# Patient Record
Sex: MALE | Race: WHITE | NOT HISPANIC OR LATINO | Employment: OTHER | URBAN - METROPOLITAN AREA
[De-identification: names, ages, dates, MRNs, and addresses within clinical notes are randomized per-mention and may not be internally consistent; named-entity substitution may affect disease eponyms.]

---

## 2018-05-25 PROBLEM — H25.12 NUCLEAR SCLEROSIS OF LEFT EYE: Status: ACTIVE | Noted: 2018-05-25

## 2018-05-25 PROBLEM — H52.13 MYOPIA OF BOTH EYES WITH ASTIGMATISM AND PRESBYOPIA: Status: ACTIVE | Noted: 2018-05-25

## 2018-05-25 PROBLEM — H26.491 PCO (POSTERIOR CAPSULAR OPACIFICATION), RIGHT: Status: ACTIVE | Noted: 2018-05-25

## 2018-05-25 PROBLEM — Z96.1 PSEUDOPHAKIA OF RIGHT EYE: Status: ACTIVE | Noted: 2018-05-25

## 2018-05-25 PROBLEM — H52.4 MYOPIA OF BOTH EYES WITH ASTIGMATISM AND PRESBYOPIA: Status: ACTIVE | Noted: 2018-05-25

## 2018-05-25 PROBLEM — Z98.890 HX OF VITRECTOMY: Status: ACTIVE | Noted: 2018-05-25

## 2018-05-25 PROBLEM — H52.203 MYOPIA OF BOTH EYES WITH ASTIGMATISM AND PRESBYOPIA: Status: ACTIVE | Noted: 2018-05-25

## 2018-06-08 ASSESSMENT — ACTIVITIES OF DAILY LIVING (ADL)
ADL_SHORT_OF_BREATH: NO
ADL_SCORE: 12
SENSORY_SUPPORT_DEVICES: EYEGLASSES;HEARING AIDS
CHRONIC_PAIN_PRESENT: NO
ADL_BEFORE_ADMISSION: INDEPENDENT
HISTORY OF FALLING IN THE LAST YEAR (PRIOR TO ADMISSION): NO
NEEDS_ASSIST: NO
RECENT_DECLINE_ADL: NO

## 2018-06-13 RX ORDER — SODIUM CHLORIDE 9 MG/ML
INJECTION, SOLUTION INTRAVENOUS CONTINUOUS
Status: CANCELLED | OUTPATIENT
Start: 2018-06-13

## 2018-06-14 ENCOUNTER — HOSPITAL ENCOUNTER (OUTPATIENT)
Age: 73
Discharge: HOME | End: 2018-06-14
Attending: OPHTHALMOLOGY | Admitting: OPHTHALMOLOGY

## 2018-06-14 ENCOUNTER — ANESTHESIA EVENT (OUTPATIENT)
Dept: SURGERY | Age: 73
End: 2018-06-14

## 2018-06-14 ENCOUNTER — ANESTHESIA (OUTPATIENT)
Dept: SURGERY | Age: 73
End: 2018-06-14

## 2018-06-14 ENCOUNTER — SURGERY (OUTPATIENT)
Age: 73
End: 2018-06-14

## 2018-06-14 PROCEDURE — 10002801 HB RX 250 W/O HCPCS: Performed by: OPHTHALMOLOGY

## 2018-06-14 PROCEDURE — V2632 POST CHMBR INTRAOCULAR LENS: HCPCS | Performed by: OPHTHALMOLOGY

## 2018-06-14 PROCEDURE — 66982 XCAPSL CTRC RMVL CPLX WO ECP: CPT | Performed by: NURSE ANESTHETIST, CERTIFIED REGISTERED

## 2018-06-14 PROCEDURE — 10002362 HB ANESTH MAC IV 1ST 1/2 HR: Performed by: OPHTHALMOLOGY

## 2018-06-14 PROCEDURE — 10002800 HB RX 250 W HCPCS: Performed by: NURSE ANESTHETIST, CERTIFIED REGISTERED

## 2018-06-14 PROCEDURE — 66982 XCAPSL CTRC RMVL CPLX WO ECP: CPT | Performed by: OPHTHALMOLOGY

## 2018-06-14 PROCEDURE — 10003056 HB DISPOSABLE INSTRUMENT/SUPPLY 1: Performed by: OPHTHALMOLOGY

## 2018-06-14 PROCEDURE — 10002800 HB RX 250 W HCPCS: Performed by: OPHTHALMOLOGY

## 2018-06-14 PROCEDURE — 10002319 HB EYE SURGERY 2: Performed by: OPHTHALMOLOGY

## 2018-06-14 PROCEDURE — 10004348 HB COUNTER-EVAL PRE-OP

## 2018-06-14 PROCEDURE — 10002807 HB RX 258: Performed by: ANESTHESIOLOGY

## 2018-06-14 DEVICE — TECNIS 1-PC CLEAR MONO 6.0MM 18.0D
Type: IMPLANTABLE DEVICE | Site: EYE | Status: FUNCTIONAL
Brand: TECNIS IOL

## 2018-06-14 RX ORDER — HYDRALAZINE HYDROCHLORIDE 20 MG/ML
5 INJECTION INTRAMUSCULAR; INTRAVENOUS EVERY 10 MIN PRN
Status: DISCONTINUED | OUTPATIENT
Start: 2018-06-14 | End: 2018-06-14 | Stop reason: HOSPADM

## 2018-06-14 RX ORDER — 0.9 % SODIUM CHLORIDE 0.9 %
2 VIAL (ML) INJECTION EVERY 12 HOURS SCHEDULED
Status: DISCONTINUED | OUTPATIENT
Start: 2018-06-14 | End: 2018-06-14 | Stop reason: HOSPADM

## 2018-06-14 RX ORDER — 0.9 % SODIUM CHLORIDE 0.9 %
2 VIAL (ML) INJECTION PRN
Status: DISCONTINUED | OUTPATIENT
Start: 2018-06-14 | End: 2018-06-14 | Stop reason: HOSPADM

## 2018-06-14 RX ORDER — DIPHENHYDRAMINE HYDROCHLORIDE 50 MG/ML
6.25-25 INJECTION INTRAMUSCULAR; INTRAVENOUS
Status: DISCONTINUED | OUTPATIENT
Start: 2018-06-14 | End: 2018-06-14 | Stop reason: HOSPADM

## 2018-06-14 RX ORDER — HUMAN INSULIN 100 [IU]/ML
INJECTION, SOLUTION SUBCUTANEOUS
Status: DISCONTINUED | OUTPATIENT
Start: 2018-06-14 | End: 2018-06-14 | Stop reason: HOSPADM

## 2018-06-14 RX ORDER — ONDANSETRON 2 MG/ML
4 INJECTION INTRAMUSCULAR; INTRAVENOUS 2 TIMES DAILY PRN
Status: DISCONTINUED | OUTPATIENT
Start: 2018-06-14 | End: 2018-06-14 | Stop reason: HOSPADM

## 2018-06-14 RX ORDER — SODIUM CHLORIDE 9 MG/ML
INJECTION, SOLUTION INTRAVENOUS CONTINUOUS
Status: DISCONTINUED | OUTPATIENT
Start: 2018-06-14 | End: 2018-06-14 | Stop reason: HOSPADM

## 2018-06-14 RX ORDER — TETRACAINE HYDROCHLORIDE 5 MG/ML
SOLUTION OPHTHALMIC PRN
Status: DISCONTINUED | OUTPATIENT
Start: 2018-06-14 | End: 2018-06-14 | Stop reason: HOSPADM

## 2018-06-14 RX ORDER — PROCHLORPERAZINE MALEATE 5 MG/1
5-10 TABLET ORAL EVERY 4 HOURS PRN
Status: DISCONTINUED | OUTPATIENT
Start: 2018-06-14 | End: 2018-06-14 | Stop reason: HOSPADM

## 2018-06-14 RX ORDER — LIDOCAINE HYDROCHLORIDE 10 MG/ML
INJECTION, SOLUTION EPIDURAL; INFILTRATION; INTRACAUDAL; PERINEURAL PRN
Status: DISCONTINUED | OUTPATIENT
Start: 2018-06-14 | End: 2018-06-14 | Stop reason: HOSPADM

## 2018-06-14 RX ORDER — LIDOCAINE HYDROCHLORIDE 10 MG/ML
.5-1 INJECTION, SOLUTION INFILTRATION; PERINEURAL PRN
Status: DISCONTINUED | OUTPATIENT
Start: 2018-06-14 | End: 2018-06-14 | Stop reason: HOSPADM

## 2018-06-14 RX ORDER — MOXIFLOXACIN 5 MG/ML
1 SOLUTION/ DROPS OPHTHALMIC
Status: COMPLETED | OUTPATIENT
Start: 2018-06-14 | End: 2018-06-14

## 2018-06-14 RX ORDER — LIDOCAINE AND PRILOCAINE 25; 25 MG/G; MG/G
1 CREAM TOPICAL PRN
Status: DISCONTINUED | OUTPATIENT
Start: 2018-06-14 | End: 2018-06-14 | Stop reason: HOSPADM

## 2018-06-14 RX ORDER — TROPICAMIDE 10 MG/ML
1 SOLUTION/ DROPS OPHTHALMIC
Status: COMPLETED | OUTPATIENT
Start: 2018-06-14 | End: 2018-06-14

## 2018-06-14 RX ORDER — LABETALOL HYDROCHLORIDE 5 MG/ML
5 INJECTION, SOLUTION INTRAVENOUS EVERY 10 MIN PRN
Status: DISCONTINUED | OUTPATIENT
Start: 2018-06-14 | End: 2018-06-14 | Stop reason: HOSPADM

## 2018-06-14 RX ORDER — MIDAZOLAM HYDROCHLORIDE 1 MG/ML
INJECTION, SOLUTION INTRAMUSCULAR; INTRAVENOUS PRN
Status: DISCONTINUED | OUTPATIENT
Start: 2018-06-14 | End: 2018-06-14

## 2018-06-14 RX ORDER — ACETAMINOPHEN 10 MG/ML
1000 INJECTION, SOLUTION INTRAVENOUS
Status: DISCONTINUED | OUTPATIENT
Start: 2018-06-14 | End: 2018-06-14 | Stop reason: HOSPADM

## 2018-06-14 RX ORDER — PHENYLEPHRINE HYDROCHLORIDE 25 MG/ML
1 SOLUTION/ DROPS OPHTHALMIC
Status: COMPLETED | OUTPATIENT
Start: 2018-06-14 | End: 2018-06-14

## 2018-06-14 RX ORDER — SODIUM CHLORIDE, SODIUM LACTATE, POTASSIUM CHLORIDE, CALCIUM CHLORIDE 600; 310; 30; 20 MG/100ML; MG/100ML; MG/100ML; MG/100ML
INJECTION, SOLUTION INTRAVENOUS CONTINUOUS
Status: DISCONTINUED | OUTPATIENT
Start: 2018-06-14 | End: 2018-06-14 | Stop reason: HOSPADM

## 2018-06-14 RX ORDER — BALANCED SALT SOLUTION 6.4; .75; .48; .3; 3.9; 1.7 MG/ML; MG/ML; MG/ML; MG/ML; MG/ML; MG/ML
SOLUTION OPHTHALMIC PRN
Status: DISCONTINUED | OUTPATIENT
Start: 2018-06-14 | End: 2018-06-14 | Stop reason: HOSPADM

## 2018-06-14 RX ORDER — NALOXONE HCL 0.4 MG/ML
0.2 VIAL (ML) INJECTION EVERY 5 MIN PRN
Status: DISCONTINUED | OUTPATIENT
Start: 2018-06-14 | End: 2018-06-14 | Stop reason: HOSPADM

## 2018-06-14 RX ADMIN — EPINEPHRINE: 1 INJECTION INTRAMUSCULAR; INTRAVENOUS; SUBCUTANEOUS at 11:57

## 2018-06-14 RX ADMIN — TROPICAMIDE 1 DROP: 10 SOLUTION/ DROPS OPHTHALMIC at 11:15

## 2018-06-14 RX ADMIN — MOXIFLOXACIN 1 DROP: 5 SOLUTION/ DROPS OPHTHALMIC at 11:20

## 2018-06-14 RX ADMIN — PHENYLEPHRINE HYDROCHLORIDE 1 DROP: 25 SOLUTION/ DROPS OPHTHALMIC at 11:25

## 2018-06-14 RX ADMIN — PHENYLEPHRINE HYDROCHLORIDE 1 DROP: 25 SOLUTION/ DROPS OPHTHALMIC at 11:20

## 2018-06-14 RX ADMIN — MIDAZOLAM HYDROCHLORIDE 1 MG: 1 INJECTION, SOLUTION INTRAMUSCULAR; INTRAVENOUS at 11:55

## 2018-06-14 RX ADMIN — BALANCED SALT SOLUTION 15 ML: 6.4; .75; .48; .3; 3.9; 1.7 SOLUTION OPHTHALMIC at 11:57

## 2018-06-14 RX ADMIN — TETRACAINE HYDROCHLORIDE 2 DROP: 5 SOLUTION OPHTHALMIC at 11:40

## 2018-06-14 RX ADMIN — LIDOCAINE HYDROCHLORIDE 0.5 ML: 10 INJECTION, SOLUTION EPIDURAL; INFILTRATION; INTRACAUDAL; PERINEURAL at 11:57

## 2018-06-14 RX ADMIN — SODIUM CHLORIDE, POTASSIUM CHLORIDE, SODIUM LACTATE AND CALCIUM CHLORIDE: 600; 310; 30; 20 INJECTION, SOLUTION INTRAVENOUS at 11:25

## 2018-06-14 RX ADMIN — Medication 12 MG: at 11:57

## 2018-06-14 RX ADMIN — MOXIFLOXACIN 1 DROP: 5 SOLUTION/ DROPS OPHTHALMIC at 11:15

## 2018-06-14 RX ADMIN — TETRACAINE HYDROCHLORIDE 2 DROP: 5 SOLUTION OPHTHALMIC at 10:44

## 2018-06-14 RX ADMIN — FENTANYL CITRATE 50 MCG: 50 INJECTION INTRAMUSCULAR; INTRAVENOUS at 11:50

## 2018-06-14 RX ADMIN — TETRACAINE HYDROCHLORIDE 2 DROP: 5 SOLUTION OPHTHALMIC at 11:44

## 2018-06-14 RX ADMIN — TROPICAMIDE 1 DROP: 10 SOLUTION/ DROPS OPHTHALMIC at 11:25

## 2018-06-14 RX ADMIN — TETRACAINE HYDROCHLORIDE 2 DROP: 5 SOLUTION OPHTHALMIC at 11:42

## 2018-06-14 RX ADMIN — MOXIFLOXACIN 1 DROP: 5 SOLUTION/ DROPS OPHTHALMIC at 11:25

## 2018-06-14 RX ADMIN — MIDAZOLAM HYDROCHLORIDE 1 MG: 1 INJECTION, SOLUTION INTRAMUSCULAR; INTRAVENOUS at 11:50

## 2018-06-14 RX ADMIN — TROPICAMIDE 1 DROP: 10 SOLUTION/ DROPS OPHTHALMIC at 11:20

## 2018-06-14 RX ADMIN — PHENYLEPHRINE HYDROCHLORIDE 1 DROP: 25 SOLUTION/ DROPS OPHTHALMIC at 11:15

## 2018-06-14 ASSESSMENT — ACTIVITIES OF DAILY LIVING (ADL)
ADL_BEFORE_ADMISSION: INDEPENDENT
NEEDS_ASSIST: NO
ADL_SCORE: 12
SENSORY_SUPPORT_DEVICES: EYEGLASSES;HEARING AIDS
HISTORY OF FALLING IN THE LAST YEAR (PRIOR TO ADMISSION): NO
ADL_SHORT_OF_BREATH: NO
RECENT_DECLINE_ADL: NO
CHRONIC_PAIN_PRESENT: NO

## 2018-06-14 ASSESSMENT — COGNITIVE AND FUNCTIONAL STATUS - GENERAL
ARE YOU DEAF OR DO YOU HAVE SERIOUS DIFFICULTY  HEARING: NO
ARE YOU BLIND OR DO YOU HAVE SERIOUS DIFFICULTY SEEING, EVEN WHEN WEARING GLASSES: NO

## 2018-06-14 ASSESSMENT — PAIN SCALES - GENERAL
PAIN_LEVEL_AT_REST: 0

## 2018-06-14 ASSESSMENT — ENCOUNTER SYMPTOMS
SEIZURES: 0
EXERCISE TOLERANCE: GOOD

## 2018-06-15 PROBLEM — Z96.1 PSEUDOPHAKIA OF BOTH EYES: Status: ACTIVE | Noted: 2018-06-15

## 2018-06-15 PROBLEM — Z96.1 PSEUDOPHAKIA OF RIGHT EYE: Status: RESOLVED | Noted: 2018-05-25 | Resolved: 2018-06-15

## 2018-10-30 ENCOUNTER — NURSE ONLY (OUTPATIENT)
Dept: URGENT CARE | Age: 73
End: 2018-10-30

## 2018-10-30 VITALS — TEMPERATURE: 98.9 F

## 2018-10-30 DIAGNOSIS — Z23 NEED FOR IMMUNIZATION AGAINST INFLUENZA: Primary | ICD-10-CM

## 2018-10-30 PROCEDURE — 90662 IIV NO PRSV INCREASED AG IM: CPT | Performed by: PHYSICIAN ASSISTANT

## 2019-11-25 ENCOUNTER — NURSE ONLY (OUTPATIENT)
Dept: URGENT CARE | Age: 74
End: 2019-11-25

## 2019-11-25 VITALS — TEMPERATURE: 98.4 F

## 2019-11-25 DIAGNOSIS — Z23 NEED FOR IMMUNIZATION AGAINST INFLUENZA: Primary | ICD-10-CM

## 2019-11-25 PROCEDURE — 90662 IIV NO PRSV INCREASED AG IM: CPT | Performed by: PHYSICIAN ASSISTANT

## 2021-01-01 ENCOUNTER — PATIENT OUTREACH (OUTPATIENT)
Dept: GASTROENTEROLOGY | Facility: CLINIC | Age: 76
End: 2021-01-01
Payer: COMMERCIAL

## 2021-01-01 ENCOUNTER — TELEPHONE (OUTPATIENT)
Dept: GASTROENTEROLOGY | Facility: CLINIC | Age: 76
End: 2021-01-01

## 2021-01-01 ENCOUNTER — PATIENT OUTREACH (OUTPATIENT)
Dept: GASTROENTEROLOGY | Facility: CLINIC | Age: 76
End: 2021-01-01

## 2021-01-01 ENCOUNTER — HOSPITAL ENCOUNTER (OUTPATIENT)
Facility: CLINIC | Age: 76
Discharge: HOME | End: 2021-10-14
Attending: INTERNAL MEDICINE | Admitting: INTERNAL MEDICINE
Payer: COMMERCIAL

## 2021-01-01 ENCOUNTER — TELEPHONE (OUTPATIENT)
Dept: GASTROENTEROLOGY | Facility: CLINIC | Age: 76
End: 2021-01-01
Payer: COMMERCIAL

## 2021-01-01 ENCOUNTER — ANESTHESIA EVENT (OUTPATIENT)
Dept: SURGERY | Facility: CLINIC | Age: 76
End: 2021-01-01
Payer: COMMERCIAL

## 2021-01-01 ENCOUNTER — HEALTH MAINTENANCE LETTER (OUTPATIENT)
Age: 76
End: 2021-01-01

## 2021-01-01 ENCOUNTER — ANESTHESIA (OUTPATIENT)
Dept: GASTROENTEROLOGY | Facility: CLINIC | Age: 76
End: 2021-01-01
Payer: COMMERCIAL

## 2021-01-01 ENCOUNTER — LAB (OUTPATIENT)
Dept: LAB | Facility: CLINIC | Age: 76
End: 2021-01-01
Payer: COMMERCIAL

## 2021-01-01 ENCOUNTER — OFFICE VISIT (OUTPATIENT)
Dept: SURGERY | Facility: CLINIC | Age: 76
End: 2021-01-01
Attending: SURGERY
Payer: COMMERCIAL

## 2021-01-01 ENCOUNTER — HOSPITAL ENCOUNTER (OUTPATIENT)
Facility: CLINIC | Age: 76
Discharge: HOME | End: 2021-09-28
Attending: INTERNAL MEDICINE | Admitting: INTERNAL MEDICINE
Payer: COMMERCIAL

## 2021-01-01 ENCOUNTER — ANESTHESIA (OUTPATIENT)
Dept: SURGERY | Facility: CLINIC | Age: 76
End: 2021-01-01
Payer: COMMERCIAL

## 2021-01-01 ENCOUNTER — TELEPHONE (OUTPATIENT)
Dept: SURGERY | Facility: CLINIC | Age: 76
End: 2021-01-01

## 2021-01-01 ENCOUNTER — OFFICE VISIT (OUTPATIENT)
Dept: FAMILY MEDICINE | Facility: CLINIC | Age: 76
End: 2021-01-01
Payer: COMMERCIAL

## 2021-01-01 ENCOUNTER — APPOINTMENT (OUTPATIENT)
Dept: GENERAL RADIOLOGY | Facility: CLINIC | Age: 76
End: 2021-01-01
Attending: INTERNAL MEDICINE
Payer: COMMERCIAL

## 2021-01-01 ENCOUNTER — PREP FOR PROCEDURE (OUTPATIENT)
Dept: GASTROENTEROLOGY | Facility: CLINIC | Age: 76
End: 2021-01-01

## 2021-01-01 ENCOUNTER — PATIENT OUTREACH (OUTPATIENT)
Dept: SURGERY | Facility: CLINIC | Age: 76
End: 2021-01-01

## 2021-01-01 ENCOUNTER — LAB (OUTPATIENT)
Dept: URGENT CARE | Facility: URGENT CARE | Age: 76
End: 2021-01-01
Payer: COMMERCIAL

## 2021-01-01 ENCOUNTER — ANESTHESIA EVENT (OUTPATIENT)
Dept: GASTROENTEROLOGY | Facility: CLINIC | Age: 76
End: 2021-01-01
Payer: COMMERCIAL

## 2021-01-01 ENCOUNTER — PREP FOR PROCEDURE (OUTPATIENT)
Dept: GASTROENTEROLOGY | Facility: CLINIC | Age: 76
End: 2021-01-01
Payer: COMMERCIAL

## 2021-01-01 ENCOUNTER — LAB (OUTPATIENT)
Dept: LAB | Facility: CLINIC | Age: 76
End: 2021-01-01
Attending: INTERNAL MEDICINE
Payer: COMMERCIAL

## 2021-01-01 VITALS
WEIGHT: 179.45 LBS | BODY MASS INDEX: 25.12 KG/M2 | TEMPERATURE: 97.8 F | SYSTOLIC BLOOD PRESSURE: 142 MMHG | OXYGEN SATURATION: 98 % | HEART RATE: 74 BPM | RESPIRATION RATE: 16 BRPM | HEIGHT: 71 IN | DIASTOLIC BLOOD PRESSURE: 82 MMHG

## 2021-01-01 VITALS
HEART RATE: 85 BPM | RESPIRATION RATE: 16 BRPM | TEMPERATURE: 98.4 F | SYSTOLIC BLOOD PRESSURE: 140 MMHG | DIASTOLIC BLOOD PRESSURE: 79 MMHG | WEIGHT: 173.94 LBS | BODY MASS INDEX: 24.9 KG/M2 | HEIGHT: 70 IN | OXYGEN SATURATION: 99 %

## 2021-01-01 VITALS
HEART RATE: 72 BPM | BODY MASS INDEX: 26.25 KG/M2 | SYSTOLIC BLOOD PRESSURE: 130 MMHG | WEIGHT: 187.5 LBS | DIASTOLIC BLOOD PRESSURE: 70 MMHG | RESPIRATION RATE: 16 BRPM | HEIGHT: 71 IN

## 2021-01-01 VITALS
OXYGEN SATURATION: 100 % | WEIGHT: 179.2 LBS | DIASTOLIC BLOOD PRESSURE: 73 MMHG | HEART RATE: 72 BPM | SYSTOLIC BLOOD PRESSURE: 112 MMHG | TEMPERATURE: 97.7 F | BODY MASS INDEX: 25.71 KG/M2

## 2021-01-01 DIAGNOSIS — F33.42 MAJOR DEPRESSIVE DISORDER, RECURRENT EPISODE, IN FULL REMISSION (H): ICD-10-CM

## 2021-01-01 DIAGNOSIS — K86.89 PANCREATIC MASS: Primary | ICD-10-CM

## 2021-01-01 DIAGNOSIS — Z11.59 ENCOUNTER FOR SCREENING FOR OTHER VIRAL DISEASES: ICD-10-CM

## 2021-01-01 DIAGNOSIS — C61 PROSTATE CANCER (H): ICD-10-CM

## 2021-01-01 DIAGNOSIS — R69 DIAGNOSIS UNKNOWN: ICD-10-CM

## 2021-01-01 DIAGNOSIS — E78.5 DYSLIPIDEMIA (HIGH LDL; LOW HDL): ICD-10-CM

## 2021-01-01 DIAGNOSIS — K83.1 OBSTRUCTIVE JAUNDICE (H): ICD-10-CM

## 2021-01-01 DIAGNOSIS — K86.89 PANCREATIC MASS: ICD-10-CM

## 2021-01-01 DIAGNOSIS — I10 ESSENTIAL HYPERTENSION, BENIGN: Primary | ICD-10-CM

## 2021-01-01 DIAGNOSIS — I10 ESSENTIAL HYPERTENSION, BENIGN: ICD-10-CM

## 2021-01-01 DIAGNOSIS — K83.1 BILIARY STRICTURE (H): Primary | ICD-10-CM

## 2021-01-01 DIAGNOSIS — F51.01 PRIMARY INSOMNIA: ICD-10-CM

## 2021-01-01 DIAGNOSIS — Z11.59 ENCOUNTER FOR SCREENING FOR OTHER VIRAL DISEASES: Primary | ICD-10-CM

## 2021-01-01 LAB
ALBUMIN SERPL-MCNC: 3.4 G/DL (ref 3.4–5)
ALP SERPL-CCNC: 95 U/L (ref 40–150)
ALT SERPL W P-5'-P-CCNC: 36 U/L (ref 0–70)
AMYLASE SERPL-CCNC: 36 U/L (ref 30–110)
AMYLASE SERPL-CCNC: 47 U/L (ref 30–110)
ANION GAP SERPL CALCULATED.3IONS-SCNC: 5 MMOL/L (ref 3–14)
AST SERPL W P-5'-P-CCNC: 28 U/L (ref 0–45)
BILIRUB SERPL-MCNC: 0.6 MG/DL (ref 0.2–1.3)
BUN SERPL-MCNC: 18 MG/DL (ref 7–30)
CALCIUM SERPL-MCNC: 8.8 MG/DL (ref 8.5–10.1)
CHLORIDE BLD-SCNC: 104 MMOL/L (ref 94–109)
CHOLEST SERPL-MCNC: 163 MG/DL
CO2 SERPL-SCNC: 27 MMOL/L (ref 20–32)
CREAT SERPL-MCNC: 0.92 MG/DL (ref 0.66–1.25)
ERCP: NORMAL
ERYTHROCYTE [DISTWIDTH] IN BLOOD BY AUTOMATED COUNT: 12.8 % (ref 10–15)
FASTING STATUS PATIENT QL REPORTED: YES
GFR SERPL CREATININE-BSD FRML MDRD: 81 ML/MIN/1.73M2
GLUCOSE BLD-MCNC: 114 MG/DL (ref 70–99)
GLUCOSE BLDC GLUCOMTR-MCNC: 119 MG/DL (ref 70–99)
HCT VFR BLD AUTO: 31.6 % (ref 40–53)
HDLC SERPL-MCNC: 54 MG/DL
HGB BLD-MCNC: 10.8 G/DL (ref 13.3–17.7)
INR PPP: 1.08 (ref 0.85–1.15)
LDLC SERPL CALC-MCNC: 86 MG/DL
LIPASE SERPL-CCNC: 280 U/L (ref 73–393)
LIPASE SERPL-CCNC: 574 U/L (ref 73–393)
MCH RBC QN AUTO: 35.5 PG (ref 26.5–33)
MCHC RBC AUTO-ENTMCNC: 34.2 G/DL (ref 31.5–36.5)
MCV RBC AUTO: 104 FL (ref 78–100)
NONHDLC SERPL-MCNC: 109 MG/DL
PATH REPORT.COMMENTS IMP SPEC: ABNORMAL
PATH REPORT.COMMENTS IMP SPEC: NORMAL
PATH REPORT.COMMENTS IMP SPEC: YES
PATH REPORT.FINAL DX SPEC: ABNORMAL
PATH REPORT.FINAL DX SPEC: NORMAL
PATH REPORT.FINAL DX SPEC: NORMAL
PATH REPORT.GROSS SPEC: ABNORMAL
PATH REPORT.GROSS SPEC: NORMAL
PATH REPORT.GROSS SPEC: NORMAL
PATH REPORT.MICROSCOPIC SPEC OTHER STN: NORMAL
PATH REPORT.RELEVANT HX SPEC: ABNORMAL
PATH REPORT.RELEVANT HX SPEC: NORMAL
PATH REPORT.RELEVANT HX SPEC: NORMAL
PHOTO IMAGE: NORMAL
PLATELET # BLD AUTO: 175 10E3/UL (ref 150–450)
POTASSIUM BLD-SCNC: 4 MMOL/L (ref 3.4–5.3)
PROT SERPL-MCNC: 6.9 G/DL (ref 6.8–8.8)
RBC # BLD AUTO: 3.04 10E6/UL (ref 4.4–5.9)
SARS-COV-2 RNA RESP QL NAA+PROBE: NEGATIVE
SARS-COV-2 RNA RESP QL NAA+PROBE: NEGATIVE
SODIUM SERPL-SCNC: 136 MMOL/L (ref 133–144)
TRIGL SERPL-MCNC: 115 MG/DL
UPPER EUS: NORMAL
UPPER EUS: NORMAL
WBC # BLD AUTO: 6.9 10E3/UL (ref 4–11)

## 2021-01-01 PROCEDURE — U0003 INFECTIOUS AGENT DETECTION BY NUCLEIC ACID (DNA OR RNA); SEVERE ACUTE RESPIRATORY SYNDROME CORONAVIRUS 2 (SARS-COV-2) (CORONAVIRUS DISEASE [COVID-19]), AMPLIFIED PROBE TECHNIQUE, MAKING USE OF HIGH THROUGHPUT TECHNOLOGIES AS DESCRIBED BY CMS-2020-01-R: HCPCS

## 2021-01-01 PROCEDURE — 88173 CYTOPATH EVAL FNA REPORT: CPT | Mod: 26 | Performed by: PATHOLOGY

## 2021-01-01 PROCEDURE — 999N000141 HC STATISTIC PRE-PROCEDURE NURSING ASSESSMENT: Performed by: INTERNAL MEDICINE

## 2021-01-01 PROCEDURE — 80053 COMPREHEN METABOLIC PANEL: CPT | Performed by: INTERNAL MEDICINE

## 2021-01-01 PROCEDURE — 82962 GLUCOSE BLOOD TEST: CPT

## 2021-01-01 PROCEDURE — 255N000002 HC RX 255 OP 636: Performed by: INTERNAL MEDICINE

## 2021-01-01 PROCEDURE — 360N000084 HC SURGERY LEVEL 4 W/ FLUORO, PER MIN: Performed by: INTERNAL MEDICINE

## 2021-01-01 PROCEDURE — 88305 TISSUE EXAM BY PATHOLOGIST: CPT | Mod: 26 | Performed by: PATHOLOGY

## 2021-01-01 PROCEDURE — C1726 CATH, BAL DIL, NON-VASCULAR: HCPCS | Performed by: INTERNAL MEDICINE

## 2021-01-01 PROCEDURE — 36415 COLL VENOUS BLD VENIPUNCTURE: CPT

## 2021-01-01 PROCEDURE — 272N000001 HC OR GENERAL SUPPLY STERILE: Performed by: INTERNAL MEDICINE

## 2021-01-01 PROCEDURE — 85027 COMPLETE CBC AUTOMATED: CPT | Performed by: INTERNAL MEDICINE

## 2021-01-01 PROCEDURE — C1877 STENT, NON-COAT/COV W/O DEL: HCPCS | Performed by: INTERNAL MEDICINE

## 2021-01-01 PROCEDURE — 250N000009 HC RX 250: Performed by: INTERNAL MEDICINE

## 2021-01-01 PROCEDURE — 250N000009 HC RX 250: Performed by: ANESTHESIOLOGY

## 2021-01-01 PROCEDURE — 82150 ASSAY OF AMYLASE: CPT | Performed by: INTERNAL MEDICINE

## 2021-01-01 PROCEDURE — 80061 LIPID PANEL: CPT

## 2021-01-01 PROCEDURE — U0005 INFEC AGEN DETEC AMPLI PROBE: HCPCS

## 2021-01-01 PROCEDURE — 710N000012 HC RECOVERY PHASE 2, PER MINUTE: Performed by: INTERNAL MEDICINE

## 2021-01-01 PROCEDURE — 250N000025 HC SEVOFLURANE, PER MIN: Performed by: INTERNAL MEDICINE

## 2021-01-01 PROCEDURE — 999N000181 XR SURGERY CARM FLUORO GREATER THAN 5 MIN W STILLS: Mod: TC

## 2021-01-01 PROCEDURE — 43242 EGD US FINE NEEDLE BX/ASPIR: CPT | Performed by: INTERNAL MEDICINE

## 2021-01-01 PROCEDURE — G0463 HOSPITAL OUTPT CLINIC VISIT: HCPCS

## 2021-01-01 PROCEDURE — 258N000003 HC RX IP 258 OP 636: Performed by: INTERNAL MEDICINE

## 2021-01-01 PROCEDURE — 258N000003 HC RX IP 258 OP 636

## 2021-01-01 PROCEDURE — 88305 TISSUE EXAM BY PATHOLOGIST: CPT | Mod: TC | Performed by: INTERNAL MEDICINE

## 2021-01-01 PROCEDURE — 250N000009 HC RX 250

## 2021-01-01 PROCEDURE — 250N000011 HC RX IP 250 OP 636

## 2021-01-01 PROCEDURE — 99207 PR NO CHARGE LOS: CPT

## 2021-01-01 PROCEDURE — 258N000003 HC RX IP 258 OP 636: Performed by: STUDENT IN AN ORGANIZED HEALTH CARE EDUCATION/TRAINING PROGRAM

## 2021-01-01 PROCEDURE — 88172 CYTP DX EVAL FNA 1ST EA SITE: CPT | Mod: 26 | Performed by: PATHOLOGY

## 2021-01-01 PROCEDURE — 710N000010 HC RECOVERY PHASE 1, LEVEL 2, PER MIN: Performed by: INTERNAL MEDICINE

## 2021-01-01 PROCEDURE — 250N000011 HC RX IP 250 OP 636: Performed by: STUDENT IN AN ORGANIZED HEALTH CARE EDUCATION/TRAINING PROGRAM

## 2021-01-01 PROCEDURE — 83690 ASSAY OF LIPASE: CPT | Performed by: INTERNAL MEDICINE

## 2021-01-01 PROCEDURE — C1769 GUIDE WIRE: HCPCS | Performed by: INTERNAL MEDICINE

## 2021-01-01 PROCEDURE — 99204 OFFICE O/P NEW MOD 45 MIN: CPT | Performed by: INTERNAL MEDICINE

## 2021-01-01 PROCEDURE — 36415 COLL VENOUS BLD VENIPUNCTURE: CPT | Performed by: INTERNAL MEDICINE

## 2021-01-01 PROCEDURE — 99204 OFFICE O/P NEW MOD 45 MIN: CPT | Performed by: SURGERY

## 2021-01-01 PROCEDURE — 43237 ENDOSCOPIC US EXAM ESOPH: CPT | Performed by: INTERNAL MEDICINE

## 2021-01-01 PROCEDURE — 85610 PROTHROMBIN TIME: CPT | Performed by: INTERNAL MEDICINE

## 2021-01-01 PROCEDURE — 370N000017 HC ANESTHESIA TECHNICAL FEE, PER MIN: Performed by: INTERNAL MEDICINE

## 2021-01-01 DEVICE — IMPLANTABLE DEVICE
Type: IMPLANTABLE DEVICE | Site: BILE DUCT | Status: NON-FUNCTIONAL
Removed: 2022-02-24

## 2021-01-01 RX ORDER — PROPOFOL 10 MG/ML
INJECTION, EMULSION INTRAVENOUS PRN
Status: DISCONTINUED | OUTPATIENT
Start: 2021-01-01 | End: 2021-01-01

## 2021-01-01 RX ORDER — SODIUM CHLORIDE, SODIUM LACTATE, POTASSIUM CHLORIDE, CALCIUM CHLORIDE 600; 310; 30; 20 MG/100ML; MG/100ML; MG/100ML; MG/100ML
INJECTION, SOLUTION INTRAVENOUS CONTINUOUS PRN
Status: DISCONTINUED | OUTPATIENT
Start: 2021-01-01 | End: 2021-01-01

## 2021-01-01 RX ORDER — IOPAMIDOL 408 MG/ML
INJECTION, SOLUTION INTRATHECAL PRN
Status: DISCONTINUED | OUTPATIENT
Start: 2021-01-01 | End: 2021-01-01 | Stop reason: HOSPADM

## 2021-01-01 RX ORDER — ONDANSETRON 4 MG/1
4 TABLET, ORALLY DISINTEGRATING ORAL EVERY 30 MIN PRN
Status: DISCONTINUED | OUTPATIENT
Start: 2021-01-01 | End: 2021-01-01 | Stop reason: HOSPADM

## 2021-01-01 RX ORDER — ONDANSETRON 2 MG/ML
4 INJECTION INTRAMUSCULAR; INTRAVENOUS EVERY 30 MIN PRN
Status: DISCONTINUED | OUTPATIENT
Start: 2021-01-01 | End: 2021-01-01 | Stop reason: HOSPADM

## 2021-01-01 RX ORDER — EPHEDRINE SULFATE 50 MG/ML
INJECTION, SOLUTION INTRAMUSCULAR; INTRAVENOUS; SUBCUTANEOUS PRN
Status: DISCONTINUED | OUTPATIENT
Start: 2021-01-01 | End: 2021-01-01

## 2021-01-01 RX ORDER — IOPAMIDOL 510 MG/ML
INJECTION, SOLUTION INTRAVASCULAR PRN
Status: DISCONTINUED | OUTPATIENT
Start: 2021-01-01 | End: 2021-01-01 | Stop reason: HOSPADM

## 2021-01-01 RX ORDER — NALOXONE HYDROCHLORIDE 0.4 MG/ML
0.4 INJECTION, SOLUTION INTRAMUSCULAR; INTRAVENOUS; SUBCUTANEOUS
Status: DISCONTINUED | OUTPATIENT
Start: 2021-01-01 | End: 2021-01-01 | Stop reason: HOSPADM

## 2021-01-01 RX ORDER — LOSARTAN POTASSIUM 100 MG/1
100 TABLET ORAL DAILY
Qty: 90 TABLET | Refills: 1 | Status: SHIPPED | OUTPATIENT
Start: 2021-01-01 | End: 2022-01-01

## 2021-01-01 RX ORDER — NALOXONE HYDROCHLORIDE 0.4 MG/ML
0.2 INJECTION, SOLUTION INTRAMUSCULAR; INTRAVENOUS; SUBCUTANEOUS
Status: DISCONTINUED | OUTPATIENT
Start: 2021-01-01 | End: 2021-01-01 | Stop reason: HOSPADM

## 2021-01-01 RX ORDER — LABETALOL HYDROCHLORIDE 5 MG/ML
10 INJECTION, SOLUTION INTRAVENOUS
Status: DISCONTINUED | OUTPATIENT
Start: 2021-01-01 | End: 2021-01-01 | Stop reason: HOSPADM

## 2021-01-01 RX ORDER — HYDRALAZINE HYDROCHLORIDE 20 MG/ML
2.5-5 INJECTION INTRAMUSCULAR; INTRAVENOUS EVERY 10 MIN PRN
Status: DISCONTINUED | OUTPATIENT
Start: 2021-01-01 | End: 2021-01-01 | Stop reason: HOSPADM

## 2021-01-01 RX ORDER — ACETAMINOPHEN 325 MG/1
975 TABLET ORAL ONCE
Status: DISCONTINUED | OUTPATIENT
Start: 2021-01-01 | End: 2021-01-01 | Stop reason: HOSPADM

## 2021-01-01 RX ORDER — HYDROMORPHONE HCL IN WATER/PF 6 MG/30 ML
0.2 PATIENT CONTROLLED ANALGESIA SYRINGE INTRAVENOUS EVERY 5 MIN PRN
Status: DISCONTINUED | OUTPATIENT
Start: 2021-01-01 | End: 2021-01-01 | Stop reason: HOSPADM

## 2021-01-01 RX ORDER — ZOLPIDEM TARTRATE 10 MG/1
10 TABLET ORAL
COMMUNITY
Start: 2021-08-06 | End: 2022-01-01

## 2021-01-01 RX ORDER — DICYCLOMINE HCL 20 MG
20 TABLET ORAL 3 TIMES DAILY
COMMUNITY
Start: 2021-09-10

## 2021-01-01 RX ORDER — LIDOCAINE 40 MG/G
CREAM TOPICAL
Status: DISCONTINUED | OUTPATIENT
Start: 2021-01-01 | End: 2021-01-01 | Stop reason: HOSPADM

## 2021-01-01 RX ORDER — FLUMAZENIL 0.1 MG/ML
0.2 INJECTION, SOLUTION INTRAVENOUS
Status: DISCONTINUED | OUTPATIENT
Start: 2021-01-01 | End: 2021-01-01 | Stop reason: HOSPADM

## 2021-01-01 RX ORDER — INDOMETHACIN 50 MG/1
SUPPOSITORY RECTAL PRN
Status: DISCONTINUED | OUTPATIENT
Start: 2021-01-01 | End: 2021-01-01 | Stop reason: HOSPADM

## 2021-01-01 RX ORDER — ESCITALOPRAM OXALATE 20 MG/1
1 TABLET ORAL EVERY MORNING
COMMUNITY
Start: 2021-09-02

## 2021-01-01 RX ORDER — OXYCODONE HYDROCHLORIDE 5 MG/1
5 TABLET ORAL EVERY 4 HOURS PRN
Status: DISCONTINUED | OUTPATIENT
Start: 2021-01-01 | End: 2021-01-01 | Stop reason: HOSPADM

## 2021-01-01 RX ORDER — DIMENHYDRINATE 50 MG/ML
25 INJECTION, SOLUTION INTRAMUSCULAR; INTRAVENOUS
Status: DISCONTINUED | OUTPATIENT
Start: 2021-01-01 | End: 2021-01-01 | Stop reason: HOSPADM

## 2021-01-01 RX ORDER — SODIUM CHLORIDE, SODIUM LACTATE, POTASSIUM CHLORIDE, CALCIUM CHLORIDE 600; 310; 30; 20 MG/100ML; MG/100ML; MG/100ML; MG/100ML
INJECTION, SOLUTION INTRAVENOUS CONTINUOUS
Status: DISCONTINUED | OUTPATIENT
Start: 2021-01-01 | End: 2021-01-01 | Stop reason: HOSPADM

## 2021-01-01 RX ORDER — LIDOCAINE HYDROCHLORIDE 20 MG/ML
INJECTION, SOLUTION INFILTRATION; PERINEURAL PRN
Status: DISCONTINUED | OUTPATIENT
Start: 2021-01-01 | End: 2021-01-01

## 2021-01-01 RX ORDER — BUPROPION HYDROCHLORIDE 150 MG/1
1 TABLET, EXTENDED RELEASE ORAL DAILY
COMMUNITY
Start: 2020-03-03 | End: 2021-01-01

## 2021-01-01 RX ORDER — LOSARTAN POTASSIUM 100 MG/1
100 TABLET ORAL DAILY
COMMUNITY
Start: 2020-09-04 | End: 2021-01-01

## 2021-01-01 RX ORDER — ONDANSETRON 2 MG/ML
INJECTION INTRAMUSCULAR; INTRAVENOUS PRN
Status: DISCONTINUED | OUTPATIENT
Start: 2021-01-01 | End: 2021-01-01

## 2021-01-01 RX ORDER — GLYCOPYRROLATE 0.2 MG/ML
INJECTION, SOLUTION INTRAMUSCULAR; INTRAVENOUS PRN
Status: DISCONTINUED | OUTPATIENT
Start: 2021-01-01 | End: 2021-01-01

## 2021-01-01 RX ORDER — LOVASTATIN 40 MG
40 TABLET ORAL EVERY MORNING
COMMUNITY
Start: 2020-09-04 | End: 2022-01-01

## 2021-01-01 RX ORDER — HYDROMORPHONE HCL IN WATER/PF 6 MG/30 ML
0.4 PATIENT CONTROLLED ANALGESIA SYRINGE INTRAVENOUS EVERY 5 MIN PRN
Status: DISCONTINUED | OUTPATIENT
Start: 2021-01-01 | End: 2021-01-01 | Stop reason: HOSPADM

## 2021-01-01 RX ORDER — FENTANYL CITRATE 50 UG/ML
50 INJECTION, SOLUTION INTRAMUSCULAR; INTRAVENOUS EVERY 5 MIN PRN
Status: DISCONTINUED | OUTPATIENT
Start: 2021-01-01 | End: 2021-01-01 | Stop reason: HOSPADM

## 2021-01-01 RX ORDER — PROPOFOL 10 MG/ML
INJECTION, EMULSION INTRAVENOUS CONTINUOUS PRN
Status: DISCONTINUED | OUTPATIENT
Start: 2021-01-01 | End: 2021-01-01

## 2021-01-01 RX ORDER — FENTANYL CITRATE 50 UG/ML
INJECTION, SOLUTION INTRAMUSCULAR; INTRAVENOUS PRN
Status: DISCONTINUED | OUTPATIENT
Start: 2021-01-01 | End: 2021-01-01

## 2021-01-01 RX ORDER — ONDANSETRON 4 MG/1
4 TABLET, ORALLY DISINTEGRATING ORAL EVERY 6 HOURS PRN
Status: DISCONTINUED | OUTPATIENT
Start: 2021-01-01 | End: 2021-01-01 | Stop reason: HOSPADM

## 2021-01-01 RX ORDER — DEXAMETHASONE SODIUM PHOSPHATE 4 MG/ML
INJECTION, SOLUTION INTRA-ARTICULAR; INTRALESIONAL; INTRAMUSCULAR; INTRAVENOUS; SOFT TISSUE PRN
Status: DISCONTINUED | OUTPATIENT
Start: 2021-01-01 | End: 2021-01-01

## 2021-01-01 RX ORDER — LOPERAMIDE HCL 2 MG
1 CAPSULE ORAL PRN
COMMUNITY
End: 2022-01-01

## 2021-01-01 RX ORDER — FENTANYL CITRATE 50 UG/ML
25 INJECTION, SOLUTION INTRAMUSCULAR; INTRAVENOUS
Status: DISCONTINUED | OUTPATIENT
Start: 2021-01-01 | End: 2021-01-01 | Stop reason: HOSPADM

## 2021-01-01 RX ORDER — FENTANYL CITRATE 50 UG/ML
25 INJECTION, SOLUTION INTRAMUSCULAR; INTRAVENOUS EVERY 5 MIN PRN
Status: DISCONTINUED | OUTPATIENT
Start: 2021-01-01 | End: 2021-01-01 | Stop reason: HOSPADM

## 2021-01-01 RX ORDER — MEPERIDINE HYDROCHLORIDE 25 MG/ML
12.5 INJECTION INTRAMUSCULAR; INTRAVENOUS; SUBCUTANEOUS
Status: DISCONTINUED | OUTPATIENT
Start: 2021-01-01 | End: 2021-01-01 | Stop reason: HOSPADM

## 2021-01-01 RX ORDER — ONDANSETRON 2 MG/ML
4 INJECTION INTRAMUSCULAR; INTRAVENOUS EVERY 6 HOURS PRN
Status: DISCONTINUED | OUTPATIENT
Start: 2021-01-01 | End: 2021-01-01 | Stop reason: HOSPADM

## 2021-01-01 RX ORDER — LEVOFLOXACIN 5 MG/ML
INJECTION, SOLUTION INTRAVENOUS PRN
Status: DISCONTINUED | OUTPATIENT
Start: 2021-01-01 | End: 2021-01-01

## 2021-01-01 RX ADMIN — ONDANSETRON 4 MG: 2 INJECTION INTRAMUSCULAR; INTRAVENOUS at 09:15

## 2021-01-01 RX ADMIN — ROCURONIUM BROMIDE 50 MG: 10 INJECTION INTRAVENOUS at 07:51

## 2021-01-01 RX ADMIN — PHENYLEPHRINE HYDROCHLORIDE 100 MCG: 10 INJECTION INTRAVENOUS at 07:51

## 2021-01-01 RX ADMIN — PHENYLEPHRINE HYDROCHLORIDE 200 MCG: 10 INJECTION INTRAVENOUS at 09:45

## 2021-01-01 RX ADMIN — PHENYLEPHRINE HYDROCHLORIDE 200 MCG: 10 INJECTION INTRAVENOUS at 10:19

## 2021-01-01 RX ADMIN — Medication 5 MG: at 08:10

## 2021-01-01 RX ADMIN — SODIUM CHLORIDE, POTASSIUM CHLORIDE, SODIUM LACTATE AND CALCIUM CHLORIDE: 600; 310; 30; 20 INJECTION, SOLUTION INTRAVENOUS at 14:51

## 2021-01-01 RX ADMIN — PHENYLEPHRINE HYDROCHLORIDE 200 MCG: 10 INJECTION INTRAVENOUS at 10:10

## 2021-01-01 RX ADMIN — PHENYLEPHRINE HYDROCHLORIDE 100 MCG: 10 INJECTION INTRAVENOUS at 09:37

## 2021-01-01 RX ADMIN — LIDOCAINE HYDROCHLORIDE 80 MG: 20 INJECTION, SOLUTION INFILTRATION; PERINEURAL at 07:51

## 2021-01-01 RX ADMIN — PHENYLEPHRINE HYDROCHLORIDE 100 MCG: 10 INJECTION INTRAVENOUS at 10:02

## 2021-01-01 RX ADMIN — FENTANYL CITRATE 100 MCG: 50 INJECTION, SOLUTION INTRAMUSCULAR; INTRAVENOUS at 07:51

## 2021-01-01 RX ADMIN — PHENYLEPHRINE HYDROCHLORIDE 50 MCG: 10 INJECTION INTRAVENOUS at 08:31

## 2021-01-01 RX ADMIN — PROPOFOL 150 MCG/KG/MIN: 10 INJECTION, EMULSION INTRAVENOUS at 15:00

## 2021-01-01 RX ADMIN — SODIUM CHLORIDE, POTASSIUM CHLORIDE, SODIUM LACTATE AND CALCIUM CHLORIDE: 600; 310; 30; 20 INJECTION, SOLUTION INTRAVENOUS at 07:45

## 2021-01-01 RX ADMIN — PHENYLEPHRINE HYDROCHLORIDE 100 MCG: 10 INJECTION INTRAVENOUS at 08:10

## 2021-01-01 RX ADMIN — SODIUM CHLORIDE, POTASSIUM CHLORIDE, SODIUM LACTATE AND CALCIUM CHLORIDE 1000 ML: 600; 310; 30; 20 INJECTION, SOLUTION INTRAVENOUS at 11:00

## 2021-01-01 RX ADMIN — SUGAMMADEX 200 MG: 100 INJECTION, SOLUTION INTRAVENOUS at 10:30

## 2021-01-01 RX ADMIN — PHENYLEPHRINE HYDROCHLORIDE 200 MCG: 10 INJECTION INTRAVENOUS at 10:06

## 2021-01-01 RX ADMIN — PHENYLEPHRINE HYDROCHLORIDE 200 MCG: 10 INJECTION INTRAVENOUS at 09:55

## 2021-01-01 RX ADMIN — PROPOFOL 120 MG: 10 INJECTION, EMULSION INTRAVENOUS at 07:51

## 2021-01-01 RX ADMIN — PHENYLEPHRINE HYDROCHLORIDE 100 MCG: 10 INJECTION INTRAVENOUS at 09:49

## 2021-01-01 RX ADMIN — GLYCOPYRROLATE 0.2 MG: 0.2 INJECTION, SOLUTION INTRAMUSCULAR; INTRAVENOUS at 14:52

## 2021-01-01 RX ADMIN — TOPICAL ANESTHETIC 1 SPRAY: 200 SPRAY DENTAL; PERIODONTAL at 14:52

## 2021-01-01 RX ADMIN — DEXAMETHASONE SODIUM PHOSPHATE 4 MG: 4 INJECTION, SOLUTION INTRA-ARTICULAR; INTRALESIONAL; INTRAMUSCULAR; INTRAVENOUS; SOFT TISSUE at 08:10

## 2021-01-01 RX ADMIN — LIDOCAINE HYDROCHLORIDE 100 MG: 20 INJECTION, SOLUTION INFILTRATION; PERINEURAL at 15:00

## 2021-01-01 RX ADMIN — PHENYLEPHRINE HYDROCHLORIDE 50 MCG: 10 INJECTION INTRAVENOUS at 09:22

## 2021-01-01 RX ADMIN — PHENYLEPHRINE HYDROCHLORIDE 100 MCG: 10 INJECTION INTRAVENOUS at 08:04

## 2021-01-01 RX ADMIN — LEVOFLOXACIN 500 MG: 5 INJECTION, SOLUTION INTRAVENOUS at 09:25

## 2021-01-01 RX ADMIN — FENTANYL CITRATE 50 MCG: 50 INJECTION, SOLUTION INTRAMUSCULAR; INTRAVENOUS at 09:35

## 2021-01-01 RX ADMIN — FENTANYL CITRATE 50 MCG: 50 INJECTION, SOLUTION INTRAMUSCULAR; INTRAVENOUS at 09:05

## 2021-01-01 ASSESSMENT — LIFESTYLE VARIABLES
TOBACCO_USE: 1
TOBACCO_USE: 1

## 2021-01-01 ASSESSMENT — ANXIETY QUESTIONNAIRES
GAD7 TOTAL SCORE: 0
3. WORRYING TOO MUCH ABOUT DIFFERENT THINGS: NOT AT ALL
GAD7 TOTAL SCORE: 0
2. NOT BEING ABLE TO STOP OR CONTROL WORRYING: NOT AT ALL
IF YOU CHECKED OFF ANY PROBLEMS ON THIS QUESTIONNAIRE, HOW DIFFICULT HAVE THESE PROBLEMS MADE IT FOR YOU TO DO YOUR WORK, TAKE CARE OF THINGS AT HOME, OR GET ALONG WITH OTHER PEOPLE: NOT DIFFICULT AT ALL
1. FEELING NERVOUS, ANXIOUS, OR ON EDGE: NOT AT ALL
6. BECOMING EASILY ANNOYED OR IRRITABLE: NOT AT ALL
5. BEING SO RESTLESS THAT IT IS HARD TO SIT STILL: NOT AT ALL
7. FEELING AFRAID AS IF SOMETHING AWFUL MIGHT HAPPEN: NOT AT ALL

## 2021-01-01 ASSESSMENT — PATIENT HEALTH QUESTIONNAIRE - PHQ9
SUM OF ALL RESPONSES TO PHQ QUESTIONS 1-9: 2
5. POOR APPETITE OR OVEREATING: NOT AT ALL

## 2021-01-01 ASSESSMENT — MIFFLIN-ST. JEOR
SCORE: 1566.13
SCORE: 1594.68
SCORE: 1525.25

## 2021-01-01 ASSESSMENT — PAIN SCALES - GENERAL: PAINLEVEL: NO PAIN (0)

## 2021-04-06 VITALS
RESPIRATION RATE: 16 BRPM | OXYGEN SATURATION: 97 % | SYSTOLIC BLOOD PRESSURE: 149 MMHG | HEART RATE: 81 BPM | WEIGHT: 193.34 LBS | TEMPERATURE: 97 F | DIASTOLIC BLOOD PRESSURE: 81 MMHG | BODY MASS INDEX: 27.07 KG/M2 | HEIGHT: 71 IN

## 2021-08-25 ENCOUNTER — TRANSFERRED RECORDS (OUTPATIENT)
Dept: HEALTH INFORMATION MANAGEMENT | Facility: CLINIC | Age: 76
End: 2021-08-25

## 2021-08-27 ENCOUNTER — APPOINTMENT (OUTPATIENT)
Dept: GENERAL RADIOLOGY | Age: 76
End: 2021-08-27
Attending: INTERNAL MEDICINE

## 2021-08-27 ENCOUNTER — HOSPITAL ENCOUNTER (OUTPATIENT)
Age: 76
Discharge: ANOTHER HOSPITAL | End: 2021-08-27
Attending: INTERNAL MEDICINE | Admitting: INTERNAL MEDICINE

## 2021-08-27 ENCOUNTER — ANESTHESIA (OUTPATIENT)
Dept: SURGERY | Age: 76
End: 2021-08-27

## 2021-08-27 ENCOUNTER — ANESTHESIA EVENT (OUTPATIENT)
Dept: SURGERY | Age: 76
End: 2021-08-27

## 2021-08-27 VITALS
BODY MASS INDEX: 24.64 KG/M2 | HEART RATE: 72 BPM | RESPIRATION RATE: 14 BRPM | OXYGEN SATURATION: 99 % | WEIGHT: 176 LBS | TEMPERATURE: 98.4 F | DIASTOLIC BLOOD PRESSURE: 74 MMHG | HEIGHT: 71 IN | SYSTOLIC BLOOD PRESSURE: 151 MMHG

## 2021-08-27 DIAGNOSIS — K86.89 PANCREATIC MASS: ICD-10-CM

## 2021-08-27 PROCEDURE — 86301 IMMUNOASSAY TUMOR CA 19-9: CPT | Performed by: INTERNAL MEDICINE

## 2021-08-27 PROCEDURE — 93005 ELECTROCARDIOGRAM TRACING: CPT | Performed by: INTERNAL MEDICINE

## 2021-08-27 PROCEDURE — 10002805 HB CONTRAST AGENT: Performed by: INTERNAL MEDICINE

## 2021-08-27 PROCEDURE — 74330 X-RAY BILE/PANC ENDOSCOPY: CPT

## 2021-08-27 PROCEDURE — A43274 ANES ERCP W PLACE ENDOSCOPIC STENT IN BIL PANCREATIC DUC: Performed by: ANESTHESIOLOGY

## 2021-08-27 PROCEDURE — 10006023 HB SUPPLY 272: Performed by: INTERNAL MEDICINE

## 2021-08-27 PROCEDURE — 10003433 HB ERCP: Performed by: INTERNAL MEDICINE

## 2021-08-27 PROCEDURE — 88305 TISSUE EXAM BY PATHOLOGIST: CPT | Performed by: INTERNAL MEDICINE

## 2021-08-27 PROCEDURE — 10003429 HB ENDOSCOPY W/ENDOSCOPIC ULTRASOUND: Performed by: INTERNAL MEDICINE

## 2021-08-27 PROCEDURE — 10002801 HB RX 250 W/O HCPCS: Performed by: ANESTHESIOLOGY

## 2021-08-27 PROCEDURE — 10002800 HB RX 250 W HCPCS: Performed by: ANESTHESIOLOGY

## 2021-08-27 PROCEDURE — 43274 ERCP DUCT STENT PLACEMENT: CPT | Performed by: INTERNAL MEDICINE

## 2021-08-27 PROCEDURE — 43238 EGD US FINE NEEDLE BX/ASPIR: CPT | Performed by: INTERNAL MEDICINE

## 2021-08-27 PROCEDURE — C1769 GUIDE WIRE: HCPCS | Performed by: INTERNAL MEDICINE

## 2021-08-27 PROCEDURE — 10002803 HB RX 637: Performed by: INTERNAL MEDICINE

## 2021-08-27 PROCEDURE — C2617 STENT, NON-COR, TEM W/O DEL: HCPCS | Performed by: INTERNAL MEDICINE

## 2021-08-27 PROCEDURE — 10002359 HB ANESTH GENERAL ADD'L 1/2 HR: Performed by: INTERNAL MEDICINE

## 2021-08-27 PROCEDURE — 10003436 HB UPPER GI ENDOSCOPY: Performed by: INTERNAL MEDICINE

## 2021-08-27 PROCEDURE — 10004451 HB PACU RECOVERY 1ST 30 MINUTES: Performed by: INTERNAL MEDICINE

## 2021-08-27 PROCEDURE — 10002807 HB RX 258: Performed by: ANESTHESIOLOGY

## 2021-08-27 PROCEDURE — 82378 CARCINOEMBRYONIC ANTIGEN: CPT | Performed by: INTERNAL MEDICINE

## 2021-08-27 PROCEDURE — 10002358 HB ANESTH GENERAL 1ST 1/2 HR: Performed by: INTERNAL MEDICINE

## 2021-08-27 PROCEDURE — 10006027 HB SUPPLY 278: Performed by: INTERNAL MEDICINE

## 2021-08-27 PROCEDURE — 10004651 HB RX, NO CHARGE ITEM: Performed by: INTERNAL MEDICINE

## 2021-08-27 PROCEDURE — C1874 STENT, COATED/COV W/DEL SYS: HCPCS | Performed by: INTERNAL MEDICINE

## 2021-08-27 PROCEDURE — 93010 ELECTROCARDIOGRAM REPORT: CPT | Performed by: INTERNAL MEDICINE

## 2021-08-27 PROCEDURE — 10002800 HB RX 250 W HCPCS: Performed by: INTERNAL MEDICINE

## 2021-08-27 PROCEDURE — 36415 COLL VENOUS BLD VENIPUNCTURE: CPT | Performed by: INTERNAL MEDICINE

## 2021-08-27 PROCEDURE — 10004451 HB PACU RECOVERY 1ST 30 MINUTES

## 2021-08-27 DEVICE — MMIS - STENT FRMN 5FR 5CM FLXB .035IN SM PGTL CRV: Type: IMPLANTABLE DEVICE | Site: PANCREATIC DUCT | Status: FUNCTIONAL

## 2021-08-27 DEVICE — STENT SYSTEM RMV
Type: IMPLANTABLE DEVICE | Site: PANCREATIC DUCT | Status: FUNCTIONAL
Brand: WALLFLEX BILIARY

## 2021-08-27 RX ORDER — SODIUM CHLORIDE, SODIUM LACTATE, POTASSIUM CHLORIDE, CALCIUM CHLORIDE 600; 310; 30; 20 MG/100ML; MG/100ML; MG/100ML; MG/100ML
INJECTION, SOLUTION INTRAVENOUS CONTINUOUS
Status: DISCONTINUED | OUTPATIENT
Start: 2021-08-27 | End: 2021-08-27 | Stop reason: HOSPADM

## 2021-08-27 RX ORDER — SODIUM CHLORIDE 9 MG/ML
INJECTION, SOLUTION INTRAVENOUS CONTINUOUS
Status: DISCONTINUED | OUTPATIENT
Start: 2021-08-27 | End: 2021-08-27 | Stop reason: HOSPADM

## 2021-08-27 RX ORDER — NALOXONE HCL 0.4 MG/ML
0.2 VIAL (ML) INJECTION EVERY 5 MIN PRN
Status: DISCONTINUED | OUTPATIENT
Start: 2021-08-27 | End: 2021-08-27 | Stop reason: HOSPADM

## 2021-08-27 RX ORDER — CEFAZOLIN SODIUM/WATER 2 G/20 ML
SYRINGE (ML) INTRAVENOUS PRN
Status: DISCONTINUED | OUTPATIENT
Start: 2021-08-27 | End: 2021-08-27 | Stop reason: HOSPADM

## 2021-08-27 RX ORDER — 0.9 % SODIUM CHLORIDE 0.9 %
2 VIAL (ML) INJECTION EVERY 12 HOURS SCHEDULED
Status: DISCONTINUED | OUTPATIENT
Start: 2021-08-27 | End: 2021-08-27 | Stop reason: HOSPADM

## 2021-08-27 RX ORDER — LIDOCAINE HYDROCHLORIDE 20 MG/ML
INJECTION, SOLUTION INFILTRATION; PERINEURAL PRN
Status: DISCONTINUED | OUTPATIENT
Start: 2021-08-27 | End: 2021-08-27

## 2021-08-27 RX ORDER — METOCLOPRAMIDE HYDROCHLORIDE 5 MG/ML
5-10 INJECTION INTRAMUSCULAR; INTRAVENOUS EVERY 6 HOURS PRN
Status: DISCONTINUED | OUTPATIENT
Start: 2021-08-27 | End: 2021-08-27 | Stop reason: HOSPADM

## 2021-08-27 RX ORDER — PROCHLORPERAZINE EDISYLATE 5 MG/ML
5 INJECTION INTRAMUSCULAR; INTRAVENOUS
Status: DISCONTINUED | OUTPATIENT
Start: 2021-08-27 | End: 2021-08-27 | Stop reason: HOSPADM

## 2021-08-27 RX ORDER — DEXAMETHASONE SODIUM PHOSPHATE 4 MG/ML
4 INJECTION, SOLUTION INTRA-ARTICULAR; INTRALESIONAL; INTRAMUSCULAR; INTRAVENOUS; SOFT TISSUE
Status: DISCONTINUED | OUTPATIENT
Start: 2021-08-27 | End: 2021-08-27 | Stop reason: HOSPADM

## 2021-08-27 RX ORDER — ONDANSETRON 2 MG/ML
4 INJECTION INTRAMUSCULAR; INTRAVENOUS 2 TIMES DAILY PRN
Status: DISCONTINUED | OUTPATIENT
Start: 2021-08-27 | End: 2021-08-27 | Stop reason: HOSPADM

## 2021-08-27 RX ORDER — ONDANSETRON 2 MG/ML
INJECTION INTRAMUSCULAR; INTRAVENOUS PRN
Status: DISCONTINUED | OUTPATIENT
Start: 2021-08-27 | End: 2021-08-27

## 2021-08-27 RX ORDER — SODIUM CHLORIDE, SODIUM LACTATE, POTASSIUM CHLORIDE, CALCIUM CHLORIDE 600; 310; 30; 20 MG/100ML; MG/100ML; MG/100ML; MG/100ML
INJECTION, SOLUTION INTRAVENOUS CONTINUOUS PRN
Status: DISCONTINUED | OUTPATIENT
Start: 2021-08-27 | End: 2021-08-27

## 2021-08-27 RX ORDER — DIPHENHYDRAMINE HYDROCHLORIDE 50 MG/ML
6.25-25 INJECTION INTRAMUSCULAR; INTRAVENOUS
Status: DISCONTINUED | OUTPATIENT
Start: 2021-08-27 | End: 2021-08-27 | Stop reason: HOSPADM

## 2021-08-27 RX ORDER — INDOMETHACIN 50 MG/1
SUPPOSITORY RECTAL PRN
Status: DISCONTINUED | OUTPATIENT
Start: 2021-08-27 | End: 2021-08-27 | Stop reason: HOSPADM

## 2021-08-27 RX ORDER — ACETAMINOPHEN 325 MG/1
650 TABLET ORAL EVERY 4 HOURS PRN
Status: DISCONTINUED | OUTPATIENT
Start: 2021-08-27 | End: 2021-08-27 | Stop reason: HOSPADM

## 2021-08-27 RX ORDER — HYDRALAZINE HYDROCHLORIDE 20 MG/ML
10 INJECTION INTRAMUSCULAR; INTRAVENOUS EVERY 10 MIN PRN
Status: DISCONTINUED | OUTPATIENT
Start: 2021-08-27 | End: 2021-08-27 | Stop reason: HOSPADM

## 2021-08-27 RX ORDER — PROPOFOL 10 MG/ML
INJECTION, EMULSION INTRAVENOUS PRN
Status: DISCONTINUED | OUTPATIENT
Start: 2021-08-27 | End: 2021-08-27

## 2021-08-27 RX ORDER — ACETAMINOPHEN 650 MG/1
650 SUPPOSITORY RECTAL EVERY 4 HOURS PRN
Status: DISCONTINUED | OUTPATIENT
Start: 2021-08-27 | End: 2021-08-27 | Stop reason: HOSPADM

## 2021-08-27 RX ORDER — DEXAMETHASONE SODIUM PHOSPHATE 4 MG/ML
INJECTION, SOLUTION INTRA-ARTICULAR; INTRALESIONAL; INTRAMUSCULAR; INTRAVENOUS; SOFT TISSUE PRN
Status: DISCONTINUED | OUTPATIENT
Start: 2021-08-27 | End: 2021-08-27

## 2021-08-27 RX ORDER — CEFAZOLIN SODIUM/WATER 2 G/20 ML
2000 SYRINGE (ML) INTRAVENOUS ONCE
Status: DISCONTINUED | OUTPATIENT
Start: 2021-08-27 | End: 2021-08-27 | Stop reason: HOSPADM

## 2021-08-27 RX ORDER — 0.9 % SODIUM CHLORIDE 0.9 %
VIAL (ML) INJECTION PRN
Status: DISCONTINUED | OUTPATIENT
Start: 2021-08-27 | End: 2021-08-27 | Stop reason: HOSPADM

## 2021-08-27 RX ORDER — ROCURONIUM BROMIDE 10 MG/ML
INJECTION, SOLUTION INTRAVENOUS PRN
Status: DISCONTINUED | OUTPATIENT
Start: 2021-08-27 | End: 2021-08-27

## 2021-08-27 RX ADMIN — PROPOFOL 200 MG: 10 INJECTION, EMULSION INTRAVENOUS at 14:58

## 2021-08-27 RX ADMIN — GLUCAGON HYDROCHLORIDE 0.5 MG: KIT at 15:47

## 2021-08-27 RX ADMIN — ROCURONIUM BROMIDE 20 MG: 50 INJECTION, SOLUTION INTRAVENOUS at 16:00

## 2021-08-27 RX ADMIN — ONDANSETRON 4 MG: 2 INJECTION INTRAMUSCULAR; INTRAVENOUS at 15:07

## 2021-08-27 RX ADMIN — LIDOCAINE HYDROCHLORIDE 60 ML: 20 INJECTION, SOLUTION INFILTRATION; PERINEURAL at 14:57

## 2021-08-27 RX ADMIN — ROCURONIUM BROMIDE 50 MG: 50 INJECTION, SOLUTION INTRAVENOUS at 14:58

## 2021-08-27 RX ADMIN — DEXAMETHASONE SODIUM PHOSPHATE 4 MG: 4 INJECTION, SOLUTION INTRAMUSCULAR; INTRAVENOUS at 15:07

## 2021-08-27 RX ADMIN — GLUCAGON HYDROCHLORIDE 0.5 MG: KIT at 15:56

## 2021-08-27 RX ADMIN — SODIUM CHLORIDE, POTASSIUM CHLORIDE, SODIUM LACTATE AND CALCIUM CHLORIDE: 600; 310; 30; 20 INJECTION, SOLUTION INTRAVENOUS at 14:53

## 2021-08-27 RX ADMIN — SUGAMMADEX 200 MG: 100 INJECTION, SOLUTION INTRAVENOUS at 16:17

## 2021-08-27 RX ADMIN — GLUCAGON HYDROCHLORIDE 0.5 MG: KIT at 15:35

## 2021-08-27 SDOH — SOCIAL STABILITY: SOCIAL INSECURITY: RISK FACTORS: AGE

## 2021-08-27 SDOH — SOCIAL STABILITY: SOCIAL INSECURITY: RISK FACTORS: KIDNEY DISEASE

## 2021-08-27 ASSESSMENT — ACTIVITIES OF DAILY LIVING (ADL)
ADL_SCORE: 12
ADL_BEFORE_ADMISSION: INDEPENDENT
HISTORY OF FALLING IN THE LAST YEAR (PRIOR TO ADMISSION): NO
RECENT_DECLINE_ADL: NO
ADL_SHORT_OF_BREATH: NO
NEEDS_ASSIST: NO
CHRONIC_PAIN_PRESENT: NO

## 2021-08-27 ASSESSMENT — PAIN SCALES - GENERAL
PAINLEVEL_OUTOF10: 0

## 2021-08-27 ASSESSMENT — LIFESTYLE VARIABLES: SMOKING_STATUS: FORMER

## 2021-08-28 LAB
CANCER AG19-9 SERPL-ACNC: 6 UNITS/ML (ref 0–35)
CEA SERPL-MCNC: 2.6 NG/ML (ref 0–5)

## 2021-08-31 ENCOUNTER — TELEPHONE (OUTPATIENT)
Dept: GASTROENTEROLOGY | Age: 76
End: 2021-08-31

## 2021-08-31 LAB
ASR DISCLAIMER: NORMAL
ASR DISCLAIMER: NORMAL
CASE RPRT: NORMAL
CASE RPRT: NORMAL
CLINICAL INFO: NORMAL
CLINICAL INFO: NORMAL
PATH REPORT.FINAL DX SPEC: NORMAL
PATH REPORT.FINAL DX SPEC: NORMAL
PATH REPORT.GROSS SPEC: NORMAL
PATH REPORT.GROSS SPEC: NORMAL
PATH REPORT.MICROSCOPIC SPEC OTHER STN: NORMAL
PATH REPORT.MICROSCOPIC SPEC OTHER STN: NORMAL

## 2021-09-08 LAB
ATRIAL RATE (BPM): 81
P AXIS (DEGREES): 65
PR-INTERVAL (MSEC): 152
QRS-INTERVAL (MSEC): 92
QT-INTERVAL (MSEC): 368
QTC: 427
R AXIS (DEGREES): 11
REPORT TEXT: NORMAL
T AXIS (DEGREES): 27
VENTRICULAR RATE EKG/MIN (BPM): 81

## 2021-09-10 ENCOUNTER — PATIENT OUTREACH (OUTPATIENT)
Dept: ONCOLOGY | Facility: CLINIC | Age: 76
End: 2021-09-10

## 2021-09-10 ENCOUNTER — TRANSCRIBE ORDERS (OUTPATIENT)
Dept: OTHER | Age: 76
End: 2021-09-10

## 2021-09-10 ENCOUNTER — PRE VISIT (OUTPATIENT)
Dept: OTHER | Age: 76
End: 2021-09-10

## 2021-09-10 DIAGNOSIS — R69 DIAGNOSIS UNKNOWN: Primary | ICD-10-CM

## 2021-09-10 NOTE — PROGRESS NOTES
Writer attempted to load in records through [facility], no matching patient info, message to New Patient Records sent to obtain patient records.

## 2021-09-13 ENCOUNTER — DOCUMENTATION ONLY (OUTPATIENT)
Dept: ONCOLOGY | Facility: CLINIC | Age: 76
End: 2021-09-13

## 2021-09-13 NOTE — PROGRESS NOTES
Action    Action Taken 9/13/2021 3:30pm TALIB   I called the [facility] Phone: [phone]- they will fax over records and reports soon.     They transferred me to radiology- I sent over a request for imaging to be mailed.   Ph: [phone]  Fax: [phone]     Mailing Address:   [address] [place] [address]    [employer] Tracking Number:  [id number]    9/16/2021 8:14AM TALIB   I sent a bunch of records from [facility] to HIM yesterday but don't see them in Epic...     I sent the records to HIM a second time this morning.     9/17/2021 7:16AM TALIB Edwards sent a Teams message asking me to call Kody's wife Asia Reynaga at [phone]. I'll gave them a call. I spoke to Asia and also pulled it pt Kody's records into CE from [facility]    Update: I checked the image disc tracking number and it arrived at the [facility] yesterday afternoon   Sep, 16 2021 2:01 PM Delivered [place], [place]    I sent an ib-message to Fara letting her know we should be good to schedule.

## 2021-09-21 NOTE — TELEPHONE ENCOUNTER
New Patient Oncology Nurse Navigator Note     Referring provider: Dr. Cardoza    Referring Clinic/Organization: Other - [facility]      Referred to: Surgical Oncology      Requested provider (if applicable): First available provider    Referral Received: 09/21/21       Evaluation for : Pancreas Mass      Clinical History (per Nurse review of records provided):      8/27/21 - EUS/ERCP     Endoscopic ultrasound findings:  The linear echoendoscope was advanced to 2nd portion of the duodenum. Mediastinal structures are unremarkable. Liver parenchyma was notable for marked intrahepatic ductal dilation. The gallbladder is dilated bile duct is dilated to the level of the head of the pancreas with there is an abrupt cutoff of the common bile duct. Pancreatic duct is also dilated in the body and tail of the pancreas with a hypoechoic heterogeneous mass measuring 3 x 2.5 cm in the region of the superior aspect of the head of the pancreas extending into the distal body of the pancreas fine-needle biopsy was performed using a 22 gauge needle total of 3 passes were performed with specimen sent in [facility] for pathologic evaluation. There was a small pocket of ascites seen. Several peripancreatic lymph nodes were identified as well. Vascular structures were intact. Without evidence of vascular invasion specifically the portal vein was free from tumor invasion.. The echoendoscope was withdrawn.    Endoscopic retrograde cholangiopancreatography findings:  The therapeutic duodenum scope was advanced to the 2nd portion of the duodenum. Initial cannulation of the pancreatic duct was successful bile duct cannulation was not successful therefore pancreatic stent was placed needle knife papillotomy was performed with subsequent deep cannulation of the bile duct being able to be achieved. 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 There is good hemostasis at the close of the procedure gallbladder did partially filled. The intrahepatic ducts were markedly dilated.    Pathology:   Cytologic Interpretation     Pancreatic mass, fine-needle aspirate:   -Debris and inflammation, negative for malignancy.         LABS:   8/27/21 -  : 6        No past medical history on file.    No past surgical history on file.    No current outpatient medications on file.         Not on File       Clinical Assessment / Barriers to Care (Per Nurse):    None at this time.     Records Location:     [facility] Everywhere     Records Needed:     ABDOMINAL IMAGING (CT SCANS, MRI, US)  DATING BACK TO 2020      Additional testing needed prior to consult:     CT CAP   EUS    Referral updates and Plan:       Consult with Surgical Oncology   CT CAP - pancreas protocol    09/21/2021 3:11 PM - Called and left a message with my direct number for call back. Patient and wife returned call. Discussed transfer of care and plan for repeat biopsy. Confirmed patient is not set up as of yet but they are wanting to proceed with EUS at the  as they are now living in [place]. Informed them I would send message to our GI group to review and that they would be in touch to schedule EUS. Informed them that once that is scheduled we will arrange for surgical consult about 1 week after procedure to ensure any repeat biopsy are back. They agreed with plan and have no questions at this time.     Fara Hanna RN, BSN   Surgical Oncology New Patient Nurse Navigator  [facility]  [phone]

## 2021-09-22 NOTE — TELEPHONE ENCOUNTER
Screening Questions  1. Are you active on mychart? no    2. What insurance is in the chart? MEDICARE    2.  Ordering/Referring Provider: Dr. Garvin    3. BMI 24.4    4. Are you on daily home oxygen? no    5. Do you have a history of difficult airway? no    6. Have you had a heart, lung, or liver transplant? no    7. Are you currently on dialysis or have chronic kidney disease? no    8. Have you had a stroke or Transient ischemic atttack (TIA) within 6 months? no    9. In the past 6 months, have you had any heart related issues including cardiomyopathy or heart attack?         If yes, did it require cardiac stenting or other implantable device?no    10. Do you have any implantable devices in your body (pacemaker, defib, LVAD)? no    11. Do you take nitroglycerin? If yes, how often? no    12. Are you currently taking any blood thinners?no    13. Are you a diabetic? no    14. (Females) Are you currently pregnant?   If yes, how many weeks?    15. Have you had a procedure in the past that was difficult to tolerate with conscious sedation? Any allergies to Fentanyl or Versed no    16. Are you taking any scheduled prescription narcotics more than once daily? no    17. Do you have any chemical dependencies such as alcohol, street drugs, or methadone? no    18. Do you have any history of post-traumatic stress syndrome or mental health issues? no    19. Do you transfer independently? yes    20.  Do you have any issues with constipation? OCCASIONALLY     21. Preferred Pharmacy for Pre Prescription Unsure right now. Recently moved.     Scheduling Details    Which Colonoscopy Prep was Sent?:   Procedure Scheduled: EUS  Provider/Surgeon: Dr. Garvin   Date of Procedure: 09/28/2021  Location: [facility]  Caller (Please ask for phone number if not scheduled by patient): YASH ROACHBING [phone]      Sedation Type: MAC  Conscious Sedation- Needs  for 6 hours after the procedure  MAC/General-Needs  for 24 hours after  procedure    Pre-op Required at [facility] [place] [place] and OR for MAC sedation:   (if yes advise patient they will need a pre-op prior to procedure)      Is patient on blood thinners? -NO (If yes- inform patient to follow up with PCP or provider for follow up instructions)     Informed patient they will need an adult  YES  Cannot take any type of public or medical transportation alone    [facility]: YES    Confirmed Nurse will call to complete assessment YES    Additional comments: NO

## 2021-09-22 NOTE — PROGRESS NOTES
Called patient to discuss procedure with Dr Garvin:    OK for EUS.     Pt now has metal biliary stent which may get in the way but cannot be removed. Does not need ERCP at same time.   Not on anticoagulation.     Had significant pancreatitis at time of prior procedure but preceded the ERCP. Evident on the CT.    Left VM, gastro procedure ordered, message sent to endo , waiting for call back to schedule though    1030    Pt wife calledExplained they can expect a call from  for date and time of procedure, will need a , someone to stay with them for 24 hours and should stay in town for 24 hours (within 45 min of Hospital) post procedure    Patient needs to get pre-op physical completed. If outside  health system will need physical faxed to number [phone]   If you do not get a preop physical, your procedure could be cancelled, patient voiced understanding*    Preop Plan: office visit on 9/3/21 with full physical exam, EKG on 9/8 in [facility]    Med Review    Blood thinner -  none  ASA - none  Diabetic - none    COVID test discussed: yes, pt understands that needs to be within 4 days of procedure    Patient Education r/t procedure: mychart    Does patient have any history of gastric bypass/gastric surgery/altered panc/bili anatomy? none    A pre-op nurse will call 1-2 days prior to the procedure.      Verbalized understanding of all instructions. All questions answered.        Dede Bond, RN, BSN,   [facility]  Care coordinator

## 2021-09-23 NOTE — TELEPHONE ENCOUNTER
"Writer reviewed pre-assessment questions with patient prior to upcoming EUS on 9.28.2021.      Covid test scheduled: 9.24.2021    Pre-op: Per Dede Bond RN \"Preop Plan: office visit on 9/3/21 with full physical exam[facility]    Arrival time: 1320    Facility location: [facility]    Sedation type: MAC    Electronic Implantable devices? No    Blood thinners/Antiplatelet medication? No    Reviewed EUS prep instructions with patient.  Instructions sent via letter yesterday.  Pt has [facility] number to call with any questions/concerns.    Designated  policy reviewed with patient.     Patient verbalized understanding.  No further questions or concerns.    Erendira Meyer RN    "

## 2021-09-28 NOTE — ANESTHESIA PREPROCEDURE EVALUATION
"Anesthesia Pre-Procedure Evaluation    Patient: Kody Reynaga   MRN: [id number] : 1945        Preoperative Diagnosis: Pancreatic mass [K86.89]   Procedure : Procedure(s):  ENDOSCOPIC ULTRASOUND, ESOPHAGOSCOPY / UPPER GASTROINTESTINAL TRACT (GI)     No past medical history on file.   No past surgical history on file.   Not on File   Social History     Tobacco Use     Smoking status: Not on file   Substance Use Topics     Alcohol use: Not on file      Wt Readings from Last 1 Encounters:   No data found for Wt        Anesthesia Evaluation   Pt has had prior anesthetic.     No history of anesthetic complications       ROS/MED HX  ENT/Pulmonary:     (+) tobacco use, Past use,     Neurologic:  - neg neurologic ROS     Cardiovascular:  - neg cardiovascular ROS   (+) -----No previous cardiac testing     METS/Exercise Tolerance:     Hematologic:  - neg hematologic  ROS     Musculoskeletal:  - neg musculoskeletal ROS     GI/Hepatic: Comment:   * Obstructive Jaundice and weight loss.  * Mass head of the pancreas and significant intrahepatic and extra hepatic ductal dilatation consistent with apparent malignant appearing mass at the head of the  pancreas noted on endoscopic ultrasound.         Renal/Genitourinary:  - neg Renal ROS     Endo:  - neg endo ROS     Psychiatric/Substance Use: Comment: On Citalopram, Bupropion.   Heavier alcohol intake >15 drinks per week, with reportedly average 4 drinks on \"drinking days\". Denies EtOH abuse. Denies withdrawals.     (+) psychiatric history depression and anxiety alcohol abuse     Infectious Disease:  - neg infectious disease ROS     Malignancy:  - neg malignancy ROS     Other:  - neg other ROS          Physical Exam    Airway  airway exam normal      Mallampati: II   TM distance: > 3 FB   Neck ROM: full   Mouth opening: > 3 cm    Respiratory Devices and Support         Dental  no notable dental history         Cardiovascular   cardiovascular exam normal          Pulmonary   " pulmonary exam normal                OUTSIDE LABS:  CBC: No results found for: WBC, HGB, HCT, PLT  BMP: No results found for: NA, POTASSIUM, CHLORIDE, CO2, BUN, CR, GLC  COAGS: No results found for: PTT, INR, FIBR  POC: No results found for: BGM, HCG, HCGS  HEPATIC: No results found for: ALBUMIN, PROTTOTAL, ALT, AST, GGT, ALKPHOS, BILITOTAL, BILIDIRECT, AWAIS  OTHER: No results found for: PH, LACT, A1C, AMIRAH, PHOS, MAG, LIPASE, AMYLASE, TSH, T4, T3, CRP, SED    Anesthesia Plan    ASA Status:  3      Anesthesia Type: MAC.     - Reason for MAC: immobility needed, straight local not clinically adequate   Induction: Propofol.   Maintenance: TIVA.        Consents    Anesthesia Plan(s) and associated risks, benefits, and realistic alternatives discussed. Questions answered and patient/representative(s) expressed understanding.     - Discussed with:  Patient      - Extended Intubation/Ventilatory Support Discussed: No.      - Patient is DNR/DNI Status: No    Use of blood products discussed: No .     Postoperative Care    Pain management: IV analgesics, Oral pain medications.   PONV prophylaxis: Ondansetron (or other 5HT-3), Dexamethasone or Solumedrol     Comments:                Radha Art MD

## 2021-09-28 NOTE — ANESTHESIA POSTPROCEDURE EVALUATION
Patient: Kody Reynaga    Procedure(s):  ENDOSCOPIC ULTRASOUND, ESOPHAGOSCOPY / UPPER GASTROINTESTINAL TRACT (GI)    Diagnosis:Pancreatic mass [K86.89]  Diagnosis Additional Information: No value filed.    Anesthesia Type:  MAC    Note:  Disposition: Outpatient   Postop Pain Control: Uneventful            Sign Out: Well controlled pain   PONV: No   Neuro/Psych: Uneventful            Sign Out: Acceptable/Baseline neuro status   Airway/Respiratory: Uneventful            Sign Out: Acceptable/Baseline resp. status   CV/Hemodynamics: Uneventful            Sign Out: Acceptable CV status; No obvious hypovolemia; No obvious fluid overload   Other NRE: NONE   DID A NON-ROUTINE EVENT OCCUR? No           Last vitals:  Vitals Value Taken Time   /79 09/28/21 1645   Temp     Pulse     Resp 16 09/28/21 1645   SpO2 99 % 09/28/21 1645       Electronically Signed By: AUBREE GARSIA MD  September 28, 2021  4:59 PM

## 2021-09-28 NOTE — OR NURSING
Dr. Mendoza call 1650: Patient stable, discharging now. [facility] will place sign out.

## 2021-09-28 NOTE — ANESTHESIA CARE TRANSFER NOTE
Patient: Kody Reynaga    Procedure(s):  ENDOSCOPIC ULTRASOUND, ESOPHAGOSCOPY / UPPER GASTROINTESTINAL TRACT (GI)    Diagnosis: Pancreatic mass [K86.89]  Diagnosis Additional Information: No value filed.    Anesthesia Type:   MAC     Note:    Oropharynx: spontaneously breathing  Level of Consciousness: awake  Oxygen Supplementation: room air    Independent Airway: airway patency satisfactory and stable  Dentition: dentition unchanged  Vital Signs Stable: post-procedure vital signs reviewed and stable  Report to RN Given: handoff report given  Patient transferred to: Phase II    Handoff Report: Identifed the Patient, Identified the Reponsible Provider, Reviewed the pertinent medical history, Discussed the surgical course, Reviewed Intra-OP anesthesia mangement and issues during anesthesia, Set expectations for post-procedure period and Allowed opportunity for questions and acknowledgement of understanding      Vitals:  Vitals Value Taken Time   BP     Temp     Pulse     Resp     SpO2         Electronically Signed By: KATHRINE Britton CRNA  September 28, 2021  3:59 PM

## 2021-09-28 NOTE — DISCHARGE INSTRUCTIONS
[facility] Upper Endoscopy (EGD) with Monitored Anesthesia Care  For 24 hours after your procedure  Sedation:  1. Get plenty of rest. A responsible adult must stay with you for at least 24 hours after you leave the hospital.   2. Do not drive or use heavy equipment. If you have weakness or tingling, don't drive or use heavy equipment until this feeling goes away.  3. Do not drink alcohol.  4. Avoid strenuous or risky activities. Ask for help when climbing stairs.   5. You may feel lightheaded. IF so, sit for a few minutes before standing. Have someone help you get up.   6. If you have nausea (feel sick to your stomach): Drink only clear liquids such as apple juice, ginger ale, broth or 7-Up. Rest may also help. Be sure to drink enough fluids. Move to a regular diet as you feel able.  7. You may have a slight fever. Call the doctor if your fever is over 100 F (37.7 C) (taken under the tongue) or lasts longer than 24 hours.  8. You may have a dry mouth, a sore throat, muscle aches or trouble sleeping. These should go away after 24 hours.  9. Do not make important or legal decisions.   Procedural:  1. Wait one hour before eating or drinking. Start with sips of water. When your gag reflex has returned, you may return to your normal diet, medicines, and light exercise.  2. Some bloating is normal. You may have large burps or pass air.  3. You may have a sore throat for 2 to 3 days. If so, it may help to:    Avoid hot liquids for 24 hours.    Use sore throat lozenges.    Gargle as need with salt water up to 4 times a day. Mix 1 cup of warm water with 1 teaspoon of salt. Do not swallow.  4. You may take Tylenol (acetaminophen) for pain unless your doctor has told you not to.   Do not take aspirin or ibuprofen (Advil, Motrin, or other anti-inflammatory  drugs) for __3__ days.  Call right away if you have:  1. Unusual pain in belly or chest pain not relieved with passing air.  2. Severe throat pain or trouble swallowing.  3. Black  stools (tar-like looking bowel movement).  4. Signs of infection (fever, growing tenderness at the surgery site, a large amount of drainage or bleeding, severe pain, foul-smelling drainage, redness, swelling).  5. It has been over 8 to 10 hours since surgery and you are still not able to urinate (pass water).  6. Headache for over 24 hours.  7. Numbness, tingling or weakness the day after surgery (if you had spinal anesthesia).  Follow-up:  _x___ We took small tissue or fluid samples. Your doctor will call you with the results within two weeks.  ____ Your esophagus was dilated (opened) or banded.    Avoid hot liquids for 24 hours    Drink only cool liquids for the rest of the day.    Eat a soft diet for the next few days.    You may have a sore throat or chest for 2-3 days.  If you have severe pain, bleeding, vomit blood, or shortness of breath, go to an emergency room.  If you have questions, call:  Endoscopy: Monday to Friday, 8 a.m. to 4:30 p.m. [phone], option 2  After hours: Hospital  [phone] (Ask for the GI fellow on call)

## 2021-10-08 NOTE — H&P (VIEW-ONLY)
Surgical Oncology - New Patient  10/8/2021    76 M w/ diagnosis of biliary obstruction of unclear etiology.  He originally presented with painless jaundice and was found to have biliary dilation and gradual tapering towards the head of the pancreas on imaging.  There was no clear mass identified, but imaging showed other findings consistent with pancreatitis.  He was stented via ERCP and underwent EUS x 2 (including one at the [facility]) with biopsy showing no evidence of malignancy.  In discussion at multidisciplinary conference and review with radiology, there continues to be significant concern for underlying malignancy.  Given this, the patient presents to discuss the possible role of surgical management.    Of Note: The patient is feeling much better with the biliary stent in place.  He denies any prior history of pancreatitis.  He does mention a history of heavy alcohol use that he stopped several years ago.  The patient is generally healthy and has good physical status.  He is independent and does not use assistive devices for ambulation.  He has no significant cardiac or pulmonary disease.  No prior abdominal operations.  No blood thinning medications.    /73   Pulse 72   Temp 97.7  F (36.5  C) (Oral)   Wt 81.3 kg (179 lb 3.2 oz)   SpO2 100%   BMI 25.71 kg/m      Abdominal US (8/26/2021): IMPRESSION:  1.  Positive ultrasonographic Hinton sign.  The gallbladder is moderately distended and filled with sludge.  The gallbladder wall is normal in thickness and there is no pericholecystic fluid.  Findings are equivocal for acute cholecystitis.  Recommend surgical consultation.  Consider further evaluation with a HIDA scan.   2.  Dilated common bile duct up to 1.6 cm.  Sludge is seen within the distal common bile duct.  Partially visualized intrahepatic biliary duct dilatation.   Please refer to the subsequent MRCP of the abdomen.     CTAP (8/26/2021): IMPRESSION:  1. CT findings compatible with acute  pancreatitis. Small focal area of blunted enhancement along the inferior margin of the proximal pancreatic tail is noted. This could reflect a small area of glandular necrosis. Recommend attention on follow-up imaging.   2. Marked intra and extrahepatic biliary ductal dilatation focally transitioning at the level the proximal common bile duct. There is bile duct wall enhancement at this site. Findings could indicate underlying ductal stricture. Neoplastic process is not excluded. Additional consideration would include nonradiopaque stones. Although this is felt to be less likely. Recommend correlation with MRCP or ERCP.    MRI/MRCP (8/26/2021): IMPRESSION:  1. Marked intrahepatic biliary ductal dilatation extending through the common hepatic duct. There is abrupt transition of the duct at the level of the proximal CBD extending through the pancreatic head. This could indicate underlying stricture. While benign inflammatory causes could be considered, a neoplastic process is also a consideration. Recommend correlation with ERCP.   2. Sequela of pancreatitis. Several tiny cystic foci within the pancreas measure up to 7 mm. Recommend attention on follow-up imaging. Follow-up MRI or CT recommended in one year.   3. Gallbladder sludge.   4. Suspect underlying red marrow reconversion. Areas of increased T2 signal are seen throughout the thoracolumbar spine on nonfat sat images. Additional heterogeneous appearance of the marrow in the pelvis. While this may be a benign etiology, consider follow-up MRI confirm.     EUS (9/27/2021): IMPRESSION:  - Difficult exam of the pancreatic head due to indwelling metal biliary stent.   - Diffusely heterogenous pancreas with scattered hypoechoci regions, irregular duct contour, dilated sidebranches and simple-appearing cysts. Could represent sequelae of recent acute pancreatitis, underlying chronic pancreatitis or changes from pancreatic duct obstruction.   - More focal rounded region  in the pancreatic head measuring 20 x 17 mm in diameter. This is in contact with the bile duct and also associated with apparent obstruction of the pancreatic duct. This has loss of interface with the portal vein but no narrowing or occlusion. No contact with major arteries although imaging difficult due to stent artifact. Needle aspiration and needle biopsy performed. Results pending.   - Metal biliary and plastic pancreatic duct stents left in place.   - No ascites, liver lesions or adenopathy. The pancreatic head abnormality is technically non-specific and could represent acute or chronic pancreatitis, however this is concerning for malignancy in light of the recent biliary obstruction. Biopsies are pending. Will need consultation with surgical oncology in my opinion and should be considered malignant unless definitively proven otherwise.    Pathology (9/27/2021): No evidence of malignancy.    Assessment/Plan:  76 M w/ extra-hepatic biliary obstruction at the level of the pancreatic head and appearance concerning for malignancy, but without a biopsy that proves malignancy.  Both prior EUS exams were done in the presence of the biliary stent, which made visualization difficult.  The patient is otherwise relatively healthy and has a good functional status.  He appears that he would tolerate major abdominal surgery.  We discussed his situation and that often the only way to definitively prove the presence or absence of malignancy is to resect the area surgically, which would require a Whipple in his case.  Although I think he would likely tolerate this, there is still a larger chance of poor outcomes in a patient of his age.  Thus, I would prefer to exhaust all options for diagnosis prior to committing him to surgery.  We discussed him in pancreas conference and one thing that could be done is repeat ERCP and EUS with stent removal to allow for SPYGLASS biopsies and also a better EUS picture that may aid more  directed FNA.  The stent would be replaced after.  I think we should attempt this first to try and get a diagnosis as a cancer diagnosis (depending on etiology) may effect management decisions and order of treatments.  The patient was in agreement.  However, in the event that we still end up with no diagnosis after this attempt, I would definitely lean towards a recommendation for surgical resection in this patient given the concern over malignancy and the fact that I think he is a reasonable surgical candidate.  Thus, we will communicate with the GI team regarding the recommendation to re-attempt EUS/ERCP and biopsy.  Questions were answered and the patient was in agreement with and understanding of the plan.    A total of 50 minutes were spent on this encounter including more than 50% in face to face time and the remainder in imaging review, chart review, documentation, and coordination of care.

## 2021-10-08 NOTE — NURSING NOTE
"Oncology Rooming Note    October 8, 2021 10:00 AM   Kody Reynaga is a 76 year old male who presents for:    Chief Complaint   Patient presents with     Oncology Clinic Visit     pancreas mass     Initial Vitals: /73   Pulse 72   Temp 97.7  F (36.5  C) (Oral)   Wt 81.3 kg (179 lb 3.2 oz)   SpO2 100%   BMI 25.71 kg/m   Estimated body mass index is 25.71 kg/m  as calculated from the following:    Height as of 9/28/21: 1.778 m (5' 10\").    Weight as of this encounter: 81.3 kg (179 lb 3.2 oz). Body surface area is 2 meters squared.  No Pain (0) Comment: Data Unavailable   No LMP for male patient.  Allergies reviewed: Yes  Medications reviewed: Yes    Medications: Medication refills not needed today.  Pharmacy name entered into [facility]: [id number] - [place], [place] - [address] AT [facility] [address]    Clinical concerns: none       Kari Sandoval CMA            "

## 2021-10-08 NOTE — PROGRESS NOTES
Surgical Oncology - New Patient  10/8/2021    76 M w/ diagnosis of biliary obstruction of unclear etiology.  He originally presented with painless jaundice and was found to have biliary dilation and gradual tapering towards the head of the pancreas on imaging.  There was no clear mass identified, but imaging showed other findings consistent with pancreatitis.  He was stented via ERCP and underwent EUS x 2 (including one at the [facility]) with biopsy showing no evidence of malignancy.  In discussion at multidisciplinary conference and review with radiology, there continues to be significant concern for underlying malignancy.  Given this, the patient presents to discuss the possible role of surgical management.    Of Note: The patient is feeling much better with the biliary stent in place.  He denies any prior history of pancreatitis.  He does mention a history of heavy alcohol use that he stopped several years ago.  The patient is generally healthy and has good physical status.  He is independent and does not use assistive devices for ambulation.  He has no significant cardiac or pulmonary disease.  No prior abdominal operations.  No blood thinning medications.    /73   Pulse 72   Temp 97.7  F (36.5  C) (Oral)   Wt 81.3 kg (179 lb 3.2 oz)   SpO2 100%   BMI 25.71 kg/m      Abdominal US (8/26/2021): IMPRESSION:  1.  Positive ultrasonographic Hinton sign.  The gallbladder is moderately distended and filled with sludge.  The gallbladder wall is normal in thickness and there is no pericholecystic fluid.  Findings are equivocal for acute cholecystitis.  Recommend surgical consultation.  Consider further evaluation with a HIDA scan.   2.  Dilated common bile duct up to 1.6 cm.  Sludge is seen within the distal common bile duct.  Partially visualized intrahepatic biliary duct dilatation.   Please refer to the subsequent MRCP of the abdomen.     CTAP (8/26/2021): IMPRESSION:  1. CT findings compatible with acute  pancreatitis. Small focal area of blunted enhancement along the inferior margin of the proximal pancreatic tail is noted. This could reflect a small area of glandular necrosis. Recommend attention on follow-up imaging.   2. Marked intra and extrahepatic biliary ductal dilatation focally transitioning at the level the proximal common bile duct. There is bile duct wall enhancement at this site. Findings could indicate underlying ductal stricture. Neoplastic process is not excluded. Additional consideration would include nonradiopaque stones. Although this is felt to be less likely. Recommend correlation with MRCP or ERCP.    MRI/MRCP (8/26/2021): IMPRESSION:  1. Marked intrahepatic biliary ductal dilatation extending through the common hepatic duct. There is abrupt transition of the duct at the level of the proximal CBD extending through the pancreatic head. This could indicate underlying stricture. While benign inflammatory causes could be considered, a neoplastic process is also a consideration. Recommend correlation with ERCP.   2. Sequela of pancreatitis. Several tiny cystic foci within the pancreas measure up to 7 mm. Recommend attention on follow-up imaging. Follow-up MRI or CT recommended in one year.   3. Gallbladder sludge.   4. Suspect underlying red marrow reconversion. Areas of increased T2 signal are seen throughout the thoracolumbar spine on nonfat sat images. Additional heterogeneous appearance of the marrow in the pelvis. While this may be a benign etiology, consider follow-up MRI confirm.     EUS (9/27/2021): IMPRESSION:  - Difficult exam of the pancreatic head due to indwelling metal biliary stent.   - Diffusely heterogenous pancreas with scattered hypoechoci regions, irregular duct contour, dilated sidebranches and simple-appearing cysts. Could represent sequelae of recent acute pancreatitis, underlying chronic pancreatitis or changes from pancreatic duct obstruction.   - More focal rounded region  in the pancreatic head measuring 20 x 17 mm in diameter. This is in contact with the bile duct and also associated with apparent obstruction of the pancreatic duct. This has loss of interface with the portal vein but no narrowing or occlusion. No contact with major arteries although imaging difficult due to stent artifact. Needle aspiration and needle biopsy performed. Results pending.   - Metal biliary and plastic pancreatic duct stents left in place.   - No ascites, liver lesions or adenopathy. The pancreatic head abnormality is technically non-specific and could represent acute or chronic pancreatitis, however this is concerning for malignancy in light of the recent biliary obstruction. Biopsies are pending. Will need consultation with surgical oncology in my opinion and should be considered malignant unless definitively proven otherwise.    Pathology (9/27/2021): No evidence of malignancy.    Assessment/Plan:  76 M w/ extra-hepatic biliary obstruction at the level of the pancreatic head and appearance concerning for malignancy, but without a biopsy that proves malignancy.  Both prior EUS exams were done in the presence of the biliary stent, which made visualization difficult.  The patient is otherwise relatively healthy and has a good functional status.  He appears that he would tolerate major abdominal surgery.  We discussed his situation and that often the only way to definitively prove the presence or absence of malignancy is to resect the area surgically, which would require a Whipple in his case.  Although I think he would likely tolerate this, there is still a larger chance of poor outcomes in a patient of his age.  Thus, I would prefer to exhaust all options for diagnosis prior to committing him to surgery.  We discussed him in pancreas conference and one thing that could be done is repeat ERCP and EUS with stent removal to allow for SPYGLASS biopsies and also a better EUS picture that may aid more  directed FNA.  The stent would be replaced after.  I think we should attempt this first to try and get a diagnosis as a cancer diagnosis (depending on etiology) may effect management decisions and order of treatments.  The patient was in agreement.  However, in the event that we still end up with no diagnosis after this attempt, I would definitely lean towards a recommendation for surgical resection in this patient given the concern over malignancy and the fact that I think he is a reasonable surgical candidate.  Thus, we will communicate with the GI team regarding the recommendation to re-attempt EUS/ERCP and biopsy.  Questions were answered and the patient was in agreement with and understanding of the plan.    A total of 50 minutes were spent on this encounter including more than 50% in face to face time and the remainder in imaging review, chart review, documentation, and coordination of care.

## 2021-10-08 NOTE — LETTER
10/8/2021         RE: Kody Reynaga  [address]  [facility] [address]        Dear Colleague,    Thank you for referring your patient, Kody Reynaga, to the [facility]. Please see a copy of my visit note below.    Surgical Oncology - New Patient  10/8/2021    76 M w/ diagnosis of biliary obstruction of unclear etiology.  He originally presented with painless jaundice and was found to have biliary dilation and gradual tapering towards the head of the pancreas on imaging.  There was no clear mass identified, but imaging showed other findings consistent with pancreatitis.  He was stented via ERCP and underwent EUS x 2 (including one at the [facility]) with biopsy showing no evidence of malignancy.  In discussion at multidisciplinary conference and review with radiology, there continues to be significant concern for underlying malignancy.  Given this, the patient presents to discuss the possible role of surgical management.    Of Note: The patient is feeling much better with the biliary stent in place.  He denies any prior history of pancreatitis.  He does mention a history of heavy alcohol use that he stopped several years ago.  The patient is generally healthy and has good physical status.  He is independent and does not use assistive devices for ambulation.  He has no significant cardiac or pulmonary disease.  No prior abdominal operations.  No blood thinning medications.    /73   Pulse 72   Temp 97.7  F (36.5  C) (Oral)   Wt 81.3 kg (179 lb 3.2 oz)   SpO2 100%   BMI 25.71 kg/m      Abdominal US (8/26/2021): IMPRESSION:  1.  Positive ultrasonographic Hinton sign.  The gallbladder is moderately distended and filled with sludge.  The gallbladder wall is normal in thickness and there is no pericholecystic fluid.  Findings are equivocal for acute cholecystitis.  Recommend surgical consultation.  Consider further evaluation with a HIDA scan.   2.  Dilated common bile duct up  to 1.6 cm.  Sludge is seen within the distal common bile duct.  Partially visualized intrahepatic biliary duct dilatation.   Please refer to the subsequent MRCP of the abdomen.     CTAP (8/26/2021): IMPRESSION:  1. CT findings compatible with acute pancreatitis. Small focal area of blunted enhancement along the inferior margin of the proximal pancreatic tail is noted. This could reflect a small area of glandular necrosis. Recommend attention on follow-up imaging.   2. Marked intra and extrahepatic biliary ductal dilatation focally transitioning at the level the proximal common bile duct. There is bile duct wall enhancement at this site. Findings could indicate underlying ductal stricture. Neoplastic process is not excluded. Additional consideration would include nonradiopaque stones. Although this is felt to be less likely. Recommend correlation with MRCP or ERCP.    MRI/MRCP (8/26/2021): IMPRESSION:  1. Marked intrahepatic biliary ductal dilatation extending through the common hepatic duct. There is abrupt transition of the duct at the level of the proximal CBD extending through the pancreatic head. This could indicate underlying stricture. While benign inflammatory causes could be considered, a neoplastic process is also a consideration. Recommend correlation with ERCP.   2. Sequela of pancreatitis. Several tiny cystic foci within the pancreas measure up to 7 mm. Recommend attention on follow-up imaging. Follow-up MRI or CT recommended in one year.   3. Gallbladder sludge.   4. Suspect underlying red marrow reconversion. Areas of increased T2 signal are seen throughout the thoracolumbar spine on nonfat sat images. Additional heterogeneous appearance of the marrow in the pelvis. While this may be a benign etiology, consider follow-up MRI confirm.     EUS (9/27/2021): IMPRESSION:  - Difficult exam of the pancreatic head due to indwelling metal biliary stent.   - Diffusely heterogenous pancreas with scattered  hypoechoci regions, irregular duct contour, dilated sidebranches and simple-appearing cysts. Could represent sequelae of recent acute pancreatitis, underlying chronic pancreatitis or changes from pancreatic duct obstruction.   - More focal rounded region in the pancreatic head measuring 20 x 17 mm in diameter. This is in contact with the bile duct and also associated with apparent obstruction of the pancreatic duct. This has loss of interface with the portal vein but no narrowing or occlusion. No contact with major arteries although imaging difficult due to stent artifact. Needle aspiration and needle biopsy performed. Results pending.   - Metal biliary and plastic pancreatic duct stents left in place.   - No ascites, liver lesions or adenopathy. The pancreatic head abnormality is technically non-specific and could represent acute or chronic pancreatitis, however this is concerning for malignancy in light of the recent biliary obstruction. Biopsies are pending. Will need consultation with surgical oncology in my opinion and should be considered malignant unless definitively proven otherwise.    Pathology (9/27/2021): No evidence of malignancy.    Assessment/Plan:  76 M w/ extra-hepatic biliary obstruction at the level of the pancreatic head and appearance concerning for malignancy, but without a biopsy that proves malignancy.  Both prior EUS exams were done in the presence of the biliary stent, which made visualization difficult.  The patient is otherwise relatively healthy and has a good functional status.  He appears that he would tolerate major abdominal surgery.  We discussed his situation and that often the only way to definitively prove the presence or absence of malignancy is to resect the area surgically, which would require a Whipple in his case.  Although I think he would likely tolerate this, there is still a larger chance of poor outcomes in a patient of his age.  Thus, I would prefer to exhaust all  options for diagnosis prior to committing him to surgery.  We discussed him in pancreas conference and one thing that could be done is repeat ERCP and EUS with stent removal to allow for SPYGLASS biopsies and also a better EUS picture that may aid more directed FNA.  The stent would be replaced after.  I think we should attempt this first to try and get a diagnosis as a cancer diagnosis (depending on etiology) may effect management decisions and order of treatments.  The patient was in agreement.  However, in the event that we still end up with no diagnosis after this attempt, I would definitely lean towards a recommendation for surgical resection in this patient given the concern over malignancy and the fact that I think he is a reasonable surgical candidate.  Thus, we will communicate with the GI team regarding the recommendation to re-attempt EUS/ERCP and biopsy.  Questions were answered and the patient was in agreement with and understanding of the plan.    A total of 50 minutes were spent on this encounter including more than 50% in face to face time and the remainder in imaging review, chart review, documentation, and coordination of care.          Again, thank you for allowing me to participate in the care of your patient.        Sincerely,        Anil Benz MD

## 2021-10-11 NOTE — PROGRESS NOTES
Called to discuss with patient. Left VM    Called wife and did speak about scheduling procedure EUS/ERCP with Dr Garvin and Dr Abarca on 10/14  Wife is agreeable to this plan.      Explained they can expect a call from  for date and time of procedure, will need a , someone to stay with them for 24 hours and should stay in town for 24 hours (within 45 min of Hospital) post procedure    Preop Plan: Dr Garvin to update office visit from 9/3/21    Med Review    Blood thinner -  none  ASA - none  Diabetic - none    COVID test discussed: yes, provided covid scheduling phone number, wife will call to schedule    Patient Education r/t procedure: mychart    A pre-op nurse will call 1-2 days prior to the procedure.     NPO/Prep: 8 hours prior to procedure    Verbalized understanding of all instructions. All questions answered.      Case request in, message sent to OR      Dede Bond, RN, BSN,   [facility]  Care coordinator

## 2021-10-12 NOTE — BRIEF OP NOTE
[facility] Brief Operative Note    Pre-operative diagnosis: Pancreatic mass [K86.89]   Post-operative diagnosis As prior    Procedure: Procedure(s):  ENDOSCOPIC ULTRASOUND, ESOPHAGOSCOPY / UPPER GASTROINTESTINAL TRACT (GI)  ENDOSCOPIC RETROGRADE CHOLANGIOPANCREATOGRAPHY, WITH DIRECT DUCT VISUALIZATION, USING PANCREATICOBILIARY FIBEROPTIC PROBE   Surgeon(s): Surgeon(s) and Role:  Panel 1:     * Babak Garvin MD - Primary  Panel 2:     * Anthony Abarca MD - Primary   Estimated blood loss: 0 mL    Specimens: * No specimens in log *   Findings:        76 M w/ extra-hepatic biliary obstruction at the level of the pancreatic head and appearance concerning for malignancy, but without a biopsy that proves malignancy.  Both prior EUS exams were done in the presence of the biliary stent, which made visualization difficult.  Given high concern for malignancy and surgical candidacy if proven, patient present today for re-attempt EUSand biopsy and ERCP for new biliary stent placement +/- SpyGlass biopsies.      EUS:    - Extraction of previously placed metal biliary stent. Plastic pancreatic duct stent not manipulated.   - An indiscrete hypoechoic mass-like lesion in the region in the pancreatic head/distal bile duct measuring 19 x 18 mm in diameter. No contact with major arteries or veins was appreciated and no adjacent lymphadenopathy. The abnormality is non-specific and could represent sequale of acute or chronic pancreatitis, however given concern for malignancy in light of the recent biliary obstruction and prior negative sampling, repeat needle biopsy performed. Results pending.   - Diffusely heterogenous pancreas with scattered hypoechoci regions, irregular duct contour, dilated sidebranches and simple-appearing cysts. Could represent sequelae of recent acute pancreatitis, underlying chronic pancreatitis or changes from pancreatic duct obstruction.  - No ascites, liver lesions or adenopathy.      ERCP    **Overall impression: 76 year-old male with distal obstruction to the common bile duct and resultant stricture who had prior ERCP with FCMS placement, prior EUS exams and biopsies of presumed pancreatic head mass were negative for malignancy. Had a third EUS with biopsies earlier today, prelim not suggestive of malignancy. ERCP was notable for:  - Widely patient biliary sphincterotomy and a transpapillary Crowley pancreatic stent distally migrated. Stent self ejected and passed distally during the exam.  - SpyGladd cholangioscopy demonstrated a segmental biliary stricture involving the middle and lower third of the main bile duct. Biopsies were obtained with a SpyBite miniature forceps for histology.   - Uncomplicated placement of three 10 Fr x 9 cm SF biliary stents into the common bile duct.     -- Suspect visualized biliary stricture from both CP in the head area and mucosal changes from previosuly placed FCMS, rather than malignant appearing biliary obstruction.     Plan  - Observe patient in PACU for possible discharge same day.   - Will communicate both EUS guided and SpyBite biopsies to the patient when available.  - Repeat ERCP with Dr. Abarca in 3 months to exchange stents and further evaluatio nstricture site.   - The findings and recommendations were discussed with the patient and their family.

## 2021-10-13 NOTE — ANESTHESIA PREPROCEDURE EVALUATION
Anesthesia Pre-Procedure Evaluation    Patient: Kody Reynaga   MRN: [id number] : 1945        Preoperative Diagnosis: Pancreatic mass [K86.89]    Procedure : Procedure(s):  ENDOSCOPIC ULTRASOUND, ESOPHAGOSCOPY / UPPER GASTROINTESTINAL TRACT (GI)  ENDOSCOPIC RETROGRADE CHOLANGIOPANCREATOGRAPHY, WITH DIRECT DUCT VISUALIZATION, USING PANCREATICOBILIARY FIBEROPTIC PROBE          Past Medical History:   Diagnosis Date     Anxiety      Depression      Gastroesophageal reflux disease      Gout      Hypertension      IBS (irritable bowel syndrome)      Pancreatic disease      Prostate cancer (H)       Past Surgical History:   Procedure Laterality Date     CATARACT EXTRACTION W/ INTRAOCULAR LENS IMPLANT Right      COLONOSCOPY       cryoablation of prostate       ENDOSCOPIC RETROGRADE CHOLANGIOPANCREATOGRAM       ENDOSCOPIC ULTRASOUND UPPER GASTROINTESTINAL TRACT (GI) N/A 2021    Procedure: ENDOSCOPIC ULTRASOUND, ESOPHAGOSCOPY / UPPER GASTROINTESTINAL TRACT (GI);  Surgeon: Babak Garvin MD;  Location:  GI     MOUTH SURGERY       TONSILLECTOMY        No Known Allergies   Social History     Tobacco Use     Smoking status: Former Smoker     Smokeless tobacco: Never Used   Substance Use Topics     Alcohol use: Not Currently      Wt Readings from Last 1 Encounters:   10/14/21 81.4 kg (179 lb 7.3 oz)        Anesthesia Evaluation   Pt has had prior anesthetic.     No history of anesthetic complications       ROS/MED HX  ENT/Pulmonary:     (+) tobacco use, Past use,     Neurologic:  - neg neurologic ROS     Cardiovascular:     (+) hypertension-----No previous cardiac testing     METS/Exercise Tolerance:     Hematologic:  - neg hematologic  ROS     Musculoskeletal:  - neg musculoskeletal ROS     GI/Hepatic: Comment:   * Obstructive Jaundice and weight loss.  * Mass head of the pancreas and significant intrahepatic and extra hepatic ductal dilatation consistent with apparent malignant appearing mass at the head  "of the  pancreas noted on endoscopic ultrasound.       (+) GERD,     Renal/Genitourinary:  - neg Renal ROS     Endo:  - neg endo ROS     Psychiatric/Substance Use: Comment: On Citalopram, Bupropion.   Heavier alcohol intake >15 drinks per week, with reportedly average 4 drinks on \"drinking days\". Denies EtOH abuse. Denies withdrawals.     (+) psychiatric history depression and anxiety alcohol abuse     Infectious Disease:  - neg infectious disease ROS     Malignancy: Comment: Prostate cancer s/p Lupron therapy in 2019 and cryoablation, followed by urology without signs of disease progression      Other:  - neg other ROS          Physical Exam    Airway        Mallampati: II   TM distance: > 3 FB   Neck ROM: full   Mouth opening: > 3 cm    Respiratory Devices and Support         Dental  no notable dental history         Cardiovascular   cardiovascular exam normal          Pulmonary   pulmonary exam normal                OUTSIDE LABS:  CBC: No results found for: WBC, HGB, HCT, PLT  BMP:   Lab Results   Component Value Date     (H) 10/14/2021     COAGS: No results found for: PTT, INR, FIBR  POC: No results found for: BGM, HCG, HCGS  HEPATIC: No results found for: ALBUMIN, PROTTOTAL, ALT, AST, GGT, ALKPHOS, BILITOTAL, BILIDIRECT, AWAIS  OTHER: No results found for: PH, LACT, A1C, AMIRAH, PHOS, MAG, LIPASE, AMYLASE, TSH, T4, T3, CRP, SED    Anesthesia Plan    ASA Status:  3      Anesthesia Type: General.     - Airway: ETT   Induction: Intravenous.   Maintenance: Balanced.   Techniques and Equipment:     - Lines/Monitors: BIS     Consents    Anesthesia Plan(s) and associated risks, benefits, and realistic alternatives discussed. Questions answered and patient/representative(s) expressed understanding.     - Discussed with:  Patient      - Extended Intubation/Ventilatory Support Discussed: Yes.      - Patient is DNR/DNI Status: No    Use of blood products discussed: Yes.     - Discussed with: Patient.     Postoperative " Care    Pain management: IV analgesics, Oral pain medications, Multi-modal analgesia.   PONV prophylaxis: Ondansetron (or other 5HT-3), Dexamethasone or Solumedrol     Comments:                Matteo Reid

## 2021-10-14 NOTE — DISCHARGE INSTRUCTIONS
[facility], [place]  Same-Day Surgery ERCP Procedure   Adult Discharge Orders & Instructions     You had a procedure known as an Endoscopic Retrograde Cholangiopancreatography (ERCP). Your healthcare provider performed the ERCP to look at your bile or pancreatic ducts, and to locate and/or treat blockages (dilation, stenting, removal, etc.) in these ducts. Often biopsies, small samples of tissue, are taken to help diagnose and/or classify stages of disease growth. This procedure is used to diagnose diseases of the pancreas, bile ducts, pancreatic duct, liver, and gallbladder.     After your procedure   1. Make sure to clarify with your healthcare provider any diet restrictions (For example, clear liquid, low fat, no caffeine, etc.)   2. Do NOT take aspirin containing medications or any other blood-thinning medicines (anticoagulants) until your healthcare provider says it's OK.   3. You MAY be prescribed antibiotics, depending on what was done and/or found during your EUS, make sure to take antibiotics as prescribed by your healthcare provider    For 24 hours after surgery  1. Get plenty of rest.  A responsible adult must stay with you for at least 24 hours after you leave the hospital.   2. Do not drive or use heavy equipment.  If you have weakness or tingling, don't drive or use heavy equipment until this feeling goes away.  3. Do not drink alcohol.  4. Avoid strenuous or risky activities (gym, yoga, cycling, etc.).  Ask for help when climbing stairs.   5. You may feel lightheaded.  IF so, sit for a few minutes before standing.  Have someone help you get up.   6. If you have nausea (feel sick to your stomach): Drink only clear liquids such as apple juice, ginger ale, broth or 7-Up.  Rest may also help.  Be sure to drink enough fluids.  Move to a regular diet as you feel able.  7. If you feel bloated or have too much gas, use a heating pad on your belly to help reduce the discomfort.  This should help you feel better.   8. You may have a slight fever. This is normal for the first 24 hours.   9. You may have a dry mouth, a sore throat, muscle aches or trouble sleeping.  These are normal and will go away after 24 hours. A sore throat is most common. Use lozenges or gargle with salt water to ease the discomfort.   10. Do not make important or legal decisions.      Call your doctor for any of the followin. Chest pain, and/or shortness of breath  2. Abdominal  pain, bloating or cramping that has not improved or does not respond to pain reliving medications (Tylenol or narcotics if prescribed)   3. Difficulty swallowing or feeling as though food or liquids are stuck in your throat   4. Sore throat lasting more than 2 days or pain that has worsened over time   5. Black or tarry stools   6. Nausea and/or vomiting that is not resolving or has not responded to anti-nausea medications prescribed to you   7. It has been over 8 to 10 hours since surgery and you are still not able to urinate (pass water)   8. Headache for over 24 hours   9. Fever over 100.5 F (38 C) lasting more than 24 hours after the procedure   10. Signs of jaundice or blockage (fever, chills, abdominal pain, yellowing of the whites of your eyes, yellowing of your skin, and/or passing darker than normal urine)     To contact a doctor, call:   [ ]Dr Garvin at the  GI clinic at [phone] (Monday thru Friday 8:00am to 4:30pm)   [ ] [phone] and ask for the GI resident on call (answered 24 hours a day)   [ ] Emergency Department: [facility]: [phone]     Take it easy when you get home.  Remember, same day surgery DOES NOT MEAN SAME DAY RECOVERY!  Healing is a gradual process.  You will need some time to recover - you may be more tired than you realize at first.  Rest and relax for at least the first 24 hours at home.  You'll feel better and heal faster if you take good care of yourself.

## 2021-10-14 NOTE — ANESTHESIA POSTPROCEDURE EVALUATION
Patient: Kody Reynaga    Procedure: Procedure(s):  ESOPHAGOGASTRODUODENOSCOPY, WITH FINE NEEDLE ASPIRATION BIOPSY, WITH ENDOSCOPIC ULTRASOUND GUIDANCE, biliary stent removal  ENDOSCOPIC RETROGRADE CHOLANGIOPANCREATOGRAPHY, WITH DIRECT DUCT VISUALIZATION, USING PANCREATICOBILIARY FIBEROPTIC PROBE-spyglass, biliary sludge and clot removal, biliary biopsies, biliary stent exchange       Diagnosis:Pancreatic mass [K86.89]  Diagnosis Additional Information: No value filed.    Anesthesia Type:  General    Note:  Disposition: Outpatient   Postop Pain Control: Uneventful            Sign Out: Well controlled pain   PONV: No   Neuro/Psych: Uneventful            Sign Out: Acceptable/Baseline neuro status   Airway/Respiratory: Uneventful            Sign Out: Acceptable/Baseline resp. status   CV/Hemodynamics: Uneventful            Sign Out: Acceptable CV status; No obvious hypovolemia; No obvious fluid overload   Other NRE: NONE   DID A NON-ROUTINE EVENT OCCUR? No           Last vitals:  Vitals Value Taken Time   /70 10/14/21 1100   Temp 36.9  C (98.42  F) 10/14/21 1110   Pulse 75 10/14/21 1110   Resp 18 10/14/21 1100   SpO2 97 % 10/14/21 1110   Vitals shown include unvalidated device data.    Electronically Signed By: Akash Chow MD  October 14, 2021  11:11 AM

## 2021-10-14 NOTE — ANESTHESIA CARE TRANSFER NOTE
Patient: Kody Reynaga    Procedure: Procedure(s):  ESOPHAGOGASTRODUODENOSCOPY, WITH FINE NEEDLE ASPIRATION BIOPSY, WITH ENDOSCOPIC ULTRASOUND GUIDANCE, biliary stent removal  ENDOSCOPIC RETROGRADE CHOLANGIOPANCREATOGRAPHY, WITH DIRECT DUCT VISUALIZATION, USING PANCREATICOBILIARY FIBEROPTIC PROBE-spyglass, biliary sludge and clot removal, biliary biopsies, biliary stent exchange       Diagnosis: Pancreatic mass [K86.89]  Diagnosis Additional Information: No value filed.    Anesthesia Type:   General     Note:    Oropharynx: oropharynx clear of all foreign objects and spontaneously breathing  Level of Consciousness: awake and drowsy  Oxygen Supplementation: face mask  Level of Supplemental Oxygen (L/min / FiO2): 4  Independent Airway: airway patency satisfactory and stable  Dentition: dentition unchanged  Vital Signs Stable: post-procedure vital signs reviewed and stable  Report to RN Given: handoff report given  Patient transferred to: PACU    Handoff Report: Identifed the Patient, Identified the Reponsible Provider, Reviewed the pertinent medical history, Discussed the surgical course, Reviewed Intra-OP anesthesia mangement and issues during anesthesia, Set expectations for post-procedure period and Allowed opportunity for questions and acknowledgement of understanding      Vitals:  Vitals Value Taken Time   /77 10/14/21 1050   Temp     Pulse 84 10/14/21 1052   Resp     SpO2 100 % 10/14/21 1052   Vitals shown include unvalidated device data.    Electronically Signed By: Matteo Reid  October 14, 2021  10:53 AM

## 2021-10-14 NOTE — ANESTHESIA PROCEDURE NOTES
Airway       Patient location during procedure: OR       Procedure Start/Stop Times: 10/14/2021 7:55 AM  Staff -        Anesthesiologist:  Akash Chow MD       Resident/Fellow: Matteo Reid       Performed By: resident  Consent for Airway        Urgency: elective  Indications and Patient Condition       Indications for airway management: robin-procedural       Induction type:intravenous       Mask difficulty assessment: 2 - vent by mask + OA or adjuvant +/- NMBA    Final Airway Details       Final airway type: endotracheal airway       Successful airway: ETT - single  Endotracheal Airway Details        ETT size (mm): 7.5       Cuffed: yes       Successful intubation technique: direct laryngoscopy       DL Blade Type: MAC 4       Grade View of Cords: 2 (2b)       Adjucts: bougie       Position: Right       Measured from: gums/teeth       Secured at (cm): 22       Bite Block used: egd bite block.    Post intubation assessment        Placement verified by: capnometry, equal breath sounds and chest rise        Number of attempts at approach: 2       Number of other approaches attempted: 1 (ramping)       Secured with: silk tape       Ease of procedure: easy       Dentition: Intact and Unchanged    Additional Comments       Initial attempt with MAC4 blade with grade 3 view, decision was made to mask while grabbing a boogie. Patient was ramped to optimize positioning, subsequent attempt with grade 2b view and successful placement of tube with boogie.

## 2021-10-15 NOTE — TELEPHONE ENCOUNTER
Cytology from EUS-FNA returned with atypia. No definitive evidence of malignancy. Called and updated pt. Clarified that the bile duct biopsies at ERCP are still pending and that atypia does not imply malignancy and could indicate changes related to inflammation or stent-related irritation.    Message sent to Dr. Benz with update.    JESSICA Garvin MD  Professor of Medicine  Division of Gastroenterology, Hepatology and Nutrition  [facility]

## 2021-10-20 NOTE — TELEPHONE ENCOUNTER
Per Dr Garvin  just reviewed the pathology results with Dr. Abarca from this pt.   The bile duct biopsies were suspicious but not diagnostic of malignancy.     He had recommended repeat ERCP and stent change in 3 months, but now would like to move this up to 2 months.     Please assist in scheduling:     Procedure/Imaging/Clinic: ERCP  Physician: Dr. Abarca  Timin months  Procedure length:90 min  Anesthesia:gen  Dx: biliary stricture  Tier:2  Location: [facility]     Orders placed, left message.    ML     infant of 35 completed weeks of gestation

## 2021-10-20 NOTE — TELEPHONE ENCOUNTER
"Final biopsy results from ERCP forceps biliary biopsies returned read as \"suspicious for malignancy\".     Discussed with pathology. Not felt to be sufficiently abnormal to state with certainty that this is malignant. As such, not sufficient to proceed with chemo.    Called and discussed with pt.    Discussed with Dr. Abarca and Dr. Benz.    Dr. Benz will regroup with pt to make a final decision re possible surgery, however tentative plan will be for earlier than planned repeat ERCP for stent change and rebiopsy in 2 months, rather than 3 months. Re.    JESSICA Garvin MD  Professor of Medicine  Division of Gastroenterology, Hepatology and Nutrition  [facility]    "

## 2021-10-27 NOTE — TELEPHONE ENCOUNTER
I called and had a short discussion with the patient.  In short, all of his biopsies are inconclusive.  Often times we recommend surgery in these scenarios given there is suspicion of malignancy and the potentially curative treatment window can be missed if not treated.  However, there is also a chance that this is not malignancy and that a resection would not be required.  Thus, there is risk of both over-treatment and under-treatment.  Given his age, his concerns about quality of life, and the risk of a Whipple procedure, I plan to discuss him at the next multidisciplinary pancreas conference to clarify a group plan moving forward.  Questions were answered and the patient was in agreement with and understanding of the plan.

## 2021-11-01 NOTE — TELEPHONE ENCOUNTER
I called and left a voicemail for the patient as I wanted to discuss final recommendations after discussion in multidisciplinary conference today.  I gave an office callback number and will also try to contact him again at another time.

## 2021-11-02 NOTE — TELEPHONE ENCOUNTER
The patient called me back today and I was able to have a conversation with him.  Just to summarize, the patient is well aware that a surgical approach could be the right approach or one that could lead to over-treatment and/or complications.  On the other hand, he is aware that ongoing surveillance could lead to delay in treatment of a cancer if one is present.  Thus, he would prefer not to move forward with surgery at this time.  This actually echoed what we discussed in conference where it was recommended that we repeat ERCP/EUS in 2 months to see what things look like at that time.  This was in light of the risks of surgery, the patient's age, the patient's preference, and the indeterminate findings from biopsy.  GI will move forward with this surveillance and I will likely speak to the patient again in the future.  Questions were answered and the patient was in agreement with and understanding of the plan.

## 2021-11-13 NOTE — PROGRESS NOTES
Assessment & Plan     Pancreatic mass  The pancreatic mass was found when he was in [facility]  He had biopsies done at least once there  He had a couple of more biopsies done at [facility]  Apparently the said that this was not malignant  Initially he had some pancreatic stents but now he has biliary stents  Following up with GI  Not clear what the etiology of this mass is  He does have a history of significant alcohol intake in the past  They are wondering if this is from chronic pancreatitis    Essential hypertension, benign  He is on losartan and blood pressure is well controlled  He recently had a CMP and his electrolytes are normal    Primary insomnia  He is on melatonin and zolpidem  Apparently he is on zolpidem for the last 16 years  This was prescribed by him by his PCP in [place]  I discussed with him about side effects of zolpidem  Encouraged him to see if he can wean himself off  For now I asked him to see if he can take it on alternate days    Major depressive disorder, recurrent episode, in full remission (H)  On Lexapro 20 mg  PHQ-9 score today is very good    Obstructive jaundice  As stated above he has severe obstructive jaundice from the pancreatic mass  This is being worked up  He had pancreatic stents placed at one-point but currently has biliary stents  These are supposed to be replaced every 3 months    Dyslipidemia (high LDL; low HDL)  Paradoxically his HDL was undetectable when the lipid panel was done a couple of months ago in [place]  Not sure that is because of his deranged LFTs then  Will go to repeat the lipid panel in a couple of weeks  Continue lovastatin  - Lipid panel reflex to direct LDL Fasting; Future    Prostate cancer (H)  History of prostate cancer status post laser therapy  Last PSA was very good at 0.08  He wishes to follow-up with the urologist here  - Adult Urology Referral; Future      30 minutes spent on the date of the encounter doing chart review, history and  "exam, documentation and further activities per the note       BMI:   Estimated body mass index is 26.52 kg/m  as calculated from the following:    Height as of this encounter: 1.791 m (5' 10.5\").    Weight as of this encounter: 85 kg (187 lb 8 oz).           Return in about 3 months (around 2/17/2022).    Jerel Curran MD  [facility] Gates is a 76 year old who presents for the following health issues     History of Present Illness       He eats 2-3 servings of fruits and vegetables daily.He consumes 1 sweetened beverage(s) daily.He exercises with enough effort to increase his heart rate 30 to 60 minutes per day.  He exercises with enough effort to increase his heart rate 6 days per week.   He is taking medications regularly.  76-year-old retired  who has recently moved from [place] to be closer to family here in [place] comes today to establish care  He has no active complaints whatsoever however he is needing some refills and some refills soon          Review of Systems   Constitutional, HEENT, cardiovascular, pulmonary, gi and gu systems are negative, except as otherwise noted.      Objective    /70 (BP Location: Right arm, Patient Position: Sitting, Cuff Size: Adult Large)   Pulse 72   Resp 16   Ht 1.791 m (5' 10.5\")   Wt 85 kg (187 lb 8 oz)   BMI 26.52 kg/m    Body mass index is 26.52 kg/m .  Physical Exam   GENERAL: healthy, alert and no distress  NECK: no adenopathy, no asymmetry, masses, or scars and thyroid normal to palpation  RESP: lungs clear to auscultation - no rales, rhonchi or wheezes  CV: regular rate and rhythm, normal S1 S2, no S3 or S4, no murmur, click or rub, no peripheral edema and peripheral pulses strong  ABDOMEN: soft, nontender, no hepatosplenomegaly, no masses and bowel sounds normal  MS: no gross musculoskeletal defects noted, no edema  SKIN: no suspicious lesions or rashes  NEURO: Normal strength and tone, " mentation intact and speech normal  PSYCH: mentation appears normal, affect normal/bright

## 2021-11-17 PROBLEM — C61 PROSTATE CANCER (H): Status: ACTIVE | Noted: 2021-01-01

## 2021-11-17 PROBLEM — K86.89 PANCREATIC MASS: Status: ACTIVE | Noted: 2021-01-01

## 2021-11-17 PROBLEM — E78.5 DYSLIPIDEMIA (HIGH LDL; LOW HDL): Status: ACTIVE | Noted: 2021-01-01

## 2021-11-17 PROBLEM — K83.1 OBSTRUCTIVE JAUNDICE (H): Status: ACTIVE | Noted: 2021-08-26

## 2021-11-17 PROBLEM — G47.00 INSOMNIA: Status: ACTIVE | Noted: 2021-01-01

## 2021-12-02 NOTE — TELEPHONE ENCOUNTER
Routing refill request to provider for review/approval because:  Medication is reported/historical    Barb Coburn BSN, RN

## 2021-12-02 NOTE — TELEPHONE ENCOUNTER
Patient came into clinic for a lab and realized that he also needs prescription refills for Omeprazole and Losartan. Please help patient receive his refills. Please call patient with any further questions or concerns. Thank you.

## 2021-12-14 NOTE — TELEPHONE ENCOUNTER
LVM for patient in regards to scheduling procedure with Dr. Garvin/Dr. Abarca since January schedule has been released. Hold placed 1/13/2022.Left direct line for patient to call to go over scheduling details.

## 2021-12-17 NOTE — TELEPHONE ENCOUNTER
LVM for patient in regards to scheduling procedure with Dr. Abarca and Dr. Garvin. Left direct line for patient to call to go over scheduling details.

## 2021-12-30 NOTE — TELEPHONE ENCOUNTER
Called patient one last time to discuss procedure on 1/13/22.  Pt says time will work. Message sent via [employer] with other preop details.       ML

## 2022-01-01 ENCOUNTER — HOSPITAL ENCOUNTER (OUTPATIENT)
Facility: CLINIC | Age: 77
Discharge: HOME | End: 2022-01-24
Attending: INTERNAL MEDICINE | Admitting: INTERNAL MEDICINE
Payer: COMMERCIAL

## 2022-01-01 ENCOUNTER — PREP FOR PROCEDURE (OUTPATIENT)
Dept: GASTROENTEROLOGY | Facility: CLINIC | Age: 77
End: 2022-01-01
Payer: COMMERCIAL

## 2022-01-01 ENCOUNTER — ANESTHESIA EVENT (OUTPATIENT)
Dept: SURGERY | Facility: CLINIC | Age: 77
End: 2022-01-01
Payer: COMMERCIAL

## 2022-01-01 ENCOUNTER — PATIENT OUTREACH (OUTPATIENT)
Dept: CARE COORDINATION | Facility: CLINIC | Age: 77
End: 2022-01-01

## 2022-01-01 ENCOUNTER — TELEPHONE (OUTPATIENT)
Dept: FAMILY MEDICINE | Facility: CLINIC | Age: 77
End: 2022-01-01

## 2022-01-01 ENCOUNTER — APPOINTMENT (OUTPATIENT)
Dept: OCCUPATIONAL THERAPY | Facility: CLINIC | Age: 77
DRG: 682 | End: 2022-01-01
Payer: COMMERCIAL

## 2022-01-01 ENCOUNTER — ANTICOAGULATION THERAPY VISIT (OUTPATIENT)
Dept: ANTICOAGULATION | Facility: CLINIC | Age: 77
End: 2022-01-01

## 2022-01-01 ENCOUNTER — TELEPHONE (OUTPATIENT)
Dept: UROLOGY | Facility: CLINIC | Age: 77
End: 2022-01-01

## 2022-01-01 ENCOUNTER — OFFICE VISIT (OUTPATIENT)
Dept: SURGERY | Facility: CLINIC | Age: 77
End: 2022-01-01
Attending: SURGERY
Payer: COMMERCIAL

## 2022-01-01 ENCOUNTER — PREP FOR PROCEDURE (OUTPATIENT)
Dept: GASTROENTEROLOGY | Facility: CLINIC | Age: 77
End: 2022-01-01

## 2022-01-01 ENCOUNTER — APPOINTMENT (OUTPATIENT)
Dept: GENERAL RADIOLOGY | Facility: CLINIC | Age: 77
DRG: 682 | End: 2022-01-01
Payer: COMMERCIAL

## 2022-01-01 ENCOUNTER — ANESTHESIA (OUTPATIENT)
Dept: SURGERY | Facility: CLINIC | Age: 77
End: 2022-01-01
Payer: COMMERCIAL

## 2022-01-01 ENCOUNTER — TELEPHONE (OUTPATIENT)
Dept: WOUND CARE | Facility: CLINIC | Age: 77
End: 2022-01-01

## 2022-01-01 ENCOUNTER — APPOINTMENT (OUTPATIENT)
Dept: ULTRASOUND IMAGING | Facility: CLINIC | Age: 77
DRG: 682 | End: 2022-01-01
Attending: INTERNAL MEDICINE
Payer: COMMERCIAL

## 2022-01-01 ENCOUNTER — DOCUMENTATION ONLY (OUTPATIENT)
Dept: GASTROENTEROLOGY | Facility: CLINIC | Age: 77
End: 2022-01-01
Payer: COMMERCIAL

## 2022-01-01 ENCOUNTER — VIRTUAL VISIT (OUTPATIENT)
Dept: FAMILY MEDICINE | Facility: CLINIC | Age: 77
End: 2022-01-01
Payer: COMMERCIAL

## 2022-01-01 ENCOUNTER — TELEPHONE (OUTPATIENT)
Dept: HEMATOLOGY | Facility: CLINIC | Age: 77
End: 2022-01-01
Payer: COMMERCIAL

## 2022-01-01 ENCOUNTER — APPOINTMENT (OUTPATIENT)
Dept: ULTRASOUND IMAGING | Facility: CLINIC | Age: 77
DRG: 682 | End: 2022-01-01
Attending: STUDENT IN AN ORGANIZED HEALTH CARE EDUCATION/TRAINING PROGRAM
Payer: COMMERCIAL

## 2022-01-01 ENCOUNTER — LAB (OUTPATIENT)
Dept: LAB | Facility: CLINIC | Age: 77
End: 2022-01-01

## 2022-01-01 ENCOUNTER — TELEPHONE (OUTPATIENT)
Dept: UROLOGY | Facility: CLINIC | Age: 77
End: 2022-01-01
Payer: COMMERCIAL

## 2022-01-01 ENCOUNTER — APPOINTMENT (OUTPATIENT)
Dept: GENERAL RADIOLOGY | Facility: CLINIC | Age: 77
DRG: 682 | End: 2022-01-01
Attending: STUDENT IN AN ORGANIZED HEALTH CARE EDUCATION/TRAINING PROGRAM
Payer: COMMERCIAL

## 2022-01-01 ENCOUNTER — VIRTUAL VISIT (OUTPATIENT)
Dept: GASTROENTEROLOGY | Facility: CLINIC | Age: 77
End: 2022-01-01
Attending: INTERNAL MEDICINE
Payer: COMMERCIAL

## 2022-01-01 ENCOUNTER — TELEPHONE (OUTPATIENT)
Dept: GASTROENTEROLOGY | Facility: CLINIC | Age: 77
End: 2022-01-01
Payer: COMMERCIAL

## 2022-01-01 ENCOUNTER — ANESTHESIA EVENT (OUTPATIENT)
Dept: SURGERY | Facility: CLINIC | Age: 77
End: 2022-01-01

## 2022-01-01 ENCOUNTER — OFFICE VISIT (OUTPATIENT)
Dept: INFUSION THERAPY | Facility: CLINIC | Age: 77
End: 2022-01-01
Attending: INTERNAL MEDICINE
Payer: COMMERCIAL

## 2022-01-01 ENCOUNTER — PATIENT OUTREACH (OUTPATIENT)
Dept: GASTROENTEROLOGY | Facility: CLINIC | Age: 77
End: 2022-01-01
Payer: COMMERCIAL

## 2022-01-01 ENCOUNTER — OFFICE VISIT (OUTPATIENT)
Dept: FAMILY MEDICINE | Facility: CLINIC | Age: 77
End: 2022-01-01
Payer: COMMERCIAL

## 2022-01-01 ENCOUNTER — APPOINTMENT (OUTPATIENT)
Dept: GENERAL RADIOLOGY | Facility: CLINIC | Age: 77
DRG: 682 | End: 2022-01-01
Attending: INTERNAL MEDICINE
Payer: COMMERCIAL

## 2022-01-01 ENCOUNTER — DOCUMENTATION ONLY (OUTPATIENT)
Dept: OTHER | Facility: CLINIC | Age: 77
End: 2022-01-01

## 2022-01-01 ENCOUNTER — MYC MEDICAL ADVICE (OUTPATIENT)
Dept: FAMILY MEDICINE | Facility: CLINIC | Age: 77
End: 2022-01-01

## 2022-01-01 ENCOUNTER — APPOINTMENT (OUTPATIENT)
Dept: ULTRASOUND IMAGING | Facility: CLINIC | Age: 77
DRG: 682 | End: 2022-01-01
Payer: COMMERCIAL

## 2022-01-01 ENCOUNTER — DOCUMENTATION ONLY (OUTPATIENT)
Dept: LAB | Facility: CLINIC | Age: 77
End: 2022-01-01

## 2022-01-01 ENCOUNTER — APPOINTMENT (OUTPATIENT)
Dept: CT IMAGING | Facility: CLINIC | Age: 77
DRG: 682 | End: 2022-01-01
Attending: STUDENT IN AN ORGANIZED HEALTH CARE EDUCATION/TRAINING PROGRAM
Payer: COMMERCIAL

## 2022-01-01 ENCOUNTER — OFFICE VISIT (OUTPATIENT)
Dept: HEMATOLOGY | Facility: CLINIC | Age: 77
End: 2022-01-01
Attending: PHYSICIAN ASSISTANT
Payer: COMMERCIAL

## 2022-01-01 ENCOUNTER — DOCUMENTATION ONLY (OUTPATIENT)
Dept: GASTROENTEROLOGY | Facility: CLINIC | Age: 77
End: 2022-01-01

## 2022-01-01 ENCOUNTER — APPOINTMENT (OUTPATIENT)
Dept: PHYSICAL THERAPY | Facility: CLINIC | Age: 77
DRG: 682 | End: 2022-01-01
Payer: COMMERCIAL

## 2022-01-01 ENCOUNTER — PATIENT OUTREACH (OUTPATIENT)
Dept: GASTROENTEROLOGY | Facility: CLINIC | Age: 77
End: 2022-01-01

## 2022-01-01 ENCOUNTER — OFFICE VISIT (OUTPATIENT)
Dept: HEMATOLOGY | Facility: CLINIC | Age: 77
End: 2022-01-01
Attending: INTERNAL MEDICINE
Payer: COMMERCIAL

## 2022-01-01 ENCOUNTER — APPOINTMENT (OUTPATIENT)
Dept: PHYSICAL THERAPY | Facility: CLINIC | Age: 77
DRG: 682 | End: 2022-01-01
Attending: INTERNAL MEDICINE
Payer: COMMERCIAL

## 2022-01-01 ENCOUNTER — OFFICE VISIT (OUTPATIENT)
Dept: SURGERY | Facility: CLINIC | Age: 77
End: 2022-01-01
Payer: COMMERCIAL

## 2022-01-01 ENCOUNTER — APPOINTMENT (OUTPATIENT)
Dept: GENERAL RADIOLOGY | Facility: CLINIC | Age: 77
DRG: 682 | End: 2022-01-01
Attending: NURSE PRACTITIONER
Payer: COMMERCIAL

## 2022-01-01 ENCOUNTER — TELEPHONE (OUTPATIENT)
Dept: GASTROENTEROLOGY | Facility: CLINIC | Age: 77
End: 2022-01-01

## 2022-01-01 ENCOUNTER — ANCILLARY PROCEDURE (OUTPATIENT)
Dept: ULTRASOUND IMAGING | Facility: CLINIC | Age: 77
End: 2022-01-01
Attending: INTERNAL MEDICINE
Payer: COMMERCIAL

## 2022-01-01 ENCOUNTER — APPOINTMENT (OUTPATIENT)
Dept: OCCUPATIONAL THERAPY | Facility: CLINIC | Age: 77
DRG: 682 | End: 2022-01-01
Attending: INTERNAL MEDICINE
Payer: COMMERCIAL

## 2022-01-01 ENCOUNTER — HOSPITAL ENCOUNTER (INPATIENT)
Facility: CLINIC | Age: 77
LOS: 10 days | DRG: 682 | End: 2022-09-16
Attending: STUDENT IN AN ORGANIZED HEALTH CARE EDUCATION/TRAINING PROGRAM | Admitting: INTERNAL MEDICINE
Payer: COMMERCIAL

## 2022-01-01 ENCOUNTER — TRANSFERRED RECORDS (OUTPATIENT)
Dept: HEALTH INFORMATION MANAGEMENT | Facility: CLINIC | Age: 77
End: 2022-01-01

## 2022-01-01 ENCOUNTER — ANCILLARY PROCEDURE (OUTPATIENT)
Dept: CT IMAGING | Facility: CLINIC | Age: 77
End: 2022-01-01
Attending: INTERNAL MEDICINE
Payer: COMMERCIAL

## 2022-01-01 ENCOUNTER — MYC MEDICAL ADVICE (OUTPATIENT)
Dept: GASTROENTEROLOGY | Facility: CLINIC | Age: 77
End: 2022-01-01

## 2022-01-01 ENCOUNTER — APPOINTMENT (OUTPATIENT)
Dept: GENERAL RADIOLOGY | Facility: CLINIC | Age: 77
End: 2022-01-01
Attending: INTERNAL MEDICINE
Payer: COMMERCIAL

## 2022-01-01 ENCOUNTER — HOSPITAL ENCOUNTER (OUTPATIENT)
Facility: CLINIC | Age: 77
Discharge: HOME | End: 2022-05-10
Attending: INTERNAL MEDICINE | Admitting: INTERNAL MEDICINE
Payer: COMMERCIAL

## 2022-01-01 ENCOUNTER — PRE VISIT (OUTPATIENT)
Dept: SURGERY | Facility: CLINIC | Age: 77
End: 2022-01-01

## 2022-01-01 ENCOUNTER — HOSPITAL ENCOUNTER (EMERGENCY)
Facility: CLINIC | Age: 77
Discharge: HOME | End: 2022-08-15
Attending: EMERGENCY MEDICINE | Admitting: EMERGENCY MEDICINE
Payer: COMMERCIAL

## 2022-01-01 ENCOUNTER — LAB (OUTPATIENT)
Dept: LAB | Facility: CLINIC | Age: 77
End: 2022-01-01
Attending: INTERNAL MEDICINE

## 2022-01-01 ENCOUNTER — HOSPITAL ENCOUNTER (OUTPATIENT)
Facility: CLINIC | Age: 77
Discharge: HOME | End: 2022-02-24
Attending: INTERNAL MEDICINE | Admitting: INTERNAL MEDICINE
Payer: COMMERCIAL

## 2022-01-01 ENCOUNTER — LAB (OUTPATIENT)
Dept: LAB | Facility: CLINIC | Age: 77
End: 2022-01-01
Attending: INTERNAL MEDICINE
Payer: COMMERCIAL

## 2022-01-01 ENCOUNTER — ANCILLARY PROCEDURE (OUTPATIENT)
Dept: CT IMAGING | Facility: CLINIC | Age: 77
End: 2022-01-01
Attending: PHYSICIAN ASSISTANT
Payer: COMMERCIAL

## 2022-01-01 ENCOUNTER — VIRTUAL VISIT (OUTPATIENT)
Dept: SURGERY | Facility: CLINIC | Age: 77
End: 2022-01-01
Payer: COMMERCIAL

## 2022-01-01 ENCOUNTER — LAB (OUTPATIENT)
Dept: LAB | Facility: CLINIC | Age: 77
End: 2022-01-01
Payer: COMMERCIAL

## 2022-01-01 ENCOUNTER — HOSPITAL ENCOUNTER (OUTPATIENT)
Dept: CT IMAGING | Facility: CLINIC | Age: 77
Discharge: HOME | End: 2022-05-10
Attending: INTERNAL MEDICINE
Payer: COMMERCIAL

## 2022-01-01 ENCOUNTER — VIRTUAL VISIT (OUTPATIENT)
Dept: GASTROENTEROLOGY | Facility: CLINIC | Age: 77
End: 2022-01-01
Payer: COMMERCIAL

## 2022-01-01 ENCOUNTER — TRANSFERRED RECORDS (OUTPATIENT)
Dept: HEALTH INFORMATION MANAGEMENT | Facility: CLINIC | Age: 77
End: 2022-01-01
Payer: COMMERCIAL

## 2022-01-01 ENCOUNTER — CARE COORDINATION (OUTPATIENT)
Dept: GASTROENTEROLOGY | Facility: CLINIC | Age: 77
End: 2022-01-01
Payer: COMMERCIAL

## 2022-01-01 ENCOUNTER — OFFICE VISIT (OUTPATIENT)
Dept: GASTROENTEROLOGY | Facility: CLINIC | Age: 77
DRG: 682 | End: 2022-01-01
Attending: INTERNAL MEDICINE
Payer: COMMERCIAL

## 2022-01-01 ENCOUNTER — APPOINTMENT (OUTPATIENT)
Dept: CARDIOLOGY | Facility: CLINIC | Age: 77
DRG: 682 | End: 2022-01-01
Payer: COMMERCIAL

## 2022-01-01 ENCOUNTER — OFFICE VISIT (OUTPATIENT)
Dept: UROLOGY | Facility: CLINIC | Age: 77
End: 2022-01-01
Attending: INTERNAL MEDICINE
Payer: COMMERCIAL

## 2022-01-01 ENCOUNTER — HOSPITAL ENCOUNTER (INPATIENT)
Facility: CLINIC | Age: 77
LOS: 5 days | Discharge: HOME | DRG: 682 | End: 2022-09-03
Attending: EMERGENCY MEDICINE | Admitting: INTERNAL MEDICINE
Payer: COMMERCIAL

## 2022-01-01 VITALS
HEART RATE: 93 BPM | DIASTOLIC BLOOD PRESSURE: 70 MMHG | WEIGHT: 198.2 LBS | SYSTOLIC BLOOD PRESSURE: 130 MMHG | HEIGHT: 71 IN | TEMPERATURE: 99.2 F | RESPIRATION RATE: 21 BRPM | BODY MASS INDEX: 27.75 KG/M2 | OXYGEN SATURATION: 95 %

## 2022-01-01 VITALS
OXYGEN SATURATION: 99 % | HEART RATE: 69 BPM | HEIGHT: 71 IN | TEMPERATURE: 98 F | DIASTOLIC BLOOD PRESSURE: 91 MMHG | WEIGHT: 192.02 LBS | BODY MASS INDEX: 26.88 KG/M2 | SYSTOLIC BLOOD PRESSURE: 188 MMHG | RESPIRATION RATE: 16 BRPM

## 2022-01-01 VITALS
DIASTOLIC BLOOD PRESSURE: 82 MMHG | HEART RATE: 74 BPM | HEIGHT: 71 IN | OXYGEN SATURATION: 98 % | WEIGHT: 192.1 LBS | BODY MASS INDEX: 26.9 KG/M2 | RESPIRATION RATE: 16 BRPM | TEMPERATURE: 98.6 F | SYSTOLIC BLOOD PRESSURE: 137 MMHG

## 2022-01-01 VITALS
DIASTOLIC BLOOD PRESSURE: 64 MMHG | SYSTOLIC BLOOD PRESSURE: 109 MMHG | BODY MASS INDEX: 26.12 KG/M2 | HEART RATE: 90 BPM | OXYGEN SATURATION: 95 % | TEMPERATURE: 98 F | WEIGHT: 187.3 LBS

## 2022-01-01 VITALS
RESPIRATION RATE: 18 BRPM | HEART RATE: 99 BPM | DIASTOLIC BLOOD PRESSURE: 61 MMHG | TEMPERATURE: 98 F | OXYGEN SATURATION: 98 % | HEIGHT: 71 IN | BODY MASS INDEX: 28.28 KG/M2 | WEIGHT: 202 LBS | SYSTOLIC BLOOD PRESSURE: 97 MMHG

## 2022-01-01 VITALS
TEMPERATURE: 97.8 F | HEART RATE: 76 BPM | WEIGHT: 194.3 LBS | DIASTOLIC BLOOD PRESSURE: 76 MMHG | HEIGHT: 71 IN | BODY MASS INDEX: 27.2 KG/M2 | OXYGEN SATURATION: 99 % | SYSTOLIC BLOOD PRESSURE: 132 MMHG

## 2022-01-01 VITALS
WEIGHT: 158.73 LBS | DIASTOLIC BLOOD PRESSURE: 59 MMHG | HEIGHT: 71 IN | RESPIRATION RATE: 8 BRPM | BODY MASS INDEX: 22.22 KG/M2 | SYSTOLIC BLOOD PRESSURE: 115 MMHG | OXYGEN SATURATION: 92 % | TEMPERATURE: 98.5 F

## 2022-01-01 VITALS
DIASTOLIC BLOOD PRESSURE: 60 MMHG | OXYGEN SATURATION: 96 % | RESPIRATION RATE: 16 BRPM | SYSTOLIC BLOOD PRESSURE: 126 MMHG | WEIGHT: 183.42 LBS | BODY MASS INDEX: 25.68 KG/M2 | HEART RATE: 91 BPM | TEMPERATURE: 97.5 F | HEIGHT: 71 IN

## 2022-01-01 VITALS
HEART RATE: 77 BPM | DIASTOLIC BLOOD PRESSURE: 56 MMHG | OXYGEN SATURATION: 100 % | SYSTOLIC BLOOD PRESSURE: 111 MMHG | TEMPERATURE: 98 F | RESPIRATION RATE: 22 BRPM

## 2022-01-01 VITALS
OXYGEN SATURATION: 97 % | HEART RATE: 69 BPM | BODY MASS INDEX: 26.94 KG/M2 | WEIGHT: 192.46 LBS | RESPIRATION RATE: 16 BRPM | SYSTOLIC BLOOD PRESSURE: 154 MMHG | DIASTOLIC BLOOD PRESSURE: 85 MMHG | TEMPERATURE: 97.6 F | HEIGHT: 71 IN

## 2022-01-01 VITALS
WEIGHT: 203 LBS | BODY MASS INDEX: 28.33 KG/M2 | DIASTOLIC BLOOD PRESSURE: 78 MMHG | SYSTOLIC BLOOD PRESSURE: 133 MMHG | TEMPERATURE: 97.7 F | HEART RATE: 88 BPM

## 2022-01-01 VITALS
SYSTOLIC BLOOD PRESSURE: 132 MMHG | WEIGHT: 196.6 LBS | HEART RATE: 77 BPM | TEMPERATURE: 97.8 F | DIASTOLIC BLOOD PRESSURE: 70 MMHG | HEIGHT: 71 IN | RESPIRATION RATE: 16 BRPM | OXYGEN SATURATION: 98 % | BODY MASS INDEX: 27.52 KG/M2

## 2022-01-01 VITALS
TEMPERATURE: 97.9 F | OXYGEN SATURATION: 99 % | BODY MASS INDEX: 26.85 KG/M2 | SYSTOLIC BLOOD PRESSURE: 150 MMHG | WEIGHT: 192.5 LBS | HEART RATE: 97 BPM | DIASTOLIC BLOOD PRESSURE: 76 MMHG

## 2022-01-01 VITALS
RESPIRATION RATE: 15 BRPM | BODY MASS INDEX: 27.96 KG/M2 | SYSTOLIC BLOOD PRESSURE: 137 MMHG | TEMPERATURE: 97.8 F | DIASTOLIC BLOOD PRESSURE: 75 MMHG | HEIGHT: 71 IN | HEART RATE: 74 BPM | WEIGHT: 199.74 LBS | OXYGEN SATURATION: 98 %

## 2022-01-01 DIAGNOSIS — N17.9 ACUTE RENAL FAILURE SUPERIMPOSED ON CHRONIC KIDNEY DISEASE, UNSPECIFIED CKD STAGE, UNSPECIFIED ACUTE RENAL FAILURE TYPE: ICD-10-CM

## 2022-01-01 DIAGNOSIS — K83.8 DILATED INTRAHEPATIC BILE DUCT: ICD-10-CM

## 2022-01-01 DIAGNOSIS — F51.01 PRIMARY INSOMNIA: ICD-10-CM

## 2022-01-01 DIAGNOSIS — R18.8 OTHER ASCITES: Primary | ICD-10-CM

## 2022-01-01 DIAGNOSIS — I81 PORTAL VEIN THROMBOSIS: Primary | ICD-10-CM

## 2022-01-01 DIAGNOSIS — R54 FRAILTY SYNDROME IN GERIATRIC PATIENT: ICD-10-CM

## 2022-01-01 DIAGNOSIS — I10 ESSENTIAL HYPERTENSION, BENIGN: ICD-10-CM

## 2022-01-01 DIAGNOSIS — E87.5 HYPERKALEMIA: ICD-10-CM

## 2022-01-01 DIAGNOSIS — Z01.818 PREOP EXAMINATION: Primary | ICD-10-CM

## 2022-01-01 DIAGNOSIS — I82.90 ACUTE DEEP VEIN THROMBOSIS (DVT) OF NON-EXTREMITY VEIN: ICD-10-CM

## 2022-01-01 DIAGNOSIS — R79.89 ELEVATED SERUM CREATININE: ICD-10-CM

## 2022-01-01 DIAGNOSIS — Z11.59 ENCOUNTER FOR SCREENING FOR OTHER VIRAL DISEASES: ICD-10-CM

## 2022-01-01 DIAGNOSIS — Z11.59 ENCOUNTER FOR SCREENING FOR OTHER VIRAL DISEASES: Primary | ICD-10-CM

## 2022-01-01 DIAGNOSIS — R14.0 ABDOMINAL DISTENTION: ICD-10-CM

## 2022-01-01 DIAGNOSIS — K86.89 MASS OF PANCREAS: Primary | ICD-10-CM

## 2022-01-01 DIAGNOSIS — K76.6 PORTAL HYPERTENSION (H): Primary | ICD-10-CM

## 2022-01-01 DIAGNOSIS — Z29.89 ALTITUDE SICKNESS PREVENTATIVE MEASURES: Primary | ICD-10-CM

## 2022-01-01 DIAGNOSIS — R18.8 OTHER ASCITES: ICD-10-CM

## 2022-01-01 DIAGNOSIS — Z98.890 S/P CRANIOTOMY: ICD-10-CM

## 2022-01-01 DIAGNOSIS — Z01.818 PREOP GENERAL PHYSICAL EXAM: Primary | ICD-10-CM

## 2022-01-01 DIAGNOSIS — C61 PROSTATE CANCER (H): Primary | ICD-10-CM

## 2022-01-01 DIAGNOSIS — N18.31 CHRONIC KIDNEY DISEASE, STAGE 3A (H): ICD-10-CM

## 2022-01-01 DIAGNOSIS — K86.89 PANCREATIC MASS: Primary | ICD-10-CM

## 2022-01-01 DIAGNOSIS — Z71.89 OTHER SPECIFIED COUNSELING: ICD-10-CM

## 2022-01-01 DIAGNOSIS — C61 PROSTATE CANCER (H): ICD-10-CM

## 2022-01-01 DIAGNOSIS — R47.01 EXPRESSIVE APHASIA: ICD-10-CM

## 2022-01-01 DIAGNOSIS — K83.1 BILIARY STRICTURE (H): Primary | ICD-10-CM

## 2022-01-01 DIAGNOSIS — E78.5 DYSLIPIDEMIA (HIGH LDL; LOW HDL): ICD-10-CM

## 2022-01-01 DIAGNOSIS — K86.89 PANCREATIC MASS: ICD-10-CM

## 2022-01-01 DIAGNOSIS — I81 PORTAL VEIN THROMBOSIS: ICD-10-CM

## 2022-01-01 DIAGNOSIS — N17.9 ACUTE RENAL FAILURE, UNSPECIFIED ACUTE RENAL FAILURE TYPE (H): Primary | ICD-10-CM

## 2022-01-01 DIAGNOSIS — S06.5XAA SDH (SUBDURAL HEMATOMA) (H): ICD-10-CM

## 2022-01-01 DIAGNOSIS — Z79.01 CHRONIC ANTICOAGULATION: Primary | ICD-10-CM

## 2022-01-01 DIAGNOSIS — K83.1 BILIARY OBSTRUCTION (H): Primary | ICD-10-CM

## 2022-01-01 DIAGNOSIS — Z98.890 STATUS POST CRYOABLATION: ICD-10-CM

## 2022-01-01 DIAGNOSIS — K83.1 COMMON BILE DUCT STRICTURE (H): Primary | ICD-10-CM

## 2022-01-01 DIAGNOSIS — R93.3 ABNORMAL FINDING ON GI TRACT IMAGING: Primary | ICD-10-CM

## 2022-01-01 DIAGNOSIS — N18.31 CHRONIC KIDNEY DISEASE, STAGE 3A (H): Primary | ICD-10-CM

## 2022-01-01 DIAGNOSIS — Z11.59 NEED FOR HEPATITIS C SCREENING TEST: Primary | ICD-10-CM

## 2022-01-01 DIAGNOSIS — E72.20 HYPERAMMONEMIA (H): ICD-10-CM

## 2022-01-01 DIAGNOSIS — K83.1 BILIARY OBSTRUCTION (H): ICD-10-CM

## 2022-01-01 DIAGNOSIS — Z11.59 NEED FOR HEPATITIS C SCREENING TEST: ICD-10-CM

## 2022-01-01 DIAGNOSIS — K83.1 BILIARY STRICTURE (H): ICD-10-CM

## 2022-01-01 DIAGNOSIS — N19 UREMIA: ICD-10-CM

## 2022-01-01 DIAGNOSIS — Z20.822 LAB TEST NEGATIVE FOR COVID-19 VIRUS: ICD-10-CM

## 2022-01-01 DIAGNOSIS — N17.9 ACUTE RENAL FAILURE, UNSPECIFIED ACUTE RENAL FAILURE TYPE (H): ICD-10-CM

## 2022-01-01 DIAGNOSIS — Z20.822 CONTACT WITH AND (SUSPECTED) EXPOSURE TO COVID-19: ICD-10-CM

## 2022-01-01 DIAGNOSIS — Z46.89 ENCOUNTER FOR REMOVAL OF PANCREATIC STENT: Primary | ICD-10-CM

## 2022-01-01 DIAGNOSIS — S01.00XD OPEN WOUND OF SCALP, UNSPECIFIED OPEN WOUND TYPE, SUBSEQUENT ENCOUNTER: ICD-10-CM

## 2022-01-01 DIAGNOSIS — R93.3 ABNORMAL FINDING ON GI TRACT IMAGING: ICD-10-CM

## 2022-01-01 DIAGNOSIS — F51.01 PRIMARY INSOMNIA: Primary | ICD-10-CM

## 2022-01-01 DIAGNOSIS — D77 OTHER DISORDERS OF BLOOD AND BLOOD-FORMING ORGANS IN DISEASES CLASSIFIED ELSEWHERE: ICD-10-CM

## 2022-01-01 DIAGNOSIS — N18.9 ACUTE RENAL FAILURE SUPERIMPOSED ON CHRONIC KIDNEY DISEASE, UNSPECIFIED CKD STAGE, UNSPECIFIED ACUTE RENAL FAILURE TYPE: ICD-10-CM

## 2022-01-01 LAB
ALBUMIN BODY FLUID SOURCE: NORMAL
ALBUMIN FLD-MCNC: 1.3 G/DL
ALBUMIN FLD-MCNC: 1.7 G/DL
ALBUMIN FLD-MCNC: 2.4 G/DL
ALBUMIN MFR UR ELPH: 62.1 MG/DL
ALBUMIN SERPL BCG-MCNC: 3.3 G/DL (ref 3.5–5.2)
ALBUMIN SERPL BCG-MCNC: 3.4 G/DL (ref 3.5–5.2)
ALBUMIN SERPL BCG-MCNC: 3.5 G/DL (ref 3.5–5.2)
ALBUMIN SERPL BCG-MCNC: 3.8 G/DL (ref 3.5–5.2)
ALBUMIN SERPL BCG-MCNC: 3.8 G/DL (ref 3.5–5.2)
ALBUMIN SERPL BCG-MCNC: 4 G/DL (ref 3.5–5.2)
ALBUMIN SERPL BCG-MCNC: 4.2 G/DL (ref 3.5–5.2)
ALBUMIN SERPL BCG-MCNC: 4.3 G/DL (ref 3.5–5.2)
ALBUMIN SERPL BCG-MCNC: 4.4 G/DL (ref 3.5–5.2)
ALBUMIN SERPL BCG-MCNC: 4.4 G/DL (ref 3.5–5.2)
ALBUMIN SERPL BCG-MCNC: 4.6 G/DL (ref 3.5–5.2)
ALBUMIN SERPL-MCNC: 3 G/DL (ref 3.4–5)
ALBUMIN SERPL-MCNC: 3 G/DL (ref 3.4–5)
ALBUMIN SERPL-MCNC: 3.6 G/DL (ref 3.4–5)
ALBUMIN SERPL-MCNC: 3.7 G/DL (ref 3.4–5)
ALBUMIN SERPL-MCNC: 3.8 G/DL (ref 3.4–5)
ALBUMIN UR-MCNC: 100 MG/DL
ALBUMIN UR-MCNC: 20 MG/DL
ALBUMIN UR-MCNC: 20 MG/DL
ALP SERPL-CCNC: 103 U/L (ref 40–129)
ALP SERPL-CCNC: 125 U/L (ref 40–129)
ALP SERPL-CCNC: 140 U/L (ref 40–129)
ALP SERPL-CCNC: 150 U/L (ref 40–150)
ALP SERPL-CCNC: 155 U/L (ref 40–129)
ALP SERPL-CCNC: 157 U/L (ref 40–129)
ALP SERPL-CCNC: 158 U/L (ref 40–129)
ALP SERPL-CCNC: 159 U/L (ref 40–129)
ALP SERPL-CCNC: 160 U/L (ref 40–129)
ALP SERPL-CCNC: 164 U/L (ref 40–129)
ALP SERPL-CCNC: 167 U/L (ref 40–129)
ALP SERPL-CCNC: 235 U/L (ref 40–129)
ALP SERPL-CCNC: 407 U/L (ref 40–129)
ALP SERPL-CCNC: 69 U/L (ref 40–129)
ALP SERPL-CCNC: 73 U/L (ref 40–129)
ALP SERPL-CCNC: 74 U/L (ref 40–150)
ALP SERPL-CCNC: 75 U/L (ref 40–129)
ALP SERPL-CCNC: 77 U/L (ref 40–129)
ALP SERPL-CCNC: 78 U/L (ref 40–150)
ALP SERPL-CCNC: 81 U/L (ref 40–129)
ALP SERPL-CCNC: 83 U/L (ref 40–150)
ALP SERPL-CCNC: 85 U/L (ref 40–150)
ALP SERPL-CCNC: 93 U/L (ref 40–150)
ALP SERPL-CCNC: 94 U/L (ref 40–150)
ALP SERPL-CCNC: 95 U/L (ref 40–129)
ALT SERPL W P-5'-P-CCNC: 10 U/L (ref 10–50)
ALT SERPL W P-5'-P-CCNC: 10 U/L (ref 10–50)
ALT SERPL W P-5'-P-CCNC: 11 U/L (ref 10–50)
ALT SERPL W P-5'-P-CCNC: 12 U/L (ref 10–50)
ALT SERPL W P-5'-P-CCNC: 13 U/L (ref 10–50)
ALT SERPL W P-5'-P-CCNC: 14 U/L (ref 10–50)
ALT SERPL W P-5'-P-CCNC: 15 U/L (ref 0–70)
ALT SERPL W P-5'-P-CCNC: 17 U/L (ref 0–70)
ALT SERPL W P-5'-P-CCNC: 20 U/L (ref 10–50)
ALT SERPL W P-5'-P-CCNC: 27 U/L (ref 0–70)
ALT SERPL W P-5'-P-CCNC: 30 U/L (ref 0–70)
ALT SERPL W P-5'-P-CCNC: 37 U/L (ref 0–70)
ALT SERPL W P-5'-P-CCNC: 43 U/L (ref 10–50)
ALT SERPL W P-5'-P-CCNC: 46 U/L (ref 0–70)
ALT SERPL W P-5'-P-CCNC: 6 U/L (ref 10–50)
ALT SERPL W P-5'-P-CCNC: 7 U/L (ref 10–50)
ALT SERPL W P-5'-P-CCNC: 77 U/L (ref 0–70)
ALT SERPL W P-5'-P-CCNC: <5 U/L (ref 10–50)
AMMONIA PLAS-SCNC: 43 UMOL/L (ref 16–60)
AMMONIA PLAS-SCNC: 67 UMOL/L (ref 16–60)
AMYLASE SERPL-CCNC: 38 U/L (ref 30–110)
AMYLASE SERPL-CCNC: 45 U/L (ref 30–110)
ANA SER QL IF: NEGATIVE
ANCA AB PATTERN SER IF-IMP: NORMAL
ANION GAP SERPL CALCULATED.3IONS-SCNC: 15 MMOL/L (ref 3–14)
ANION GAP SERPL CALCULATED.3IONS-SCNC: 15 MMOL/L (ref 7–15)
ANION GAP SERPL CALCULATED.3IONS-SCNC: 16 MMOL/L (ref 7–15)
ANION GAP SERPL CALCULATED.3IONS-SCNC: 17 MMOL/L (ref 7–15)
ANION GAP SERPL CALCULATED.3IONS-SCNC: 18 MMOL/L (ref 7–15)
ANION GAP SERPL CALCULATED.3IONS-SCNC: 20 MMOL/L (ref 7–15)
ANION GAP SERPL CALCULATED.3IONS-SCNC: 22 MMOL/L (ref 7–15)
ANION GAP SERPL CALCULATED.3IONS-SCNC: 23 MMOL/L (ref 7–15)
ANION GAP SERPL CALCULATED.3IONS-SCNC: 25 MMOL/L (ref 7–15)
ANION GAP SERPL CALCULATED.3IONS-SCNC: 25 MMOL/L (ref 7–15)
ANION GAP SERPL CALCULATED.3IONS-SCNC: 6 MMOL/L (ref 3–14)
ANION GAP SERPL CALCULATED.3IONS-SCNC: 7 MMOL/L (ref 3–14)
ANION GAP SERPL CALCULATED.3IONS-SCNC: 8 MMOL/L (ref 3–14)
APPEARANCE FLD: ABNORMAL
APPEARANCE UR: ABNORMAL
APPEARANCE UR: CLEAR
APPEARANCE UR: CLEAR
APTT PPP: 37 SECONDS (ref 22–38)
AST SERPL W P-5'-P-CCNC: 18 U/L (ref 0–45)
AST SERPL W P-5'-P-CCNC: 19 U/L (ref 10–50)
AST SERPL W P-5'-P-CCNC: 20 U/L (ref 0–45)
AST SERPL W P-5'-P-CCNC: 22 U/L (ref 10–50)
AST SERPL W P-5'-P-CCNC: 22 U/L (ref 10–50)
AST SERPL W P-5'-P-CCNC: 23 U/L (ref 10–50)
AST SERPL W P-5'-P-CCNC: 24 U/L (ref 0–45)
AST SERPL W P-5'-P-CCNC: 25 U/L (ref 0–45)
AST SERPL W P-5'-P-CCNC: 25 U/L (ref 10–50)
AST SERPL W P-5'-P-CCNC: 26 U/L (ref 10–50)
AST SERPL W P-5'-P-CCNC: 26 U/L (ref 10–50)
AST SERPL W P-5'-P-CCNC: 27 U/L (ref 10–50)
AST SERPL W P-5'-P-CCNC: 30 U/L (ref 10–50)
AST SERPL W P-5'-P-CCNC: 36 U/L (ref 10–50)
AST SERPL W P-5'-P-CCNC: 37 U/L (ref 10–50)
AST SERPL W P-5'-P-CCNC: 49 U/L (ref 10–50)
AST SERPL W P-5'-P-CCNC: 63 U/L (ref 0–45)
AST SERPL W P-5'-P-CCNC: 98 U/L (ref 10–50)
AST SERPL W P-5'-P-CCNC: ABNORMAL U/L
ATRIAL RATE - MUSE: 65 BPM
ATRIAL RATE - MUSE: 86 BPM
ATRIAL RATE - MUSE: 90 BPM
ATRIAL RATE - MUSE: 93 BPM
B2 GLYCOPROT1 IGG SERPL IA-ACNC: 0.9 U/ML
B2 GLYCOPROT1 IGG SERPL IA-ACNC: <0.8 U/ML
B2 GLYCOPROT1 IGM SERPL IA-ACNC: <2.4 U/ML
B2 GLYCOPROT1 IGM SERPL IA-ACNC: <2.4 U/ML
BACTERIA #/AREA URNS HPF: ABNORMAL /HPF
BACTERIA BLD CULT: NO GROWTH
BACTERIA BLD CULT: NO GROWTH
BACTERIA BRO BRUSH AEROBE CULT: ABNORMAL
BACTERIA FLD CULT: NO GROWTH
BACTERIA FLD CULT: NO GROWTH
BACTERIA UR CULT: NO GROWTH
BASE EXCESS BLDA CALC-SCNC: -1.4 MMOL/L (ref -9–1.8)
BASE EXCESS BLDA CALC-SCNC: -1.6 MMOL/L (ref -9–1.8)
BASE EXCESS BLDA CALC-SCNC: 0.1 MMOL/L (ref -9–1.8)
BASE EXCESS BLDA CALC-SCNC: 0.8 MMOL/L (ref -9–1.8)
BASE EXCESS BLDA CALC-SCNC: 1.9 MMOL/L (ref -9–1.8)
BASE EXCESS BLDA CALC-SCNC: 2 MMOL/L (ref -9–1.8)
BASOPHILS # BLD AUTO: 0 10E3/UL (ref 0–0.2)
BASOPHILS # BLD AUTO: 0.1 10E3/UL (ref 0–0.2)
BASOPHILS # BLD MANUAL: 0 10E3/UL (ref 0–0.2)
BASOPHILS # BLD MANUAL: 0 10E3/UL (ref 0–0.2)
BASOPHILS NFR BLD AUTO: 0 %
BASOPHILS NFR BLD AUTO: 1 %
BASOPHILS NFR BLD MANUAL: 0 %
BASOPHILS NFR BLD MANUAL: 0 %
BILIRUB DIRECT SERPL-MCNC: <0.2 MG/DL (ref 0–0.3)
BILIRUB SERPL-MCNC: 0.4 MG/DL
BILIRUB SERPL-MCNC: 0.4 MG/DL (ref 0.2–1.3)
BILIRUB SERPL-MCNC: 0.5 MG/DL
BILIRUB SERPL-MCNC: 0.5 MG/DL
BILIRUB SERPL-MCNC: 0.5 MG/DL (ref 0.2–1.3)
BILIRUB SERPL-MCNC: 0.5 MG/DL (ref 0.2–1.3)
BILIRUB SERPL-MCNC: 0.6 MG/DL
BILIRUB SERPL-MCNC: 0.6 MG/DL (ref 0.2–1.3)
BILIRUB SERPL-MCNC: 0.7 MG/DL
BILIRUB SERPL-MCNC: 0.7 MG/DL
BILIRUB SERPL-MCNC: 0.8 MG/DL
BILIRUB SERPL-MCNC: 0.8 MG/DL
BILIRUB SERPL-MCNC: 0.8 MG/DL (ref 0.2–1.3)
BILIRUB SERPL-MCNC: 0.9 MG/DL
BILIRUB SERPL-MCNC: 1 MG/DL
BILIRUB SERPL-MCNC: 1.1 MG/DL
BILIRUB SERPL-MCNC: 1.2 MG/DL
BILIRUB SERPL-MCNC: 1.2 MG/DL (ref 0.2–1.3)
BILIRUB SERPL-MCNC: 1.3 MG/DL
BILIRUB SERPL-MCNC: 1.3 MG/DL (ref 0.2–1.3)
BILIRUB UR QL STRIP: NEGATIVE
BUN SERPL-MCNC: 102 MG/DL (ref 8–23)
BUN SERPL-MCNC: 120 MG/DL (ref 8–23)
BUN SERPL-MCNC: 126 MG/DL (ref 8–23)
BUN SERPL-MCNC: 127 MG/DL (ref 8–23)
BUN SERPL-MCNC: 129 MG/DL (ref 8–23)
BUN SERPL-MCNC: 131 MG/DL (ref 8–23)
BUN SERPL-MCNC: 138 MG/DL (ref 8–23)
BUN SERPL-MCNC: 147 MG/DL (ref 8–23)
BUN SERPL-MCNC: 148 MG/DL (ref 8–23)
BUN SERPL-MCNC: 168 MG/DL (ref 8–23)
BUN SERPL-MCNC: 19 MG/DL (ref 7–30)
BUN SERPL-MCNC: 22 MG/DL (ref 7–30)
BUN SERPL-MCNC: 22 MG/DL (ref 7–30)
BUN SERPL-MCNC: 25 MG/DL (ref 7–30)
BUN SERPL-MCNC: 27 MG/DL (ref 7–30)
BUN SERPL-MCNC: 42 MG/DL (ref 7–30)
BUN SERPL-MCNC: 61 MG/DL (ref 7–30)
BUN SERPL-MCNC: 70.9 MG/DL (ref 8–23)
BUN SERPL-MCNC: 74.5 MG/DL (ref 8–23)
BUN SERPL-MCNC: 75.3 MG/DL (ref 8–23)
BUN SERPL-MCNC: 76 MG/DL (ref 8–23)
BUN SERPL-MCNC: 77.2 MG/DL (ref 8–23)
BUN SERPL-MCNC: 78.9 MG/DL (ref 8–23)
BUN SERPL-MCNC: 81.9 MG/DL (ref 8–23)
BUN SERPL-MCNC: 83.4 MG/DL (ref 8–23)
BUN SERPL-MCNC: 84 MG/DL (ref 7–30)
BUN SERPL-MCNC: 88.2 MG/DL (ref 8–23)
BURR CELLS BLD QL SMEAR: SLIGHT
BURR CELLS BLD QL SMEAR: SLIGHT
C DIFF TOX B STL QL: POSITIVE
C-ANCA TITR SER IF: NORMAL {TITER}
C3 SERPL-MCNC: 73 MG/DL (ref 81–157)
C4 SERPL-MCNC: 19 MG/DL (ref 13–39)
CALCIUM SERPL-MCNC: 10 MG/DL (ref 8.8–10.2)
CALCIUM SERPL-MCNC: 8.8 MG/DL (ref 8.8–10.2)
CALCIUM SERPL-MCNC: 8.8 MG/DL (ref 8.8–10.2)
CALCIUM SERPL-MCNC: 8.9 MG/DL (ref 8.8–10.2)
CALCIUM SERPL-MCNC: 8.9 MG/DL (ref 8.8–10.2)
CALCIUM SERPL-MCNC: 9.1 MG/DL (ref 8.5–10.1)
CALCIUM SERPL-MCNC: 9.1 MG/DL (ref 8.8–10.2)
CALCIUM SERPL-MCNC: 9.2 MG/DL (ref 8.5–10.1)
CALCIUM SERPL-MCNC: 9.2 MG/DL (ref 8.8–10.2)
CALCIUM SERPL-MCNC: 9.3 MG/DL (ref 8.5–10.1)
CALCIUM SERPL-MCNC: 9.3 MG/DL (ref 8.8–10.2)
CALCIUM SERPL-MCNC: 9.3 MG/DL (ref 8.8–10.2)
CALCIUM SERPL-MCNC: 9.4 MG/DL (ref 8.5–10.1)
CALCIUM SERPL-MCNC: 9.4 MG/DL (ref 8.5–10.1)
CALCIUM SERPL-MCNC: 9.5 MG/DL (ref 8.8–10.2)
CALCIUM SERPL-MCNC: 9.6 MG/DL (ref 8.8–10.2)
CALCIUM SERPL-MCNC: 9.7 MG/DL (ref 8.8–10.2)
CALCIUM SERPL-MCNC: 9.8 MG/DL (ref 8.8–10.2)
CALCIUM SERPL-MCNC: 9.8 MG/DL (ref 8.8–10.2)
CANCER AG19-9 SERPL IA-ACNC: 23 U/ML
CARDIOLIPIN IGG SER IA-ACNC: <2 GPL-U/ML
CARDIOLIPIN IGG SER IA-ACNC: <2 GPL-U/ML
CARDIOLIPIN IGG SER IA-ACNC: NEGATIVE
CARDIOLIPIN IGG SER IA-ACNC: NEGATIVE
CARDIOLIPIN IGM SER IA-ACNC: <2 MPL-U/ML
CARDIOLIPIN IGM SER IA-ACNC: <2 MPL-U/ML
CARDIOLIPIN IGM SER IA-ACNC: NEGATIVE
CARDIOLIPIN IGM SER IA-ACNC: NEGATIVE
CELL COUNT BODY FLUID SOURCE: ABNORMAL
CHLORIDE BLD-SCNC: 102 MMOL/L (ref 94–109)
CHLORIDE BLD-SCNC: 103 MMOL/L (ref 94–109)
CHLORIDE BLD-SCNC: 103 MMOL/L (ref 94–109)
CHLORIDE BLD-SCNC: 104 MMOL/L (ref 94–109)
CHLORIDE BLD-SCNC: 105 MMOL/L (ref 94–109)
CHLORIDE BLD-SCNC: 106 MMOL/L (ref 94–109)
CHLORIDE BLD-SCNC: 93 MMOL/L (ref 94–109)
CHLORIDE BLD-SCNC: 96 MMOL/L (ref 94–109)
CHLORIDE SERPL-SCNC: 86 MMOL/L (ref 98–107)
CHLORIDE SERPL-SCNC: 87 MMOL/L (ref 98–107)
CHLORIDE SERPL-SCNC: 89 MMOL/L (ref 98–107)
CHLORIDE SERPL-SCNC: 91 MMOL/L (ref 98–107)
CHLORIDE SERPL-SCNC: 92 MMOL/L (ref 98–107)
CHLORIDE SERPL-SCNC: 93 MMOL/L (ref 98–107)
CHLORIDE SERPL-SCNC: 94 MMOL/L (ref 98–107)
CHLORIDE SERPL-SCNC: 97 MMOL/L (ref 98–107)
CHLORIDE SERPL-SCNC: 99 MMOL/L (ref 98–107)
CO2 SERPL-SCNC: 20 MMOL/L (ref 20–32)
CO2 SERPL-SCNC: 24 MMOL/L (ref 20–32)
CO2 SERPL-SCNC: 25 MMOL/L (ref 20–32)
CO2 SERPL-SCNC: 25 MMOL/L (ref 20–32)
CO2 SERPL-SCNC: 26 MMOL/L (ref 20–32)
CO2 SERPL-SCNC: 28 MMOL/L (ref 20–32)
COLOR FLD: ABNORMAL
COLOR FLD: YELLOW
COLOR FLD: YELLOW
COLOR UR AUTO: ABNORMAL
COLOR UR AUTO: ABNORMAL
COLOR UR AUTO: YELLOW
CREAT BLD-MCNC: 1.7 MG/DL (ref 0.7–1.3)
CREAT SERPL-MCNC: 0.91 MG/DL (ref 0.66–1.25)
CREAT SERPL-MCNC: 1.08 MG/DL (ref 0.66–1.25)
CREAT SERPL-MCNC: 1.38 MG/DL (ref 0.66–1.25)
CREAT SERPL-MCNC: 1.45 MG/DL (ref 0.66–1.25)
CREAT SERPL-MCNC: 1.56 MG/DL (ref 0.66–1.25)
CREAT SERPL-MCNC: 2.04 MG/DL (ref 0.66–1.25)
CREAT SERPL-MCNC: 2.59 MG/DL (ref 0.66–1.25)
CREAT SERPL-MCNC: 2.9 MG/DL (ref 0.67–1.17)
CREAT SERPL-MCNC: 2.96 MG/DL (ref 0.67–1.17)
CREAT SERPL-MCNC: 2.99 MG/DL (ref 0.67–1.17)
CREAT SERPL-MCNC: 3.06 MG/DL (ref 0.67–1.17)
CREAT SERPL-MCNC: 3.08 MG/DL (ref 0.67–1.17)
CREAT SERPL-MCNC: 3.19 MG/DL (ref 0.67–1.17)
CREAT SERPL-MCNC: 3.3 MG/DL (ref 0.67–1.17)
CREAT SERPL-MCNC: 3.76 MG/DL (ref 0.67–1.17)
CREAT SERPL-MCNC: 3.8 MG/DL (ref 0.67–1.17)
CREAT SERPL-MCNC: 3.84 MG/DL (ref 0.67–1.17)
CREAT SERPL-MCNC: 4.05 MG/DL (ref 0.67–1.17)
CREAT SERPL-MCNC: 4.07 MG/DL (ref 0.67–1.17)
CREAT SERPL-MCNC: 4.18 MG/DL (ref 0.67–1.17)
CREAT SERPL-MCNC: 4.25 MG/DL (ref 0.66–1.25)
CREAT SERPL-MCNC: 4.26 MG/DL (ref 0.67–1.17)
CREAT SERPL-MCNC: 4.28 MG/DL (ref 0.67–1.17)
CREAT SERPL-MCNC: 5.16 MG/DL (ref 0.67–1.17)
CREAT SERPL-MCNC: 5.28 MG/DL (ref 0.67–1.17)
CREAT SERPL-MCNC: 5.92 MG/DL (ref 0.67–1.17)
CREAT SERPL-MCNC: 6.65 MG/DL (ref 0.67–1.17)
CREAT UR-MCNC: 66.2 MG/DL
CREAT UR-MCNC: 89 MG/DL
DEPRECATED HCO3 PLAS-SCNC: 15 MMOL/L (ref 22–29)
DEPRECATED HCO3 PLAS-SCNC: 18 MMOL/L (ref 22–29)
DEPRECATED HCO3 PLAS-SCNC: 21 MMOL/L (ref 22–29)
DEPRECATED HCO3 PLAS-SCNC: 22 MMOL/L (ref 22–29)
DEPRECATED HCO3 PLAS-SCNC: 23 MMOL/L (ref 22–29)
DEPRECATED HCO3 PLAS-SCNC: 24 MMOL/L (ref 22–29)
DEPRECATED HCO3 PLAS-SCNC: 25 MMOL/L (ref 22–29)
DEPRECATED HCO3 PLAS-SCNC: 25 MMOL/L (ref 22–29)
DIASTOLIC BLOOD PRESSURE - MUSE: NORMAL MMHG
DRVVT CONFIRM NORMALIZED RATIO: 1.2
DRVVT CONFIRM NORMALIZED RATIO: 1.23
DRVVT SCREEN MIX RATIO: 1.07
DRVVT SCREEN RATIO: 1.31
DRVVT SCREEN RATIO: 1.33
ELLIPTOCYTES BLD QL SMEAR: ABNORMAL
EOSINOPHIL # BLD AUTO: 0 10E3/UL (ref 0–0.7)
EOSINOPHIL # BLD AUTO: 0.1 10E3/UL (ref 0–0.7)
EOSINOPHIL # BLD AUTO: 0.2 10E3/UL (ref 0–0.7)
EOSINOPHIL # BLD AUTO: 0.3 10E3/UL (ref 0–0.7)
EOSINOPHIL # BLD MANUAL: 0.2 10E3/UL (ref 0–0.7)
EOSINOPHIL # BLD MANUAL: 0.4 10E3/UL (ref 0–0.7)
EOSINOPHIL NFR BLD AUTO: 0 %
EOSINOPHIL NFR BLD AUTO: 1 %
EOSINOPHIL NFR BLD AUTO: 2 %
EOSINOPHIL NFR BLD MANUAL: 1 %
EOSINOPHIL NFR BLD MANUAL: 1 %
EOSINOPHIL NFR FLD MANUAL: 1 %
ERCP: NORMAL
ERYTHROCYTE [DISTWIDTH] IN BLOOD BY AUTOMATED COUNT: 11.7 % (ref 10–15)
ERYTHROCYTE [DISTWIDTH] IN BLOOD BY AUTOMATED COUNT: 11.7 % (ref 10–15)
ERYTHROCYTE [DISTWIDTH] IN BLOOD BY AUTOMATED COUNT: 11.9 % (ref 10–15)
ERYTHROCYTE [DISTWIDTH] IN BLOOD BY AUTOMATED COUNT: 11.9 % (ref 10–15)
ERYTHROCYTE [DISTWIDTH] IN BLOOD BY AUTOMATED COUNT: 12.3 % (ref 10–15)
ERYTHROCYTE [DISTWIDTH] IN BLOOD BY AUTOMATED COUNT: 14.3 % (ref 10–15)
ERYTHROCYTE [DISTWIDTH] IN BLOOD BY AUTOMATED COUNT: 14.9 % (ref 10–15)
ERYTHROCYTE [DISTWIDTH] IN BLOOD BY AUTOMATED COUNT: 15.2 % (ref 10–15)
ERYTHROCYTE [DISTWIDTH] IN BLOOD BY AUTOMATED COUNT: 15.2 % (ref 10–15)
ERYTHROCYTE [DISTWIDTH] IN BLOOD BY AUTOMATED COUNT: 15.3 % (ref 10–15)
ERYTHROCYTE [DISTWIDTH] IN BLOOD BY AUTOMATED COUNT: 15.3 % (ref 10–15)
ERYTHROCYTE [DISTWIDTH] IN BLOOD BY AUTOMATED COUNT: 15.4 % (ref 10–15)
ERYTHROCYTE [DISTWIDTH] IN BLOOD BY AUTOMATED COUNT: 15.4 % (ref 10–15)
ERYTHROCYTE [DISTWIDTH] IN BLOOD BY AUTOMATED COUNT: 16.1 % (ref 10–15)
ERYTHROCYTE [DISTWIDTH] IN BLOOD BY AUTOMATED COUNT: 16.2 % (ref 10–15)
ERYTHROCYTE [DISTWIDTH] IN BLOOD BY AUTOMATED COUNT: 16.3 % (ref 10–15)
ERYTHROCYTE [DISTWIDTH] IN BLOOD BY AUTOMATED COUNT: 16.5 % (ref 10–15)
ERYTHROCYTE [DISTWIDTH] IN BLOOD BY AUTOMATED COUNT: 16.6 % (ref 10–15)
ERYTHROCYTE [DISTWIDTH] IN BLOOD BY AUTOMATED COUNT: 16.6 % (ref 10–15)
ERYTHROCYTE [DISTWIDTH] IN BLOOD BY AUTOMATED COUNT: 16.7 % (ref 10–15)
ERYTHROCYTE [DISTWIDTH] IN BLOOD BY AUTOMATED COUNT: 17.2 % (ref 10–15)
ERYTHROCYTE [DISTWIDTH] IN BLOOD BY AUTOMATED COUNT: 17.2 % (ref 10–15)
ERYTHROCYTE [DISTWIDTH] IN BLOOD BY AUTOMATED COUNT: 17.4 % (ref 10–15)
ERYTHROCYTE [DISTWIDTH] IN BLOOD BY AUTOMATED COUNT: 17.6 % (ref 10–15)
GBM IGG SER IA-ACNC: <2.4 U/ML
GBM IGG SER IA-ACNC: NEGATIVE
GFR SERPL CREATININE-BSD FRML MDRD: 11 ML/MIN/1.73M2
GFR SERPL CREATININE-BSD FRML MDRD: 11 ML/MIN/1.73M2
GFR SERPL CREATININE-BSD FRML MDRD: 14 ML/MIN/1.73M2
GFR SERPL CREATININE-BSD FRML MDRD: 15 ML/MIN/1.73M2
GFR SERPL CREATININE-BSD FRML MDRD: 16 ML/MIN/1.73M2
GFR SERPL CREATININE-BSD FRML MDRD: 16 ML/MIN/1.73M2
GFR SERPL CREATININE-BSD FRML MDRD: 19 ML/MIN/1.73M2
GFR SERPL CREATININE-BSD FRML MDRD: 19 ML/MIN/1.73M2
GFR SERPL CREATININE-BSD FRML MDRD: 20 ML/MIN/1.73M2
GFR SERPL CREATININE-BSD FRML MDRD: 20 ML/MIN/1.73M2
GFR SERPL CREATININE-BSD FRML MDRD: 21 ML/MIN/1.73M2
GFR SERPL CREATININE-BSD FRML MDRD: 21 ML/MIN/1.73M2
GFR SERPL CREATININE-BSD FRML MDRD: 22 ML/MIN/1.73M2
GFR SERPL CREATININE-BSD FRML MDRD: 25 ML/MIN/1.73M2
GFR SERPL CREATININE-BSD FRML MDRD: 33 ML/MIN/1.73M2
GFR SERPL CREATININE-BSD FRML MDRD: 41 ML/MIN/1.73M2
GFR SERPL CREATININE-BSD FRML MDRD: 46 ML/MIN/1.73M2
GFR SERPL CREATININE-BSD FRML MDRD: 50 ML/MIN/1.73M2
GFR SERPL CREATININE-BSD FRML MDRD: 53 ML/MIN/1.73M2
GFR SERPL CREATININE-BSD FRML MDRD: 71 ML/MIN/1.73M2
GFR SERPL CREATININE-BSD FRML MDRD: 8 ML/MIN/1.73M2
GFR SERPL CREATININE-BSD FRML MDRD: 87 ML/MIN/1.73M2
GFR SERPL CREATININE-BSD FRML MDRD: 9 ML/MIN/1.73M2
GLUCOSE BLD-MCNC: 108 MG/DL (ref 70–99)
GLUCOSE BLD-MCNC: 114 MG/DL (ref 70–99)
GLUCOSE BLD-MCNC: 143 MG/DL (ref 70–99)
GLUCOSE BLD-MCNC: 182 MG/DL (ref 70–99)
GLUCOSE BLD-MCNC: 197 MG/DL (ref 70–99)
GLUCOSE BLD-MCNC: 207 MG/DL (ref 70–99)
GLUCOSE BLD-MCNC: 57 MG/DL (ref 70–99)
GLUCOSE BLD-MCNC: 99 MG/DL (ref 70–99)
GLUCOSE BLDC GLUCOMTR-MCNC: 103 MG/DL (ref 70–99)
GLUCOSE BLDC GLUCOMTR-MCNC: 106 MG/DL (ref 70–99)
GLUCOSE BLDC GLUCOMTR-MCNC: 110 MG/DL (ref 70–99)
GLUCOSE BLDC GLUCOMTR-MCNC: 111 MG/DL (ref 70–99)
GLUCOSE BLDC GLUCOMTR-MCNC: 114 MG/DL (ref 70–99)
GLUCOSE BLDC GLUCOMTR-MCNC: 118 MG/DL (ref 70–99)
GLUCOSE BLDC GLUCOMTR-MCNC: 123 MG/DL (ref 70–99)
GLUCOSE BLDC GLUCOMTR-MCNC: 128 MG/DL (ref 70–99)
GLUCOSE BLDC GLUCOMTR-MCNC: 129 MG/DL (ref 70–99)
GLUCOSE BLDC GLUCOMTR-MCNC: 129 MG/DL (ref 70–99)
GLUCOSE BLDC GLUCOMTR-MCNC: 132 MG/DL (ref 70–99)
GLUCOSE BLDC GLUCOMTR-MCNC: 134 MG/DL (ref 70–99)
GLUCOSE BLDC GLUCOMTR-MCNC: 135 MG/DL (ref 70–99)
GLUCOSE BLDC GLUCOMTR-MCNC: 139 MG/DL (ref 70–99)
GLUCOSE BLDC GLUCOMTR-MCNC: 139 MG/DL (ref 70–99)
GLUCOSE BLDC GLUCOMTR-MCNC: 142 MG/DL (ref 70–99)
GLUCOSE BLDC GLUCOMTR-MCNC: 144 MG/DL (ref 70–99)
GLUCOSE BLDC GLUCOMTR-MCNC: 146 MG/DL (ref 70–99)
GLUCOSE BLDC GLUCOMTR-MCNC: 149 MG/DL (ref 70–99)
GLUCOSE BLDC GLUCOMTR-MCNC: 150 MG/DL (ref 70–99)
GLUCOSE BLDC GLUCOMTR-MCNC: 158 MG/DL (ref 70–99)
GLUCOSE BLDC GLUCOMTR-MCNC: 158 MG/DL (ref 70–99)
GLUCOSE BLDC GLUCOMTR-MCNC: 162 MG/DL (ref 70–99)
GLUCOSE BLDC GLUCOMTR-MCNC: 163 MG/DL (ref 70–99)
GLUCOSE BLDC GLUCOMTR-MCNC: 163 MG/DL (ref 70–99)
GLUCOSE BLDC GLUCOMTR-MCNC: 164 MG/DL (ref 70–99)
GLUCOSE BLDC GLUCOMTR-MCNC: 166 MG/DL (ref 70–99)
GLUCOSE BLDC GLUCOMTR-MCNC: 167 MG/DL (ref 70–99)
GLUCOSE BLDC GLUCOMTR-MCNC: 169 MG/DL (ref 70–99)
GLUCOSE BLDC GLUCOMTR-MCNC: 169 MG/DL (ref 70–99)
GLUCOSE BLDC GLUCOMTR-MCNC: 170 MG/DL (ref 70–99)
GLUCOSE BLDC GLUCOMTR-MCNC: 171 MG/DL (ref 70–99)
GLUCOSE BLDC GLUCOMTR-MCNC: 172 MG/DL (ref 70–99)
GLUCOSE BLDC GLUCOMTR-MCNC: 172 MG/DL (ref 70–99)
GLUCOSE BLDC GLUCOMTR-MCNC: 174 MG/DL (ref 70–99)
GLUCOSE BLDC GLUCOMTR-MCNC: 176 MG/DL (ref 70–99)
GLUCOSE BLDC GLUCOMTR-MCNC: 176 MG/DL (ref 70–99)
GLUCOSE BLDC GLUCOMTR-MCNC: 177 MG/DL (ref 70–99)
GLUCOSE BLDC GLUCOMTR-MCNC: 182 MG/DL (ref 70–99)
GLUCOSE BLDC GLUCOMTR-MCNC: 182 MG/DL (ref 70–99)
GLUCOSE BLDC GLUCOMTR-MCNC: 188 MG/DL (ref 70–99)
GLUCOSE BLDC GLUCOMTR-MCNC: 189 MG/DL (ref 70–99)
GLUCOSE BLDC GLUCOMTR-MCNC: 192 MG/DL (ref 70–99)
GLUCOSE BLDC GLUCOMTR-MCNC: 196 MG/DL (ref 70–99)
GLUCOSE BLDC GLUCOMTR-MCNC: 196 MG/DL (ref 70–99)
GLUCOSE BLDC GLUCOMTR-MCNC: 197 MG/DL (ref 70–99)
GLUCOSE BLDC GLUCOMTR-MCNC: 198 MG/DL (ref 70–99)
GLUCOSE BLDC GLUCOMTR-MCNC: 201 MG/DL (ref 70–99)
GLUCOSE BLDC GLUCOMTR-MCNC: 202 MG/DL (ref 70–99)
GLUCOSE BLDC GLUCOMTR-MCNC: 205 MG/DL (ref 70–99)
GLUCOSE BLDC GLUCOMTR-MCNC: 211 MG/DL (ref 70–99)
GLUCOSE BLDC GLUCOMTR-MCNC: 213 MG/DL (ref 70–99)
GLUCOSE BLDC GLUCOMTR-MCNC: 215 MG/DL (ref 70–99)
GLUCOSE BLDC GLUCOMTR-MCNC: 215 MG/DL (ref 70–99)
GLUCOSE BLDC GLUCOMTR-MCNC: 216 MG/DL (ref 70–99)
GLUCOSE BLDC GLUCOMTR-MCNC: 216 MG/DL (ref 70–99)
GLUCOSE BLDC GLUCOMTR-MCNC: 218 MG/DL (ref 70–99)
GLUCOSE BLDC GLUCOMTR-MCNC: 220 MG/DL (ref 70–99)
GLUCOSE BLDC GLUCOMTR-MCNC: 224 MG/DL (ref 70–99)
GLUCOSE BLDC GLUCOMTR-MCNC: 225 MG/DL (ref 70–99)
GLUCOSE BLDC GLUCOMTR-MCNC: 226 MG/DL (ref 70–99)
GLUCOSE BLDC GLUCOMTR-MCNC: 228 MG/DL (ref 70–99)
GLUCOSE BLDC GLUCOMTR-MCNC: 239 MG/DL (ref 70–99)
GLUCOSE BLDC GLUCOMTR-MCNC: 239 MG/DL (ref 70–99)
GLUCOSE BLDC GLUCOMTR-MCNC: 245 MG/DL (ref 70–99)
GLUCOSE BLDC GLUCOMTR-MCNC: 86 MG/DL (ref 70–99)
GLUCOSE BODY FLUID SOURCE: NORMAL
GLUCOSE FLD-MCNC: 137 MG/DL
GLUCOSE SERPL-MCNC: 116 MG/DL (ref 70–99)
GLUCOSE SERPL-MCNC: 116 MG/DL (ref 70–99)
GLUCOSE SERPL-MCNC: 119 MG/DL (ref 70–99)
GLUCOSE SERPL-MCNC: 121 MG/DL (ref 70–99)
GLUCOSE SERPL-MCNC: 129 MG/DL (ref 70–99)
GLUCOSE SERPL-MCNC: 132 MG/DL (ref 70–99)
GLUCOSE SERPL-MCNC: 135 MG/DL (ref 70–99)
GLUCOSE SERPL-MCNC: 137 MG/DL (ref 70–99)
GLUCOSE SERPL-MCNC: 140 MG/DL (ref 70–99)
GLUCOSE SERPL-MCNC: 144 MG/DL (ref 70–99)
GLUCOSE SERPL-MCNC: 144 MG/DL (ref 70–99)
GLUCOSE SERPL-MCNC: 146 MG/DL (ref 70–99)
GLUCOSE SERPL-MCNC: 152 MG/DL (ref 70–99)
GLUCOSE SERPL-MCNC: 154 MG/DL (ref 70–99)
GLUCOSE SERPL-MCNC: 165 MG/DL (ref 70–99)
GLUCOSE SERPL-MCNC: 168 MG/DL (ref 70–99)
GLUCOSE SERPL-MCNC: 198 MG/DL (ref 70–99)
GLUCOSE SERPL-MCNC: 210 MG/DL (ref 70–99)
GLUCOSE SERPL-MCNC: 98 MG/DL (ref 70–99)
GLUCOSE UR STRIP-MCNC: NEGATIVE MG/DL
GRAM STAIN RESULT: NORMAL
HBV SURFACE AB SERPL IA-ACNC: 56.64 M[IU]/ML
HBV SURFACE AB SERPL IA-ACNC: REACTIVE M[IU]/ML
HBV SURFACE AG SERPL QL IA: NONREACTIVE
HCO3 BLD-SCNC: 22 MMOL/L (ref 21–28)
HCO3 BLD-SCNC: 22 MMOL/L (ref 21–28)
HCO3 BLD-SCNC: 24 MMOL/L (ref 21–28)
HCO3 BLD-SCNC: 25 MMOL/L (ref 21–28)
HCO3 BLD-SCNC: 25 MMOL/L (ref 21–28)
HCO3 BLD-SCNC: 26 MMOL/L (ref 21–28)
HCT VFR BLD AUTO: 25.2 % (ref 40–53)
HCT VFR BLD AUTO: 25.5 % (ref 40–53)
HCT VFR BLD AUTO: 26 % (ref 40–53)
HCT VFR BLD AUTO: 26 % (ref 40–53)
HCT VFR BLD AUTO: 27.4 % (ref 40–53)
HCT VFR BLD AUTO: 27.8 % (ref 40–53)
HCT VFR BLD AUTO: 28.1 % (ref 40–53)
HCT VFR BLD AUTO: 28.6 % (ref 40–53)
HCT VFR BLD AUTO: 28.8 % (ref 40–53)
HCT VFR BLD AUTO: 29.3 % (ref 40–53)
HCT VFR BLD AUTO: 30.8 % (ref 40–53)
HCT VFR BLD AUTO: 31.2 % (ref 40–53)
HCT VFR BLD AUTO: 31.4 % (ref 40–53)
HCT VFR BLD AUTO: 34.9 % (ref 40–53)
HCT VFR BLD AUTO: 35.8 % (ref 40–53)
HCT VFR BLD AUTO: 36.2 % (ref 40–53)
HCT VFR BLD AUTO: 36.4 % (ref 40–53)
HCT VFR BLD AUTO: 36.6 % (ref 40–53)
HCT VFR BLD AUTO: 37.1 % (ref 40–53)
HCT VFR BLD AUTO: 39.4 % (ref 40–53)
HCV AB SERPL QL IA: NONREACTIVE
HGB BLD-MCNC: 10 G/DL (ref 13.3–17.7)
HGB BLD-MCNC: 10 G/DL (ref 13.3–17.7)
HGB BLD-MCNC: 11.5 G/DL (ref 13.3–17.7)
HGB BLD-MCNC: 11.8 G/DL (ref 13.3–17.7)
HGB BLD-MCNC: 11.9 G/DL (ref 13.3–17.7)
HGB BLD-MCNC: 11.9 G/DL (ref 13.3–17.7)
HGB BLD-MCNC: 12.3 G/DL (ref 13.3–17.7)
HGB BLD-MCNC: 13.1 G/DL (ref 13.3–17.7)
HGB BLD-MCNC: 13.3 G/DL (ref 13.3–17.7)
HGB BLD-MCNC: 8 G/DL (ref 13.3–17.7)
HGB BLD-MCNC: 8.2 G/DL (ref 13.3–17.7)
HGB BLD-MCNC: 8.2 G/DL (ref 13.3–17.7)
HGB BLD-MCNC: 8.3 G/DL (ref 13.3–17.7)
HGB BLD-MCNC: 8.3 G/DL (ref 13.3–17.7)
HGB BLD-MCNC: 8.4 G/DL (ref 13.3–17.7)
HGB BLD-MCNC: 8.7 G/DL (ref 13.3–17.7)
HGB BLD-MCNC: 8.8 G/DL (ref 13.3–17.7)
HGB BLD-MCNC: 8.8 G/DL (ref 13.3–17.7)
HGB BLD-MCNC: 9 G/DL (ref 13.3–17.7)
HGB BLD-MCNC: 9.3 G/DL (ref 13.3–17.7)
HGB BLD-MCNC: 9.4 G/DL (ref 13.3–17.7)
HGB BLD-MCNC: 9.4 G/DL (ref 13.3–17.7)
HGB BLD-MCNC: 9.5 G/DL (ref 13.3–17.7)
HGB BLD-MCNC: 9.9 G/DL (ref 13.3–17.7)
HGB UR QL STRIP: ABNORMAL
HOLD SPECIMEN: NORMAL
HYALINE CASTS: 4 /LPF
HYALINE CASTS: 5 /LPF
IMM GRANULOCYTES # BLD: 0 10E3/UL
IMM GRANULOCYTES # BLD: 0.1 10E3/UL
IMM GRANULOCYTES # BLD: 0.2 10E3/UL
IMM GRANULOCYTES # BLD: 0.4 10E3/UL
IMM GRANULOCYTES # BLD: 0.4 10E3/UL
IMM GRANULOCYTES NFR BLD: 0 %
IMM GRANULOCYTES NFR BLD: 1 %
INR PPP: 1.06 (ref 0.85–1.15)
INR PPP: 1.12 (ref 0.85–1.15)
INR PPP: 1.22 (ref 0.85–1.15)
INR PPP: 1.28 (ref 0.85–1.15)
INR PPP: 1.31 (ref 0.85–1.15)
INR PPP: 1.42 (ref 0.85–1.15)
INR PPP: 1.81 (ref 0.85–1.15)
INR PPP: 1.92 (ref 0.85–1.15)
INR PPP: 1.98 (ref 0.85–1.15)
INR PPP: 2.29 (ref 0.85–1.15)
INR PPP: 2.3 (ref 0.85–1.15)
INR PPP: 2.33 (ref 0.85–1.15)
INR PPP: 2.5 (ref 0.85–1.15)
INTERPRETATION ECG - MUSE: NORMAL
INTERPRETATION: NORMAL
INTERPRETATION: NORMAL
KAPPA LC FREE SER-MCNC: 15 MG/DL (ref 0.33–1.94)
KAPPA LC FREE/LAMBDA FREE SER NEPH: 1.6 {RATIO} (ref 0.26–1.65)
KETONES UR STRIP-MCNC: NEGATIVE MG/DL
LA PPP-IMP: NEGATIVE
LA PPP-IMP: NORMAL
LA PPP-IMP: POSITIVE
LACTATE SERPL-SCNC: 1.9 MMOL/L (ref 0.7–2)
LACTATE SERPL-SCNC: 1.9 MMOL/L (ref 0.7–2)
LACTATE SERPL-SCNC: 2.6 MMOL/L (ref 0.7–2)
LACTATE SERPL-SCNC: 2.9 MMOL/L (ref 0.7–2)
LACTATE SERPL-SCNC: 3.1 MMOL/L (ref 0.7–2)
LACTATE SERPL-SCNC: 3.3 MMOL/L (ref 0.7–2)
LACTATE SERPL-SCNC: 3.7 MMOL/L (ref 0.7–2)
LACTATE SERPL-SCNC: 3.9 MMOL/L (ref 0.7–2)
LACTATE SERPL-SCNC: 5.9 MMOL/L (ref 0.7–2)
LAMBDA LC FREE SERPL-MCNC: 9.4 MG/DL (ref 0.57–2.63)
LD BODY BODY FLUID SOURCE: NORMAL
LDH FLD L TO P-CCNC: 427 U/L
LEUKOCYTE ESTERASE UR QL STRIP: ABNORMAL
LIPASE SERPL-CCNC: 165 U/L (ref 73–393)
LIPASE SERPL-CCNC: 260 U/L (ref 73–393)
LIPASE SERPL-CCNC: 323 U/L (ref 73–393)
LIPASE SERPL-CCNC: 548 U/L (ref 13–60)
LUPUS INTERPRETATION: ABNORMAL
LUPUS INTERPRETATION: ABNORMAL
LVEF ECHO: NORMAL
LYMPHOCYTES # BLD AUTO: 0.5 10E3/UL (ref 0.8–5.3)
LYMPHOCYTES # BLD AUTO: 0.5 10E3/UL (ref 0.8–5.3)
LYMPHOCYTES # BLD AUTO: 0.6 10E3/UL (ref 0.8–5.3)
LYMPHOCYTES # BLD AUTO: 0.7 10E3/UL (ref 0.8–5.3)
LYMPHOCYTES # BLD AUTO: 0.8 10E3/UL (ref 0.8–5.3)
LYMPHOCYTES # BLD AUTO: 0.9 10E3/UL (ref 0.8–5.3)
LYMPHOCYTES # BLD AUTO: 1.1 10E3/UL (ref 0.8–5.3)
LYMPHOCYTES # BLD MANUAL: 0.2 10E3/UL (ref 0.8–5.3)
LYMPHOCYTES # BLD MANUAL: 1.1 10E3/UL (ref 0.8–5.3)
LYMPHOCYTES NFR BLD AUTO: 16 %
LYMPHOCYTES NFR BLD AUTO: 2 %
LYMPHOCYTES NFR BLD AUTO: 3 %
LYMPHOCYTES NFR BLD AUTO: 4 %
LYMPHOCYTES NFR BLD AUTO: 4 %
LYMPHOCYTES NFR BLD AUTO: 5 %
LYMPHOCYTES NFR BLD AUTO: 5 %
LYMPHOCYTES NFR BLD AUTO: 6 %
LYMPHOCYTES NFR BLD AUTO: 7 %
LYMPHOCYTES NFR BLD MANUAL: 1 %
LYMPHOCYTES NFR BLD MANUAL: 3 %
LYMPHOCYTES NFR FLD MANUAL: 23 %
LYMPHOCYTES NFR FLD MANUAL: 37 %
LYMPHOCYTES NFR FLD MANUAL: 41 %
MAGNESIUM SERPL-MCNC: 2.4 MG/DL (ref 1.7–2.3)
MAGNESIUM SERPL-MCNC: 2.5 MG/DL (ref 1.7–2.3)
MAGNESIUM SERPL-MCNC: 2.5 MG/DL (ref 1.7–2.3)
MAGNESIUM SERPL-MCNC: 2.8 MG/DL (ref 1.7–2.3)
MAGNESIUM SERPL-MCNC: 3 MG/DL (ref 1.7–2.3)
MAGNESIUM SERPL-MCNC: 3 MG/DL (ref 1.7–2.3)
MAGNESIUM SERPL-MCNC: 3.1 MG/DL (ref 1.7–2.3)
MCH RBC QN AUTO: 27.4 PG (ref 26.5–33)
MCH RBC QN AUTO: 27.5 PG (ref 26.5–33)
MCH RBC QN AUTO: 27.8 PG (ref 26.5–33)
MCH RBC QN AUTO: 28 PG (ref 26.5–33)
MCH RBC QN AUTO: 28 PG (ref 26.5–33)
MCH RBC QN AUTO: 28.1 PG (ref 26.5–33)
MCH RBC QN AUTO: 28.2 PG (ref 26.5–33)
MCH RBC QN AUTO: 28.2 PG (ref 26.5–33)
MCH RBC QN AUTO: 28.3 PG (ref 26.5–33)
MCH RBC QN AUTO: 28.3 PG (ref 26.5–33)
MCH RBC QN AUTO: 28.4 PG (ref 26.5–33)
MCH RBC QN AUTO: 28.5 PG (ref 26.5–33)
MCH RBC QN AUTO: 28.5 PG (ref 26.5–33)
MCH RBC QN AUTO: 28.6 PG (ref 26.5–33)
MCH RBC QN AUTO: 28.7 PG (ref 26.5–33)
MCH RBC QN AUTO: 28.8 PG (ref 26.5–33)
MCH RBC QN AUTO: 31.5 PG (ref 26.5–33)
MCH RBC QN AUTO: 31.6 PG (ref 26.5–33)
MCH RBC QN AUTO: 31.8 PG (ref 26.5–33)
MCH RBC QN AUTO: 32.2 PG (ref 26.5–33)
MCH RBC QN AUTO: 32.5 PG (ref 26.5–33)
MCHC RBC AUTO-ENTMCNC: 29.2 G/DL (ref 31.5–36.5)
MCHC RBC AUTO-ENTMCNC: 30.3 G/DL (ref 31.5–36.5)
MCHC RBC AUTO-ENTMCNC: 30.4 G/DL (ref 31.5–36.5)
MCHC RBC AUTO-ENTMCNC: 30.6 G/DL (ref 31.5–36.5)
MCHC RBC AUTO-ENTMCNC: 31.3 G/DL (ref 31.5–36.5)
MCHC RBC AUTO-ENTMCNC: 31.7 G/DL (ref 31.5–36.5)
MCHC RBC AUTO-ENTMCNC: 31.7 G/DL (ref 31.5–36.5)
MCHC RBC AUTO-ENTMCNC: 31.8 G/DL (ref 31.5–36.5)
MCHC RBC AUTO-ENTMCNC: 31.9 G/DL (ref 31.5–36.5)
MCHC RBC AUTO-ENTMCNC: 32.1 G/DL (ref 31.5–36.5)
MCHC RBC AUTO-ENTMCNC: 32.2 G/DL (ref 31.5–36.5)
MCHC RBC AUTO-ENTMCNC: 32.3 G/DL (ref 31.5–36.5)
MCHC RBC AUTO-ENTMCNC: 32.3 G/DL (ref 31.5–36.5)
MCHC RBC AUTO-ENTMCNC: 32.4 G/DL (ref 31.5–36.5)
MCHC RBC AUTO-ENTMCNC: 32.4 G/DL (ref 31.5–36.5)
MCHC RBC AUTO-ENTMCNC: 32.6 G/DL (ref 31.5–36.5)
MCHC RBC AUTO-ENTMCNC: 32.6 G/DL (ref 31.5–36.5)
MCHC RBC AUTO-ENTMCNC: 32.9 G/DL (ref 31.5–36.5)
MCHC RBC AUTO-ENTMCNC: 33.6 G/DL (ref 31.5–36.5)
MCHC RBC AUTO-ENTMCNC: 33.8 G/DL (ref 31.5–36.5)
MCHC RBC AUTO-ENTMCNC: 34.1 G/DL (ref 31.5–36.5)
MCHC RBC AUTO-ENTMCNC: 35.3 G/DL (ref 31.5–36.5)
MCV RBC AUTO: 86 FL (ref 78–100)
MCV RBC AUTO: 86 FL (ref 78–100)
MCV RBC AUTO: 87 FL (ref 78–100)
MCV RBC AUTO: 88 FL (ref 78–100)
MCV RBC AUTO: 89 FL (ref 78–100)
MCV RBC AUTO: 90 FL (ref 78–100)
MCV RBC AUTO: 91 FL (ref 78–100)
MCV RBC AUTO: 92 FL (ref 78–100)
MCV RBC AUTO: 93 FL (ref 78–100)
MCV RBC AUTO: 95 FL (ref 78–100)
MCV RBC AUTO: 96 FL (ref 78–100)
MCV RBC AUTO: 97 FL (ref 78–100)
MONOCYTES # BLD AUTO: 0.6 10E3/UL (ref 0–1.3)
MONOCYTES # BLD AUTO: 0.7 10E3/UL (ref 0–1.3)
MONOCYTES # BLD AUTO: 0.8 10E3/UL (ref 0–1.3)
MONOCYTES # BLD AUTO: 0.9 10E3/UL (ref 0–1.3)
MONOCYTES # BLD AUTO: 1 10E3/UL (ref 0–1.3)
MONOCYTES # BLD AUTO: 1.1 10E3/UL (ref 0–1.3)
MONOCYTES # BLD AUTO: 1.1 10E3/UL (ref 0–1.3)
MONOCYTES # BLD AUTO: 1.2 10E3/UL (ref 0–1.3)
MONOCYTES # BLD AUTO: 1.3 10E3/UL (ref 0–1.3)
MONOCYTES # BLD AUTO: 1.3 10E3/UL (ref 0–1.3)
MONOCYTES # BLD AUTO: 1.4 10E3/UL (ref 0–1.3)
MONOCYTES # BLD AUTO: 1.5 10E3/UL (ref 0–1.3)
MONOCYTES # BLD MANUAL: 0.2 10E3/UL (ref 0–1.3)
MONOCYTES # BLD MANUAL: 1.1 10E3/UL (ref 0–1.3)
MONOCYTES NFR BLD AUTO: 10 %
MONOCYTES NFR BLD AUTO: 2 %
MONOCYTES NFR BLD AUTO: 2 %
MONOCYTES NFR BLD AUTO: 4 %
MONOCYTES NFR BLD AUTO: 5 %
MONOCYTES NFR BLD AUTO: 6 %
MONOCYTES NFR BLD AUTO: 7 %
MONOCYTES NFR BLD AUTO: 7 %
MONOCYTES NFR BLD AUTO: 8 %
MONOCYTES NFR BLD AUTO: 9 %
MONOCYTES NFR BLD MANUAL: 1 %
MONOCYTES NFR BLD MANUAL: 3 %
MONOS+MACROS NFR FLD MANUAL: 43 %
MONOS+MACROS NFR FLD MANUAL: NORMAL %
MONOS+MACROS NFR FLD MANUAL: NORMAL %
MRSA DNA SPEC QL NAA+PROBE: NEGATIVE
MUCOUS THREADS #/AREA URNS LPF: PRESENT /LPF
MUCOUS THREADS #/AREA URNS LPF: PRESENT /LPF
NEUTROPHILS # BLD AUTO: 10.3 10E3/UL (ref 1.6–8.3)
NEUTROPHILS # BLD AUTO: 10.8 10E3/UL (ref 1.6–8.3)
NEUTROPHILS # BLD AUTO: 11.6 10E3/UL (ref 1.6–8.3)
NEUTROPHILS # BLD AUTO: 11.8 10E3/UL (ref 1.6–8.3)
NEUTROPHILS # BLD AUTO: 13.1 10E3/UL (ref 1.6–8.3)
NEUTROPHILS # BLD AUTO: 14.1 10E3/UL (ref 1.6–8.3)
NEUTROPHILS # BLD AUTO: 14.4 10E3/UL (ref 1.6–8.3)
NEUTROPHILS # BLD AUTO: 15.5 10E3/UL (ref 1.6–8.3)
NEUTROPHILS # BLD AUTO: 16.9 10E3/UL (ref 1.6–8.3)
NEUTROPHILS # BLD AUTO: 17.4 10E3/UL (ref 1.6–8.3)
NEUTROPHILS # BLD AUTO: 18.3 10E3/UL (ref 1.6–8.3)
NEUTROPHILS # BLD AUTO: 21.7 10E3/UL (ref 1.6–8.3)
NEUTROPHILS # BLD AUTO: 25.2 10E3/UL (ref 1.6–8.3)
NEUTROPHILS # BLD AUTO: 26.3 10E3/UL (ref 1.6–8.3)
NEUTROPHILS # BLD AUTO: 32.5 10E3/UL (ref 1.6–8.3)
NEUTROPHILS # BLD AUTO: 5.1 10E3/UL (ref 1.6–8.3)
NEUTROPHILS # BLD MANUAL: 22.7 10E3/UL (ref 1.6–8.3)
NEUTROPHILS # BLD MANUAL: 33.9 10E3/UL (ref 1.6–8.3)
NEUTROPHILS NFR BLD AUTO: 71 %
NEUTROPHILS NFR BLD AUTO: 81 %
NEUTROPHILS NFR BLD AUTO: 82 %
NEUTROPHILS NFR BLD AUTO: 82 %
NEUTROPHILS NFR BLD AUTO: 83 %
NEUTROPHILS NFR BLD AUTO: 83 %
NEUTROPHILS NFR BLD AUTO: 85 %
NEUTROPHILS NFR BLD AUTO: 88 %
NEUTROPHILS NFR BLD AUTO: 88 %
NEUTROPHILS NFR BLD AUTO: 89 %
NEUTROPHILS NFR BLD AUTO: 90 %
NEUTROPHILS NFR BLD AUTO: 91 %
NEUTROPHILS NFR BLD AUTO: 91 %
NEUTROPHILS NFR BLD AUTO: 92 %
NEUTROPHILS NFR BLD AUTO: 93 %
NEUTROPHILS NFR BLD AUTO: 94 %
NEUTROPHILS NFR BLD MANUAL: 92 %
NEUTROPHILS NFR BLD MANUAL: 97 %
NEUTS BAND NFR FLD MANUAL: 34 %
NEUTS BAND NFR FLD MANUAL: 41 %
NEUTS BAND NFR FLD MANUAL: 6 %
NITRATE UR QL: NEGATIVE
NRBC # BLD AUTO: 0 10E3/UL
NRBC # BLD AUTO: 0.1 10E3/UL
NRBC # BLD AUTO: 0.1 10E3/UL
NRBC # BLD AUTO: 0.2 10E3/UL
NRBC # BLD AUTO: 1.1 10E3/UL
NRBC BLD AUTO-RTO: 0 /100
NRBC BLD MANUAL-RTO: 1 %
NRBC BLD MANUAL-RTO: 3 %
O2/TOTAL GAS SETTING VFR VENT: 100 %
O2/TOTAL GAS SETTING VFR VENT: 100 %
O2/TOTAL GAS SETTING VFR VENT: 3 %
O2/TOTAL GAS SETTING VFR VENT: 50 %
O2/TOTAL GAS SETTING VFR VENT: 60 %
O2/TOTAL GAS SETTING VFR VENT: 70 %
OTHER CELLS FLD MANUAL: 18 %
OTHER CELLS FLD MANUAL: 57 %
OXYHGB MFR BLD: 84 % (ref 92–100)
OXYHGB MFR BLD: 91 % (ref 92–100)
P AXIS - MUSE: 59 DEGREES
P AXIS - MUSE: 69 DEGREES
P AXIS - MUSE: 69 DEGREES
P AXIS - MUSE: 83 DEGREES
PATH REPORT.COMMENTS IMP SPEC: ABNORMAL
PATH REPORT.COMMENTS IMP SPEC: ABNORMAL
PATH REPORT.COMMENTS IMP SPEC: NORMAL
PATH REPORT.COMMENTS IMP SPEC: YES
PATH REPORT.FINAL DX SPEC: ABNORMAL
PATH REPORT.FINAL DX SPEC: NORMAL
PATH REPORT.GROSS SPEC: ABNORMAL
PATH REPORT.GROSS SPEC: NORMAL
PATH REPORT.MICROSCOPIC SPEC OTHER STN: ABNORMAL
PATH REPORT.MICROSCOPIC SPEC OTHER STN: NORMAL
PATH REPORT.RELEVANT HX SPEC: ABNORMAL
PATH REPORT.RELEVANT HX SPEC: NORMAL
PCO2 BLD: 33 MM HG (ref 35–45)
PCO2 BLD: 34 MM HG (ref 35–45)
PCO2 BLD: 34 MM HG (ref 35–45)
PCO2 BLD: 35 MM HG (ref 35–45)
PCO2 BLD: 36 MM HG (ref 35–45)
PCO2 BLD: 37 MM HG (ref 35–45)
PH BLD: 7.43 [PH] (ref 7.35–7.45)
PH BLD: 7.44 [PH] (ref 7.35–7.45)
PH BLD: 7.45 [PH] (ref 7.35–7.45)
PH BLD: 7.48 [PH] (ref 7.35–7.45)
PH UR STRIP: 5 [PH] (ref 5–7)
PH UR STRIP: 5 [PH] (ref 5–7)
PH UR STRIP: 5.5 [PH] (ref 5–7)
PHOSPHATE SERPL-MCNC: 5.3 MG/DL (ref 2.5–4.5)
PHOSPHATE SERPL-MCNC: 6.3 MG/DL (ref 2.5–4.5)
PHOSPHATE SERPL-MCNC: 7.6 MG/DL (ref 2.5–4.5)
PHOTO IMAGE: NORMAL
PHOTO IMAGE: NORMAL
PLAT MORPH BLD: ABNORMAL
PLATELET # BLD AUTO: 103 10E3/UL (ref 150–450)
PLATELET # BLD AUTO: 104 10E3/UL (ref 150–450)
PLATELET # BLD AUTO: 109 10E3/UL (ref 150–450)
PLATELET # BLD AUTO: 121 10E3/UL (ref 150–450)
PLATELET # BLD AUTO: 127 10E3/UL (ref 150–450)
PLATELET # BLD AUTO: 131 10E3/UL (ref 150–450)
PLATELET # BLD AUTO: 136 10E3/UL (ref 150–450)
PLATELET # BLD AUTO: 139 10E3/UL (ref 150–450)
PLATELET # BLD AUTO: 139 10E3/UL (ref 150–450)
PLATELET # BLD AUTO: 152 10E3/UL (ref 150–450)
PLATELET # BLD AUTO: 156 10E3/UL (ref 150–450)
PLATELET # BLD AUTO: 157 10E3/UL (ref 150–450)
PLATELET # BLD AUTO: 161 10E3/UL (ref 150–450)
PLATELET # BLD AUTO: 170 10E3/UL (ref 150–450)
PLATELET # BLD AUTO: 175 10E3/UL (ref 150–450)
PLATELET # BLD AUTO: 192 10E3/UL (ref 150–450)
PLATELET # BLD AUTO: 194 10E3/UL (ref 150–450)
PLATELET # BLD AUTO: 203 10E3/UL (ref 150–450)
PLATELET # BLD AUTO: 205 10E3/UL (ref 150–450)
PLATELET # BLD AUTO: 212 10E3/UL (ref 150–450)
PLATELET # BLD AUTO: 218 10E3/UL (ref 150–450)
PLATELET # BLD AUTO: 228 10E3/UL (ref 150–450)
PLATELET # BLD AUTO: 323 10E3/UL (ref 150–450)
PLATELET # BLD AUTO: 99 10E3/UL (ref 150–450)
PLATELET NEUTRALIZATION: -2 SECONDS
PLATELET NEUTRALIZATION: -5 SECONDS
PO2 BLD: 52 MM HG (ref 80–105)
PO2 BLD: 54 MM HG (ref 80–105)
PO2 BLD: 58 MM HG (ref 80–105)
PO2 BLD: 65 MM HG (ref 80–105)
PO2 BLD: 72 MM HG (ref 80–105)
PO2 BLD: 75 MM HG (ref 80–105)
POLYCHROMASIA BLD QL SMEAR: ABNORMAL
POLYCHROMASIA BLD QL SMEAR: SLIGHT
POTASSIUM BLD-SCNC: 4.1 MMOL/L (ref 3.4–5.3)
POTASSIUM BLD-SCNC: 4.2 MMOL/L (ref 3.4–5.3)
POTASSIUM BLD-SCNC: 4.4 MMOL/L (ref 3.4–5.3)
POTASSIUM BLD-SCNC: 4.6 MMOL/L (ref 3.4–5.3)
POTASSIUM BLD-SCNC: 4.7 MMOL/L (ref 3.4–5.3)
POTASSIUM BLD-SCNC: 5.2 MMOL/L (ref 3.4–5.3)
POTASSIUM BLD-SCNC: 5.2 MMOL/L (ref 3.4–5.3)
POTASSIUM BLD-SCNC: 6.6 MMOL/L (ref 3.4–5.3)
POTASSIUM SERPL-SCNC: 2.8 MMOL/L (ref 3.4–5.3)
POTASSIUM SERPL-SCNC: 3.1 MMOL/L (ref 3.4–5.3)
POTASSIUM SERPL-SCNC: 3.2 MMOL/L (ref 3.4–5.3)
POTASSIUM SERPL-SCNC: 3.4 MMOL/L (ref 3.4–5.3)
POTASSIUM SERPL-SCNC: 3.5 MMOL/L (ref 3.4–5.3)
POTASSIUM SERPL-SCNC: 3.6 MMOL/L (ref 3.4–5.3)
POTASSIUM SERPL-SCNC: 4 MMOL/L (ref 3.4–5.3)
POTASSIUM SERPL-SCNC: 4.1 MMOL/L (ref 3.4–5.3)
POTASSIUM SERPL-SCNC: 4.2 MMOL/L (ref 3.4–5.3)
POTASSIUM SERPL-SCNC: 4.2 MMOL/L (ref 3.4–5.3)
POTASSIUM SERPL-SCNC: 4.4 MMOL/L (ref 3.4–5.3)
POTASSIUM SERPL-SCNC: 4.4 MMOL/L (ref 3.4–5.3)
POTASSIUM SERPL-SCNC: 4.6 MMOL/L (ref 3.4–5.3)
POTASSIUM SERPL-SCNC: 4.6 MMOL/L (ref 3.4–5.3)
POTASSIUM SERPL-SCNC: 4.9 MMOL/L (ref 3.4–5.3)
POTASSIUM SERPL-SCNC: 5.2 MMOL/L (ref 3.4–5.3)
POTASSIUM SERPL-SCNC: 5.3 MMOL/L (ref 3.4–5.3)
POTASSIUM SERPL-SCNC: 5.4 MMOL/L (ref 3.4–5.3)
POTASSIUM SERPL-SCNC: 5.7 MMOL/L (ref 3.4–5.3)
POTASSIUM SERPL-SCNC: 5.8 MMOL/L (ref 3.4–5.3)
POTASSIUM SERPL-SCNC: 6 MMOL/L (ref 3.4–5.3)
POTASSIUM SERPL-SCNC: 6.2 MMOL/L (ref 3.4–5.3)
POTASSIUM SERPL-SCNC: 6.7 MMOL/L (ref 3.4–5.3)
POTASSIUM SERPL-SCNC: 6.9 MMOL/L (ref 3.4–5.3)
PR INTERVAL - MUSE: 146 MS
PR INTERVAL - MUSE: 148 MS
PR INTERVAL - MUSE: 154 MS
PR INTERVAL - MUSE: 160 MS
PROCALCITONIN SERPL IA-MCNC: 11.5 NG/ML
PROCALCITONIN SERPL IA-MCNC: 2.45 NG/ML
PROCALCITONIN SERPL IA-MCNC: 9.61 NG/ML
PROMYELOCYTES # BLD MANUAL: 0.4 10E3/UL
PROMYELOCYTES NFR BLD MANUAL: 1 %
PROT FLD-MCNC: 2.3 G/DL
PROT FLD-MCNC: 2.7 G/DL
PROT FLD-MCNC: 3.1 G/DL
PROT SERPL-MCNC: 5.8 G/DL (ref 6.4–8.3)
PROT SERPL-MCNC: 5.9 G/DL (ref 6.4–8.3)
PROT SERPL-MCNC: 6.1 G/DL (ref 6.4–8.3)
PROT SERPL-MCNC: 6.2 G/DL (ref 6.4–8.3)
PROT SERPL-MCNC: 6.3 G/DL (ref 6.4–8.3)
PROT SERPL-MCNC: 6.4 G/DL (ref 6.4–8.3)
PROT SERPL-MCNC: 6.4 G/DL (ref 6.4–8.3)
PROT SERPL-MCNC: 6.5 G/DL (ref 6.4–8.3)
PROT SERPL-MCNC: 6.6 G/DL (ref 6.4–8.3)
PROT SERPL-MCNC: 6.6 G/DL (ref 6.4–8.3)
PROT SERPL-MCNC: 6.8 G/DL (ref 6.8–8.8)
PROT SERPL-MCNC: 6.9 G/DL (ref 6.8–8.8)
PROT SERPL-MCNC: 7 G/DL (ref 6.8–8.8)
PROT SERPL-MCNC: 7 G/DL (ref 6.8–8.8)
PROT SERPL-MCNC: 7.1 G/DL (ref 6.4–8.3)
PROT SERPL-MCNC: 7.2 G/DL (ref 6.8–8.8)
PROT SERPL-MCNC: 7.3 G/DL (ref 6.8–8.8)
PROT SERPL-MCNC: 7.4 G/DL (ref 6.8–8.8)
PROT/CREAT 24H UR: 0.94 MG/MG CR (ref 0–0.2)
PROTEIN BODY FLUID SOURCE: NORMAL
PSA SERPL-MCNC: 0.1 NG/ML (ref 0–6.5)
PSA SERPL-MCNC: 0.17 UG/L (ref 0–4)
PSA SERPL-MCNC: 0.19 UG/L (ref 0–4)
PTT 1:2 MIX: 43 SECONDS (ref 31–45)
PTT RATIO: 1.54
PTT RATIO: 1.65
QRS DURATION - MUSE: 76 MS
QRS DURATION - MUSE: 82 MS
QRS DURATION - MUSE: 88 MS
QRS DURATION - MUSE: 94 MS
QT - MUSE: 360 MS
QT - MUSE: 364 MS
QT - MUSE: 382 MS
QT - MUSE: 424 MS
QTC - MUSE: 435 MS
QTC - MUSE: 440 MS
QTC - MUSE: 440 MS
QTC - MUSE: 474 MS
R AXIS - MUSE: -13 DEGREES
R AXIS - MUSE: -16 DEGREES
R AXIS - MUSE: -19 DEGREES
R AXIS - MUSE: -3 DEGREES
RADIOLOGIST FLAGS: ABNORMAL
RADIOLOGIST FLAGS: NORMAL
RBC # BLD AUTO: 2.85 10E6/UL (ref 4.4–5.9)
RBC # BLD AUTO: 2.91 10E6/UL (ref 4.4–5.9)
RBC # BLD AUTO: 2.92 10E6/UL (ref 4.4–5.9)
RBC # BLD AUTO: 2.95 10E6/UL (ref 4.4–5.9)
RBC # BLD AUTO: 2.97 10E6/UL (ref 4.4–5.9)
RBC # BLD AUTO: 3.03 10E6/UL (ref 4.4–5.9)
RBC # BLD AUTO: 3.08 10E6/UL (ref 4.4–5.9)
RBC # BLD AUTO: 3.13 10E6/UL (ref 4.4–5.9)
RBC # BLD AUTO: 3.2 10E6/UL (ref 4.4–5.9)
RBC # BLD AUTO: 3.22 10E6/UL (ref 4.4–5.9)
RBC # BLD AUTO: 3.31 10E6/UL (ref 4.4–5.9)
RBC # BLD AUTO: 3.33 10E6/UL (ref 4.4–5.9)
RBC # BLD AUTO: 3.34 10E6/UL (ref 4.4–5.9)
RBC # BLD AUTO: 3.36 10E6/UL (ref 4.4–5.9)
RBC # BLD AUTO: 3.47 10E6/UL (ref 4.4–5.9)
RBC # BLD AUTO: 3.48 10E6/UL (ref 4.4–5.9)
RBC # BLD AUTO: 3.5 10E6/UL (ref 4.4–5.9)
RBC # BLD AUTO: 3.74 10E6/UL (ref 4.4–5.9)
RBC # BLD AUTO: 3.76 10E6/UL (ref 4.4–5.9)
RBC # BLD AUTO: 3.82 10E6/UL (ref 4.4–5.9)
RBC # BLD AUTO: 4.02 10E6/UL (ref 4.4–5.9)
RBC # BLD AUTO: 4.03 10E6/UL (ref 4.4–5.9)
RBC # BLD AUTO: 4.14 10E6/UL (ref 4.4–5.9)
RBC # BLD AUTO: 4.22 10E6/UL (ref 4.4–5.9)
RBC MORPH BLD: ABNORMAL
RBC URINE: 29 /HPF
RBC URINE: 3 /HPF
RBC URINE: 85 /HPF
SA TARGET DNA: NEGATIVE
SARS-COV-2 RNA RESP QL NAA+PROBE: NEGATIVE
SIGNIFICANT RESULTS: NORMAL
SIGNIFICANT RESULTS: NORMAL
SODIUM SERPL-SCNC: 127 MMOL/L (ref 136–145)
SODIUM SERPL-SCNC: 127 MMOL/L (ref 136–145)
SODIUM SERPL-SCNC: 128 MMOL/L (ref 133–144)
SODIUM SERPL-SCNC: 128 MMOL/L (ref 136–145)
SODIUM SERPL-SCNC: 129 MMOL/L (ref 136–145)
SODIUM SERPL-SCNC: 130 MMOL/L (ref 133–144)
SODIUM SERPL-SCNC: 130 MMOL/L (ref 136–145)
SODIUM SERPL-SCNC: 131 MMOL/L (ref 136–145)
SODIUM SERPL-SCNC: 132 MMOL/L (ref 136–145)
SODIUM SERPL-SCNC: 132 MMOL/L (ref 136–145)
SODIUM SERPL-SCNC: 133 MMOL/L (ref 136–145)
SODIUM SERPL-SCNC: 133 MMOL/L (ref 136–145)
SODIUM SERPL-SCNC: 134 MMOL/L (ref 133–144)
SODIUM SERPL-SCNC: 134 MMOL/L (ref 133–144)
SODIUM SERPL-SCNC: 134 MMOL/L (ref 136–145)
SODIUM SERPL-SCNC: 135 MMOL/L (ref 136–145)
SODIUM SERPL-SCNC: 136 MMOL/L (ref 133–144)
SODIUM SERPL-SCNC: 136 MMOL/L (ref 133–144)
SODIUM SERPL-SCNC: 137 MMOL/L (ref 133–144)
SODIUM SERPL-SCNC: 138 MMOL/L (ref 136–145)
SODIUM SERPL-SCNC: 138 MMOL/L (ref 136–145)
SODIUM SERPL-SCNC: 139 MMOL/L (ref 133–144)
SODIUM SERPL-SCNC: 139 MMOL/L (ref 136–145)
SODIUM UR-SCNC: <20 MMOL/L
SP GR UR STRIP: 1.01 (ref 1–1.03)
SP GR UR STRIP: 1.02 (ref 1–1.03)
SP GR UR STRIP: 1.02 (ref 1–1.03)
SPECIMEN DESCRIPTION: NORMAL
SPECIMEN DESCRIPTION: NORMAL
SQUAMOUS EPITHELIAL: 1 /HPF
SQUAMOUS EPITHELIAL: <1 /HPF
SQUAMOUS EPITHELIAL: <1 /HPF
SYSTOLIC BLOOD PRESSURE - MUSE: NORMAL MMHG
T AXIS - MUSE: 31 DEGREES
T AXIS - MUSE: 36 DEGREES
T AXIS - MUSE: 36 DEGREES
T AXIS - MUSE: 72 DEGREES
TARGETS BLD QL SMEAR: SLIGHT
TARGETS BLD QL SMEAR: SLIGHT
TEST DETAILS, MDL: NORMAL
TEST DETAILS, MDL: NORMAL
THROMBIN TIME: 14.6 SECONDS (ref 13–19)
THROMBIN TIME: 15.7 SECONDS (ref 13–19)
TOXIC GRANULES BLD QL SMEAR: PRESENT
TRANSITIONAL EPI: <1 /HPF
UPPER EUS: NORMAL
UPPER EUS: NORMAL
UROBILINOGEN UR STRIP-MCNC: NORMAL MG/DL
UUN UR-MCNC: 339 MG/DL (ref 801–1666)
VENTRICULAR RATE- MUSE: 65 BPM
VENTRICULAR RATE- MUSE: 86 BPM
VENTRICULAR RATE- MUSE: 90 BPM
VENTRICULAR RATE- MUSE: 93 BPM
WBC # BLD AUTO: 10.4 10E3/UL (ref 4–11)
WBC # BLD AUTO: 12.6 10E3/UL (ref 4–11)
WBC # BLD AUTO: 13 10E3/UL (ref 4–11)
WBC # BLD AUTO: 13.1 10E3/UL (ref 4–11)
WBC # BLD AUTO: 14.1 10E3/UL (ref 4–11)
WBC # BLD AUTO: 14.1 10E3/UL (ref 4–11)
WBC # BLD AUTO: 15.5 10E3/UL (ref 4–11)
WBC # BLD AUTO: 16 10E3/UL (ref 4–11)
WBC # BLD AUTO: 17 10E3/UL (ref 4–11)
WBC # BLD AUTO: 17.4 10E3/UL (ref 4–11)
WBC # BLD AUTO: 18.9 10E3/UL (ref 4–11)
WBC # BLD AUTO: 19.2 10E3/UL (ref 4–11)
WBC # BLD AUTO: 20.3 10E3/UL (ref 4–11)
WBC # BLD AUTO: 23.4 10E3/UL (ref 4–11)
WBC # BLD AUTO: 23.6 10E3/UL (ref 4–11)
WBC # BLD AUTO: 27.4 10E3/UL (ref 4–11)
WBC # BLD AUTO: 28 10E3/UL (ref 4–11)
WBC # BLD AUTO: 35 10E3/UL (ref 4–11)
WBC # BLD AUTO: 36.9 10E3/UL (ref 4–11)
WBC # BLD AUTO: 6.1 10E3/UL (ref 4–11)
WBC # BLD AUTO: 6.5 10E3/UL (ref 4–11)
WBC # BLD AUTO: 7.1 10E3/UL (ref 4–11)
WBC # BLD AUTO: 7.3 10E3/UL (ref 4–11)
WBC # BLD AUTO: 8.4 10E3/UL (ref 4–11)
WBC # FLD AUTO: 398 /UL
WBC # FLD AUTO: 485 /UL
WBC # FLD AUTO: 577 /UL
WBC URINE: 18 /HPF
WBC URINE: 3 /HPF
WBC URINE: 8 /HPF

## 2022-01-01 PROCEDURE — 250N000013 HC RX MED GY IP 250 OP 250 PS 637

## 2022-01-01 PROCEDURE — 120N000002 HC R&B MED SURG/OB UMMC

## 2022-01-01 PROCEDURE — 99233 SBSQ HOSP IP/OBS HIGH 50: CPT | Performed by: CLINICAL NURSE SPECIALIST

## 2022-01-01 PROCEDURE — 80053 COMPREHEN METABOLIC PANEL: CPT | Performed by: PATHOLOGY

## 2022-01-01 PROCEDURE — 999N000157 HC STATISTIC RCP TIME EA 10 MIN

## 2022-01-01 PROCEDURE — 36415 COLL VENOUS BLD VENIPUNCTURE: CPT | Performed by: INTERNAL MEDICINE

## 2022-01-01 PROCEDURE — 94660 CPAP INITIATION&MGMT: CPT

## 2022-01-01 PROCEDURE — 83605 ASSAY OF LACTIC ACID: CPT

## 2022-01-01 PROCEDURE — 83735 ASSAY OF MAGNESIUM: CPT | Performed by: STUDENT IN AN ORGANIZED HEALTH CARE EDUCATION/TRAINING PROGRAM

## 2022-01-01 PROCEDURE — 85610 PROTHROMBIN TIME: CPT | Performed by: INTERNAL MEDICINE

## 2022-01-01 PROCEDURE — 99291 CRITICAL CARE FIRST HOUR: CPT | Mod: 25 | Performed by: EMERGENCY MEDICINE

## 2022-01-01 PROCEDURE — 85730 THROMBOPLASTIN TIME PARTIAL: CPT | Performed by: PHYSICIAN ASSISTANT

## 2022-01-01 PROCEDURE — 88305 TISSUE EXAM BY PATHOLOGIST: CPT | Mod: TC | Performed by: INTERNAL MEDICINE

## 2022-01-01 PROCEDURE — 85610 PROTHROMBIN TIME: CPT

## 2022-01-01 PROCEDURE — 250N000011 HC RX IP 250 OP 636: Performed by: INTERNAL MEDICINE

## 2022-01-01 PROCEDURE — 250N000013 HC RX MED GY IP 250 OP 250 PS 637: Performed by: INTERNAL MEDICINE

## 2022-01-01 PROCEDURE — 84100 ASSAY OF PHOSPHORUS: CPT

## 2022-01-01 PROCEDURE — 82040 ASSAY OF SERUM ALBUMIN: CPT | Performed by: INTERNAL MEDICINE

## 2022-01-01 PROCEDURE — 97530 THERAPEUTIC ACTIVITIES: CPT | Mod: GP

## 2022-01-01 PROCEDURE — 87493 C DIFF AMPLIFIED PROBE: CPT | Performed by: INTERNAL MEDICINE

## 2022-01-01 PROCEDURE — 85025 COMPLETE CBC W/AUTO DIFF WBC: CPT | Performed by: STUDENT IN AN ORGANIZED HEALTH CARE EDUCATION/TRAINING PROGRAM

## 2022-01-01 PROCEDURE — 85014 HEMATOCRIT: CPT | Performed by: INTERNAL MEDICINE

## 2022-01-01 PROCEDURE — 36415 COLL VENOUS BLD VENIPUNCTURE: CPT | Performed by: NURSE PRACTITIONER

## 2022-01-01 PROCEDURE — 36415 COLL VENOUS BLD VENIPUNCTURE: CPT | Performed by: STUDENT IN AN ORGANIZED HEALTH CARE EDUCATION/TRAINING PROGRAM

## 2022-01-01 PROCEDURE — 74018 RADEX ABDOMEN 1 VIEW: CPT | Mod: 26 | Performed by: RADIOLOGY

## 2022-01-01 PROCEDURE — 250N000009 HC RX 250: Performed by: STUDENT IN AN ORGANIZED HEALTH CARE EDUCATION/TRAINING PROGRAM

## 2022-01-01 PROCEDURE — 97165 OT EVAL LOW COMPLEX 30 MIN: CPT | Mod: GO

## 2022-01-01 PROCEDURE — 88185 FLOWCYTOMETRY/TC ADD-ON: CPT | Performed by: PHYSICIAN ASSISTANT

## 2022-01-01 PROCEDURE — 81003 URINALYSIS AUTO W/O SCOPE: CPT | Performed by: STUDENT IN AN ORGANIZED HEALTH CARE EDUCATION/TRAINING PROGRAM

## 2022-01-01 PROCEDURE — 86038 ANTINUCLEAR ANTIBODIES: CPT | Performed by: CLINICAL NURSE SPECIALIST

## 2022-01-01 PROCEDURE — 84132 ASSAY OF SERUM POTASSIUM: CPT

## 2022-01-01 PROCEDURE — 250N000011 HC RX IP 250 OP 636

## 2022-01-01 PROCEDURE — 99233 SBSQ HOSP IP/OBS HIGH 50: CPT | Performed by: STUDENT IN AN ORGANIZED HEALTH CARE EDUCATION/TRAINING PROGRAM

## 2022-01-01 PROCEDURE — 74177 CT ABD & PELVIS W/CONTRAST: CPT

## 2022-01-01 PROCEDURE — 93975 VASCULAR STUDY: CPT | Mod: 26 | Performed by: RADIOLOGY

## 2022-01-01 PROCEDURE — 710N000012 HC RECOVERY PHASE 2, PER MINUTE: Performed by: INTERNAL MEDICINE

## 2022-01-01 PROCEDURE — 74177 CT ABD & PELVIS W/CONTRAST: CPT | Mod: 26 | Performed by: RADIOLOGY

## 2022-01-01 PROCEDURE — 74018 RADEX ABDOMEN 1 VIEW: CPT

## 2022-01-01 PROCEDURE — 49083 ABD PARACENTESIS W/IMAGING: CPT | Performed by: STUDENT IN AN ORGANIZED HEALTH CARE EDUCATION/TRAINING PROGRAM

## 2022-01-01 PROCEDURE — 99205 OFFICE O/P NEW HI 60 MIN: CPT | Performed by: PHYSICIAN ASSISTANT

## 2022-01-01 PROCEDURE — 85014 HEMATOCRIT: CPT | Performed by: STUDENT IN AN ORGANIZED HEALTH CARE EDUCATION/TRAINING PROGRAM

## 2022-01-01 PROCEDURE — P9047 ALBUMIN (HUMAN), 25%, 50ML: HCPCS | Performed by: CLINICAL NURSE SPECIALIST

## 2022-01-01 PROCEDURE — 85014 HEMATOCRIT: CPT | Performed by: PHYSICIAN ASSISTANT

## 2022-01-01 PROCEDURE — 250N000012 HC RX MED GY IP 250 OP 636 PS 637

## 2022-01-01 PROCEDURE — 82042 OTHER SOURCE ALBUMIN QUAN EA: CPT | Performed by: INTERNAL MEDICINE

## 2022-01-01 PROCEDURE — 83615 LACTATE (LD) (LDH) ENZYME: CPT | Performed by: STUDENT IN AN ORGANIZED HEALTH CARE EDUCATION/TRAINING PROGRAM

## 2022-01-01 PROCEDURE — 99222 1ST HOSP IP/OBS MODERATE 55: CPT | Mod: GC | Performed by: INTERNAL MEDICINE

## 2022-01-01 PROCEDURE — 999N000054 HC STATISTIC EKG NON-CHARGEABLE

## 2022-01-01 PROCEDURE — 36415 COLL VENOUS BLD VENIPUNCTURE: CPT | Performed by: PHYSICIAN ASSISTANT

## 2022-01-01 PROCEDURE — 87070 CULTURE OTHR SPECIMN AEROBIC: CPT | Performed by: STUDENT IN AN ORGANIZED HEALTH CARE EDUCATION/TRAINING PROGRAM

## 2022-01-01 PROCEDURE — 99207 PR CDG-CUT & PASTE-POTENTIAL IMPACT ON LEVEL: CPT | Performed by: STUDENT IN AN ORGANIZED HEALTH CARE EDUCATION/TRAINING PROGRAM

## 2022-01-01 PROCEDURE — 250N000009 HC RX 250: Performed by: INTERNAL MEDICINE

## 2022-01-01 PROCEDURE — 80053 COMPREHEN METABOLIC PANEL: CPT | Performed by: INTERNAL MEDICINE

## 2022-01-01 PROCEDURE — 71045 X-RAY EXAM CHEST 1 VIEW: CPT

## 2022-01-01 PROCEDURE — 84153 ASSAY OF PSA TOTAL: CPT | Performed by: PATHOLOGY

## 2022-01-01 PROCEDURE — 258N000003 HC RX IP 258 OP 636: Performed by: NURSE ANESTHETIST, CERTIFIED REGISTERED

## 2022-01-01 PROCEDURE — 99221 1ST HOSP IP/OBS SF/LOW 40: CPT | Performed by: NURSE PRACTITIONER

## 2022-01-01 PROCEDURE — 86160 COMPLEMENT ANTIGEN: CPT | Performed by: CLINICAL NURSE SPECIALIST

## 2022-01-01 PROCEDURE — 82945 GLUCOSE OTHER FLUID: CPT | Performed by: STUDENT IN AN ORGANIZED HEALTH CARE EDUCATION/TRAINING PROGRAM

## 2022-01-01 PROCEDURE — 99213 OFFICE O/P EST LOW 20 MIN: CPT | Mod: 95 | Performed by: INTERNAL MEDICINE

## 2022-01-01 PROCEDURE — 82962 GLUCOSE BLOOD TEST: CPT

## 2022-01-01 PROCEDURE — 86146 BETA-2 GLYCOPROTEIN ANTIBODY: CPT | Performed by: PHYSICIAN ASSISTANT

## 2022-01-01 PROCEDURE — P9045 ALBUMIN (HUMAN), 5%, 250 ML: HCPCS | Performed by: STUDENT IN AN ORGANIZED HEALTH CARE EDUCATION/TRAINING PROGRAM

## 2022-01-01 PROCEDURE — 87340 HEPATITIS B SURFACE AG IA: CPT | Performed by: CLINICAL NURSE SPECIALIST

## 2022-01-01 PROCEDURE — 99233 SBSQ HOSP IP/OBS HIGH 50: CPT | Mod: FS | Performed by: CLINICAL NURSE SPECIALIST

## 2022-01-01 PROCEDURE — 250N000013 HC RX MED GY IP 250 OP 250 PS 637: Performed by: STUDENT IN AN ORGANIZED HEALTH CARE EDUCATION/TRAINING PROGRAM

## 2022-01-01 PROCEDURE — 85027 COMPLETE CBC AUTOMATED: CPT | Performed by: STUDENT IN AN ORGANIZED HEALTH CARE EDUCATION/TRAINING PROGRAM

## 2022-01-01 PROCEDURE — G0463 HOSPITAL OUTPT CLINIC VISIT: HCPCS

## 2022-01-01 PROCEDURE — 83735 ASSAY OF MAGNESIUM: CPT

## 2022-01-01 PROCEDURE — 96376 TX/PRO/DX INJ SAME DRUG ADON: CPT | Performed by: EMERGENCY MEDICINE

## 2022-01-01 PROCEDURE — 88112 CYTOPATH CELL ENHANCE TECH: CPT | Mod: TC

## 2022-01-01 PROCEDURE — 80053 COMPREHEN METABOLIC PANEL: CPT | Performed by: STUDENT IN AN ORGANIZED HEALTH CARE EDUCATION/TRAINING PROGRAM

## 2022-01-01 PROCEDURE — 88305 TISSUE EXAM BY PATHOLOGIST: CPT | Mod: 26 | Performed by: PATHOLOGY

## 2022-01-01 PROCEDURE — 49083 ABD PARACENTESIS W/IMAGING: CPT | Performed by: INTERNAL MEDICINE

## 2022-01-01 PROCEDURE — P9045 ALBUMIN (HUMAN), 5%, 250 ML: HCPCS | Performed by: CLINICAL NURSE SPECIALIST

## 2022-01-01 PROCEDURE — 97530 THERAPEUTIC ACTIVITIES: CPT | Mod: GO | Performed by: OCCUPATIONAL THERAPIST

## 2022-01-01 PROCEDURE — 255N000002 HC RX 255 OP 636: Performed by: INTERNAL MEDICINE

## 2022-01-01 PROCEDURE — 84157 ASSAY OF PROTEIN OTHER: CPT | Performed by: STUDENT IN AN ORGANIZED HEALTH CARE EDUCATION/TRAINING PROGRAM

## 2022-01-01 PROCEDURE — 258N000003 HC RX IP 258 OP 636: Performed by: CLINICAL NURSE SPECIALIST

## 2022-01-01 PROCEDURE — 87015 SPECIMEN INFECT AGNT CONCNTJ: CPT | Performed by: INTERNAL MEDICINE

## 2022-01-01 PROCEDURE — 99223 1ST HOSP IP/OBS HIGH 75: CPT | Mod: FS | Performed by: CLINICAL NURSE SPECIALIST

## 2022-01-01 PROCEDURE — 85610 PROTHROMBIN TIME: CPT | Performed by: PHYSICIAN ASSISTANT

## 2022-01-01 PROCEDURE — 99233 SBSQ HOSP IP/OBS HIGH 50: CPT | Mod: GC | Performed by: INTERNAL MEDICINE

## 2022-01-01 PROCEDURE — 82150 ASSAY OF AMYLASE: CPT | Performed by: INTERNAL MEDICINE

## 2022-01-01 PROCEDURE — 710N000009 HC RECOVERY PHASE 1, LEVEL 1, PER MIN: Performed by: INTERNAL MEDICINE

## 2022-01-01 PROCEDURE — 76770 US EXAM ABDO BACK WALL COMP: CPT

## 2022-01-01 PROCEDURE — 250N000009 HC RX 250

## 2022-01-01 PROCEDURE — 83605 ASSAY OF LACTIC ACID: CPT | Performed by: INTERNAL MEDICINE

## 2022-01-01 PROCEDURE — 86803 HEPATITIS C AB TEST: CPT | Performed by: PHYSICIAN ASSISTANT

## 2022-01-01 PROCEDURE — 85027 COMPLETE CBC AUTOMATED: CPT | Performed by: INTERNAL MEDICINE

## 2022-01-01 PROCEDURE — 99204 OFFICE O/P NEW MOD 45 MIN: CPT | Performed by: CLINICAL NURSE SPECIALIST

## 2022-01-01 PROCEDURE — 90937 HEMODIALYSIS REPEATED EVAL: CPT

## 2022-01-01 PROCEDURE — U0003 INFECTIOUS AGENT DETECTION BY NUCLEIC ACID (DNA OR RNA); SEVERE ACUTE RESPIRATORY SYNDROME CORONAVIRUS 2 (SARS-COV-2) (CORONAVIRUS DISEASE [COVID-19]), AMPLIFIED PROBE TECHNIQUE, MAKING USE OF HIGH THROUGHPUT TECHNOLOGIES AS DESCRIBED BY CMS-2020-01-R: HCPCS

## 2022-01-01 PROCEDURE — 82374 ASSAY BLOOD CARBON DIOXIDE: CPT | Performed by: INTERNAL MEDICINE

## 2022-01-01 PROCEDURE — 99233 SBSQ HOSP IP/OBS HIGH 50: CPT | Performed by: INTERNAL MEDICINE

## 2022-01-01 PROCEDURE — 84540 ASSAY OF URINE/UREA-N: CPT | Performed by: INTERNAL MEDICINE

## 2022-01-01 PROCEDURE — 82570 ASSAY OF URINE CREATININE: CPT | Performed by: STUDENT IN AN ORGANIZED HEALTH CARE EDUCATION/TRAINING PROGRAM

## 2022-01-01 PROCEDURE — 250N000009 HC RX 250: Performed by: NURSE ANESTHETIST, CERTIFIED REGISTERED

## 2022-01-01 PROCEDURE — 83690 ASSAY OF LIPASE: CPT | Performed by: INTERNAL MEDICINE

## 2022-01-01 PROCEDURE — 999N000179 XR SURGERY CARM FLUORO LESS THAN 5 MIN W STILLS: Mod: TC

## 2022-01-01 PROCEDURE — 88172 CYTP DX EVAL FNA 1ST EA SITE: CPT | Mod: TC | Performed by: INTERNAL MEDICINE

## 2022-01-01 PROCEDURE — 83690 ASSAY OF LIPASE: CPT | Performed by: FAMILY MEDICINE

## 2022-01-01 PROCEDURE — 250N000025 HC SEVOFLURANE, PER MIN: Performed by: INTERNAL MEDICINE

## 2022-01-01 PROCEDURE — 51702 INSERT TEMP BLADDER CATH: CPT | Performed by: EMERGENCY MEDICINE

## 2022-01-01 PROCEDURE — 99233 SBSQ HOSP IP/OBS HIGH 50: CPT | Performed by: NURSE PRACTITIONER

## 2022-01-01 PROCEDURE — 0W9G3ZZ DRAINAGE OF PERITONEAL CAVITY, PERCUTANEOUS APPROACH: ICD-10-PCS | Performed by: INTERNAL MEDICINE

## 2022-01-01 PROCEDURE — 99207 PR NO BILLABLE SERVICE THIS VISIT: CPT | Performed by: INTERNAL MEDICINE

## 2022-01-01 PROCEDURE — 85025 COMPLETE CBC W/AUTO DIFF WBC: CPT | Performed by: EMERGENCY MEDICINE

## 2022-01-01 PROCEDURE — 82248 BILIRUBIN DIRECT: CPT | Performed by: INTERNAL MEDICINE

## 2022-01-01 PROCEDURE — C1726 CATH, BAL DIL, NON-VASCULAR: HCPCS | Performed by: INTERNAL MEDICINE

## 2022-01-01 PROCEDURE — 85390 FIBRINOLYSINS SCREEN I&R: CPT | Mod: 26 | Performed by: PATHOLOGY

## 2022-01-01 PROCEDURE — 36600 WITHDRAWAL OF ARTERIAL BLOOD: CPT

## 2022-01-01 PROCEDURE — C1874 STENT, COATED/COV W/DEL SYS: HCPCS | Performed by: INTERNAL MEDICINE

## 2022-01-01 PROCEDURE — 99214 OFFICE O/P EST MOD 30 MIN: CPT | Mod: 95 | Performed by: INTERNAL MEDICINE

## 2022-01-01 PROCEDURE — 370N000017 HC ANESTHESIA TECHNICAL FEE, PER MIN: Performed by: INTERNAL MEDICINE

## 2022-01-01 PROCEDURE — 999N000065 XR CHEST PORT 1 VIEW

## 2022-01-01 PROCEDURE — 999N000128 HC STATISTIC PERIPHERAL IV START W/O US GUIDANCE

## 2022-01-01 PROCEDURE — 83605 ASSAY OF LACTIC ACID: CPT | Performed by: NURSE PRACTITIONER

## 2022-01-01 PROCEDURE — 97535 SELF CARE MNGMENT TRAINING: CPT | Mod: GO

## 2022-01-01 PROCEDURE — 99214 OFFICE O/P EST MOD 30 MIN: CPT | Performed by: FAMILY MEDICINE

## 2022-01-01 PROCEDURE — 97535 SELF CARE MNGMENT TRAINING: CPT | Mod: GO | Performed by: OCCUPATIONAL THERAPIST

## 2022-01-01 PROCEDURE — 250N000009 HC RX 250: Performed by: EMERGENCY MEDICINE

## 2022-01-01 PROCEDURE — 250N000011 HC RX IP 250 OP 636: Performed by: NURSE PRACTITIONER

## 2022-01-01 PROCEDURE — 85613 RUSSELL VIPER VENOM DILUTED: CPT | Performed by: PHYSICIAN ASSISTANT

## 2022-01-01 PROCEDURE — 360N000083 HC SURGERY LEVEL 3 W/ FLUORO, PER MIN: Performed by: INTERNAL MEDICINE

## 2022-01-01 PROCEDURE — 93005 ELECTROCARDIOGRAM TRACING: CPT | Mod: 76 | Performed by: EMERGENCY MEDICINE

## 2022-01-01 PROCEDURE — 250N000011 HC RX IP 250 OP 636: Performed by: CLINICAL NURSE SPECIALIST

## 2022-01-01 PROCEDURE — 272N000054 HC CANNULA HIGH FLOW, ADULT

## 2022-01-01 PROCEDURE — 87070 CULTURE OTHR SPECIMN AEROBIC: CPT | Performed by: INTERNAL MEDICINE

## 2022-01-01 PROCEDURE — 97116 GAIT TRAINING THERAPY: CPT | Mod: GP

## 2022-01-01 PROCEDURE — U0005 INFEC AGEN DETEC AMPLI PROBE: HCPCS | Performed by: EMERGENCY MEDICINE

## 2022-01-01 PROCEDURE — 85004 AUTOMATED DIFF WBC COUNT: CPT | Performed by: INTERNAL MEDICINE

## 2022-01-01 PROCEDURE — 88112 CYTOPATH CELL ENHANCE TECH: CPT | Mod: 26 | Performed by: PATHOLOGY

## 2022-01-01 PROCEDURE — 85025 COMPLETE CBC W/AUTO DIFF WBC: CPT | Performed by: INTERNAL MEDICINE

## 2022-01-01 PROCEDURE — P9047 ALBUMIN (HUMAN), 25%, 50ML: HCPCS | Performed by: INTERNAL MEDICINE

## 2022-01-01 PROCEDURE — 85610 PROTHROMBIN TIME: CPT | Performed by: STUDENT IN AN ORGANIZED HEALTH CARE EDUCATION/TRAINING PROGRAM

## 2022-01-01 PROCEDURE — 82947 ASSAY GLUCOSE BLOOD QUANT: CPT | Performed by: STUDENT IN AN ORGANIZED HEALTH CARE EDUCATION/TRAINING PROGRAM

## 2022-01-01 PROCEDURE — P9047 ALBUMIN (HUMAN), 25%, 50ML: HCPCS

## 2022-01-01 PROCEDURE — 81403 MOPATH PROCEDURE LEVEL 4: CPT | Performed by: PHYSICIAN ASSISTANT

## 2022-01-01 PROCEDURE — 81001 URINALYSIS AUTO W/SCOPE: CPT | Performed by: STUDENT IN AN ORGANIZED HEALTH CARE EDUCATION/TRAINING PROGRAM

## 2022-01-01 PROCEDURE — 82140 ASSAY OF AMMONIA: CPT | Performed by: INTERNAL MEDICINE

## 2022-01-01 PROCEDURE — 120N000003 HC R&B IMCU UMMC

## 2022-01-01 PROCEDURE — 36415 COLL VENOUS BLD VENIPUNCTURE: CPT

## 2022-01-01 PROCEDURE — 85004 AUTOMATED DIFF WBC COUNT: CPT | Performed by: STUDENT IN AN ORGANIZED HEALTH CARE EDUCATION/TRAINING PROGRAM

## 2022-01-01 PROCEDURE — 272N000001 HC OR GENERAL SUPPLY STERILE: Performed by: INTERNAL MEDICINE

## 2022-01-01 PROCEDURE — 84132 ASSAY OF SERUM POTASSIUM: CPT | Performed by: PHYSICIAN ASSISTANT

## 2022-01-01 PROCEDURE — 80053 COMPREHEN METABOLIC PANEL: CPT | Performed by: PHYSICIAN ASSISTANT

## 2022-01-01 PROCEDURE — 88341 IMHCHEM/IMCYTCHM EA ADD ANTB: CPT | Mod: 26 | Performed by: PATHOLOGY

## 2022-01-01 PROCEDURE — 99215 OFFICE O/P EST HI 40 MIN: CPT | Mod: GT | Performed by: INTERNAL MEDICINE

## 2022-01-01 PROCEDURE — 97130 THER IVNTJ EA ADDL 15 MIN: CPT | Mod: GO

## 2022-01-01 PROCEDURE — 87077 CULTURE AEROBIC IDENTIFY: CPT | Mod: 59 | Performed by: INTERNAL MEDICINE

## 2022-01-01 PROCEDURE — 84153 ASSAY OF PSA TOTAL: CPT | Performed by: PHYSICIAN ASSISTANT

## 2022-01-01 PROCEDURE — 85027 COMPLETE CBC AUTOMATED: CPT | Performed by: PHYSICIAN ASSISTANT

## 2022-01-01 PROCEDURE — 89051 BODY FLUID CELL COUNT: CPT | Performed by: STUDENT IN AN ORGANIZED HEALTH CARE EDUCATION/TRAINING PROGRAM

## 2022-01-01 PROCEDURE — 83605 ASSAY OF LACTIC ACID: CPT | Performed by: STUDENT IN AN ORGANIZED HEALTH CARE EDUCATION/TRAINING PROGRAM

## 2022-01-01 PROCEDURE — 85025 COMPLETE CBC W/AUTO DIFF WBC: CPT

## 2022-01-01 PROCEDURE — 200N000002 HC R&B ICU UMMC

## 2022-01-01 PROCEDURE — 250N000013 HC RX MED GY IP 250 OP 250 PS 637: Performed by: CLINICAL NURSE SPECIALIST

## 2022-01-01 PROCEDURE — 99291 CRITICAL CARE FIRST HOUR: CPT | Mod: GC | Performed by: INTERNAL MEDICINE

## 2022-01-01 PROCEDURE — 49083 ABD PARACENTESIS W/IMAGING: CPT | Mod: GC | Performed by: RADIOLOGY

## 2022-01-01 PROCEDURE — 36415 COLL VENOUS BLD VENIPUNCTURE: CPT | Performed by: FAMILY MEDICINE

## 2022-01-01 PROCEDURE — 85610 PROTHROMBIN TIME: CPT | Performed by: EMERGENCY MEDICINE

## 2022-01-01 PROCEDURE — 99238 HOSP IP/OBS DSCHRG MGMT 30/<: CPT | Mod: GC | Performed by: INTERNAL MEDICINE

## 2022-01-01 PROCEDURE — 71045 X-RAY EXAM CHEST 1 VIEW: CPT | Mod: 26 | Performed by: RADIOLOGY

## 2022-01-01 PROCEDURE — 93010 ELECTROCARDIOGRAM REPORT: CPT | Performed by: STUDENT IN AN ORGANIZED HEALTH CARE EDUCATION/TRAINING PROGRAM

## 2022-01-01 PROCEDURE — 84132 ASSAY OF SERUM POTASSIUM: CPT | Performed by: EMERGENCY MEDICINE

## 2022-01-01 PROCEDURE — 87040 BLOOD CULTURE FOR BACTERIA: CPT | Performed by: INTERNAL MEDICINE

## 2022-01-01 PROCEDURE — 96375 TX/PRO/DX INJ NEW DRUG ADDON: CPT | Performed by: EMERGENCY MEDICINE

## 2022-01-01 PROCEDURE — 84157 ASSAY OF PROTEIN OTHER: CPT

## 2022-01-01 PROCEDURE — 258N000003 HC RX IP 258 OP 636: Performed by: INTERNAL MEDICINE

## 2022-01-01 PROCEDURE — 49083 ABD PARACENTESIS W/IMAGING: CPT | Performed by: EMERGENCY MEDICINE

## 2022-01-01 PROCEDURE — 83735 ASSAY OF MAGNESIUM: CPT | Performed by: INTERNAL MEDICINE

## 2022-01-01 PROCEDURE — 96365 THER/PROPH/DIAG IV INF INIT: CPT | Performed by: EMERGENCY MEDICINE

## 2022-01-01 PROCEDURE — 999N000141 HC STATISTIC PRE-PROCEDURE NURSING ASSESSMENT: Performed by: INTERNAL MEDICINE

## 2022-01-01 PROCEDURE — 250N000011 HC RX IP 250 OP 636: Performed by: NURSE ANESTHETIST, CERTIFIED REGISTERED

## 2022-01-01 PROCEDURE — 97166 OT EVAL MOD COMPLEX 45 MIN: CPT | Mod: GO | Performed by: OCCUPATIONAL THERAPIST

## 2022-01-01 PROCEDURE — 84145 PROCALCITONIN (PCT): CPT | Performed by: NURSE PRACTITIONER

## 2022-01-01 PROCEDURE — U0005 INFEC AGEN DETEC AMPLI PROBE: HCPCS

## 2022-01-01 PROCEDURE — 258N000003 HC RX IP 258 OP 636

## 2022-01-01 PROCEDURE — 99232 SBSQ HOSP IP/OBS MODERATE 35: CPT | Mod: 25 | Performed by: INTERNAL MEDICINE

## 2022-01-01 PROCEDURE — 99215 OFFICE O/P EST HI 40 MIN: CPT | Performed by: INTERNAL MEDICINE

## 2022-01-01 PROCEDURE — 36415 COLL VENOUS BLD VENIPUNCTURE: CPT | Performed by: EMERGENCY MEDICINE

## 2022-01-01 PROCEDURE — 250N000011 HC RX IP 250 OP 636: Performed by: STUDENT IN AN ORGANIZED HEALTH CARE EDUCATION/TRAINING PROGRAM

## 2022-01-01 PROCEDURE — 82042 OTHER SOURCE ALBUMIN QUAN EA: CPT

## 2022-01-01 PROCEDURE — 49083 ABD PARACENTESIS W/IMAGING: CPT | Mod: GC | Performed by: INTERNAL MEDICINE

## 2022-01-01 PROCEDURE — 93010 ELECTROCARDIOGRAM REPORT: CPT | Performed by: EMERGENCY MEDICINE

## 2022-01-01 PROCEDURE — 99214 OFFICE O/P EST MOD 30 MIN: CPT | Mod: GT | Performed by: PHYSICIAN ASSISTANT

## 2022-01-01 PROCEDURE — 86146 BETA-2 GLYCOPROTEIN ANTIBODY: CPT | Performed by: CLINICAL NURSE SPECIALIST

## 2022-01-01 PROCEDURE — 80053 COMPREHEN METABOLIC PANEL: CPT | Performed by: FAMILY MEDICINE

## 2022-01-01 PROCEDURE — 83690 ASSAY OF LIPASE: CPT | Performed by: STUDENT IN AN ORGANIZED HEALTH CARE EDUCATION/TRAINING PROGRAM

## 2022-01-01 PROCEDURE — 0W9G3ZZ DRAINAGE OF PERITONEAL CAVITY, PERCUTANEOUS APPROACH: ICD-10-PCS | Performed by: STUDENT IN AN ORGANIZED HEALTH CARE EDUCATION/TRAINING PROGRAM

## 2022-01-01 PROCEDURE — 86706 HEP B SURFACE ANTIBODY: CPT | Performed by: CLINICAL NURSE SPECIALIST

## 2022-01-01 PROCEDURE — 97802 MEDICAL NUTRITION INDIV IN: CPT | Mod: GT | Performed by: DIETITIAN, REGISTERED

## 2022-01-01 PROCEDURE — 76705 ECHO EXAM OF ABDOMEN: CPT | Mod: 26 | Performed by: RADIOLOGY

## 2022-01-01 PROCEDURE — 87106 FUNGI IDENTIFICATION YEAST: CPT | Performed by: INTERNAL MEDICINE

## 2022-01-01 PROCEDURE — 71045 X-RAY EXAM CHEST 1 VIEW: CPT | Mod: 26 | Performed by: STUDENT IN AN ORGANIZED HEALTH CARE EDUCATION/TRAINING PROGRAM

## 2022-01-01 PROCEDURE — G0452 MOLECULAR PATHOLOGY INTERPR: HCPCS | Mod: 26 | Performed by: STUDENT IN AN ORGANIZED HEALTH CARE EDUCATION/TRAINING PROGRAM

## 2022-01-01 PROCEDURE — 99285 EMERGENCY DEPT VISIT HI MDM: CPT | Performed by: EMERGENCY MEDICINE

## 2022-01-01 PROCEDURE — 89051 BODY FLUID CELL COUNT: CPT

## 2022-01-01 PROCEDURE — 96365 THER/PROPH/DIAG IV INF INIT: CPT | Performed by: STUDENT IN AN ORGANIZED HEALTH CARE EDUCATION/TRAINING PROGRAM

## 2022-01-01 PROCEDURE — 99285 EMERGENCY DEPT VISIT HI MDM: CPT | Mod: 25 | Performed by: EMERGENCY MEDICINE

## 2022-01-01 PROCEDURE — C9803 HOPD COVID-19 SPEC COLLECT: HCPCS | Performed by: EMERGENCY MEDICINE

## 2022-01-01 PROCEDURE — 85730 THROMBOPLASTIN TIME PARTIAL: CPT | Performed by: CLINICAL NURSE SPECIALIST

## 2022-01-01 PROCEDURE — 36415 COLL VENOUS BLD VENIPUNCTURE: CPT | Performed by: UROLOGY

## 2022-01-01 PROCEDURE — 250N000012 HC RX MED GY IP 250 OP 636 PS 637: Performed by: STUDENT IN AN ORGANIZED HEALTH CARE EDUCATION/TRAINING PROGRAM

## 2022-01-01 PROCEDURE — 76770 US EXAM ABDO BACK WALL COMP: CPT | Mod: 26 | Performed by: RADIOLOGY

## 2022-01-01 PROCEDURE — 88184 FLOWCYTOMETRY/ TC 1 MARKER: CPT | Performed by: PATHOLOGY

## 2022-01-01 PROCEDURE — 82803 BLOOD GASES ANY COMBINATION: CPT | Performed by: INTERNAL MEDICINE

## 2022-01-01 PROCEDURE — 86301 IMMUNOASSAY TUMOR CA 19-9: CPT | Performed by: INTERNAL MEDICINE

## 2022-01-01 PROCEDURE — 99215 OFFICE O/P EST HI 40 MIN: CPT | Performed by: SURGERY

## 2022-01-01 PROCEDURE — 97129 THER IVNTJ 1ST 15 MIN: CPT | Mod: GO

## 2022-01-01 PROCEDURE — 84155 ASSAY OF PROTEIN SERUM: CPT | Performed by: INTERNAL MEDICINE

## 2022-01-01 PROCEDURE — 74176 CT ABD & PELVIS W/O CONTRAST: CPT | Mod: 26 | Performed by: RADIOLOGY

## 2022-01-01 PROCEDURE — 80053 COMPREHEN METABOLIC PANEL: CPT

## 2022-01-01 PROCEDURE — 74176 CT ABD & PELVIS W/O CONTRAST: CPT

## 2022-01-01 PROCEDURE — C1877 STENT, NON-COAT/COV W/O DEL: HCPCS | Performed by: INTERNAL MEDICINE

## 2022-01-01 PROCEDURE — 83521 IG LIGHT CHAINS FREE EACH: CPT | Performed by: CLINICAL NURSE SPECIALIST

## 2022-01-01 PROCEDURE — 99221 1ST HOSP IP/OBS SF/LOW 40: CPT | Mod: GC | Performed by: UROLOGY

## 2022-01-01 PROCEDURE — 99223 1ST HOSP IP/OBS HIGH 75: CPT | Mod: AI | Performed by: INTERNAL MEDICINE

## 2022-01-01 PROCEDURE — P9047 ALBUMIN (HUMAN), 25%, 50ML: HCPCS | Performed by: STUDENT IN AN ORGANIZED HEALTH CARE EDUCATION/TRAINING PROGRAM

## 2022-01-01 PROCEDURE — 999N000181 XR SURGERY CARM FLUORO GREATER THAN 5 MIN W STILLS: Mod: TC

## 2022-01-01 PROCEDURE — 70450 CT HEAD/BRAIN W/O DYE: CPT | Mod: 26 | Performed by: RADIOLOGY

## 2022-01-01 PROCEDURE — 76705 ECHO EXAM OF ABDOMEN: CPT

## 2022-01-01 PROCEDURE — 93970 EXTREMITY STUDY: CPT

## 2022-01-01 PROCEDURE — 710N000010 HC RECOVERY PHASE 1, LEVEL 2, PER MIN: Performed by: INTERNAL MEDICINE

## 2022-01-01 PROCEDURE — 84156 ASSAY OF PROTEIN URINE: CPT | Performed by: INTERNAL MEDICINE

## 2022-01-01 PROCEDURE — 87086 URINE CULTURE/COLONY COUNT: CPT | Performed by: STUDENT IN AN ORGANIZED HEALTH CARE EDUCATION/TRAINING PROGRAM

## 2022-01-01 PROCEDURE — 84300 ASSAY OF URINE SODIUM: CPT | Performed by: CLINICAL NURSE SPECIALIST

## 2022-01-01 PROCEDURE — 82803 BLOOD GASES ANY COMBINATION: CPT

## 2022-01-01 PROCEDURE — 93010 ELECTROCARDIOGRAM REPORT: CPT | Mod: 59 | Performed by: INTERNAL MEDICINE

## 2022-01-01 PROCEDURE — 258N000003 HC RX IP 258 OP 636: Performed by: STUDENT IN AN ORGANIZED HEALTH CARE EDUCATION/TRAINING PROGRAM

## 2022-01-01 PROCEDURE — 93306 TTE W/DOPPLER COMPLETE: CPT | Mod: 26 | Performed by: STUDENT IN AN ORGANIZED HEALTH CARE EDUCATION/TRAINING PROGRAM

## 2022-01-01 PROCEDURE — C9803 HOPD COVID-19 SPEC COLLECT: HCPCS | Performed by: STUDENT IN AN ORGANIZED HEALTH CARE EDUCATION/TRAINING PROGRAM

## 2022-01-01 PROCEDURE — 96366 THER/PROPH/DIAG IV INF ADDON: CPT | Performed by: EMERGENCY MEDICINE

## 2022-01-01 PROCEDURE — C1769 GUIDE WIRE: HCPCS | Performed by: INTERNAL MEDICINE

## 2022-01-01 PROCEDURE — 97161 PT EVAL LOW COMPLEX 20 MIN: CPT | Mod: GP

## 2022-01-01 PROCEDURE — U0003 INFECTIOUS AGENT DETECTION BY NUCLEIC ACID (DNA OR RNA); SEVERE ACUTE RESPIRATORY SYNDROME CORONAVIRUS 2 (SARS-COV-2) (CORONAVIRUS DISEASE [COVID-19]), AMPLIFIED PROBE TECHNIQUE, MAKING USE OF HIGH THROUGHPUT TECHNOLOGIES AS DESCRIBED BY CMS-2020-01-R: HCPCS | Performed by: STUDENT IN AN ORGANIZED HEALTH CARE EDUCATION/TRAINING PROGRAM

## 2022-01-01 PROCEDURE — 86147 CARDIOLIPIN ANTIBODY EA IG: CPT | Performed by: CLINICAL NURSE SPECIALIST

## 2022-01-01 PROCEDURE — 258N000001 HC RX 258: Performed by: EMERGENCY MEDICINE

## 2022-01-01 PROCEDURE — 88112 CYTOPATH CELL ENHANCE TECH: CPT | Mod: TC | Performed by: INTERNAL MEDICINE

## 2022-01-01 PROCEDURE — 84157 ASSAY OF PROTEIN OTHER: CPT | Performed by: INTERNAL MEDICINE

## 2022-01-01 PROCEDURE — 82947 ASSAY GLUCOSE BLOOD QUANT: CPT | Performed by: INTERNAL MEDICINE

## 2022-01-01 PROCEDURE — 99233 SBSQ HOSP IP/OBS HIGH 50: CPT | Mod: 25 | Performed by: STUDENT IN AN ORGANIZED HEALTH CARE EDUCATION/TRAINING PROGRAM

## 2022-01-01 PROCEDURE — 99233 SBSQ HOSP IP/OBS HIGH 50: CPT | Mod: FS | Performed by: INTERNAL MEDICINE

## 2022-01-01 PROCEDURE — 74177 CT ABD & PELVIS W/CONTRAST: CPT | Performed by: PATHOLOGY

## 2022-01-01 PROCEDURE — 84145 PROCALCITONIN (PCT): CPT | Performed by: INTERNAL MEDICINE

## 2022-01-01 PROCEDURE — 88342 IMHCHEM/IMCYTCHM 1ST ANTB: CPT | Mod: 26 | Performed by: PATHOLOGY

## 2022-01-01 PROCEDURE — 96374 THER/PROPH/DIAG INJ IV PUSH: CPT | Performed by: STUDENT IN AN ORGANIZED HEALTH CARE EDUCATION/TRAINING PROGRAM

## 2022-01-01 PROCEDURE — 250N000013 HC RX MED GY IP 250 OP 250 PS 637: Performed by: EMERGENCY MEDICINE

## 2022-01-01 PROCEDURE — 82805 BLOOD GASES W/O2 SATURATION: CPT | Performed by: INTERNAL MEDICINE

## 2022-01-01 PROCEDURE — 84155 ASSAY OF PROTEIN SERUM: CPT | Performed by: EMERGENCY MEDICINE

## 2022-01-01 PROCEDURE — 999N000127 HC STATISTIC PERIPHERAL IV START W US GUIDANCE

## 2022-01-01 PROCEDURE — 87070 CULTURE OTHR SPECIMN AEROBIC: CPT

## 2022-01-01 PROCEDURE — 99285 EMERGENCY DEPT VISIT HI MDM: CPT | Mod: 25 | Performed by: STUDENT IN AN ORGANIZED HEALTH CARE EDUCATION/TRAINING PROGRAM

## 2022-01-01 PROCEDURE — 999N000215 HC STATISTIC HFNC ADULT NON-CPAP

## 2022-01-01 PROCEDURE — 84145 PROCALCITONIN (PCT): CPT

## 2022-01-01 PROCEDURE — 87641 MR-STAPH DNA AMP PROBE: CPT

## 2022-01-01 PROCEDURE — 85007 BL SMEAR W/DIFF WBC COUNT: CPT | Performed by: STUDENT IN AN ORGANIZED HEALTH CARE EDUCATION/TRAINING PROGRAM

## 2022-01-01 PROCEDURE — 97530 THERAPEUTIC ACTIVITIES: CPT | Mod: GO

## 2022-01-01 PROCEDURE — 99239 HOSP IP/OBS DSCHRG MGMT >30: CPT | Performed by: INTERNAL MEDICINE

## 2022-01-01 PROCEDURE — 82803 BLOOD GASES ANY COMBINATION: CPT | Performed by: STUDENT IN AN ORGANIZED HEALTH CARE EDUCATION/TRAINING PROGRAM

## 2022-01-01 PROCEDURE — 70450 CT HEAD/BRAIN W/O DYE: CPT

## 2022-01-01 PROCEDURE — 99232 SBSQ HOSP IP/OBS MODERATE 35: CPT | Performed by: FAMILY MEDICINE

## 2022-01-01 PROCEDURE — 86036 ANCA SCREEN EACH ANTIBODY: CPT | Performed by: CLINICAL NURSE SPECIALIST

## 2022-01-01 PROCEDURE — 88187 FLOWCYTOMETRY/READ 2-8: CPT | Performed by: PATHOLOGY

## 2022-01-01 PROCEDURE — 85027 COMPLETE CBC AUTOMATED: CPT

## 2022-01-01 PROCEDURE — 87040 BLOOD CULTURE FOR BACTERIA: CPT | Performed by: NURSE PRACTITIONER

## 2022-01-01 PROCEDURE — 93005 ELECTROCARDIOGRAM TRACING: CPT | Performed by: STUDENT IN AN ORGANIZED HEALTH CARE EDUCATION/TRAINING PROGRAM

## 2022-01-01 PROCEDURE — 74177 CT ABD & PELVIS W/CONTRAST: CPT | Performed by: RADIOLOGY

## 2022-01-01 PROCEDURE — 83735 ASSAY OF MAGNESIUM: CPT | Performed by: EMERGENCY MEDICINE

## 2022-01-01 PROCEDURE — 82565 ASSAY OF CREATININE: CPT

## 2022-01-01 PROCEDURE — 88173 CYTOPATH EVAL FNA REPORT: CPT | Mod: 26 | Performed by: PATHOLOGY

## 2022-01-01 PROCEDURE — 82040 ASSAY OF SERUM ALBUMIN: CPT | Performed by: PHYSICIAN ASSISTANT

## 2022-01-01 PROCEDURE — 99207 PR APP CREDIT; MD BILLING SHARED VISIT: CPT

## 2022-01-01 PROCEDURE — 82805 BLOOD GASES W/O2 SATURATION: CPT

## 2022-01-01 PROCEDURE — 0DH67UZ INSERTION OF FEEDING DEVICE INTO STOMACH, VIA NATURAL OR ARTIFICIAL OPENING: ICD-10-PCS | Performed by: INTERNAL MEDICINE

## 2022-01-01 PROCEDURE — 99232 SBSQ HOSP IP/OBS MODERATE 35: CPT | Performed by: INTERNAL MEDICINE

## 2022-01-01 PROCEDURE — 84132 ASSAY OF SERUM POTASSIUM: CPT | Performed by: INTERNAL MEDICINE

## 2022-01-01 PROCEDURE — 85025 COMPLETE CBC W/AUTO DIFF WBC: CPT | Performed by: FAMILY MEDICINE

## 2022-01-01 PROCEDURE — U0003 INFECTIOUS AGENT DETECTION BY NUCLEIC ACID (DNA OR RNA); SEVERE ACUTE RESPIRATORY SYNDROME CORONAVIRUS 2 (SARS-COV-2) (CORONAVIRUS DISEASE [COVID-19]), AMPLIFIED PROBE TECHNIQUE, MAKING USE OF HIGH THROUGHPUT TECHNOLOGIES AS DESCRIBED BY CMS-2020-01-R: HCPCS | Performed by: INTERNAL MEDICINE

## 2022-01-01 PROCEDURE — 84153 ASSAY OF PSA TOTAL: CPT | Performed by: UROLOGY

## 2022-01-01 PROCEDURE — 51798 US URINE CAPACITY MEASURE: CPT | Performed by: EMERGENCY MEDICINE

## 2022-01-01 PROCEDURE — 86147 CARDIOLIPIN ANTIBODY EA IG: CPT | Performed by: PHYSICIAN ASSISTANT

## 2022-01-01 PROCEDURE — 250N000011 HC RX IP 250 OP 636: Performed by: EMERGENCY MEDICINE

## 2022-01-01 PROCEDURE — 93306 TTE W/DOPPLER COMPLETE: CPT

## 2022-01-01 PROCEDURE — 81001 URINALYSIS AUTO W/SCOPE: CPT | Performed by: NURSE PRACTITIONER

## 2022-01-01 PROCEDURE — 93005 ELECTROCARDIOGRAM TRACING: CPT | Performed by: EMERGENCY MEDICINE

## 2022-01-01 PROCEDURE — 89051 BODY FLUID CELL COUNT: CPT | Performed by: INTERNAL MEDICINE

## 2022-01-01 PROCEDURE — 87205 SMEAR GRAM STAIN: CPT | Performed by: STUDENT IN AN ORGANIZED HEALTH CARE EDUCATION/TRAINING PROGRAM

## 2022-01-01 PROCEDURE — 36415 COLL VENOUS BLD VENIPUNCTURE: CPT | Performed by: CLINICAL NURSE SPECIALIST

## 2022-01-01 PROCEDURE — 87075 CULTR BACTERIA EXCEPT BLOOD: CPT | Performed by: INTERNAL MEDICINE

## 2022-01-01 PROCEDURE — 99207 PR SC NO CHARGE VISIT: CPT | Performed by: INTERNAL MEDICINE

## 2022-01-01 PROCEDURE — 5A09557 ASSISTANCE WITH RESPIRATORY VENTILATION, GREATER THAN 96 CONSECUTIVE HOURS, CONTINUOUS POSITIVE AIRWAY PRESSURE: ICD-10-PCS | Performed by: NURSE PRACTITIONER

## 2022-01-01 PROCEDURE — 999N000111 HC STATISTIC OT IP EVAL DEFER: Performed by: OCCUPATIONAL THERAPIST

## 2022-01-01 PROCEDURE — 71260 CT THORAX DX C+: CPT | Performed by: RADIOLOGY

## 2022-01-01 PROCEDURE — 250N000013 HC RX MED GY IP 250 OP 250 PS 637: Performed by: NURSE PRACTITIONER

## 2022-01-01 PROCEDURE — 82042 OTHER SOURCE ALBUMIN QUAN EA: CPT | Performed by: STUDENT IN AN ORGANIZED HEALTH CARE EDUCATION/TRAINING PROGRAM

## 2022-01-01 PROCEDURE — 93975 VASCULAR STUDY: CPT

## 2022-01-01 PROCEDURE — 999N000065 XR ABDOMEN PORT 1 VIEW

## 2022-01-01 PROCEDURE — 999N000185 HC STATISTIC TRANSPORT TIME EA 15 MIN

## 2022-01-01 PROCEDURE — 88172 CYTP DX EVAL FNA 1ST EA SITE: CPT | Mod: 26 | Performed by: PATHOLOGY

## 2022-01-01 PROCEDURE — 999N000043 HC STATISTIC CTO2 CONT OXYGEN TECH TIME EA 90 MIN

## 2022-01-01 PROCEDURE — 44500 INTRO GASTROINTESTINAL TUBE: CPT

## 2022-01-01 PROCEDURE — 36415 COLL VENOUS BLD VENIPUNCTURE: CPT | Performed by: PATHOLOGY

## 2022-01-01 PROCEDURE — 258N000001 HC RX 258

## 2022-01-01 PROCEDURE — 49083 ABD PARACENTESIS W/IMAGING: CPT

## 2022-01-01 PROCEDURE — 36556 INSERT NON-TUNNEL CV CATH: CPT | Mod: GC | Performed by: INTERNAL MEDICINE

## 2022-01-01 PROCEDURE — 76770 US EXAM ABDO BACK WALL COMP: CPT | Mod: 26 | Performed by: STUDENT IN AN ORGANIZED HEALTH CARE EDUCATION/TRAINING PROGRAM

## 2022-01-01 PROCEDURE — 96361 HYDRATE IV INFUSION ADD-ON: CPT | Performed by: EMERGENCY MEDICINE

## 2022-01-01 PROCEDURE — 99215 OFFICE O/P EST HI 40 MIN: CPT | Mod: 25 | Performed by: PHYSICIAN ASSISTANT

## 2022-01-01 PROCEDURE — 84100 ASSAY OF PHOSPHORUS: CPT | Performed by: EMERGENCY MEDICINE

## 2022-01-01 PROCEDURE — 83516 IMMUNOASSAY NONANTIBODY: CPT | Performed by: INTERNAL MEDICINE

## 2022-01-01 PROCEDURE — 93970 EXTREMITY STUDY: CPT | Mod: 26 | Performed by: STUDENT IN AN ORGANIZED HEALTH CARE EDUCATION/TRAINING PROGRAM

## 2022-01-01 PROCEDURE — 99203 OFFICE O/P NEW LOW 30 MIN: CPT | Mod: 25 | Performed by: UROLOGY

## 2022-01-01 PROCEDURE — 88184 FLOWCYTOMETRY/ TC 1 MARKER: CPT | Performed by: PHYSICIAN ASSISTANT

## 2022-01-01 PROCEDURE — 82140 ASSAY OF AMMONIA: CPT | Performed by: STUDENT IN AN ORGANIZED HEALTH CARE EDUCATION/TRAINING PROGRAM

## 2022-01-01 DEVICE — STENT FREEMAN PANCREA FLEX 5FRX9CM SGL PIGTAIL: Type: IMPLANTABLE DEVICE | Site: BILE DUCT | Status: FUNCTIONAL

## 2022-01-01 DEVICE — IMPLANTABLE DEVICE
Type: IMPLANTABLE DEVICE | Site: BILE DUCT | Status: NON-FUNCTIONAL
Removed: 2022-05-10

## 2022-01-01 DEVICE — IMPLANTABLE DEVICE: Type: IMPLANTABLE DEVICE | Site: BILE DUCT | Status: FUNCTIONAL

## 2022-01-01 DEVICE — STENT ENDOPROS BILIARY GORE VIABIL W/O HL 10X60MM VN1006200
Type: IMPLANTABLE DEVICE | Site: BILE DUCT | Status: NON-FUNCTIONAL
Removed: 2022-02-24

## 2022-01-01 RX ORDER — MIDODRINE HYDROCHLORIDE 5 MG/1
10 TABLET ORAL DAILY PRN
Status: DISCONTINUED | OUTPATIENT
Start: 2022-01-01 | End: 2022-01-01 | Stop reason: HOSPADM

## 2022-01-01 RX ORDER — IOPAMIDOL 755 MG/ML
118 INJECTION, SOLUTION INTRAVASCULAR ONCE
Status: COMPLETED | OUTPATIENT
Start: 2022-01-01 | End: 2022-01-01

## 2022-01-01 RX ORDER — ONDANSETRON 4 MG/1
4 TABLET, ORALLY DISINTEGRATING ORAL EVERY 30 MIN PRN
Status: DISCONTINUED | OUTPATIENT
Start: 2022-01-01 | End: 2022-01-01 | Stop reason: HOSPADM

## 2022-01-01 RX ORDER — PROCHLORPERAZINE MALEATE 5 MG
5 TABLET ORAL EVERY 6 HOURS PRN
Status: CANCELLED | OUTPATIENT
Start: 2022-01-01

## 2022-01-01 RX ORDER — LIDOCAINE 40 MG/G
CREAM TOPICAL
Status: DISCONTINUED | OUTPATIENT
Start: 2022-01-01 | End: 2022-01-01 | Stop reason: HOSPADM

## 2022-01-01 RX ORDER — HEPARIN SODIUM (PORCINE) LOCK FLUSH IV SOLN 100 UNIT/ML 100 UNIT/ML
5 SOLUTION INTRAVENOUS
Status: CANCELLED | OUTPATIENT
Start: 2022-01-01

## 2022-01-01 RX ORDER — CALCIUM GLUCONATE 20 MG/ML
1 INJECTION, SOLUTION INTRAVENOUS ONCE
Status: DISCONTINUED | OUTPATIENT
Start: 2022-01-01 | End: 2022-01-01 | Stop reason: CLARIF

## 2022-01-01 RX ORDER — LIDOCAINE HYDROCHLORIDE 10 MG/ML
20 INJECTION, SOLUTION EPIDURAL; INFILTRATION; INTRACAUDAL; PERINEURAL ONCE
Status: CANCELLED | OUTPATIENT
Start: 2022-01-01 | End: 2022-01-01

## 2022-01-01 RX ORDER — NALOXONE HYDROCHLORIDE 0.4 MG/ML
0.4 INJECTION, SOLUTION INTRAMUSCULAR; INTRAVENOUS; SUBCUTANEOUS
Status: DISCONTINUED | OUTPATIENT
Start: 2022-01-01 | End: 2022-01-01 | Stop reason: HOSPADM

## 2022-01-01 RX ORDER — LIDOCAINE HYDROCHLORIDE 10 MG/ML
INJECTION, SOLUTION EPIDURAL; INFILTRATION; INTRACAUDAL; PERINEURAL
Status: COMPLETED
Start: 2022-01-01 | End: 2022-01-01

## 2022-01-01 RX ORDER — FUROSEMIDE 20 MG
40 TABLET ORAL
Status: DISCONTINUED | OUTPATIENT
Start: 2022-01-01 | End: 2022-01-01 | Stop reason: HOSPADM

## 2022-01-01 RX ORDER — APIXABAN 5 MG (74)
KIT ORAL
Qty: 1 EACH | Refills: 0 | Status: SHIPPED | OUTPATIENT
Start: 2022-01-01 | End: 2022-01-01

## 2022-01-01 RX ORDER — HYDROMORPHONE HYDROCHLORIDE 1 MG/ML
0.3 INJECTION, SOLUTION INTRAMUSCULAR; INTRAVENOUS; SUBCUTANEOUS EVERY 30 MIN PRN
Status: DISCONTINUED | OUTPATIENT
Start: 2022-01-01 | End: 2022-01-01 | Stop reason: HOSPADM

## 2022-01-01 RX ORDER — MEPERIDINE HYDROCHLORIDE 25 MG/ML
12.5 INJECTION INTRAMUSCULAR; INTRAVENOUS; SUBCUTANEOUS
Status: DISCONTINUED | OUTPATIENT
Start: 2022-01-01 | End: 2022-01-01 | Stop reason: HOSPADM

## 2022-01-01 RX ORDER — FUROSEMIDE 10 MG/ML
40 INJECTION INTRAMUSCULAR; INTRAVENOUS ONCE
Status: COMPLETED | OUTPATIENT
Start: 2022-01-01 | End: 2022-01-01

## 2022-01-01 RX ORDER — FUROSEMIDE 40 MG
40 TABLET ORAL 2 TIMES DAILY
Qty: 90 TABLET | Refills: 0
Start: 2022-01-01 | End: 2022-01-01

## 2022-01-01 RX ORDER — ESCITALOPRAM OXALATE 10 MG/1
20 TABLET ORAL EVERY MORNING
Status: DISCONTINUED | OUTPATIENT
Start: 2022-01-01 | End: 2022-01-01 | Stop reason: HOSPADM

## 2022-01-01 RX ORDER — ACETAZOLAMIDE 125 MG/1
125 TABLET ORAL 2 TIMES DAILY
Qty: 30 TABLET | Refills: 0 | Status: SHIPPED | OUTPATIENT
Start: 2022-01-01 | End: 2022-01-01

## 2022-01-01 RX ORDER — NALOXONE HYDROCHLORIDE 0.4 MG/ML
0.2 INJECTION, SOLUTION INTRAMUSCULAR; INTRAVENOUS; SUBCUTANEOUS
Status: DISCONTINUED | OUTPATIENT
Start: 2022-01-01 | End: 2022-01-01 | Stop reason: HOSPADM

## 2022-01-01 RX ORDER — IOPAMIDOL 510 MG/ML
INJECTION, SOLUTION INTRAVASCULAR PRN
Status: DISCONTINUED | OUTPATIENT
Start: 2022-01-01 | End: 2022-01-01 | Stop reason: HOSPADM

## 2022-01-01 RX ORDER — PROCHLORPERAZINE 25 MG
12.5 SUPPOSITORY, RECTAL RECTAL EVERY 12 HOURS PRN
Status: CANCELLED | OUTPATIENT
Start: 2022-01-01

## 2022-01-01 RX ORDER — B COMPLEX C NO.10/FOLIC ACID 900MCG/5ML
5 LIQUID (ML) ORAL DAILY
Status: DISCONTINUED | OUTPATIENT
Start: 2022-01-01 | End: 2022-01-01

## 2022-01-01 RX ORDER — ONDANSETRON 2 MG/ML
INJECTION INTRAMUSCULAR; INTRAVENOUS PRN
Status: DISCONTINUED | OUTPATIENT
Start: 2022-01-01 | End: 2022-01-01

## 2022-01-01 RX ORDER — DEXTROSE MONOHYDRATE 25 G/50ML
25-50 INJECTION, SOLUTION INTRAVENOUS
Status: DISCONTINUED | OUTPATIENT
Start: 2022-01-01 | End: 2022-01-01 | Stop reason: HOSPADM

## 2022-01-01 RX ORDER — ONDANSETRON 2 MG/ML
4 INJECTION INTRAMUSCULAR; INTRAVENOUS EVERY 6 HOURS PRN
Status: DISCONTINUED | OUTPATIENT
Start: 2022-01-01 | End: 2022-01-01 | Stop reason: HOSPADM

## 2022-01-01 RX ORDER — CEFTRIAXONE 1 G/1
1 INJECTION, POWDER, FOR SOLUTION INTRAMUSCULAR; INTRAVENOUS EVERY 24 HOURS
Status: DISCONTINUED | OUTPATIENT
Start: 2022-01-01 | End: 2022-01-01

## 2022-01-01 RX ORDER — SODIUM BICARBONATE 650 MG/1
1300 TABLET ORAL 3 TIMES DAILY
Status: DISCONTINUED | OUTPATIENT
Start: 2022-01-01 | End: 2022-01-01

## 2022-01-01 RX ORDER — EPINEPHRINE 1 MG/ML
0.3 INJECTION, SOLUTION INTRAMUSCULAR; SUBCUTANEOUS EVERY 5 MIN PRN
Status: CANCELLED | OUTPATIENT
Start: 2022-01-01

## 2022-01-01 RX ORDER — SODIUM CHLORIDE, SODIUM LACTATE, POTASSIUM CHLORIDE, CALCIUM CHLORIDE 600; 310; 30; 20 MG/100ML; MG/100ML; MG/100ML; MG/100ML
INJECTION, SOLUTION INTRAVENOUS CONTINUOUS PRN
Status: DISCONTINUED | OUTPATIENT
Start: 2022-01-01 | End: 2022-01-01

## 2022-01-01 RX ORDER — FENTANYL CITRATE 50 UG/ML
INJECTION, SOLUTION INTRAMUSCULAR; INTRAVENOUS PRN
Status: DISCONTINUED | OUTPATIENT
Start: 2022-01-01 | End: 2022-01-01

## 2022-01-01 RX ORDER — METHYLPREDNISOLONE SODIUM SUCCINATE 125 MG/2ML
125 INJECTION, POWDER, LYOPHILIZED, FOR SOLUTION INTRAMUSCULAR; INTRAVENOUS
Status: CANCELLED
Start: 2022-01-01

## 2022-01-01 RX ORDER — ATROPINE SULFATE 10 MG/ML
1 SOLUTION/ DROPS OPHTHALMIC
Status: DISCONTINUED | OUTPATIENT
Start: 2022-01-01 | End: 2022-01-01 | Stop reason: HOSPADM

## 2022-01-01 RX ORDER — DEXTROSE MONOHYDRATE 25 G/50ML
25 INJECTION, SOLUTION INTRAVENOUS ONCE
Status: COMPLETED | OUTPATIENT
Start: 2022-01-01 | End: 2022-01-01

## 2022-01-01 RX ORDER — DIPHENHYDRAMINE HYDROCHLORIDE 50 MG/ML
50 INJECTION INTRAMUSCULAR; INTRAVENOUS
Status: CANCELLED
Start: 2022-01-01

## 2022-01-01 RX ORDER — HYDROMORPHONE HYDROCHLORIDE 1 MG/ML
0.3 INJECTION, SOLUTION INTRAMUSCULAR; INTRAVENOUS; SUBCUTANEOUS EVERY 30 MIN PRN
Status: CANCELLED | OUTPATIENT
Start: 2022-01-01

## 2022-01-01 RX ORDER — PRAVASTATIN SODIUM 20 MG
40 TABLET ORAL DAILY
Status: DISCONTINUED | OUTPATIENT
Start: 2022-01-01 | End: 2022-01-01

## 2022-01-01 RX ORDER — AMOXICILLIN 250 MG
2 CAPSULE ORAL 2 TIMES DAILY PRN
Status: DISCONTINUED | OUTPATIENT
Start: 2022-01-01 | End: 2022-01-01 | Stop reason: HOSPADM

## 2022-01-01 RX ORDER — CALCIUM GLUCONATE 20 MG/ML
1 INJECTION, SOLUTION INTRAVENOUS ONCE
Status: COMPLETED | OUTPATIENT
Start: 2022-01-01 | End: 2022-01-01

## 2022-01-01 RX ORDER — GLYCOPYRROLATE 0.2 MG/ML
0.2 INJECTION, SOLUTION INTRAMUSCULAR; INTRAVENOUS ONCE
Status: COMPLETED | OUTPATIENT
Start: 2022-01-01 | End: 2022-01-01

## 2022-01-01 RX ORDER — HEPARIN SODIUM,PORCINE 10 UNIT/ML
5 VIAL (ML) INTRAVENOUS
Status: CANCELLED | OUTPATIENT
Start: 2022-01-01

## 2022-01-01 RX ORDER — LIDOCAINE HYDROCHLORIDE 20 MG/ML
5 SOLUTION OROPHARYNGEAL ONCE
Status: COMPLETED | OUTPATIENT
Start: 2022-01-01 | End: 2022-01-01

## 2022-01-01 RX ORDER — LIDOCAINE HYDROCHLORIDE 20 MG/ML
INJECTION, SOLUTION INFILTRATION; PERINEURAL PRN
Status: DISCONTINUED | OUTPATIENT
Start: 2022-01-01 | End: 2022-01-01

## 2022-01-01 RX ORDER — ZOLPIDEM TARTRATE 10 MG/1
10 TABLET ORAL
Qty: 30 TABLET | Refills: 0 | Status: SHIPPED | OUTPATIENT
Start: 2022-01-01 | End: 2022-01-01

## 2022-01-01 RX ORDER — CEFTRIAXONE 2 G/1
2000 INJECTION, POWDER, FOR SOLUTION INTRAMUSCULAR; INTRAVENOUS EVERY 24 HOURS
Status: DISCONTINUED | OUTPATIENT
Start: 2022-01-01 | End: 2022-01-01

## 2022-01-01 RX ORDER — LOVASTATIN 20 MG
40 TABLET ORAL EVERY MORNING
Qty: 90 TABLET | Refills: 0
Start: 2022-01-01

## 2022-01-01 RX ORDER — ALBUMIN (HUMAN) 12.5 G/50ML
25 SOLUTION INTRAVENOUS ONCE
Status: COMPLETED | OUTPATIENT
Start: 2022-01-01 | End: 2022-01-01

## 2022-01-01 RX ORDER — DEXTROSE MONOHYDRATE 100 MG/ML
INJECTION, SOLUTION INTRAVENOUS CONTINUOUS PRN
Status: DISCONTINUED | OUTPATIENT
Start: 2022-01-01 | End: 2022-01-01 | Stop reason: HOSPADM

## 2022-01-01 RX ORDER — HYDROMORPHONE HYDROCHLORIDE 1 MG/ML
0.3 INJECTION, SOLUTION INTRAMUSCULAR; INTRAVENOUS; SUBCUTANEOUS EVERY 10 MIN PRN
Status: CANCELLED | OUTPATIENT
Start: 2022-01-01

## 2022-01-01 RX ORDER — CEFTRIAXONE 2 G/1
2000 INJECTION, POWDER, FOR SOLUTION INTRAMUSCULAR; INTRAVENOUS ONCE
Status: COMPLETED | OUTPATIENT
Start: 2022-01-01 | End: 2022-01-01

## 2022-01-01 RX ORDER — LACTULOSE 10 G/15ML
20 SOLUTION ORAL 3 TIMES DAILY
Status: DISCONTINUED | OUTPATIENT
Start: 2022-01-01 | End: 2022-01-01

## 2022-01-01 RX ORDER — ALBUMIN (HUMAN) 12.5 G/50ML
50 SOLUTION INTRAVENOUS DAILY
Status: COMPLETED | OUTPATIENT
Start: 2022-01-01 | End: 2022-01-01

## 2022-01-01 RX ORDER — LIDOCAINE 40 MG/G
CREAM TOPICAL
Status: CANCELLED | OUTPATIENT
Start: 2022-01-01

## 2022-01-01 RX ORDER — GLYCOPYRROLATE 0.2 MG/ML
0.1 INJECTION, SOLUTION INTRAMUSCULAR; INTRAVENOUS EVERY 4 HOURS PRN
Status: DISCONTINUED | OUTPATIENT
Start: 2022-01-01 | End: 2022-01-01

## 2022-01-01 RX ORDER — INDOMETHACIN 50 MG/1
SUPPOSITORY RECTAL PRN
Status: DISCONTINUED | OUTPATIENT
Start: 2022-01-01 | End: 2022-01-01 | Stop reason: HOSPADM

## 2022-01-01 RX ORDER — ALBUMIN (HUMAN) 12.5 G/50ML
100 SOLUTION INTRAVENOUS ONCE
Status: COMPLETED | OUTPATIENT
Start: 2022-01-01 | End: 2022-01-01

## 2022-01-01 RX ORDER — NALOXONE HYDROCHLORIDE 0.4 MG/ML
0.2 INJECTION, SOLUTION INTRAMUSCULAR; INTRAVENOUS; SUBCUTANEOUS
Status: DISCONTINUED | OUTPATIENT
Start: 2022-01-01 | End: 2022-01-01

## 2022-01-01 RX ORDER — LACTULOSE 10 G/15ML
10 SOLUTION ORAL 2 TIMES DAILY
Status: DISCONTINUED | OUTPATIENT
Start: 2022-01-01 | End: 2022-01-01

## 2022-01-01 RX ORDER — AMLODIPINE BESYLATE 10 MG/1
10 TABLET ORAL DAILY
Status: DISCONTINUED | OUTPATIENT
Start: 2022-01-01 | End: 2022-01-01 | Stop reason: HOSPADM

## 2022-01-01 RX ORDER — ONDANSETRON 4 MG/1
4 TABLET, ORALLY DISINTEGRATING ORAL EVERY 6 HOURS PRN
Status: DISCONTINUED | OUTPATIENT
Start: 2022-01-01 | End: 2022-01-01 | Stop reason: HOSPADM

## 2022-01-01 RX ORDER — LIDOCAINE 4 G/G
1 PATCH TOPICAL
Status: DISCONTINUED | OUTPATIENT
Start: 2022-01-01 | End: 2022-01-01 | Stop reason: HOSPADM

## 2022-01-01 RX ORDER — ALBUTEROL SULFATE 0.83 MG/ML
2.5 SOLUTION RESPIRATORY (INHALATION)
Status: CANCELLED | OUTPATIENT
Start: 2022-01-01

## 2022-01-01 RX ORDER — LORAZEPAM 2 MG/ML
1 INJECTION INTRAMUSCULAR
Status: DISCONTINUED | OUTPATIENT
Start: 2022-01-01 | End: 2022-01-01

## 2022-01-01 RX ORDER — PROPOFOL 10 MG/ML
INJECTION, EMULSION INTRAVENOUS PRN
Status: DISCONTINUED | OUTPATIENT
Start: 2022-01-01 | End: 2022-01-01

## 2022-01-01 RX ORDER — LORAZEPAM 0.5 MG/1
1 TABLET ORAL
Status: DISCONTINUED | OUTPATIENT
Start: 2022-01-01 | End: 2022-01-01

## 2022-01-01 RX ORDER — DICYCLOMINE HCL 20 MG
20 TABLET ORAL 3 TIMES DAILY
Status: DISCONTINUED | OUTPATIENT
Start: 2022-01-01 | End: 2022-01-01

## 2022-01-01 RX ORDER — ACETAMINOPHEN 650 MG/1
650 SUPPOSITORY RECTAL EVERY 6 HOURS PRN
Status: DISCONTINUED | OUTPATIENT
Start: 2022-01-01 | End: 2022-01-01 | Stop reason: HOSPADM

## 2022-01-01 RX ORDER — DEXTROSE MONOHYDRATE 25 G/50ML
25-50 INJECTION, SOLUTION INTRAVENOUS
Status: DISCONTINUED | OUTPATIENT
Start: 2022-01-01 | End: 2022-01-01 | Stop reason: CLARIF

## 2022-01-01 RX ORDER — NALOXONE HYDROCHLORIDE 0.4 MG/ML
0.1 INJECTION, SOLUTION INTRAMUSCULAR; INTRAVENOUS; SUBCUTANEOUS
Status: DISCONTINUED | OUTPATIENT
Start: 2022-01-01 | End: 2022-01-01

## 2022-01-01 RX ORDER — NICOTINE POLACRILEX 4 MG
15-30 LOZENGE BUCCAL
Status: DISCONTINUED | OUTPATIENT
Start: 2022-01-01 | End: 2022-01-01 | Stop reason: HOSPADM

## 2022-01-01 RX ORDER — VANCOMYCIN HYDROCHLORIDE 50 MG/ML
125 KIT ORAL 4 TIMES DAILY
Status: DISCONTINUED | OUTPATIENT
Start: 2022-01-01 | End: 2022-01-01

## 2022-01-01 RX ORDER — MIDODRINE HYDROCHLORIDE 5 MG/1
10 TABLET ORAL ONCE
Status: COMPLETED | OUTPATIENT
Start: 2022-01-01 | End: 2022-01-01

## 2022-01-01 RX ORDER — FLUMAZENIL 0.1 MG/ML
0.2 INJECTION, SOLUTION INTRAVENOUS
Status: DISCONTINUED | OUTPATIENT
Start: 2022-01-01 | End: 2022-01-01 | Stop reason: HOSPADM

## 2022-01-01 RX ORDER — EPHEDRINE SULFATE 50 MG/ML
INJECTION, SOLUTION INTRAMUSCULAR; INTRAVENOUS; SUBCUTANEOUS PRN
Status: DISCONTINUED | OUTPATIENT
Start: 2022-01-01 | End: 2022-01-01

## 2022-01-01 RX ORDER — ALBUTEROL SULFATE 90 UG/1
1-2 AEROSOL, METERED RESPIRATORY (INHALATION)
Status: CANCELLED
Start: 2022-01-01

## 2022-01-01 RX ORDER — GLYCOPYRROLATE 0.2 MG/ML
0.1 INJECTION, SOLUTION INTRAMUSCULAR; INTRAVENOUS EVERY 4 HOURS PRN
Status: CANCELLED | OUTPATIENT
Start: 2022-01-01

## 2022-01-01 RX ORDER — DEXAMETHASONE SODIUM PHOSPHATE 4 MG/ML
INJECTION, SOLUTION INTRA-ARTICULAR; INTRALESIONAL; INTRAMUSCULAR; INTRAVENOUS; SOFT TISSUE PRN
Status: DISCONTINUED | OUTPATIENT
Start: 2022-01-01 | End: 2022-01-01

## 2022-01-01 RX ORDER — SODIUM CHLORIDE, SODIUM LACTATE, POTASSIUM CHLORIDE, CALCIUM CHLORIDE 600; 310; 30; 20 MG/100ML; MG/100ML; MG/100ML; MG/100ML
INJECTION, SOLUTION INTRAVENOUS CONTINUOUS
Status: DISCONTINUED | OUTPATIENT
Start: 2022-01-01 | End: 2022-01-01 | Stop reason: HOSPADM

## 2022-01-01 RX ORDER — AMINO AC/PROTEIN HYDR/WHEY PRO 10G-100/30
1 LIQUID (ML) ORAL DAILY
Status: DISCONTINUED | OUTPATIENT
Start: 2022-01-01 | End: 2022-01-01

## 2022-01-01 RX ORDER — NICOTINE POLACRILEX 4 MG
15-30 LOZENGE BUCCAL
Status: DISCONTINUED | OUTPATIENT
Start: 2022-01-01 | End: 2022-01-01

## 2022-01-01 RX ORDER — CALCIUM GLUCONATE 94 MG/ML
1 INJECTION, SOLUTION INTRAVENOUS ONCE
Status: DISCONTINUED | OUTPATIENT
Start: 2022-01-01 | End: 2022-01-01

## 2022-01-01 RX ORDER — AMLODIPINE BESYLATE 10 MG/1
10 TABLET ORAL DAILY
Qty: 90 TABLET | Refills: 0
Start: 2022-01-01

## 2022-01-01 RX ORDER — ALBUMIN (HUMAN) 12.5 G/50ML
50 SOLUTION INTRAVENOUS DAILY
Status: DISCONTINUED | OUTPATIENT
Start: 2022-01-01 | End: 2022-01-01

## 2022-01-01 RX ORDER — INDOMETHACIN 50 MG/1
100 SUPPOSITORY RECTAL
Status: CANCELLED | OUTPATIENT
Start: 2022-01-01

## 2022-01-01 RX ORDER — ALBUMIN (HUMAN) 12.5 G/50ML
50 SOLUTION INTRAVENOUS ONCE
Status: COMPLETED | OUTPATIENT
Start: 2022-01-01 | End: 2022-01-01

## 2022-01-01 RX ORDER — IOPAMIDOL 755 MG/ML
124 INJECTION, SOLUTION INTRAVASCULAR ONCE
Status: COMPLETED | OUTPATIENT
Start: 2022-01-01 | End: 2022-01-01

## 2022-01-01 RX ORDER — LABETALOL HYDROCHLORIDE 5 MG/ML
5 INJECTION, SOLUTION INTRAVENOUS EVERY 5 MIN PRN
Status: DISCONTINUED | OUTPATIENT
Start: 2022-01-01 | End: 2022-01-01 | Stop reason: HOSPADM

## 2022-01-01 RX ORDER — MIDODRINE HYDROCHLORIDE 5 MG/1
5 TABLET ORAL DAILY PRN
Status: DISCONTINUED | OUTPATIENT
Start: 2022-01-01 | End: 2022-01-01

## 2022-01-01 RX ORDER — LORAZEPAM 2 MG/ML
1 INJECTION INTRAMUSCULAR EVERY 10 MIN PRN
Status: DISCONTINUED | OUTPATIENT
Start: 2022-01-01 | End: 2022-01-01 | Stop reason: HOSPADM

## 2022-01-01 RX ORDER — PANTOPRAZOLE SODIUM 40 MG/1
40 TABLET, DELAYED RELEASE ORAL EVERY MORNING
Status: DISCONTINUED | OUTPATIENT
Start: 2022-01-01 | End: 2022-01-01

## 2022-01-01 RX ORDER — ACETAMINOPHEN 325 MG/1
325 TABLET ORAL EVERY 6 HOURS PRN
COMMUNITY

## 2022-01-01 RX ORDER — HYDROMORPHONE HCL IN WATER/PF 6 MG/30 ML
.2-.4 PATIENT CONTROLLED ANALGESIA SYRINGE INTRAVENOUS EVERY 5 MIN PRN
Status: DISCONTINUED | OUTPATIENT
Start: 2022-01-01 | End: 2022-01-01 | Stop reason: HOSPADM

## 2022-01-01 RX ORDER — NYSTATIN 100000/ML
500000 SUSPENSION, ORAL (FINAL DOSE FORM) ORAL 4 TIMES DAILY
Status: DISCONTINUED | OUTPATIENT
Start: 2022-01-01 | End: 2022-01-01

## 2022-01-01 RX ORDER — MEROPENEM 500 MG/1
500 INJECTION, POWDER, FOR SOLUTION INTRAVENOUS EVERY 24 HOURS
Status: DISCONTINUED | OUTPATIENT
Start: 2022-01-01 | End: 2022-01-01

## 2022-01-01 RX ORDER — DICYCLOMINE HCL 20 MG
20 TABLET ORAL 3 TIMES DAILY
Status: DISCONTINUED | OUTPATIENT
Start: 2022-01-01 | End: 2022-01-01 | Stop reason: HOSPADM

## 2022-01-01 RX ORDER — WARFARIN SODIUM 2 MG/1
2 TABLET ORAL DAILY
Qty: 90 TABLET | Refills: 0
Start: 2022-01-01 | End: 2022-01-01

## 2022-01-01 RX ORDER — FUROSEMIDE 40 MG
40 TABLET ORAL DAILY
Qty: 90 TABLET | Refills: 0 | Status: SHIPPED | OUTPATIENT
Start: 2022-01-01 | End: 2022-01-01

## 2022-01-01 RX ORDER — POLYETHYLENE GLYCOL 3350 17 G/17G
17 POWDER, FOR SOLUTION ORAL DAILY PRN
Status: DISCONTINUED | OUTPATIENT
Start: 2022-01-01 | End: 2022-01-01 | Stop reason: HOSPADM

## 2022-01-01 RX ORDER — ONDANSETRON 2 MG/ML
4 INJECTION INTRAMUSCULAR; INTRAVENOUS EVERY 6 HOURS PRN
Status: CANCELLED | OUTPATIENT
Start: 2022-01-01

## 2022-01-01 RX ORDER — POLYETHYLENE GLYCOL 3350 17 G/17G
17 POWDER, FOR SOLUTION ORAL DAILY
Status: DISCONTINUED | OUTPATIENT
Start: 2022-01-01 | End: 2022-01-01

## 2022-01-01 RX ORDER — ZOLPIDEM TARTRATE 10 MG/1
10 TABLET ORAL
Qty: 30 TABLET | Refills: 2 | Status: SHIPPED | OUTPATIENT
Start: 2022-01-01 | End: 2022-01-01

## 2022-01-01 RX ORDER — ACETAMINOPHEN 325 MG/1
975 TABLET ORAL
Status: DISCONTINUED | OUTPATIENT
Start: 2022-01-01 | End: 2022-01-01 | Stop reason: HOSPADM

## 2022-01-01 RX ORDER — GLYCOPYRROLATE 0.2 MG/ML
0.1 INJECTION, SOLUTION INTRAMUSCULAR; INTRAVENOUS ONCE
Status: CANCELLED | OUTPATIENT
Start: 2022-01-01 | End: 2022-01-01

## 2022-01-01 RX ORDER — WARFARIN SODIUM 1 MG/1
1 TABLET ORAL DAILY
Qty: 90 TABLET | Refills: 0 | Status: SHIPPED | OUTPATIENT
Start: 2022-01-01 | End: 2022-01-01

## 2022-01-01 RX ORDER — FENTANYL CITRATE 50 UG/ML
25 INJECTION, SOLUTION INTRAMUSCULAR; INTRAVENOUS EVERY 5 MIN PRN
Status: DISCONTINUED | OUTPATIENT
Start: 2022-01-01 | End: 2022-01-01 | Stop reason: HOSPADM

## 2022-01-01 RX ORDER — FENTANYL CITRATE 50 UG/ML
25 INJECTION, SOLUTION INTRAMUSCULAR; INTRAVENOUS
Status: DISCONTINUED | OUTPATIENT
Start: 2022-01-01 | End: 2022-01-01 | Stop reason: HOSPADM

## 2022-01-01 RX ORDER — LANOLIN ALCOHOL/MO/W.PET/CERES
3 CREAM (GRAM) TOPICAL ONCE
Status: COMPLETED | OUTPATIENT
Start: 2022-01-01 | End: 2022-01-01

## 2022-01-01 RX ORDER — LORAZEPAM 2 MG/ML
1 INJECTION INTRAMUSCULAR EVERY 10 MIN PRN
Status: CANCELLED | OUTPATIENT
Start: 2022-01-01

## 2022-01-01 RX ORDER — LIDOCAINE HYDROCHLORIDE 20 MG/ML
SOLUTION OROPHARYNGEAL
Status: COMPLETED
Start: 2022-01-01 | End: 2022-01-01

## 2022-01-01 RX ORDER — POTASSIUM CHLORIDE 1.5 G/1.58G
40 POWDER, FOR SOLUTION ORAL ONCE
Status: COMPLETED | OUTPATIENT
Start: 2022-01-01 | End: 2022-01-01

## 2022-01-01 RX ORDER — LEVOFLOXACIN 5 MG/ML
INJECTION, SOLUTION INTRAVENOUS PRN
Status: DISCONTINUED | OUTPATIENT
Start: 2022-01-01 | End: 2022-01-01

## 2022-01-01 RX ORDER — METOCLOPRAMIDE HYDROCHLORIDE 5 MG/ML
5 INJECTION INTRAMUSCULAR; INTRAVENOUS ONCE
Status: COMPLETED | OUTPATIENT
Start: 2022-01-01 | End: 2022-01-01

## 2022-01-01 RX ORDER — FUROSEMIDE 10 MG/ML
INJECTION INTRAMUSCULAR; INTRAVENOUS
Status: DISCONTINUED
Start: 2022-01-01 | End: 2022-01-01 | Stop reason: HOSPADM

## 2022-01-01 RX ORDER — OXYCODONE HYDROCHLORIDE 5 MG/1
5 TABLET ORAL EVERY 4 HOURS PRN
Status: DISCONTINUED | OUTPATIENT
Start: 2022-01-01 | End: 2022-01-01 | Stop reason: HOSPADM

## 2022-01-01 RX ORDER — MORPHINE SULFATE 4 MG/ML
1-2 INJECTION, SOLUTION INTRAMUSCULAR; INTRAVENOUS
Status: DISCONTINUED | OUTPATIENT
Start: 2022-01-01 | End: 2022-01-01

## 2022-01-01 RX ORDER — CARBOXYMETHYLCELLULOSE SODIUM 5 MG/ML
1 SOLUTION/ DROPS OPHTHALMIC
Status: DISCONTINUED | OUTPATIENT
Start: 2022-01-01 | End: 2022-01-01 | Stop reason: HOSPADM

## 2022-01-01 RX ORDER — ACETAMINOPHEN 325 MG/1
650 TABLET ORAL EVERY 6 HOURS PRN
Status: DISCONTINUED | OUTPATIENT
Start: 2022-01-01 | End: 2022-01-01 | Stop reason: HOSPADM

## 2022-01-01 RX ORDER — PIPERACILLIN SODIUM, TAZOBACTAM SODIUM 2; .25 G/10ML; G/10ML
2.25 INJECTION, POWDER, LYOPHILIZED, FOR SOLUTION INTRAVENOUS EVERY 6 HOURS
Status: DISCONTINUED | OUTPATIENT
Start: 2022-01-01 | End: 2022-01-01

## 2022-01-01 RX ORDER — POTASSIUM CHLORIDE 1.5 G/1.58G
40 POWDER, FOR SOLUTION ORAL EVERY 4 HOURS
Status: COMPLETED | OUTPATIENT
Start: 2022-01-01 | End: 2022-01-01

## 2022-01-01 RX ORDER — ONDANSETRON 2 MG/ML
4 INJECTION INTRAMUSCULAR; INTRAVENOUS EVERY 30 MIN PRN
Status: DISCONTINUED | OUTPATIENT
Start: 2022-01-01 | End: 2022-01-01 | Stop reason: HOSPADM

## 2022-01-01 RX ORDER — AMOXICILLIN 250 MG
1 CAPSULE ORAL 2 TIMES DAILY PRN
Status: DISCONTINUED | OUTPATIENT
Start: 2022-01-01 | End: 2022-01-01 | Stop reason: HOSPADM

## 2022-01-01 RX ORDER — FENTANYL CITRATE 50 UG/ML
25-50 INJECTION, SOLUTION INTRAMUSCULAR; INTRAVENOUS EVERY 5 MIN PRN
Status: DISCONTINUED | OUTPATIENT
Start: 2022-01-01 | End: 2022-01-01 | Stop reason: HOSPADM

## 2022-01-01 RX ORDER — HYDROMORPHONE HCL IN WATER/PF 6 MG/30 ML
0.2 PATIENT CONTROLLED ANALGESIA SYRINGE INTRAVENOUS
Status: CANCELLED | OUTPATIENT
Start: 2022-01-01

## 2022-01-01 RX ORDER — ONDANSETRON 4 MG/1
4 TABLET, ORALLY DISINTEGRATING ORAL EVERY 6 HOURS PRN
Status: CANCELLED | OUTPATIENT
Start: 2022-01-01

## 2022-01-01 RX ORDER — IOPAMIDOL 755 MG/ML
110 INJECTION, SOLUTION INTRAVASCULAR ONCE
Status: COMPLETED | OUTPATIENT
Start: 2022-01-01 | End: 2022-01-01

## 2022-01-01 RX ORDER — VANCOMYCIN HYDROCHLORIDE 125 MG/1
125 CAPSULE ORAL 4 TIMES DAILY
Status: DISCONTINUED | OUTPATIENT
Start: 2022-01-01 | End: 2022-01-01

## 2022-01-01 RX ORDER — ALBUTEROL SULFATE 0.83 MG/ML
2.5 SOLUTION RESPIRATORY (INHALATION) EVERY 4 HOURS PRN
Status: DISCONTINUED | OUTPATIENT
Start: 2022-01-01 | End: 2022-01-01 | Stop reason: HOSPADM

## 2022-01-01 RX ORDER — DEXTROSE MONOHYDRATE 25 G/50ML
25 INJECTION, SOLUTION INTRAVENOUS ONCE
Status: DISCONTINUED | OUTPATIENT
Start: 2022-01-01 | End: 2022-01-01 | Stop reason: CLARIF

## 2022-01-01 RX ORDER — METOCLOPRAMIDE HYDROCHLORIDE 5 MG/ML
5 INJECTION INTRAMUSCULAR; INTRAVENOUS
Status: COMPLETED | OUTPATIENT
Start: 2022-01-01 | End: 2022-01-01

## 2022-01-01 RX ORDER — LIDOCAINE HYDROCHLORIDE 10 MG/ML
30 INJECTION, SOLUTION EPIDURAL; INFILTRATION; INTRACAUDAL; PERINEURAL ONCE
Status: COMPLETED | OUTPATIENT
Start: 2022-01-01 | End: 2022-01-01

## 2022-01-01 RX ORDER — HYDROMORPHONE HYDROCHLORIDE 1 MG/ML
0.3 INJECTION, SOLUTION INTRAMUSCULAR; INTRAVENOUS; SUBCUTANEOUS EVERY 10 MIN PRN
Status: DISCONTINUED | OUTPATIENT
Start: 2022-01-01 | End: 2022-01-01 | Stop reason: HOSPADM

## 2022-01-01 RX ORDER — NICOTINE POLACRILEX 4 MG
15-30 LOZENGE BUCCAL
Status: DISCONTINUED | OUTPATIENT
Start: 2022-01-01 | End: 2022-01-01 | Stop reason: CLARIF

## 2022-01-01 RX ORDER — LACTULOSE 10 G/15ML
10 SOLUTION ORAL ONCE
Status: DISCONTINUED | OUTPATIENT
Start: 2022-01-01 | End: 2022-01-01

## 2022-01-01 RX ORDER — DEXTROSE MONOHYDRATE 25 G/50ML
25-50 INJECTION, SOLUTION INTRAVENOUS
Status: DISCONTINUED | OUTPATIENT
Start: 2022-01-01 | End: 2022-01-01

## 2022-01-01 RX ORDER — LIDOCAINE HYDROCHLORIDE 20 MG/ML
1 JELLY TOPICAL ONCE
Status: COMPLETED | OUTPATIENT
Start: 2022-01-01 | End: 2022-01-01

## 2022-01-01 RX ORDER — LIDOCAINE HYDROCHLORIDE 10 MG/ML
20 INJECTION, SOLUTION EPIDURAL; INFILTRATION; INTRACAUDAL; PERINEURAL ONCE
Status: COMPLETED | OUTPATIENT
Start: 2022-01-01 | End: 2022-01-01

## 2022-01-01 RX ORDER — MIDODRINE HYDROCHLORIDE 5 MG/1
10 TABLET ORAL DAILY PRN
Status: DISCONTINUED | OUTPATIENT
Start: 2022-01-01 | End: 2022-01-01

## 2022-01-01 RX ORDER — HYDRALAZINE HYDROCHLORIDE 20 MG/ML
2.5-5 INJECTION INTRAMUSCULAR; INTRAVENOUS EVERY 10 MIN PRN
Status: DISCONTINUED | OUTPATIENT
Start: 2022-01-01 | End: 2022-01-01 | Stop reason: HOSPADM

## 2022-01-01 RX ORDER — HYDROMORPHONE HYDROCHLORIDE 1 MG/ML
0.2 INJECTION, SOLUTION INTRAMUSCULAR; INTRAVENOUS; SUBCUTANEOUS EVERY 5 MIN PRN
Status: DISCONTINUED | OUTPATIENT
Start: 2022-01-01 | End: 2022-01-01 | Stop reason: HOSPADM

## 2022-01-01 RX ADMIN — SODIUM CHLORIDE, POTASSIUM CHLORIDE, SODIUM LACTATE AND CALCIUM CHLORIDE: 600; 310; 30; 20 INJECTION, SOLUTION INTRAVENOUS at 09:51

## 2022-01-01 RX ADMIN — VANCOMYCIN HYDROCHLORIDE 125 MG: 125 CAPSULE ORAL at 15:38

## 2022-01-01 RX ADMIN — ALBUMIN HUMAN 50 G: 0.25 SOLUTION INTRAVENOUS at 03:10

## 2022-01-01 RX ADMIN — ALBUMIN HUMAN 50 G: 0.25 SOLUTION INTRAVENOUS at 08:46

## 2022-01-01 RX ADMIN — DICYCLOMINE HYDROCHLORIDE 20 MG: 20 TABLET ORAL at 08:16

## 2022-01-01 RX ADMIN — VANCOMYCIN HYDROCHLORIDE 125 MG: 125 CAPSULE ORAL at 07:53

## 2022-01-01 RX ADMIN — SODIUM CHLORIDE, POTASSIUM CHLORIDE, SODIUM LACTATE AND CALCIUM CHLORIDE: 600; 310; 30; 20 INJECTION, SOLUTION INTRAVENOUS at 15:35

## 2022-01-01 RX ADMIN — PROPOFOL 50 MG: 10 INJECTION, EMULSION INTRAVENOUS at 15:48

## 2022-01-01 RX ADMIN — VANCOMYCIN HYDROCHLORIDE 125 MG: KIT at 07:57

## 2022-01-01 RX ADMIN — APIXABAN 5 MG: 5 TABLET, FILM COATED ORAL at 21:04

## 2022-01-01 RX ADMIN — SODIUM CHLORIDE, POTASSIUM CHLORIDE, SODIUM LACTATE AND CALCIUM CHLORIDE 250 ML: 600; 310; 30; 20 INJECTION, SOLUTION INTRAVENOUS at 21:05

## 2022-01-01 RX ADMIN — ESCITALOPRAM OXALATE 20 MG: 10 TABLET ORAL at 09:29

## 2022-01-01 RX ADMIN — APIXABAN 5 MG: 5 TABLET, FILM COATED ORAL at 07:59

## 2022-01-01 RX ADMIN — APIXABAN 5 MG: 5 TABLET, FILM COATED ORAL at 08:17

## 2022-01-01 RX ADMIN — DEXTROSE MONOHYDRATE 300 ML: 100 INJECTION, SOLUTION INTRAVENOUS at 20:11

## 2022-01-01 RX ADMIN — APIXABAN 5 MG: 5 TABLET, FILM COATED ORAL at 21:00

## 2022-01-01 RX ADMIN — PIPERACILLIN SODIUM AND TAZOBACTAM SODIUM 2.25 G: 2; .25 INJECTION, POWDER, LYOPHILIZED, FOR SOLUTION INTRAVENOUS at 12:57

## 2022-01-01 RX ADMIN — VANCOMYCIN HYDROCHLORIDE 125 MG: 125 CAPSULE ORAL at 11:55

## 2022-01-01 RX ADMIN — ACETAMINOPHEN 650 MG: 325 TABLET, FILM COATED ORAL at 12:21

## 2022-01-01 RX ADMIN — SODIUM ZIRCONIUM CYCLOSILICATE 15 G: 10 POWDER, FOR SUSPENSION ORAL at 21:04

## 2022-01-01 RX ADMIN — VANCOMYCIN HYDROCHLORIDE 125 MG: 125 CAPSULE ORAL at 12:14

## 2022-01-01 RX ADMIN — DICYCLOMINE HYDROCHLORIDE 20 MG: 20 TABLET ORAL at 14:13

## 2022-01-01 RX ADMIN — NYSTATIN 500000 UNITS: 100000 SUSPENSION ORAL at 12:58

## 2022-01-01 RX ADMIN — Medication 5 ML: at 07:59

## 2022-01-01 RX ADMIN — LACTULOSE 20 G: 10 SOLUTION ORAL at 14:12

## 2022-01-01 RX ADMIN — ALBUMIN HUMAN 25 G: 0.25 SOLUTION INTRAVENOUS at 01:45

## 2022-01-01 RX ADMIN — APIXABAN 5 MG: 5 TABLET, FILM COATED ORAL at 19:39

## 2022-01-01 RX ADMIN — Medication 5 MG: at 17:08

## 2022-01-01 RX ADMIN — ALBUMIN HUMAN 100 G: 0.25 SOLUTION INTRAVENOUS at 20:34

## 2022-01-01 RX ADMIN — INSULIN ASPART 1 UNITS: 100 INJECTION, SOLUTION INTRAVENOUS; SUBCUTANEOUS at 17:56

## 2022-01-01 RX ADMIN — PANTOPRAZOLE SODIUM 40 MG: 40 TABLET, DELAYED RELEASE ORAL at 08:59

## 2022-01-01 RX ADMIN — LIDOCAINE HYDROCHLORIDE 80 MG: 20 INJECTION, SOLUTION INFILTRATION; PERINEURAL at 09:57

## 2022-01-01 RX ADMIN — DICYCLOMINE HYDROCHLORIDE 20 MG: 20 TABLET ORAL at 09:47

## 2022-01-01 RX ADMIN — APIXABAN 5 MG: 5 TABLET, FILM COATED ORAL at 20:04

## 2022-01-01 RX ADMIN — NYSTATIN 500000 UNITS: 100000 SUSPENSION ORAL at 20:15

## 2022-01-01 RX ADMIN — PHENYLEPHRINE HYDROCHLORIDE 200 MCG: 10 INJECTION INTRAVENOUS at 17:08

## 2022-01-01 RX ADMIN — ALBUMIN HUMAN 50 G: 0.25 SOLUTION INTRAVENOUS at 07:50

## 2022-01-01 RX ADMIN — PIPERACILLIN SODIUM AND TAZOBACTAM SODIUM 2.25 G: 2; .25 INJECTION, POWDER, LYOPHILIZED, FOR SOLUTION INTRAVENOUS at 09:05

## 2022-01-01 RX ADMIN — LIDOCAINE HYDROCHLORIDE 5 ML: 20 SOLUTION OROPHARYNGEAL at 15:31

## 2022-01-01 RX ADMIN — NYSTATIN 500000 UNITS: 100000 SUSPENSION ORAL at 11:53

## 2022-01-01 RX ADMIN — IOPAMIDOL 118 ML: 755 INJECTION, SOLUTION INTRAVASCULAR at 15:13

## 2022-01-01 RX ADMIN — LIDOCAINE HYDROCHLORIDE 100 MG: 20 INJECTION, SOLUTION INFILTRATION; PERINEURAL at 07:57

## 2022-01-01 RX ADMIN — LACTULOSE 20 G: 10 SOLUTION ORAL at 08:18

## 2022-01-01 RX ADMIN — Medication 1 PACKET: at 18:23

## 2022-01-01 RX ADMIN — APIXABAN 5 MG: 5 TABLET, FILM COATED ORAL at 19:09

## 2022-01-01 RX ADMIN — LIDOCAINE HYDROCHLORIDE 30 ML: 10 INJECTION, SOLUTION EPIDURAL; INFILTRATION; INTRACAUDAL; PERINEURAL at 16:41

## 2022-01-01 RX ADMIN — DICYCLOMINE HYDROCHLORIDE 20 MG: 20 TABLET ORAL at 14:32

## 2022-01-01 RX ADMIN — DEXAMETHASONE SODIUM PHOSPHATE 4 MG: 4 INJECTION, SOLUTION INTRA-ARTICULAR; INTRALESIONAL; INTRAMUSCULAR; INTRAVENOUS; SOFT TISSUE at 16:07

## 2022-01-01 RX ADMIN — PRAVASTATIN SODIUM 40 MG: 20 TABLET ORAL at 08:58

## 2022-01-01 RX ADMIN — CALCIUM GLUCONATE 1 G: 20 INJECTION, SOLUTION INTRAVENOUS at 20:07

## 2022-01-01 RX ADMIN — APIXABAN 5 MG: 5 TABLET, FILM COATED ORAL at 07:54

## 2022-01-01 RX ADMIN — VANCOMYCIN HYDROCHLORIDE 125 MG: 125 CAPSULE ORAL at 08:42

## 2022-01-01 RX ADMIN — VANCOMYCIN HYDROCHLORIDE 125 MG: 125 CAPSULE ORAL at 21:00

## 2022-01-01 RX ADMIN — DICYCLOMINE HYDROCHLORIDE 20 MG: 20 TABLET ORAL at 20:05

## 2022-01-01 RX ADMIN — AMLODIPINE BESYLATE 10 MG: 10 TABLET ORAL at 08:58

## 2022-01-01 RX ADMIN — NYSTATIN 500000 UNITS: 100000 SUSPENSION ORAL at 08:58

## 2022-01-01 RX ADMIN — AMLODIPINE BESYLATE 10 MG: 10 TABLET ORAL at 07:53

## 2022-01-01 RX ADMIN — NYSTATIN 500000 UNITS: 100000 SUSPENSION ORAL at 15:31

## 2022-01-01 RX ADMIN — APIXABAN 5 MG: 5 TABLET, FILM COATED ORAL at 08:46

## 2022-01-01 RX ADMIN — SODIUM CHLORIDE 250 ML: 9 INJECTION, SOLUTION INTRAVENOUS at 16:42

## 2022-01-01 RX ADMIN — MIDODRINE HYDROCHLORIDE 10 MG: 5 TABLET ORAL at 12:42

## 2022-01-01 RX ADMIN — SODIUM ZIRCONIUM CYCLOSILICATE 15 G: 10 POWDER, FOR SUSPENSION ORAL at 16:08

## 2022-01-01 RX ADMIN — VANCOMYCIN HYDROCHLORIDE 125 MG: 125 CAPSULE ORAL at 18:25

## 2022-01-01 RX ADMIN — THIAMINE HCL TAB 100 MG 100 MG: 100 TAB at 08:58

## 2022-01-01 RX ADMIN — LORAZEPAM 1 MG: 2 INJECTION INTRAMUSCULAR; INTRAVENOUS at 15:43

## 2022-01-01 RX ADMIN — SODIUM ZIRCONIUM CYCLOSILICATE 15 G: 10 POWDER, FOR SUSPENSION ORAL at 20:30

## 2022-01-01 RX ADMIN — AMLODIPINE BESYLATE 10 MG: 10 TABLET ORAL at 09:29

## 2022-01-01 RX ADMIN — SODIUM CHLORIDE, PRESERVATIVE FREE 91 ML: 5 INJECTION INTRAVENOUS at 08:17

## 2022-01-01 RX ADMIN — DICYCLOMINE HYDROCHLORIDE 20 MG: 20 TABLET ORAL at 09:00

## 2022-01-01 RX ADMIN — CARBOXYMETHYLCELLULOSE SODIUM 1 DROP: 5 SOLUTION/ DROPS OPHTHALMIC at 08:43

## 2022-01-01 RX ADMIN — Medication 1 PACKET: at 07:56

## 2022-01-01 RX ADMIN — VANCOMYCIN HYDROCHLORIDE 125 MG: 125 CAPSULE ORAL at 14:08

## 2022-01-01 RX ADMIN — DICYCLOMINE HYDROCHLORIDE 20 MG: 20 TABLET ORAL at 08:58

## 2022-01-01 RX ADMIN — METOCLOPRAMIDE 5 MG: 5 INJECTION, SOLUTION INTRAMUSCULAR; INTRAVENOUS at 15:29

## 2022-01-01 RX ADMIN — Medication 5 ML: at 08:58

## 2022-01-01 RX ADMIN — APIXABAN 5 MG: 5 TABLET, FILM COATED ORAL at 08:58

## 2022-01-01 RX ADMIN — DEXAMETHASONE SODIUM PHOSPHATE 4 MG: 4 INJECTION, SOLUTION INTRA-ARTICULAR; INTRALESIONAL; INTRAMUSCULAR; INTRAVENOUS; SOFT TISSUE at 09:58

## 2022-01-01 RX ADMIN — VANCOMYCIN HYDROCHLORIDE 125 MG: KIT at 08:18

## 2022-01-01 RX ADMIN — INSULIN ASPART 1 UNITS: 100 INJECTION, SOLUTION INTRAVENOUS; SUBCUTANEOUS at 13:01

## 2022-01-01 RX ADMIN — ESCITALOPRAM OXALATE 20 MG: 10 TABLET ORAL at 08:46

## 2022-01-01 RX ADMIN — Medication 40 MG: at 08:15

## 2022-01-01 RX ADMIN — DICYCLOMINE HYDROCHLORIDE 20 MG: 20 TABLET ORAL at 14:08

## 2022-01-01 RX ADMIN — PIPERACILLIN SODIUM AND TAZOBACTAM SODIUM 2.25 G: 2; .25 INJECTION, POWDER, LYOPHILIZED, FOR SOLUTION INTRAVENOUS at 21:39

## 2022-01-01 RX ADMIN — LACTULOSE 20 G: 10 SOLUTION ORAL at 19:59

## 2022-01-01 RX ADMIN — APIXABAN 5 MG: 5 TABLET, FILM COATED ORAL at 19:48

## 2022-01-01 RX ADMIN — APIXABAN 5 MG: 5 TABLET, FILM COATED ORAL at 07:56

## 2022-01-01 RX ADMIN — Medication 3 MG: at 23:30

## 2022-01-01 RX ADMIN — NYSTATIN 500000 UNITS: 100000 SUSPENSION ORAL at 19:40

## 2022-01-01 RX ADMIN — ESCITALOPRAM OXALATE 20 MG: 20 TABLET ORAL at 07:56

## 2022-01-01 RX ADMIN — DICYCLOMINE HYDROCHLORIDE 20 MG: 20 TABLET ORAL at 08:42

## 2022-01-01 RX ADMIN — DEXTROSE MONOHYDRATE 25 G: 25 INJECTION, SOLUTION INTRAVENOUS at 15:54

## 2022-01-01 RX ADMIN — ACETAMINOPHEN 650 MG: 325 TABLET, FILM COATED ORAL at 06:57

## 2022-01-01 RX ADMIN — INSULIN ASPART 1 UNITS: 100 INJECTION, SOLUTION INTRAVENOUS; SUBCUTANEOUS at 23:51

## 2022-01-01 RX ADMIN — VANCOMYCIN HYDROCHLORIDE 1500 MG: 10 INJECTION, POWDER, LYOPHILIZED, FOR SOLUTION INTRAVENOUS at 18:54

## 2022-01-01 RX ADMIN — ACETAMINOPHEN 650 MG: 325 TABLET, FILM COATED ORAL at 23:54

## 2022-01-01 RX ADMIN — PANTOPRAZOLE SODIUM 40 MG: 40 TABLET, DELAYED RELEASE ORAL at 11:30

## 2022-01-01 RX ADMIN — VANCOMYCIN HYDROCHLORIDE 125 MG: 125 CAPSULE ORAL at 20:09

## 2022-01-01 RX ADMIN — Medication 10 MG: at 23:54

## 2022-01-01 RX ADMIN — Medication 10 MG: at 01:32

## 2022-01-01 RX ADMIN — INSULIN ASPART 1 UNITS: 100 INJECTION, SOLUTION INTRAVENOUS; SUBCUTANEOUS at 19:59

## 2022-01-01 RX ADMIN — VANCOMYCIN HYDROCHLORIDE 125 MG: 125 CAPSULE ORAL at 17:02

## 2022-01-01 RX ADMIN — VANCOMYCIN HYDROCHLORIDE 125 MG: KIT at 21:38

## 2022-01-01 RX ADMIN — Medication: at 10:25

## 2022-01-01 RX ADMIN — INSULIN ASPART 2 UNITS: 100 INJECTION, SOLUTION INTRAVENOUS; SUBCUTANEOUS at 18:15

## 2022-01-01 RX ADMIN — LACTULOSE 20 G: 10 SOLUTION ORAL at 07:56

## 2022-01-01 RX ADMIN — HYDROMORPHONE HYDROCHLORIDE 0.3 MG: 1 INJECTION, SOLUTION INTRAMUSCULAR; INTRAVENOUS; SUBCUTANEOUS at 13:31

## 2022-01-01 RX ADMIN — ESCITALOPRAM OXALATE 20 MG: 20 TABLET ORAL at 07:53

## 2022-01-01 RX ADMIN — SODIUM BICARBONATE 1300 MG: 650 TABLET ORAL at 14:13

## 2022-01-01 RX ADMIN — ALBUMIN HUMAN 50 G: 0.25 SOLUTION INTRAVENOUS at 14:48

## 2022-01-01 RX ADMIN — ESCITALOPRAM OXALATE 20 MG: 20 TABLET ORAL at 08:42

## 2022-01-01 RX ADMIN — INSULIN ASPART 1 UNITS: 100 INJECTION, SOLUTION INTRAVENOUS; SUBCUTANEOUS at 12:58

## 2022-01-01 RX ADMIN — SUGAMMADEX 200 MG: 100 INJECTION, SOLUTION INTRAVENOUS at 17:24

## 2022-01-01 RX ADMIN — SODIUM BICARBONATE 1300 MG: 650 TABLET ORAL at 09:48

## 2022-01-01 RX ADMIN — SODIUM BICARBONATE 1300 MG: 650 TABLET ORAL at 20:44

## 2022-01-01 RX ADMIN — POTASSIUM CHLORIDE 40 MEQ: 1.5 POWDER, FOR SOLUTION ORAL at 09:49

## 2022-01-01 RX ADMIN — LACTULOSE 20 G: 10 SOLUTION ORAL at 20:59

## 2022-01-01 RX ADMIN — DICYCLOMINE HYDROCHLORIDE 20 MG: 20 TABLET ORAL at 20:09

## 2022-01-01 RX ADMIN — VANCOMYCIN HYDROCHLORIDE 125 MG: 125 CAPSULE ORAL at 16:51

## 2022-01-01 RX ADMIN — METOCLOPRAMIDE 5 MG: 5 INJECTION, SOLUTION INTRAMUSCULAR; INTRAVENOUS at 15:01

## 2022-01-01 RX ADMIN — SODIUM ZIRCONIUM CYCLOSILICATE 15 G: 10 POWDER, FOR SUSPENSION ORAL at 07:54

## 2022-01-01 RX ADMIN — INSULIN ASPART 1 UNITS: 100 INJECTION, SOLUTION INTRAVENOUS; SUBCUTANEOUS at 19:41

## 2022-01-01 RX ADMIN — ESCITALOPRAM OXALATE 20 MG: 20 TABLET ORAL at 08:58

## 2022-01-01 RX ADMIN — SODIUM BICARBONATE 1300 MG: 650 TABLET ORAL at 19:59

## 2022-01-01 RX ADMIN — VANCOMYCIN HYDROCHLORIDE 125 MG: KIT at 12:15

## 2022-01-01 RX ADMIN — DICYCLOMINE HYDROCHLORIDE 20 MG: 20 TABLET ORAL at 14:30

## 2022-01-01 RX ADMIN — APIXABAN 5 MG: 5 TABLET, FILM COATED ORAL at 20:05

## 2022-01-01 RX ADMIN — VANCOMYCIN HYDROCHLORIDE 125 MG: KIT at 15:31

## 2022-01-01 RX ADMIN — LACTULOSE 20 G: 10 SOLUTION ORAL at 11:30

## 2022-01-01 RX ADMIN — SODIUM CHLORIDE 250 ML: 9 INJECTION, SOLUTION INTRAVENOUS at 10:25

## 2022-01-01 RX ADMIN — APIXABAN 5 MG: 5 TABLET, FILM COATED ORAL at 20:09

## 2022-01-01 RX ADMIN — DICYCLOMINE HYDROCHLORIDE 20 MG: 20 TABLET ORAL at 14:24

## 2022-01-01 RX ADMIN — INSULIN ASPART 1 UNITS: 100 INJECTION, SOLUTION INTRAVENOUS; SUBCUTANEOUS at 12:32

## 2022-01-01 RX ADMIN — SODIUM ZIRCONIUM CYCLOSILICATE 15 G: 10 POWDER, FOR SUSPENSION ORAL at 15:58

## 2022-01-01 RX ADMIN — DEXTROSE MONOHYDRATE 25 G: 25 INJECTION, SOLUTION INTRAVENOUS at 20:19

## 2022-01-01 RX ADMIN — LACTULOSE 20 G: 10 SOLUTION ORAL at 20:45

## 2022-01-01 RX ADMIN — THIAMINE HCL TAB 100 MG 100 MG: 100 TAB at 07:56

## 2022-01-01 RX ADMIN — PHENYLEPHRINE HYDROCHLORIDE 100 MCG: 10 INJECTION INTRAVENOUS at 17:01

## 2022-01-01 RX ADMIN — LACTULOSE 20 G: 10 SOLUTION ORAL at 14:21

## 2022-01-01 RX ADMIN — ONDANSETRON 4 MG: 2 INJECTION INTRAMUSCULAR; INTRAVENOUS at 20:20

## 2022-01-01 RX ADMIN — HUMAN INSULIN 8.73 UNITS: 100 INJECTION, SOLUTION SUBCUTANEOUS at 20:19

## 2022-01-01 RX ADMIN — DICYCLOMINE HYDROCHLORIDE 20 MG: 20 TABLET ORAL at 20:15

## 2022-01-01 RX ADMIN — Medication 40 MG: at 08:57

## 2022-01-01 RX ADMIN — INSULIN ASPART 2 UNITS: 100 INJECTION, SOLUTION INTRAVENOUS; SUBCUTANEOUS at 14:12

## 2022-01-01 RX ADMIN — LIDOCAINE HYDROCHLORIDE 1 TUBE: 20 JELLY TOPICAL at 14:23

## 2022-01-01 RX ADMIN — LACTULOSE 20 G: 10 SOLUTION ORAL at 13:41

## 2022-01-01 RX ADMIN — CEFTRIAXONE SODIUM 2000 MG: 2 INJECTION, POWDER, FOR SOLUTION INTRAMUSCULAR; INTRAVENOUS at 17:02

## 2022-01-01 RX ADMIN — ACETAMINOPHEN 650 MG: 325 TABLET, FILM COATED ORAL at 15:44

## 2022-01-01 RX ADMIN — ACETAMINOPHEN 650 MG: 325 TABLET, FILM COATED ORAL at 11:55

## 2022-01-01 RX ADMIN — APIXABAN 5 MG: 5 TABLET, FILM COATED ORAL at 19:52

## 2022-01-01 RX ADMIN — ALBUMIN HUMAN 25 G: 0.25 SOLUTION INTRAVENOUS at 13:41

## 2022-01-01 RX ADMIN — ACETAMINOPHEN 650 MG: 325 TABLET, FILM COATED ORAL at 20:46

## 2022-01-01 RX ADMIN — SUGAMMADEX 200 MG: 100 INJECTION, SOLUTION INTRAVENOUS at 10:04

## 2022-01-01 RX ADMIN — FUROSEMIDE 40 MG: 10 INJECTION, SOLUTION INTRAVENOUS at 15:59

## 2022-01-01 RX ADMIN — SODIUM BICARBONATE 1300 MG: 650 TABLET ORAL at 07:53

## 2022-01-01 RX ADMIN — LIDOCAINE HYDROCHLORIDE 10 ML: 10 INJECTION, SOLUTION EPIDURAL; INFILTRATION; INTRACAUDAL; PERINEURAL at 14:02

## 2022-01-01 RX ADMIN — DICYCLOMINE HYDROCHLORIDE 20 MG: 20 TABLET ORAL at 11:31

## 2022-01-01 RX ADMIN — THIAMINE HCL TAB 100 MG 100 MG: 100 TAB at 08:17

## 2022-01-01 RX ADMIN — DICYCLOMINE HYDROCHLORIDE 20 MG: 20 TABLET ORAL at 01:53

## 2022-01-01 RX ADMIN — LEVOFLOXACIN 500 MG: 5 INJECTION, SOLUTION INTRAVENOUS at 08:15

## 2022-01-01 RX ADMIN — SODIUM ZIRCONIUM CYCLOSILICATE 15 G: 10 POWDER, FOR SUSPENSION ORAL at 08:47

## 2022-01-01 RX ADMIN — ONDANSETRON 4 MG: 2 INJECTION INTRAMUSCULAR; INTRAVENOUS at 20:43

## 2022-01-01 RX ADMIN — HUMAN INSULIN 8.73 UNITS: 100 INJECTION, SOLUTION SUBCUTANEOUS at 16:04

## 2022-01-01 RX ADMIN — SODIUM BICARBONATE 1300 MG: 650 TABLET ORAL at 20:04

## 2022-01-01 RX ADMIN — PANTOPRAZOLE SODIUM 40 MG: 40 TABLET, DELAYED RELEASE ORAL at 08:58

## 2022-01-01 RX ADMIN — ALBUMIN HUMAN 12.5 G: 0.05 INJECTION, SOLUTION INTRAVENOUS at 14:56

## 2022-01-01 RX ADMIN — AMLODIPINE BESYLATE 10 MG: 10 TABLET ORAL at 07:54

## 2022-01-01 RX ADMIN — APIXABAN 5 MG: 5 TABLET, FILM COATED ORAL at 20:44

## 2022-01-01 RX ADMIN — INSULIN ASPART 1 UNITS: 100 INJECTION, SOLUTION INTRAVENOUS; SUBCUTANEOUS at 04:04

## 2022-01-01 RX ADMIN — POTASSIUM CHLORIDE 40 MEQ: 1.5 POWDER, FOR SOLUTION ORAL at 19:48

## 2022-01-01 RX ADMIN — APIXABAN 5 MG: 5 TABLET, FILM COATED ORAL at 08:42

## 2022-01-01 RX ADMIN — GLUCAGON HYDROCHLORIDE 0.4 MG: KIT at 11:47

## 2022-01-01 RX ADMIN — NYSTATIN 500000 UNITS: 100000 SUSPENSION ORAL at 08:17

## 2022-01-01 RX ADMIN — VANCOMYCIN HYDROCHLORIDE 125 MG: 125 CAPSULE ORAL at 20:05

## 2022-01-01 RX ADMIN — INSULIN ASPART 1 UNITS: 100 INJECTION, SOLUTION INTRAVENOUS; SUBCUTANEOUS at 23:37

## 2022-01-01 RX ADMIN — Medication 5 MG: at 16:57

## 2022-01-01 RX ADMIN — ACETAMINOPHEN 650 MG: 325 TABLET, FILM COATED ORAL at 01:29

## 2022-01-01 RX ADMIN — ESCITALOPRAM OXALATE 20 MG: 10 TABLET ORAL at 07:42

## 2022-01-01 RX ADMIN — IOPAMIDOL 124 ML: 755 INJECTION, SOLUTION INTRAVENOUS at 08:18

## 2022-01-01 RX ADMIN — LACTULOSE 20 G: 10 SOLUTION ORAL at 13:34

## 2022-01-01 RX ADMIN — POLYETHYLENE GLYCOL 3350 17 G: 17 POWDER, FOR SOLUTION ORAL at 10:35

## 2022-01-01 RX ADMIN — DICYCLOMINE HYDROCHLORIDE 20 MG: 20 TABLET ORAL at 19:40

## 2022-01-01 RX ADMIN — ALBUMIN HUMAN 250 ML: 0.05 INJECTION, SOLUTION INTRAVENOUS at 11:25

## 2022-01-01 RX ADMIN — DEXAMETHASONE SODIUM PHOSPHATE 10 MG: 4 INJECTION, SOLUTION INTRA-ARTICULAR; INTRALESIONAL; INTRAMUSCULAR; INTRAVENOUS; SOFT TISSUE at 07:58

## 2022-01-01 RX ADMIN — PRAVASTATIN SODIUM 40 MG: 20 TABLET ORAL at 08:59

## 2022-01-01 RX ADMIN — PIPERACILLIN SODIUM AND TAZOBACTAM SODIUM 2.25 G: 2; .25 INJECTION, POWDER, LYOPHILIZED, FOR SOLUTION INTRAVENOUS at 03:53

## 2022-01-01 RX ADMIN — SODIUM BICARBONATE 1300 MG: 650 TABLET ORAL at 14:24

## 2022-01-01 RX ADMIN — DICYCLOMINE HYDROCHLORIDE 20 MG: 20 TABLET ORAL at 08:38

## 2022-01-01 RX ADMIN — ALBUMIN HUMAN 25 G: 0.05 INJECTION, SOLUTION INTRAVENOUS at 00:27

## 2022-01-01 RX ADMIN — APIXABAN 5 MG: 5 TABLET, FILM COATED ORAL at 09:00

## 2022-01-01 RX ADMIN — VANCOMYCIN HYDROCHLORIDE 125 MG: 125 CAPSULE ORAL at 16:44

## 2022-01-01 RX ADMIN — SODIUM BICARBONATE 1300 MG: 650 TABLET ORAL at 14:21

## 2022-01-01 RX ADMIN — DICYCLOMINE HYDROCHLORIDE 20 MG: 20 TABLET ORAL at 08:59

## 2022-01-01 RX ADMIN — GLYCOPYRROLATE 0.2 MG: 0.2 INJECTION INTRAMUSCULAR; INTRAVENOUS at 13:30

## 2022-01-01 RX ADMIN — DICYCLOMINE HYDROCHLORIDE 20 MG: 20 TABLET ORAL at 13:34

## 2022-01-01 RX ADMIN — LACTULOSE 20 G: 10 SOLUTION ORAL at 08:59

## 2022-01-01 RX ADMIN — CEFTRIAXONE SODIUM 1 G: 1 INJECTION, POWDER, FOR SOLUTION INTRAMUSCULAR; INTRAVENOUS at 19:59

## 2022-01-01 RX ADMIN — APIXABAN 5 MG: 5 TABLET, FILM COATED ORAL at 08:59

## 2022-01-01 RX ADMIN — ONDANSETRON 4 MG: 2 INJECTION INTRAMUSCULAR; INTRAVENOUS at 16:07

## 2022-01-01 RX ADMIN — Medication 10 MG: at 23:53

## 2022-01-01 RX ADMIN — SODIUM BICARBONATE 1300 MG: 650 TABLET ORAL at 08:59

## 2022-01-01 RX ADMIN — NYSTATIN 500000 UNITS: 100000 SUSPENSION ORAL at 19:52

## 2022-01-01 RX ADMIN — PIPERACILLIN SODIUM AND TAZOBACTAM SODIUM 2.25 G: 2; .25 INJECTION, POWDER, LYOPHILIZED, FOR SOLUTION INTRAVENOUS at 10:00

## 2022-01-01 RX ADMIN — SODIUM ZIRCONIUM CYCLOSILICATE 15 G: 10 POWDER, FOR SUSPENSION ORAL at 13:26

## 2022-01-01 RX ADMIN — PANTOPRAZOLE SODIUM 40 MG: 40 TABLET, DELAYED RELEASE ORAL at 07:53

## 2022-01-01 RX ADMIN — CEFTRIAXONE SODIUM 1 G: 1 INJECTION, POWDER, FOR SOLUTION INTRAMUSCULAR; INTRAVENOUS at 20:46

## 2022-01-01 RX ADMIN — SUGAMMADEX 200 MG: 100 INJECTION, SOLUTION INTRAVENOUS at 12:03

## 2022-01-01 RX ADMIN — PRAVASTATIN SODIUM 40 MG: 20 TABLET ORAL at 07:53

## 2022-01-01 RX ADMIN — THIAMINE HCL TAB 100 MG 100 MG: 100 TAB at 18:24

## 2022-01-01 RX ADMIN — LACTULOSE 20 G: 10 SOLUTION ORAL at 13:39

## 2022-01-01 RX ADMIN — PROPOFOL 20 MG: 10 INJECTION, EMULSION INTRAVENOUS at 09:36

## 2022-01-01 RX ADMIN — MIDODRINE HYDROCHLORIDE 10 MG: 5 TABLET ORAL at 10:35

## 2022-01-01 RX ADMIN — APIXABAN 5 MG: 5 TABLET, FILM COATED ORAL at 20:15

## 2022-01-01 RX ADMIN — ROCURONIUM BROMIDE 50 MG: 50 INJECTION, SOLUTION INTRAVENOUS at 07:59

## 2022-01-01 RX ADMIN — VANCOMYCIN HYDROCHLORIDE 125 MG: 125 CAPSULE ORAL at 22:06

## 2022-01-01 RX ADMIN — ESCITALOPRAM OXALATE 20 MG: 20 TABLET ORAL at 09:47

## 2022-01-01 RX ADMIN — DEXTROSE MONOHYDRATE 300 ML: 100 INJECTION, SOLUTION INTRAVENOUS at 16:02

## 2022-01-01 RX ADMIN — ACETAMINOPHEN 650 MG: 325 TABLET, FILM COATED ORAL at 23:02

## 2022-01-01 RX ADMIN — DICYCLOMINE HYDROCHLORIDE 20 MG: 20 TABLET ORAL at 14:35

## 2022-01-01 RX ADMIN — VANCOMYCIN HYDROCHLORIDE 125 MG: 125 CAPSULE ORAL at 11:30

## 2022-01-01 RX ADMIN — ROCURONIUM BROMIDE 50 MG: 50 INJECTION, SOLUTION INTRAVENOUS at 15:41

## 2022-01-01 RX ADMIN — SODIUM BICARBONATE 1300 MG: 650 TABLET ORAL at 08:41

## 2022-01-01 RX ADMIN — PIPERACILLIN SODIUM AND TAZOBACTAM SODIUM 2.25 G: 2; .25 INJECTION, POWDER, LYOPHILIZED, FOR SOLUTION INTRAVENOUS at 19:52

## 2022-01-01 RX ADMIN — SODIUM BICARBONATE 1300 MG: 650 TABLET ORAL at 11:30

## 2022-01-01 RX ADMIN — SODIUM CHLORIDE 300 ML: 9 INJECTION, SOLUTION INTRAVENOUS at 10:25

## 2022-01-01 RX ADMIN — Medication 10 MG: at 09:10

## 2022-01-01 RX ADMIN — PIPERACILLIN SODIUM AND TAZOBACTAM SODIUM 2.25 G: 2; .25 INJECTION, POWDER, LYOPHILIZED, FOR SOLUTION INTRAVENOUS at 01:13

## 2022-01-01 RX ADMIN — LACTULOSE 20 G: 10 SOLUTION ORAL at 08:42

## 2022-01-01 RX ADMIN — ALBUMIN HUMAN 50 G: 0.25 SOLUTION INTRAVENOUS at 16:20

## 2022-01-01 RX ADMIN — PRAVASTATIN SODIUM 40 MG: 20 TABLET ORAL at 11:30

## 2022-01-01 RX ADMIN — AMLODIPINE BESYLATE 10 MG: 10 TABLET ORAL at 08:46

## 2022-01-01 RX ADMIN — DICYCLOMINE HYDROCHLORIDE 20 MG: 20 TABLET ORAL at 13:41

## 2022-01-01 RX ADMIN — FUROSEMIDE 40 MG: 10 INJECTION, SOLUTION INTRAVENOUS at 02:53

## 2022-01-01 RX ADMIN — Medication 1 PACKET: at 08:19

## 2022-01-01 RX ADMIN — LACTULOSE 20 G: 10 SOLUTION ORAL at 20:05

## 2022-01-01 RX ADMIN — PROPOFOL 100 MG: 10 INJECTION, EMULSION INTRAVENOUS at 15:41

## 2022-01-01 RX ADMIN — LIDOCAINE PATCH 4% 1 PATCH: 40 PATCH TOPICAL at 20:15

## 2022-01-01 RX ADMIN — PRAVASTATIN SODIUM 40 MG: 20 TABLET ORAL at 08:42

## 2022-01-01 RX ADMIN — VANCOMYCIN HYDROCHLORIDE 125 MG: KIT at 12:58

## 2022-01-01 RX ADMIN — LACTULOSE 20 G: 10 SOLUTION ORAL at 08:58

## 2022-01-01 RX ADMIN — AMLODIPINE BESYLATE 10 MG: 10 TABLET ORAL at 07:59

## 2022-01-01 RX ADMIN — NYSTATIN 500000 UNITS: 100000 SUSPENSION ORAL at 07:56

## 2022-01-01 RX ADMIN — NYSTATIN 500000 UNITS: 100000 SUSPENSION ORAL at 12:16

## 2022-01-01 RX ADMIN — ROCURONIUM BROMIDE 50 MG: 50 INJECTION, SOLUTION INTRAVENOUS at 09:58

## 2022-01-01 RX ADMIN — MEROPENEM 500 MG: 500 INJECTION, POWDER, FOR SOLUTION INTRAVENOUS at 20:29

## 2022-01-01 RX ADMIN — PROPOFOL 150 MG: 10 INJECTION, EMULSION INTRAVENOUS at 07:58

## 2022-01-01 RX ADMIN — SODIUM ZIRCONIUM CYCLOSILICATE 15 G: 10 POWDER, FOR SUSPENSION ORAL at 12:38

## 2022-01-01 RX ADMIN — VANCOMYCIN HYDROCHLORIDE 125 MG: KIT at 08:57

## 2022-01-01 RX ADMIN — FENTANYL CITRATE 100 MCG: 50 INJECTION, SOLUTION INTRAMUSCULAR; INTRAVENOUS at 15:41

## 2022-01-01 RX ADMIN — PANTOPRAZOLE SODIUM 40 MG: 40 TABLET, DELAYED RELEASE ORAL at 09:47

## 2022-01-01 RX ADMIN — Medication 40 MG: at 08:39

## 2022-01-01 RX ADMIN — VANCOMYCIN HYDROCHLORIDE 125 MG: KIT at 19:53

## 2022-01-01 RX ADMIN — AMLODIPINE BESYLATE 10 MG: 10 TABLET ORAL at 09:47

## 2022-01-01 RX ADMIN — SODIUM BICARBONATE 1300 MG: 650 TABLET ORAL at 14:08

## 2022-01-01 RX ADMIN — LACTULOSE 20 G: 10 SOLUTION ORAL at 08:10

## 2022-01-01 RX ADMIN — LORAZEPAM 1 MG: 2 INJECTION INTRAMUSCULAR; INTRAVENOUS at 13:32

## 2022-01-01 RX ADMIN — DICYCLOMINE HYDROCHLORIDE 20 MG: 20 TABLET ORAL at 19:52

## 2022-01-01 RX ADMIN — LACTULOSE 20 G: 10 SOLUTION ORAL at 20:09

## 2022-01-01 RX ADMIN — SODIUM CHLORIDE 300 ML: 9 INJECTION, SOLUTION INTRAVENOUS at 16:42

## 2022-01-01 RX ADMIN — PIPERACILLIN SODIUM AND TAZOBACTAM SODIUM 2.25 G: 2; .25 INJECTION, POWDER, LYOPHILIZED, FOR SOLUTION INTRAVENOUS at 16:25

## 2022-01-01 RX ADMIN — PIPERACILLIN SODIUM AND TAZOBACTAM SODIUM 2.25 G: 2; .25 INJECTION, POWDER, LYOPHILIZED, FOR SOLUTION INTRAVENOUS at 08:15

## 2022-01-01 RX ADMIN — DICYCLOMINE HYDROCHLORIDE 20 MG: 20 TABLET ORAL at 20:04

## 2022-01-01 RX ADMIN — IOPAMIDOL 110 ML: 755 INJECTION, SOLUTION INTRAVASCULAR at 10:40

## 2022-01-01 RX ADMIN — DICYCLOMINE HYDROCHLORIDE 20 MG: 20 TABLET ORAL at 20:01

## 2022-01-01 RX ADMIN — PROPOFOL 120 MG: 10 INJECTION, EMULSION INTRAVENOUS at 09:57

## 2022-01-01 RX ADMIN — VANCOMYCIN HYDROCHLORIDE 125 MG: 125 CAPSULE ORAL at 09:48

## 2022-01-01 RX ADMIN — CEFTRIAXONE SODIUM 2000 MG: 2 INJECTION, POWDER, FOR SOLUTION INTRAMUSCULAR; INTRAVENOUS at 17:56

## 2022-01-01 RX ADMIN — ACETAMINOPHEN 650 MG: 325 TABLET, FILM COATED ORAL at 21:34

## 2022-01-01 RX ADMIN — ESCITALOPRAM OXALATE 20 MG: 20 TABLET ORAL at 08:59

## 2022-01-01 RX ADMIN — APIXABAN 5 MG: 5 TABLET, FILM COATED ORAL at 20:00

## 2022-01-01 RX ADMIN — PRAVASTATIN SODIUM 40 MG: 20 TABLET ORAL at 07:56

## 2022-01-01 RX ADMIN — ROCURONIUM BROMIDE 20 MG: 50 INJECTION, SOLUTION INTRAVENOUS at 09:36

## 2022-01-01 RX ADMIN — PANTOPRAZOLE SODIUM 40 MG: 40 TABLET, DELAYED RELEASE ORAL at 08:42

## 2022-01-01 RX ADMIN — AMLODIPINE BESYLATE 10 MG: 10 TABLET ORAL at 08:59

## 2022-01-01 RX ADMIN — PROPOFOL 30 MG: 10 INJECTION, EMULSION INTRAVENOUS at 08:53

## 2022-01-01 RX ADMIN — ACETAMINOPHEN 650 MG: 325 TABLET, FILM COATED ORAL at 20:17

## 2022-01-01 RX ADMIN — PROPOFOL 30 MG: 10 INJECTION, EMULSION INTRAVENOUS at 10:02

## 2022-01-01 RX ADMIN — PRAVASTATIN SODIUM 40 MG: 20 TABLET ORAL at 08:16

## 2022-01-01 RX ADMIN — LACTULOSE 20 G: 10 SOLUTION ORAL at 09:46

## 2022-01-01 RX ADMIN — LACTULOSE 10 G: 20 SOLUTION ORAL at 19:09

## 2022-01-01 RX ADMIN — INSULIN ASPART 1 UNITS: 100 INJECTION, SOLUTION INTRAVENOUS; SUBCUTANEOUS at 07:59

## 2022-01-01 RX ADMIN — MIDODRINE HYDROCHLORIDE 10 MG: 5 TABLET ORAL at 10:56

## 2022-01-01 RX ADMIN — ALBUMIN HUMAN 50 G: 0.25 SOLUTION INTRAVENOUS at 17:47

## 2022-01-01 RX ADMIN — ESCITALOPRAM OXALATE 20 MG: 20 TABLET ORAL at 08:14

## 2022-01-01 RX ADMIN — LIDOCAINE HYDROCHLORIDE 5 ML: 20 SOLUTION ORAL; TOPICAL at 15:31

## 2022-01-01 RX ADMIN — LACTULOSE 20 G: 10 SOLUTION ORAL at 19:40

## 2022-01-01 RX ADMIN — SODIUM CHLORIDE 300 ML: 9 INJECTION, SOLUTION INTRAVENOUS at 11:25

## 2022-01-01 RX ADMIN — Medication: at 16:43

## 2022-01-01 RX ADMIN — FENTANYL CITRATE 100 MCG: 50 INJECTION, SOLUTION INTRAMUSCULAR; INTRAVENOUS at 09:56

## 2022-01-01 RX ADMIN — APIXABAN 5 MG: 5 TABLET, FILM COATED ORAL at 11:30

## 2022-01-01 RX ADMIN — ROCURONIUM BROMIDE 10 MG: 50 INJECTION, SOLUTION INTRAVENOUS at 16:30

## 2022-01-01 RX ADMIN — CALCIUM GLUCONATE 1 G: 20 INJECTION, SOLUTION INTRAVENOUS at 15:59

## 2022-01-01 RX ADMIN — FENTANYL CITRATE 100 MCG: 50 INJECTION, SOLUTION INTRAMUSCULAR; INTRAVENOUS at 07:57

## 2022-01-01 RX ADMIN — LACTULOSE 20 G: 10 SOLUTION ORAL at 14:29

## 2022-01-01 RX ADMIN — ONDANSETRON 4 MG: 2 INJECTION INTRAMUSCULAR; INTRAVENOUS at 10:04

## 2022-01-01 RX ADMIN — AMLODIPINE BESYLATE 10 MG: 10 TABLET ORAL at 07:43

## 2022-01-01 RX ADMIN — POTASSIUM CHLORIDE 40 MEQ: 1.5 POWDER, FOR SOLUTION ORAL at 17:12

## 2022-01-01 RX ADMIN — PIPERACILLIN SODIUM AND TAZOBACTAM SODIUM 2.25 G: 2; .25 INJECTION, POWDER, LYOPHILIZED, FOR SOLUTION INTRAVENOUS at 12:16

## 2022-01-01 RX ADMIN — PRAVASTATIN SODIUM 40 MG: 20 TABLET ORAL at 09:47

## 2022-01-01 RX ADMIN — VANCOMYCIN HYDROCHLORIDE 125 MG: 125 CAPSULE ORAL at 18:01

## 2022-01-01 RX ADMIN — ACETAMINOPHEN 650 MG: 325 TABLET, FILM COATED ORAL at 12:58

## 2022-01-01 RX ADMIN — LIDOCAINE HYDROCHLORIDE 50 MG: 10 INJECTION, SOLUTION EPIDURAL; INFILTRATION; INTRACAUDAL; PERINEURAL at 15:13

## 2022-01-01 RX ADMIN — SODIUM BICARBONATE 1300 MG: 650 TABLET ORAL at 20:09

## 2022-01-01 RX ADMIN — PIPERACILLIN SODIUM AND TAZOBACTAM SODIUM 2.25 G: 2; .25 INJECTION, POWDER, LYOPHILIZED, FOR SOLUTION INTRAVENOUS at 22:05

## 2022-01-01 RX ADMIN — LACTULOSE 10 G: 20 SOLUTION ORAL at 07:55

## 2022-01-01 RX ADMIN — SODIUM BICARBONATE 50 MEQ: 84 INJECTION INTRAVENOUS at 15:58

## 2022-01-01 RX ADMIN — ACETAMINOPHEN 650 MG: 325 TABLET, FILM COATED ORAL at 06:28

## 2022-01-01 RX ADMIN — SODIUM BICARBONATE 1300 MG: 650 TABLET ORAL at 16:20

## 2022-01-01 RX ADMIN — HYDROMORPHONE HYDROCHLORIDE 0.3 MG: 1 INJECTION, SOLUTION INTRAMUSCULAR; INTRAVENOUS; SUBCUTANEOUS at 15:53

## 2022-01-01 RX ADMIN — PIPERACILLIN SODIUM AND TAZOBACTAM SODIUM 2.25 G: 2; .25 INJECTION, POWDER, LYOPHILIZED, FOR SOLUTION INTRAVENOUS at 14:34

## 2022-01-01 RX ADMIN — SODIUM BICARBONATE 1300 MG: 650 TABLET ORAL at 21:00

## 2022-01-01 RX ADMIN — VANCOMYCIN HYDROCHLORIDE 125 MG: 125 CAPSULE ORAL at 08:59

## 2022-01-01 RX ADMIN — SODIUM ZIRCONIUM CYCLOSILICATE 15 G: 10 POWDER, FOR SUSPENSION ORAL at 13:01

## 2022-01-01 RX ADMIN — SODIUM ZIRCONIUM CYCLOSILICATE 15 G: 10 POWDER, FOR SUSPENSION ORAL at 19:09

## 2022-01-01 RX ADMIN — APIXABAN 5 MG: 5 TABLET, FILM COATED ORAL at 09:47

## 2022-01-01 RX ADMIN — SODIUM BICARBONATE 1300 MG: 650 TABLET ORAL at 14:35

## 2022-01-01 RX ADMIN — DICYCLOMINE HYDROCHLORIDE 20 MG: 20 TABLET ORAL at 07:54

## 2022-01-01 RX ADMIN — ALBUMIN HUMAN 25 G: 0.05 INJECTION, SOLUTION INTRAVENOUS at 14:13

## 2022-01-01 RX ADMIN — VANCOMYCIN HYDROCHLORIDE 125 MG: KIT at 20:15

## 2022-01-01 RX ADMIN — NYSTATIN 500000 UNITS: 100000 SUSPENSION ORAL at 16:56

## 2022-01-01 RX ADMIN — APIXABAN 5 MG: 5 TABLET, FILM COATED ORAL at 01:52

## 2022-01-01 RX ADMIN — INSULIN ASPART 1 UNITS: 100 INJECTION, SOLUTION INTRAVENOUS; SUBCUTANEOUS at 08:43

## 2022-01-01 RX ADMIN — Medication 1 PACKET: at 08:59

## 2022-01-01 RX ADMIN — ESCITALOPRAM OXALATE 20 MG: 10 TABLET ORAL at 07:54

## 2022-01-01 RX ADMIN — APIXABAN 5 MG: 5 TABLET, FILM COATED ORAL at 21:56

## 2022-01-01 RX ADMIN — Medication 5 ML: at 08:15

## 2022-01-01 RX ADMIN — NYSTATIN 500000 UNITS: 100000 SUSPENSION ORAL at 16:25

## 2022-01-01 RX ADMIN — VANCOMYCIN HYDROCHLORIDE 125 MG: KIT at 17:02

## 2022-01-01 RX ADMIN — ESCITALOPRAM OXALATE 20 MG: 10 TABLET ORAL at 07:59

## 2022-01-01 RX ADMIN — DICYCLOMINE HYDROCHLORIDE 20 MG: 20 TABLET ORAL at 21:00

## 2022-01-01 RX ADMIN — INSULIN ASPART 1 UNITS: 100 INJECTION, SOLUTION INTRAVENOUS; SUBCUTANEOUS at 15:31

## 2022-01-01 RX ADMIN — Medication 5 ML: at 18:24

## 2022-01-01 RX ADMIN — VANCOMYCIN HYDROCHLORIDE 125 MG: 125 CAPSULE ORAL at 12:06

## 2022-01-01 RX ADMIN — NYSTATIN 500000 UNITS: 100000 SUSPENSION ORAL at 08:18

## 2022-01-01 RX ADMIN — INSULIN ASPART 1 UNITS: 100 INJECTION, SOLUTION INTRAVENOUS; SUBCUTANEOUS at 04:45

## 2022-01-01 RX ADMIN — SODIUM CHLORIDE, POTASSIUM CHLORIDE, SODIUM LACTATE AND CALCIUM CHLORIDE: 600; 310; 30; 20 INJECTION, SOLUTION INTRAVENOUS at 07:55

## 2022-01-01 RX ADMIN — PIPERACILLIN SODIUM AND TAZOBACTAM SODIUM 2.25 G: 2; .25 INJECTION, POWDER, LYOPHILIZED, FOR SOLUTION INTRAVENOUS at 04:04

## 2022-01-01 RX ADMIN — VANCOMYCIN HYDROCHLORIDE 125 MG: 125 CAPSULE ORAL at 20:44

## 2022-01-01 RX ADMIN — DICYCLOMINE HYDROCHLORIDE 20 MG: 20 TABLET ORAL at 20:44

## 2022-01-01 RX ADMIN — INSULIN ASPART 2 UNITS: 100 INJECTION, SOLUTION INTRAVENOUS; SUBCUTANEOUS at 17:30

## 2022-01-01 RX ADMIN — PIPERACILLIN SODIUM AND TAZOBACTAM SODIUM 2.25 G: 2; .25 INJECTION, POWDER, LYOPHILIZED, FOR SOLUTION INTRAVENOUS at 04:02

## 2022-01-01 RX ADMIN — ESCITALOPRAM OXALATE 20 MG: 20 TABLET ORAL at 11:30

## 2022-01-01 RX ADMIN — LIDOCAINE HYDROCHLORIDE 100 MG: 20 INJECTION, SOLUTION INFILTRATION; PERINEURAL at 15:41

## 2022-01-01 RX ADMIN — APIXABAN 5 MG: 5 TABLET, FILM COATED ORAL at 07:43

## 2022-01-01 RX ADMIN — LACTULOSE 20 G: 10 SOLUTION ORAL at 08:15

## 2022-01-01 ASSESSMENT — ACTIVITIES OF DAILY LIVING (ADL)
ADLS_ACUITY_SCORE: 34
ADLS_ACUITY_SCORE: 26
ADLS_ACUITY_SCORE: 35
ADLS_ACUITY_SCORE: 32
ADLS_ACUITY_SCORE: 40
ADLS_ACUITY_SCORE: 25
ADLS_ACUITY_SCORE: 36
ADLS_ACUITY_SCORE: 40
ADLS_ACUITY_SCORE: 29
ADLS_ACUITY_SCORE: 36
TOILETING_ISSUES: NO
ADLS_ACUITY_SCORE: 32
ADLS_ACUITY_SCORE: 42
ADLS_ACUITY_SCORE: 36
ADLS_ACUITY_SCORE: 33
ADLS_ACUITY_SCORE: 34
ADLS_ACUITY_SCORE: 25
ADLS_ACUITY_SCORE: 29
CONCENTRATING,_REMEMBERING_OR_MAKING_DECISIONS_DIFFICULTY: NO
ADLS_ACUITY_SCORE: 37
ADLS_ACUITY_SCORE: 25
ADLS_ACUITY_SCORE: 32
ADLS_ACUITY_SCORE: 35
DIFFICULTY_EATING/SWALLOWING: NO
ADLS_ACUITY_SCORE: 32
WEAR_GLASSES_OR_BLIND: YES
ADLS_ACUITY_SCORE: 25
ADLS_ACUITY_SCORE: 26
ADLS_ACUITY_SCORE: 35
ADLS_ACUITY_SCORE: 25
ADLS_ACUITY_SCORE: 32
ADLS_ACUITY_SCORE: 25
ADLS_ACUITY_SCORE: 29
ADLS_ACUITY_SCORE: 34
ADLS_ACUITY_SCORE: 42
ADLS_ACUITY_SCORE: 36
DRESSING/BATHING_DIFFICULTY: NO
NUMBER_OF_TIMES_PATIENT_HAS_FALLEN_WITHIN_LAST_SIX_MONTHS: 3
ADLS_ACUITY_SCORE: 25
ADLS_ACUITY_SCORE: 29
ADLS_ACUITY_SCORE: 26
ADLS_ACUITY_SCORE: 33
ADLS_ACUITY_SCORE: 41
ADLS_ACUITY_SCORE: 35
ADLS_ACUITY_SCORE: 30
ADLS_ACUITY_SCORE: 37
ADLS_ACUITY_SCORE: 25
ADLS_ACUITY_SCORE: 32
ADLS_ACUITY_SCORE: 37
ADLS_ACUITY_SCORE: 32
ADLS_ACUITY_SCORE: 32
ADLS_ACUITY_SCORE: 25
ADLS_ACUITY_SCORE: 37
ADLS_ACUITY_SCORE: 25
ADLS_ACUITY_SCORE: 35
ADLS_ACUITY_SCORE: 33
ADLS_ACUITY_SCORE: 34
ADLS_ACUITY_SCORE: 31
ADLS_ACUITY_SCORE: 31
ADLS_ACUITY_SCORE: 40
ADLS_ACUITY_SCORE: 41
ADLS_ACUITY_SCORE: 31
ADLS_ACUITY_SCORE: 32
ADLS_ACUITY_SCORE: 40
ADLS_ACUITY_SCORE: 37
ADLS_ACUITY_SCORE: 29
ADLS_ACUITY_SCORE: 37
ADLS_ACUITY_SCORE: 32
ADLS_ACUITY_SCORE: 37
ADLS_ACUITY_SCORE: 35
ADLS_ACUITY_SCORE: 35
DEPENDENT_IADLS:: TRANSPORTATION;COOKING;CLEANING;LAUNDRY
ADLS_ACUITY_SCORE: 34
ADLS_ACUITY_SCORE: 33
ADLS_ACUITY_SCORE: 33
ADLS_ACUITY_SCORE: 32
ADLS_ACUITY_SCORE: 25
ADLS_ACUITY_SCORE: 35
ADLS_ACUITY_SCORE: 32
ADLS_ACUITY_SCORE: 35
ADLS_ACUITY_SCORE: 40
ADLS_ACUITY_SCORE: 35
ADLS_ACUITY_SCORE: 35
ADLS_ACUITY_SCORE: 36
ADLS_ACUITY_SCORE: 26
ADLS_ACUITY_SCORE: 32
ADLS_ACUITY_SCORE: 25
ADLS_ACUITY_SCORE: 36
ADLS_ACUITY_SCORE: 37
ADLS_ACUITY_SCORE: 32
ADLS_ACUITY_SCORE: 36
ADLS_ACUITY_SCORE: 35
CHANGE_IN_FUNCTIONAL_STATUS_SINCE_ONSET_OF_CURRENT_ILLNESS/INJURY: NO
ADLS_ACUITY_SCORE: 25
ADLS_ACUITY_SCORE: 31
ADLS_ACUITY_SCORE: 37
ADLS_ACUITY_SCORE: 36
ADLS_ACUITY_SCORE: 33
ADLS_ACUITY_SCORE: 42
ADLS_ACUITY_SCORE: 29
FALL_HISTORY_WITHIN_LAST_SIX_MONTHS: YES
ADLS_ACUITY_SCORE: 29
ADLS_ACUITY_SCORE: 31
ADLS_ACUITY_SCORE: 33
ADLS_ACUITY_SCORE: 35
ADLS_ACUITY_SCORE: 32
ADLS_ACUITY_SCORE: 32
ADLS_ACUITY_SCORE: 35
ADLS_ACUITY_SCORE: 25
ADLS_ACUITY_SCORE: 36
ADLS_ACUITY_SCORE: 29
ADLS_ACUITY_SCORE: 37
ADLS_ACUITY_SCORE: 50
ADLS_ACUITY_SCORE: 34
ADLS_ACUITY_SCORE: 39
VISION_MANAGEMENT: GLASSSES
ADLS_ACUITY_SCORE: 33
ADLS_ACUITY_SCORE: 35
ADLS_ACUITY_SCORE: 33
ADLS_ACUITY_SCORE: 33
ADLS_ACUITY_SCORE: 26
ADLS_ACUITY_SCORE: 36
ADLS_ACUITY_SCORE: 33
ADLS_ACUITY_SCORE: 25
ADLS_ACUITY_SCORE: 35
ADLS_ACUITY_SCORE: 25
ADLS_ACUITY_SCORE: 29
ADLS_ACUITY_SCORE: 33
ADLS_ACUITY_SCORE: 29
ADLS_ACUITY_SCORE: 33
ADLS_ACUITY_SCORE: 35
ADLS_ACUITY_SCORE: 41
ADLS_ACUITY_SCORE: 32
ADLS_ACUITY_SCORE: 29
ADLS_ACUITY_SCORE: 29
ADLS_ACUITY_SCORE: 37
ADLS_ACUITY_SCORE: 25
ADLS_ACUITY_SCORE: 36
ADLS_ACUITY_SCORE: 33
ADLS_ACUITY_SCORE: 34
ADLS_ACUITY_SCORE: 36
DOING_ERRANDS_INDEPENDENTLY_DIFFICULTY: NO
ADLS_ACUITY_SCORE: 38
ADLS_ACUITY_SCORE: 32
ADLS_ACUITY_SCORE: 37
ADLS_ACUITY_SCORE: 37
ADLS_ACUITY_SCORE: 41
ADLS_ACUITY_SCORE: 33
ADLS_ACUITY_SCORE: 36
ADLS_ACUITY_SCORE: 34
ADLS_ACUITY_SCORE: 30
ADLS_ACUITY_SCORE: 29
ADLS_ACUITY_SCORE: 25
ADLS_ACUITY_SCORE: 31
ADLS_ACUITY_SCORE: 29
ADLS_ACUITY_SCORE: 32
ADLS_ACUITY_SCORE: 32
ADLS_ACUITY_SCORE: 37
ADLS_ACUITY_SCORE: 32
ADLS_ACUITY_SCORE: 25
ADLS_ACUITY_SCORE: 32
DEPENDENT_IADLS:: TRANSPORTATION
ADLS_ACUITY_SCORE: 33
ADLS_ACUITY_SCORE: 50
ADLS_ACUITY_SCORE: 36
ADLS_ACUITY_SCORE: 25
ADLS_ACUITY_SCORE: 37
ADLS_ACUITY_SCORE: 33
ADLS_ACUITY_SCORE: 25
ADLS_ACUITY_SCORE: 37
ADLS_ACUITY_SCORE: 35
ADLS_ACUITY_SCORE: 33
ADLS_ACUITY_SCORE: 25
ADLS_ACUITY_SCORE: 36
ADLS_ACUITY_SCORE: 32
ADLS_ACUITY_SCORE: 29
ADLS_ACUITY_SCORE: 37
ADLS_ACUITY_SCORE: 30
WALKING_OR_CLIMBING_STAIRS_DIFFICULTY: NO
ADLS_ACUITY_SCORE: 25
ADLS_ACUITY_SCORE: 36
ADLS_ACUITY_SCORE: 34
ADLS_ACUITY_SCORE: 29
ADLS_ACUITY_SCORE: 25
ADLS_ACUITY_SCORE: 36
ADLS_ACUITY_SCORE: 29
ADLS_ACUITY_SCORE: 36
ADLS_ACUITY_SCORE: 33
ADLS_ACUITY_SCORE: 32
ADLS_ACUITY_SCORE: 33
ADLS_ACUITY_SCORE: 35
ADLS_ACUITY_SCORE: 26
ADLS_ACUITY_SCORE: 35
ADLS_ACUITY_SCORE: 29
ADLS_ACUITY_SCORE: 37

## 2022-01-01 ASSESSMENT — ENCOUNTER SYMPTOMS
DIARRHEA: 0
MUSCULOSKELETAL NEGATIVE: 1
VOMITING: 0
ABDOMINAL DISTENTION: 1
SEIZURES: 0
LIGHT-HEADEDNESS: 0
SEIZURES: 0
DIFFICULTY URINATING: 0
DYSURIA: 0
SHORTNESS OF BREATH: 0
HEADACHES: 0
DIAPHORESIS: 0
SEIZURES: 0
ABDOMINAL PAIN: 1
PSYCHIATRIC NEGATIVE: 1
ABDOMINAL PAIN: 1
SEIZURES: 0
APPETITE CHANGE: 0
CHILLS: 0
CARDIOVASCULAR NEGATIVE: 1
RESPIRATORY NEGATIVE: 1
DIZZINESS: 0
COUGH: 0
ACTIVITY CHANGE: 0
FEVER: 0
WEAKNESS: 0
SORE THROAT: 0
CONFUSION: 0
ABDOMINAL DISTENTION: 1
ARTHRALGIAS: 0
MYALGIAS: 0
NAUSEA: 0

## 2022-01-01 ASSESSMENT — ANXIETY QUESTIONNAIRES
7. FEELING AFRAID AS IF SOMETHING AWFUL MIGHT HAPPEN: NOT AT ALL
GAD7 TOTAL SCORE: 0
GAD7 TOTAL SCORE: 0
6. BECOMING EASILY ANNOYED OR IRRITABLE: NOT AT ALL
5. BEING SO RESTLESS THAT IT IS HARD TO SIT STILL: NOT AT ALL
3. WORRYING TOO MUCH ABOUT DIFFERENT THINGS: NOT AT ALL
2. NOT BEING ABLE TO STOP OR CONTROL WORRYING: NOT AT ALL
IF YOU CHECKED OFF ANY PROBLEMS ON THIS QUESTIONNAIRE, HOW DIFFICULT HAVE THESE PROBLEMS MADE IT FOR YOU TO DO YOUR WORK, TAKE CARE OF THINGS AT HOME, OR GET ALONG WITH OTHER PEOPLE: NOT DIFFICULT AT ALL
1. FEELING NERVOUS, ANXIOUS, OR ON EDGE: NOT AT ALL

## 2022-01-01 ASSESSMENT — MIFFLIN-ST. JEOR
SCORE: 1625.13
SCORE: 1623.49

## 2022-01-01 ASSESSMENT — LIFESTYLE VARIABLES
TOBACCO_USE: 1

## 2022-01-01 ASSESSMENT — PAIN SCALES - GENERAL
PAINLEVEL: NO PAIN (0)
PAINLEVEL: NO PAIN (0)
PAINLEVEL: SEVERE PAIN (7)
PAINLEVEL: NO PAIN (0)
PAINLEVEL: NO PAIN (0)
PAINLEVEL: MILD PAIN (2)
PAINLEVEL: NO PAIN (0)

## 2022-01-01 ASSESSMENT — PATIENT HEALTH QUESTIONNAIRE - PHQ9
5. POOR APPETITE OR OVEREATING: NOT AT ALL
SUM OF ALL RESPONSES TO PHQ QUESTIONS 1-9: 3

## 2022-01-08 NOTE — BRIEF OP NOTE
[facility] Brief Operative Note    Pre-operative diagnosis: Biliary stricture [K83.1]   Post-operative diagnosis As prior   Procedure: Procedure(s):  ENDOSCOPIC RETROGRADE CHOLANGIOPANCREATOGRAPHY  ENDOSCOPIC ULTRASOUND, ESOPHAGOSCOPY / UPPER GASTROINTESTINAL TRACT (GI)   Surgeon(s): Surgeon(s) and Role:  Panel 1:     * Anthony Abarca MD - Primary  Panel 2:     * Babak Garvin MD - Primary   Estimated blood loss: 0 mL    Specimens: * No specimens in log *   Findings:      76 year-old male with distal obstruction to the common bile duct and resultant stricture who had prior ERCP with FCMS placement, prior EUS exams and biopsies of presumed pancreatic head mass were negative for malignancy. Had a third EUS with biopsies at [facility] with pathology showed atypica. ERCP same day was notable for patient biliary sphincterotomy and a transpapillary Crowley pancreatic stent distally migrated. Stent self ejected and passed distally during the exam. SpyGlass cholangioscopy demonstrated a segmental biliary stricture involving the middle and lower third of the main bile duct. Biopsies were obtained with a SpyBite miniature forceps and ere suspicious for malignancy. Three 10 Fr x 9 cm SF biliary stents were placed into the common bile duct. Suspect visualized biliary stricture from both CP in the head area and mucosal changes from previosuly placed FCMS, rather than malignant appearing biliary obstruction. In the interim he was evaluated by surgery and plan is to have a definitive diagnosis before proceeding with surgical resection.      Patient present today for repeat EUS with plans for further biopsies and ERCP for stents exchange.     EUS  - An indiscrete distortion of the distal portion of the bile duct/superior margin of the pancreatic head was seen. However, it was bery challenging to advance the echoendoscope beyond the pylorus despite changing position and suctioning gastric content. Thus, EUS guided  needle biopsies were not attempted.   - Fatty liver.  - Extrahepatic biliary dilation with pneumobilia.  - Hetrogenous pancreas, otherwise unremarkable. Though all exam was done from the stomach.     ERCP  - Deformed pylorus with difficulty encountered passing scope to the duodenum (MOD-22).  - Three partially occluded stents from the common bile duct were seen in the major papilla. Extracted.   - Prior biliary sphincterotomy appeared open.   - Cholangiogram showed distal segmental mild indeterminate biliary stricture.  - The biliary tree was swept and debris was found.   - SpyGlass visualization of the common duct with biopsies obtained with SpyBites from the middle and lower third of the main duct.   - Cells for cytology obtained via brushing from the lower and middle third of the main bile duct.   - One 10 mm x 6 cm Viabil covered metal stent was placed into the common bile duct.       Plan  - Observe patient in PACU for possible discharge same day.   - Await cytology and pathology results. Of note, these were suspecious for malignancy on last exam.  - Repeat ERCP in 1-2 months based on biopsies findings to exchange stent and further evaluate need for repeat biopsies. If patient develops fever, chills, jaundice or passes dark urine or has worsening of LFTs before scheduled ERCP, will recommend patient to call us immediately for consideration of an earlier urgent ERCP.- The findings and recommendations were discussed with the patient and their family.

## 2022-01-10 NOTE — TELEPHONE ENCOUNTER
Called pt to discuss case for 1/13- need to reschedule due to provider family emergency. Pt cannot do 1/12 due to dentist appointment. Bharathi Griggs with  doing case, will move to 1/24 with Dr. Gonzalez. Case mod placed. Pt aware of plan and will need new covid test and preop.    ML

## 2022-01-11 NOTE — TELEPHONE ENCOUNTER
FUTURE VISIT INFORMATION      SURGERY INFORMATION:    Date:  with Dr. Abarca    RECORDS REQUESTED FROM:       Primary Care Provider: Contreras Garner MD  - [facility]    Pertinent Medical History: Hypertension     Most recent EKG+ Tracin21- [facility]

## 2022-01-20 NOTE — PROGRESS NOTES
"Urology Consult History and Physical  [facility]   Name: Kody Reynaga    MRN: [id number]   YOB: 1945       We were asked to see Kody Reynaga at the request of Dr. Curran for evaluation and treatment of history of prostate cancer .        Chief Complaint:   Prostate cancer history    History is obtained from the patient and the medical record            History of Present Illness:   Kody Reynaga is a 76 year old male who is being seen for evaluation of history of prostate cancer.    Previously saw Urology in [place], [place] - last seen 9/1/21:  \"Mr. 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 He was managed on deprivation therapy with Lupron starting in 08/2015, and since he had organ-confined disease the patient subsequently underwent primary therapy with total cryoablation therapy of the prostate on 05/07/2019. The patient has not been on Lupron therapy since the treatment. The patient returns in follow up. His PSA level today is stable at 0.08 ng/mL demonstrating excellent cancer control.\"    TODAY 1/20/22:  He is doing well from a urinary standpoint   No urinary issues today    He underwent a biliary stent placement in August and is doing well from that standpoint as well    He recently moved to the [facility] about 4 months ago though still plans to spend summers in [facility]           Past Medical History:     Past Medical History:   Diagnosis Date     Anxiety      Depression      Gastroesophageal reflux disease      Gout      Hypertension      IBS (irritable bowel syndrome)      Pancreatic disease      Prostate cancer (H)             Past Surgical History:     Past Surgical History:   Procedure Laterality Date     CATARACT EXTRACTION W/ INTRAOCULAR LENS IMPLANT Right      COLONOSCOPY       cryoablation of prostate       ENDOSCOPIC RETROGRADE CHOLANGIOPANCREATOGRAM       ENDOSCOPIC RETROGRADE " CHOLANGIOPANCREATOGRAM WITH SPYGLASS N/A 10/14/2021    Procedure: ENDOSCOPIC RETROGRADE CHOLANGIOPANCREATOGRAPHY, WITH DIRECT DUCT VISUALIZATION, USING PANCREATICOBILIARY FIBEROPTIC PROBE-spyglass, biliary sludge and clot removal, biliary biopsies, biliary stent exchange;  Surgeon: Anthony Abarca MD;  Location: [facility] OR     ENDOSCOPIC ULTRASOUND UPPER GASTROINTESTINAL TRACT (GI) N/A 9/28/2021    Procedure: ENDOSCOPIC ULTRASOUND, ESOPHAGOSCOPY / UPPER GASTROINTESTINAL TRACT (GI);  Surgeon: Babak Garvin MD;  Location: [facility] GI     ESOPHAGOSCOPY, GASTROSCOPY, DUODENOSCOPY (EGD), COMBINED N/A 10/14/2021    Procedure: ESOPHAGOGASTRODUODENOSCOPY, WITH FINE NEEDLE ASPIRATION BIOPSY, WITH ENDOSCOPIC ULTRASOUND GUIDANCE, biliary stent removal;  Surgeon: Babak Garvin MD;  Location: [facility] OR     MOUTH SURGERY       TONSILLECTOMY              Social History:     Social History     Tobacco Use     Smoking status: Former Smoker     Smokeless tobacco: Never Used   Substance Use Topics     Alcohol use: Not Currently       History   Smoking Status     Former Smoker   Smokeless Tobacco     Never Used            Family History:   No family history on file.           Allergies:   No Known Allergies         Medications:     Current Outpatient Medications   Medication Sig     coenzyme Q-10 10 MG CAPS Take 1 capsule by mouth daily     dicyclomine (BENTYL) 20 MG tablet Take 20 mg by mouth 3 times daily     escitalopram (LEXAPRO) 20 MG tablet Take 1 tablet by mouth daily     loperamide (IMODIUM) 2 MG capsule Take 1 capsule by mouth as needed     losartan (COZAAR) 100 MG tablet Take 1 tablet (100 mg) by mouth daily     lovastatin (MEVACOR) 40 MG tablet Take 40 mg by mouth daily     melatonin 5 MG CAPS Take 1 tablet by mouth daily     Multiple Vitamin (MULTIVITAMIN PO) Take 1 tablet by mouth daily     omeprazole (PRILOSEC) 20 MG DR capsule Take 20 mg by mouth     zolpidem (AMBIEN) 10 MG tablet Take 10 mg by mouth daily     No  current facility-administered medications for this visit.             Review of Systems:     Skin: negative  Eyes: negative  Ears/Nose/Throat: negative  Respiratory: No shortness of breath, dyspnea on exertion, cough, or hemoptysis  Cardiovascular: negative  Gastrointestinal: as above  Genitourinary: as above  Musculoskeletal: negative  Neurologic: negative  Psychiatric: negative  Hematologic/Lymphatic/Immunologic: negative  Endocrine: negative          Physical Exam:   No data found.  There is no height or weight on file to calculate BMI.     General: age-appropriate appearing male in NAD  HEENT: Head AT/NC, EOMI, CN Grossly intact  Lungs: no respiratory distress, or pursed lip breathing  Heart: No obvious jugular venous distension present  Back: no bony midline tenderness, no CVAT bilaterally.  Abdomen: soft, non-distended, non-tender. No organomegaly  Lymph: no palpable inguinal lymphadenopathy.  LE: no edema.   Musculoskeltal: extremities normal, no peripheral edema  Skin: no suspicious lesions or rashes  Neuro: Alert, oriented, speech and mentation normal;  moving all 4 extremities equally.  Psych: affect and mood normal          Data:   All laboratory data reviewed:    OUTSIDE LABS REVIEWED:  PSA:  9/1/2021     0.08  2/23/2021   0.08  10/1/2020   0.05  5/22/2020   0.10  1/22/2020   <0.04  6/17/2019   <0.04  3/12/2019   0.10  9/11/2018   0.08  3/14/2018   0.21  7/15/2015   6.62    Lab Results   Component Value Date    CR 0.92 10/14/2021             Impression and Plan:   Impression:   76-year-old man with history of Juan 4+3 = 7 prostate cancer diagnosed in August 2015 initially managed with ADT and subsequently underwent cryoablation of the prostate on 5/7/2019.  He has had good biochemical control since that time off ADT      Plan:   History of prostate cancer status post cryoablation  - I reviewed his outside notes, specifically his urology appointment from 9/1/2021  - Reviewed his outside labs via care  everywhere summarized his PSA history above  - We will plan for a PSA today an order for this was placed  - Would then recommend continued PSA monitoring every 6 months  - I reviewed his serum creatinine which is normal  - Plan for follow-up with me in 1 year     Thank you for the kind consultation.    Time spent: 20 minutes spent on the date of the encounter doing chart review, history and exam, documentation and further activities as noted above.    Prakash Peralta MD   Urology  [facility]  [facility] Phone: [phone]  [facility] Phone: [phone]

## 2022-01-20 NOTE — NURSING NOTE
Kody Reynaga's goals for this visit include:   Chief Complaint   Patient presents with     New Patient     Establish care, hx of prostate cancer       He requests these members of his care team be copied on today's visit information:     PCP: Contreras Garner    Referring Provider:  Jerel Curran MD  [address] [place] [address]    There were no vitals taken for this visit.    Do you need any medication refills at today's visit?     Alanis Raya LPN on 1/20/2022 at 8:05 AM

## 2022-01-21 NOTE — H&P
"  Pre-Operative H & P     CC:  Preoperative exam to assess for increased cardiopulmonary risk while undergoing surgery and anesthesia.    Date of Encounter: 1/21/2022  Primary Care Physician:  Contreras Garner     Reason for visit:   Encounter Diagnoses   Name Primary?     Preop examination Yes     Biliary stricture        HPI  Kody Reynaga is a 76 year old male who presents for pre-operative H & P in preparation for ENDOSCOPIC RETROGRADE CHOLANGIOPANCREATOGRAPHY, ENDOSCOPIC ULTRASOUND, ESOPHAGOSCOPY / UPPER GASTROINTESTINAL TRACT (GI) with Dr. Gonzalez on 1/24/22 at [facility].     History is obtained from the patient and chart review    Patient who presented with painless jaundice and was found to have biliary dilation with gradual tapering towards the head of the pancreas.     He has had multiple evaluations and procedures, prior ERCP with stent placement, prior EUS exams, and biopsies of presumed pancreatic head mass were negative for malignancy. A third EUS with biopsies was performed recently with preliminary results not suggestive of malignancy. Per notes ERCP was notable for:    \"- Widely patient biliary sphincterotomy and a transpapillary Crowley pancreatic stent distally migrated. Stent self ejected and passed distally during the exam.  - SpyGladd cholangioscopy demonstrated a segmental biliary stricture involving the middle and lower third of the main bile duct. Biopsies were obtained with a SpyBite miniature forceps for histology.   - Uncomplicated placement of three 10 Fr x 9 cm SF biliary stents into the common bile duct.    -- Suspect visualized biliary stricture from both CP in the head area and mucosal changes from previosuly placed FCMS, rather than malignant appearing biliary obstruction.\"    Above plan made with Dr. Brannon to exchange stents and further evaluate stricture site.     Patient's history is otherwise significant for HLD, HTN, " GERD, IBS, gout, anxiety/depression, and prostate cancer.    Hx of abnormal bleeding or anti-platelet use: Denies.       Prior to Admission Medications  Current Outpatient Medications   Medication Sig Dispense Refill     coenzyme Q-10 10 MG CAPS Take 1 capsule by mouth every morning        dicyclomine (BENTYL) 20 MG tablet Take 20 mg by mouth 3 times daily       escitalopram (LEXAPRO) 20 MG tablet Take 1 tablet by mouth every morning        loperamide (IMODIUM) 2 MG capsule Take 1 capsule by mouth as needed       losartan (COZAAR) 100 MG tablet Take 1 tablet (100 mg) by mouth daily (Patient taking differently: Take 100 mg by mouth every morning ) 90 tablet 1     lovastatin (MEVACOR) 40 MG tablet Take 40 mg by mouth every morning        melatonin 5 MG CAPS Take 1 tablet by mouth nightly as needed        Multiple Vitamin (MULTIVITAMIN PO) Take 1 tablet by mouth every morning        omeprazole (PRILOSEC) 20 MG DR capsule Take 20 mg by mouth every morning        zolpidem (AMBIEN) 10 MG tablet Take 10 mg by mouth nightly as needed          Family History  Family History   Problem Relation Age of Onset     Heart Failure Mother      Cancer Father        Past Medical History:   Diagnosis Date     Anxiety      Depression      Gastroesophageal reflux disease      Gout      Hypercholesterolemia      Hypertension      IBS (irritable bowel syndrome)      Pancreatic disease      Prostate cancer (H)       Past Surgical History:   Procedure Laterality Date     CATARACT EXTRACTION W/ INTRAOCULAR LENS IMPLANT Right      COLONOSCOPY       cryoablation of prostate       ENDOSCOPIC RETROGRADE CHOLANGIOPANCREATOGRAM       ENDOSCOPIC RETROGRADE CHOLANGIOPANCREATOGRAM WITH SPYGLASS N/A 10/14/2021    Procedure: ENDOSCOPIC RETROGRADE CHOLANGIOPANCREATOGRAPHY, WITH DIRECT DUCT VISUALIZATION, USING PANCREATICOBILIARY FIBEROPTIC PROBE-spyglass, biliary sludge and clot removal, biliary biopsies, biliary stent exchange;  Surgeon: Anthony Abarca  "MD Kurtis;  Location: [facility] OR     ENDOSCOPIC ULTRASOUND UPPER GASTROINTESTINAL TRACT (GI) N/A 2021    Procedure: ENDOSCOPIC ULTRASOUND, ESOPHAGOSCOPY / UPPER GASTROINTESTINAL TRACT (GI);  Surgeon: Babak Garvin MD;  Location: [facility] GI     ESOPHAGOSCOPY, GASTROSCOPY, DUODENOSCOPY (EGD), COMBINED N/A 10/14/2021    Procedure: ESOPHAGOGASTRODUODENOSCOPY, WITH FINE NEEDLE ASPIRATION BIOPSY, WITH ENDOSCOPIC ULTRASOUND GUIDANCE, biliary stent removal;  Surgeon: Babak Garvin MD;  Location: [facility] OR     MOUTH SURGERY       TONSILLECTOMY        No Known Allergies   Social History     Tobacco Use     Smoking status: Former Smoker     Types: Cigarettes     Quit date: 1970     Years since quittin.0     Smokeless tobacco: Never Used   Substance Use Topics     Alcohol use: Not Currently      Wt Readings from Last 1 Encounters:   21 85 kg (187 lb 8 oz)        ROS/MED HISTORY  The complete review of systems is negative other than noted in the HPI or here.    ENT/Pulmonary:     (+) KLEVER risk factors, hypertension, tobacco use, Past use,  (-) recent URI   Neurologic:  - neg neurologic ROS     Cardiovascular:     (+) hypertension-----Previous cardiac testing   Echo: Date: Results:    Stress Test: Date: Results:    ECG Reviewed: Date: 21 Results:  NSR  Cath: Date: Results:   (-) taking anticoagulants/antiplatelets   METS/Exercise Tolerance:  >4   Hematologic:  - neg hematologic  ROS     Musculoskeletal:  - neg musculoskeletal ROS     GI/Hepatic: Comment: Obstructive Jaundice and weight loss  Biliary stricture    (+) GERD, Asymptomatic on medication,     Renal/Genitourinary:  - neg Renal ROS     Endo:  - neg endo ROS     Psychiatric/Substance Use: Comment:   Heavier alcohol intake >15 drinks per week, with reportedly average 4 drinks on \"drinking days\".  (+) psychiatric history depression and anxiety alcohol abuse     Infectious Disease:  - neg infectious disease ROS     Malignancy: Comment: Prostate cancer " "s/p Lupron therapy in 2019 and cryoablation, followed by urology without signs of disease progression  (+) Malignancy, History of Prostate.    Other:  - neg other ROS          LABS:  CBC:   Lab Results   Component Value Date    WBC 6.9 10/14/2021    HGB 10.8 (L) 10/14/2021    HCT 31.6 (L) 10/14/2021     10/14/2021     BMP:   Lab Results   Component Value Date     10/14/2021    POTASSIUM 4.0 10/14/2021    CHLORIDE 104 10/14/2021    CO2 27 10/14/2021    BUN 18 10/14/2021    CR 0.92 10/14/2021     (H) 10/14/2021     (H) 10/14/2021     COAGS:   Lab Results   Component Value Date    INR 1.08 10/14/2021     POC: No results found for: BGM, HCG, HCGS  HEPATIC:   Lab Results   Component Value Date    ALBUMIN 3.4 10/14/2021    PROTTOTAL 6.9 10/14/2021    ALT 36 10/14/2021    AST 28 10/14/2021    ALKPHOS 95 10/14/2021    BILITOTAL 0.6 10/14/2021     OTHER:   Lab Results   Component Value Date    AMIRAH 8.8 10/14/2021    LIPASE 574 (H) 10/14/2021    AMYLASE 47 10/14/2021          /82 (BP Location: Right arm, Patient Position: Chair, Cuff Size: Adult Large)   Pulse 74   Temp 98.6  F (37  C) (Oral)   Resp 16   Ht 1.803 m (5' 11\")   Wt 87.1 kg (192 lb 1.6 oz)   SpO2 98%   BMI 26.79 kg/m           Physical Exam  Constitutional: Awake, alert, cooperative, no apparent distress, and appears stated age.  Eyes: Pupils equal, round and reactive to light, extra ocular muscles intact, sclera clear, conjunctiva normal. Glasses on.   HENT: Normocephalic, oral pharynx with moist mucus membranes, good dentition. No goiter appreciated.   Respiratory: Clear to auscultation bilaterally, no crackles or wheezing. No cough or obvious dyspnea.  Cardiovascular: Regular rate and rhythm, normal S1 and S2, and no murmur noted.  Carotids +2, no bruits. No edema. Palpable pulses to radial  DP and PT arteries.   GI: Normal bowel sounds, soft, non-distended, non-tender, no masses palpated, no hepatosplenomegaly. "   Lymph/Hematologic: No cervical lymphadenopathy and no supraclavicular lymphadenopathy.  Genitourinary: Deferred.   Skin: Warm and dry.     Musculoskeletal: Limited ROM of neck. There is no redness, warmth, or swelling of the joints. Gross motor strength is normal.    Neurologic: Awake, alert, oriented to name, place and time. Cranial nerves II-XII are grossly intact. Gait is cautious.  Neuropsychiatric: Calm, cooperative. Normal affect.     PRIOR LABS/DIAGNOSTIC STUDIES:   All labs and imaging personally reviewed     EK21 Normal sinus rhythm  21 Gallbladder US     1.   Positive ultrasonographic Hinton sign.  The gallbladder is moderately    distended and filled with sludge.  The gallbladder wall is normal in  thickness  and there is no pericholecystic fluid.  Findings are equivocal for acute  cholecystitis.  Recommend surgical consultation.  Consider further  evaluation  with a HIDA scan.  2.  Dilated common bile duct up to 1.6 cm.  Sludge is seen within the  distal  common bile duct.  Partially visualized intrahepatic biliary duct  dilatation.    Please refer to the subsequent MRCP of the abdomen.     21 CT abd/pelvis    1. CT findings compatible with acute pancreatitis. Small focal area of blunted  enhancement along the inferior margin of the proximal pancreatic tail is noted.  This could reflect a small area of glandular necrosis. Recommend attention on  follow-up imaging.  2. Marked intra and extrahepatic biliary ductal dilatation focally  transitioning at the level the proximal common bile duct. There is bile duct  wall enhancement at this site. Findings could indicate underlying ductal  stricture. Neoplastic process is not excluded. Additional consideration would  include nonradiopaque stones. Although this is felt to be less likely.  Recommend correlation with MRCP or ERCP.     21 MRCP    1. Marked intrahepatic biliary ductal dilatation extending through the common  hepatic duct. There  is abrupt transition of the duct at the level of the  proximal CBD extending through the pancreatic head. This could indicate  underlying stricture. While benign inflammatory causes could be considered, a  neoplastic process is also a consideration. Recommend correlation with ERCP.  2. Sequela of pancreatitis. Several tiny cystic foci within the pancreas  measure up to 7 mm. Recommend attention on follow-up imaging. Follow-up MRI or  CT recommended in one year.  3. Gallbladder sludge.  4. Suspect underlying red marrow reconversion. Areas of increased T2 signal are  seen throughout the thoracolumbar spine on nonfat sat images. Additional  heterogeneous appearance of the marrow in the pelvis. While this may be a  benign etiology, consider follow-up MRI confirm.     The patient's records and results personally reviewed by this provider.     Outside records reviewed from: care everywhere    LAB/DIAGNOSTIC STUDIES TODAY:  COVID test    ASSESSMENT and PLAN  Kody Reynaga is a 76 year old male who presents for pre-operative H & P in preparation for ENDOSCOPIC RETROGRADE CHOLANGIOPANCREATOGRAPHY, ENDOSCOPIC ULTRASOUND, ESOPHAGOSCOPY / UPPER GASTROINTESTINAL TRACT (GI) with Dr. Gonzalez on 1/24/22 at [facility].   RCRI: 0.4% risk of serious cardiac event.  Risk classification for surgery procedure: Low   VTE risk: 1.8-3%  KLEVER: 3/8=Intermediate risk  PONV: 2    --Biliary stricture s/p multiple evaluations, managed with stents. Above procedure again planned for stent exchange and reevaluation of stricture.   --No history of problems with anesthesia. Last GA 10/14/21. Airway documented as easy Tube Size: 7.5 mm; DL Blade Size: MAC 4; Grade View: 2 (2b); but bougie used as adjunct.  --HLD Will take lovastatin on DOS. HYPERTENSION. Will hold losartan on DOS. No other cardiac history, symptoms or meds. EKG NSR. Able to walk distance and climb stairs when feeling well.    --Former  smoker. Denies pulmonary symptoms. Intermediate risk for KLEVER.   --GERD. IBS. Will take omeprazole and dicyclomine on DOS.  --History of regular ETOH use, now has cut down and drinking nonalcoholic beer.   --Anxiety/depression. Will take Lexapro on DOS.  --History of prostate cancer s/p Lupron and cryoablation.  --Lab update planned for DOS by service.     Patient was discussed with Dr Watson.       The patient is optimized and acceptable candidate for proposed procedure. Arrival time, NPO, shower and medication instructions provided by nursing staff today.      On the day of service:     Prep time: 12 minutes  Visit time: 15 minutes  Documentation time: 30 minutes  ------------------------------------------  Total time: 57 minutes      KATHRINE Dennis CNS  Preoperative Assessment Center  [facility]  Phone: [phone]  Fax: [phone]

## 2022-01-21 NOTE — ANESTHESIA PREPROCEDURE EVALUATION
Anesthesia Pre-Procedure Evaluation    Patient: Kody Reynaga   MRN: [id number] : 1945        Preoperative Diagnosis: Biliary stricture [K83.1]    Procedure : Procedure(s):  ENDOSCOPIC RETROGRADE CHOLANGIOPANCREATOGRAPHY  ENDOSCOPIC ULTRASOUND, ESOPHAGOSCOPY / UPPER GASTROINTESTINAL TRACT (GI)  PAC EVALUATION       Past Medical History:   Diagnosis Date     Anxiety      Depression      Gastroesophageal reflux disease      Gout      Hypercholesterolemia      Hypertension      IBS (irritable bowel syndrome)      Pancreatic disease      Prostate cancer (H)       Past Surgical History:   Procedure Laterality Date     CATARACT EXTRACTION W/ INTRAOCULAR LENS IMPLANT Right      COLONOSCOPY       cryoablation of prostate       ENDOSCOPIC RETROGRADE CHOLANGIOPANCREATOGRAM       ENDOSCOPIC RETROGRADE CHOLANGIOPANCREATOGRAM WITH SPYGLASS N/A 10/14/2021    Procedure: ENDOSCOPIC RETROGRADE CHOLANGIOPANCREATOGRAPHY, WITH DIRECT DUCT VISUALIZATION, USING PANCREATICOBILIARY FIBEROPTIC PROBE-spyglass, biliary sludge and clot removal, biliary biopsies, biliary stent exchange;  Surgeon: Anthony Abarca MD;  Location: [facility] OR     ENDOSCOPIC ULTRASOUND UPPER GASTROINTESTINAL TRACT (GI) N/A 2021    Procedure: ENDOSCOPIC ULTRASOUND, ESOPHAGOSCOPY / UPPER GASTROINTESTINAL TRACT (GI);  Surgeon: Babak Garvin MD;  Location: [facility] GI     ESOPHAGOSCOPY, GASTROSCOPY, DUODENOSCOPY (EGD), COMBINED N/A 10/14/2021    Procedure: ESOPHAGOGASTRODUODENOSCOPY, WITH FINE NEEDLE ASPIRATION BIOPSY, WITH ENDOSCOPIC ULTRASOUND GUIDANCE, biliary stent removal;  Surgeon: Babak Garvin MD;  Location: [facility] OR     MOUTH SURGERY       TONSILLECTOMY        No Known Allergies   Social History     Tobacco Use     Smoking status: Former Smoker     Types: Cigarettes     Quit date: 1970     Years since quittin.0     Smokeless tobacco: Never Used   Substance Use Topics     Alcohol use: Not Currently      Wt Readings from Last 1  "Encounters:   11/17/21 85 kg (187 lb 8 oz)        Anesthesia Evaluation   Pt has had prior anesthetic. Type: General and MAC.    No history of anesthetic complications       ROS/MED HX  ENT/Pulmonary:     (+) KLEVER risk factors, hypertension, tobacco use, Past use,  (-) recent URI   Neurologic:  - neg neurologic ROS  (-) no seizures, no CVA and migraines   Cardiovascular:     (+) hypertension-----Previous cardiac testing   Echo: Date: Results:    Stress Test: Date: Results:    ECG Reviewed: Date: 9/8/21 Results:  NSR  Cath: Date: Results:   (-) taking anticoagulants/antiplatelets   METS/Exercise Tolerance: >4 METS    Hematologic:  - neg hematologic  ROS  (-) history of blood clots and anemia   Musculoskeletal:  - neg musculoskeletal ROS (+) arthritis,  (-) muscular dystrophy   GI/Hepatic: Comment: Obstructive Jaundice and weight loss  Biliary stricture    (+) GERD, Asymptomatic on medication,  (-) cholecystitis/cholelithiasis and liver disease   Renal/Genitourinary:  - neg Renal ROS     Endo:  - neg endo ROS  (-) Type I DM and chronic steroid usage   Psychiatric/Substance Use: Comment: History of heavier alcohol intake >15 drinks per week, with reportedly average 4 drinks on \"drinking days\". Now has cut down and drinking NA beer.    (+) psychiatric history depression and anxiety alcohol abuse     Infectious Disease:  - neg infectious disease ROS     Malignancy: Comment: Prostate cancer s/p Lupron therapy in 2019 and cryoablation, followed by urology without signs of disease progression  (+) Malignancy, History of Prostate.Prostate CA Remission status post Surgery.        Other:  - neg other ROS          Physical Exam    Airway        Mallampati: II   TM distance: > 3 FB   Neck ROM: limited   Mouth opening: > 3 cm    Respiratory Devices and Support         Dental     Comment: Lower front teeth implants    (+) caps and implants    B=Bridge, C=Chipped, L=Loose, M=Missing    Cardiovascular          Rhythm and rate: " regular and normal     Pulmonary           breath sounds clear to auscultation           OUTSIDE LABS:  CBC:   Lab Results   Component Value Date    WBC 6.9 10/14/2021    HGB 10.8 (L) 10/14/2021    HCT 31.6 (L) 10/14/2021     10/14/2021     BMP:   Lab Results   Component Value Date     10/14/2021    POTASSIUM 4.0 10/14/2021    CHLORIDE 104 10/14/2021    CO2 27 10/14/2021    BUN 18 10/14/2021    CR 0.92 10/14/2021     (H) 10/14/2021     (H) 10/14/2021     COAGS:   Lab Results   Component Value Date    INR 1.08 10/14/2021     POC: No results found for: BGM, HCG, HCGS  HEPATIC:   Lab Results   Component Value Date    ALBUMIN 3.4 10/14/2021    PROTTOTAL 6.9 10/14/2021    ALT 36 10/14/2021    AST 28 10/14/2021    ALKPHOS 95 10/14/2021    BILITOTAL 0.6 10/14/2021     OTHER:   Lab Results   Component Value Date    AMIRAH 8.8 10/14/2021    LIPASE 574 (H) 10/14/2021    AMYLASE 47 10/14/2021       Anesthesia Plan    ASA Status:  2   NPO Status:  NPO Appropriate    Anesthesia Type: MAC.   Induction: Intravenous.   Maintenance: Balanced.   Techniques and Equipment:     - Lines/Monitors: 2nd IV     Consents    Anesthesia Plan(s) and associated risks, benefits, and realistic alternatives discussed. Questions answered and patient/representative(s) expressed understanding.     - Discussed: Risks, Benefits and Alternatives for BOTH SEDATION and the PROCEDURE were discussed     - Discussed with:  Patient         Postoperative Care    Pain management: IV analgesics, Multi-modal analgesia.   PONV prophylaxis: Ondansetron (or other 5HT-3), Dexamethasone or Solumedrol     Comments:              PAC Discussion and Assessment    ASA Classification: 3  Case is suitable for: [place]  Anesthetic techniques and relevant risks discussed: GA  Invasive monitoring and risk discussed: No    Possibility and Risk of blood transfusion discussed: No            PAC Resident/NP Anesthesia Assessment: ASSESSMENT and PLAN  Kody  GILLIAN Reynaga is a 76 year old male who presents for pre-operative H & P in preparation for ENDOSCOPIC RETROGRADE CHOLANGIOPANCREATOGRAPHY, ENDOSCOPIC ULTRASOUND, ESOPHAGOSCOPY / UPPER GASTROINTESTINAL TRACT (GI) with Dr. Gonzalez on 1/24/22 at [facility].   RCRI: 0.4% risk of serious cardiac event.  Risk classification for surgery procedure: Low   VTE risk: 1.8-3%  KLEVER: 3/8=Intermediate risk  PONV: 2    --Biliary stricture s/p multiple evaluations, managed with stents. Above procedure again planned for stent exchange and reevaluation of stricture.   --No history of problems with anesthesia. Last GA 10/14/21. Airway documented as easy Tube Size: 7.5 mm; DL Blade Size: MAC 4; Grade View: 2 (2b); but bougie used as adjunct.  --HLD Will take lovastatin on DOS. HYPERTENSION. Will hold losartan on DOS. No other cardiac history, symptoms or meds. EKG NSR. Able to walk distance and climb stairs when feeling well.    --Former smoker. Denies pulmonary symptoms. Intermediate risk for KLEVER.   --GERD. IBS. Will take omeprazole and dicyclomine on DOS.  --History of regular ETOH use, now has cut down and drinking nonalcoholic beer.   --Anxiety/depression. Will take Lexapro on DOS.  --History of prostate cancer s/p Lupron and cryoablation.  --Lab update planned for DOS by service.     Patient was discussed with Dr Watson.       The patient is optimized and acceptable candidate for proposed procedure. Arrival time, NPO, shower and medication instructions provided by nursing staff today.        Reviewed and Signed by PAC Mid-Level Provider/Resident  Mid-Level Provider/Resident: KATHRINE Sousa, CNS  Date: 1/21/22  Time: 8:55am                               KATHRINE Dennis CNS

## 2022-01-24 NOTE — ANESTHESIA CARE TRANSFER NOTE
Patient: Kody Reynaga    Procedure: Procedure(s):  ENDOSCOPIC RETROGRADE CHOLANGIOPANCREATOGRAPHY, BILIARY STENT EXCHANGE, sPY WITH BIOPSIES AND BRUSHINGS  ENDOSCOPIC ULTRASOUND, ESOPHAGOSCOPY / UPPER GASTROINTESTINAL TRACT (GI)       Diagnosis: Biliary stricture [K83.1]  Diagnosis Additional Information: No value filed.    Anesthesia Type:   MAC     Note:    Oropharynx: oropharynx clear of all foreign objects  Level of Consciousness: awake  Oxygen Supplementation: nasal cannula  Level of Supplemental Oxygen (L/min / FiO2): 3 LPM  Independent Airway: airway patency satisfactory and stable  Dentition: dentition unchanged  Vital Signs Stable: post-procedure vital signs reviewed and stable  Report to RN Given: handoff report given  Patient transferred to: PACU    Handoff Report: Identifed the Patient, Identified the Reponsible Provider, Reviewed the pertinent medical history, Discussed the surgical course, Reviewed Intra-OP anesthesia mangement and issues during anesthesia, Set expectations for post-procedure period and Allowed opportunity for questions and acknowledgement of understanding      Vitals:  Vitals Value Taken Time   /82 01/24/22 1732   Temp 36.4  C (97.5  F) 01/24/22 1732   Pulse 80 01/24/22 1734   Resp 16 01/24/22 1732   SpO2 99 % 01/24/22 1734   Vitals shown include unvalidated device data.    Electronically Signed By: KATHRINE Espino CRNA  January 24, 2022  5:34 PM

## 2022-01-24 NOTE — TELEPHONE ENCOUNTER
Prakash Peralta MD  P [facility] Amina Gates,     Here is your PSA result. This has risen slightly to 0.19 from 0.08. Some fluctuation after cyrotherapy is very common and this is not cause for concern. Continues with PSA checks every 6 months.     Thanks,   Prakash LEVIN. MD [place]     Attempted to reach pt.  Left detailed message to call clinic back at [phone].   Calling to relay message as noted above.     Neeta Mack RN MSN

## 2022-01-25 NOTE — DISCHARGE INSTRUCTIONS
[facility], [place]  Same-Day Surgery ERCP Procedure   Adult Discharge Orders & Instructions     You had a procedure known as an Endoscopic Retrograde Cholangiopancreatography (ERCP). Your healthcare provider performed the ERCP to look at your bile or pancreatic ducts, and to locate and/or treat blockages (dilation, stenting, removal, etc.) in these ducts. Often biopsies, small samples of tissue, are taken to help diagnose and/or classify stages of disease growth. This procedure is used to diagnose diseases of the pancreas, bile ducts, pancreatic duct, liver, and gallbladder.     After your procedure   1. Make sure to clarify with your healthcare provider any diet restrictions (For example, clear liquid, low fat, no caffeine, etc.)   2. Do NOT take aspirin containing medications or any other blood-thinning medicines (anticoagulants) until your healthcare provider says it's OK.   3. You MAY be prescribed antibiotics, depending on what was done and/or found during your EUS, make sure to take antibiotics as prescribed by your healthcare provider    For 24 hours after surgery  1. Get plenty of rest.  A responsible adult must stay with you for at least 24 hours after you leave the hospital.   2. Do not drive or use heavy equipment.  If you have weakness or tingling, don't drive or use heavy equipment until this feeling goes away.  3. Do not drink alcohol.  4. Avoid strenuous or risky activities (gym, yoga, cycling, etc.).  Ask for help when climbing stairs.   5. You may feel lightheaded.  IF so, sit for a few minutes before standing.  Have someone help you get up.   6. If you have nausea (feel sick to your stomach): Drink only clear liquids such as apple juice, ginger ale, broth or 7-Up.  Rest may also help.  Be sure to drink enough fluids.  Move to a regular diet as you feel able.  7. If you feel bloated or have too much gas, use a heating pad on your belly to help reduce the discomfort.  This should help you feel better.   8. You may have a slight fever. This is normal for the first 24 hours.   9. You may have a dry mouth, a sore throat, muscle aches or trouble sleeping.  These are normal and will go away after 24 hours. A sore throat is most common. Use lozenges or gargle with salt water to ease the discomfort.   10. Do not make important or legal decisions.      Call your doctor for any of the followin. Chest pain, and/or shortness of breath  2. Abdominal  pain, bloating or cramping that has not improved or does not respond to pain reliving medications (Tylenol or narcotics if prescribed)   3. Difficulty swallowing or feeling as though food or liquids are stuck in your throat   4. Sore throat lasting more than 2 days or pain that has worsened over time   5. Black or tarry stools   6. Nausea and/or vomiting that is not resolving or has not responded to anti-nausea medications prescribed to you   7. It has been over 8 to 10 hours since surgery and you are still not able to urinate (pass water)   8. Headache for over 24 hours   9. Fever over 100.5 F (38 C) lasting more than 24 hours after the procedure   10. Signs of jaundice or blockage (fever, chills, abdominal pain, yellowing of the whites of your eyes, yellowing of your skin, and/or passing darker than normal urine)     To contact a doctor, call:   [ ] Dr. Guru Gonzalez @ [phone] (GI Clinic) or [phone] (Monday thru Friday 8:00am to 4:30pm)   [ ] [phone] and ask for the GI resident on call (answered 24 hours a day)   [ ] Emergency Department: [facility]: [phone]     Take it easy when you get home.  Remember, same day surgery DOES NOT MEAN SAME DAY RECOVERY!  Healing is a gradual process.  You will need some time to recover - you may be more tired than you realize at first.  Rest and relax for at least the first 24 hours at home.  You'll feel better and heal faster if you take good care of yourself.      [facility]  Same-Day Surgery   Adult Discharge Orders & Instructions     For 24 hours after surgery    1. Get plenty of rest.  A responsible adult must stay with you for at least 24 hours after you leave the hospital.   2. Do not drive or use heavy equipment.  If you have weakness or tingling, don't drive or use heavy equipment until this feeling goes away.  3. Do not drink alcohol.  4. Avoid strenuous or risky activities.  Ask for help when climbing stairs.   5. You may feel lightheaded.  IF so, sit for a few minutes before standing.  Have someone help you get up.   6. If you have nausea (feel sick to your stomach): Drink only clear liquids such as apple juice, ginger ale, broth or 7-Up.  Rest may also help.  Be sure to drink enough fluids.  Move to a regular diet as you feel able.  7. You may have a slight fever. Call the doctor if your fever is over 100 F (37.7 C) (taken under the tongue) or lasts longer than 24 hours.  8. You may have a dry mouth, a sore throat, muscle aches or trouble sleeping.  These should go away after 24 hours.  9. Do not make important or legal decisions.   Call your doctor for any of the followin.  Signs of infection (fever, growing tenderness at the surgery site, a large amount of drainage or bleeding, severe pain, foul-smelling drainage, redness, swelling).    2. It has been over 8 to 10 hours since surgery and you are still not able to urinate (pass water).    3.  Headache for over 24 hours.    4.  Numbness, tingling or weakness the day after surgery (if you had spinal anesthesia).  To contact a doctor, call Dr. Guru Gonzalez @ [phone] (GI Clinic) or [phone] or:      [phone] and ask for the resident on call for gastroenterology or Dr. Gonzalez  (answered 24 hours a day)      Emergency Department:    [facility]: [phone]       (TTY for hearing impaired: [phone])    [facility]: [phone]        (TTY for hearing impaired: [phone])

## 2022-01-25 NOTE — TELEPHONE ENCOUNTER
Chart reviewed. Patient reviewed the 1/20/22 PSA results via my chart on 1/24/22. Reminder sent to urology pool for patient to follow up with PSA check in 6 months.    Martha Veronica RN, BSN

## 2022-01-25 NOTE — ANESTHESIA POSTPROCEDURE EVALUATION
Patient: Kody Reynaga    Procedure: Procedure(s):  ENDOSCOPIC RETROGRADE CHOLANGIOPANCREATOGRAPHY, BILIARY STENT EXCHANGE, sPY WITH BIOPSIES AND BRUSHINGS  ENDOSCOPIC ULTRASOUND, ESOPHAGOSCOPY / UPPER GASTROINTESTINAL TRACT (GI)       Diagnosis:Biliary stricture [K83.1]  Diagnosis Additional Information: No value filed.    Anesthesia Type:  MAC    Note:  Disposition: Outpatient   Postop Pain Control: Uneventful            Sign Out: Well controlled pain   PONV:    Neuro/Psych: Uneventful            Sign Out: Acceptable/Baseline neuro status   Airway/Respiratory: Uneventful            Sign Out: Acceptable/Baseline resp. status   CV/Hemodynamics: Uneventful            Sign Out: Acceptable CV status; No obvious hypovolemia; No obvious fluid overload   Other NRE: NONE   DID A NON-ROUTINE EVENT OCCUR?            Last vitals:  Vitals Value Taken Time   /85 01/24/22 1915   Temp 36.4  C (97.6  F) 01/24/22 1915   Pulse 69 01/24/22 1915   Resp 16 01/24/22 1915   SpO2 97 % 01/24/22 1915       Electronically Signed By: Micky Delacruz MD  January 24, 2022  7:35 PM

## 2022-01-25 NOTE — OR NURSING
Dr Sweeney at bedside. Per MD may discharge to phase 2 when ready. No diet or medication restrictions, no labs needed. Does not need to see pt again in phase 2.

## 2022-01-25 NOTE — PROGRESS NOTES
"RN RECOVERY NOTE   Assigned as pt nurse while pt in phase II recovery   Report from Meeta RN @ pt bedside in PACU @ 1815  Pt ARRIVE to phase II by 1825  NST assisting pt with dressing, obtaining VS and transferring from cart to chair.   All belongings returned to pt at this time. No report of any items missing.    Call light within pt reach. Education provided. Verbal understanding provided by pt.   Pt sitting up in chair, alert & oriented, following commands, and answering questions appropriately.   Pt tolerating PO intake without difficulty including juice and crackers.   Dr. Sweeney txt paged the following @ 1844:  \" Kody Heathjesus in pacu bay 9 is ready to be seen prior to discharge when you are available. Franci RN *[id number]\"  Dr. Sweeney called promptly and informed me that he had already seen pt in PACU and spoken to pt wife. Informed me that pt may discharge home.   Discharge instructions printed and provided for pt to take upon departure. Instructions reviewed over the telephone with Asiazulma Reynaga (spouse). Education provided. Questions encouraged and answered. Over the phone signature obtained.   PIVs removed   Pt transport placed and pending   "

## 2022-02-02 NOTE — PROGRESS NOTES
Called pt to discuss need for repeat procedure, and Dr Garvin's recommendations for CT CAP pancreatic protocol (has had no imaging since August) and then clinic visit with me in the next 1-2 weeks (virtual fine).     Then tentatively schedule this procedure 1-2 weeks after the clinic visit.     Procedure/Imaging/Clinic: EUS/ERCP   Physician: Bharathi/Tevin   Timing: next available   Procedure length: average   Anesthesia: general   Dx: biliary stricture   Tier: 2   Location: [facility] OR    [facility]  CT ordered    1015  Pt returned call, in agreement with CT scan and virtual visit with Dr Garvin on Feb 14th at 1pm. Message routed to clinic coordinators to schedule    Pt also in agreement with tentatively planning procedure to follow. Will discuss dates with OR  and confirm with patient once scheduled. Pt did agree to either Feb 24 or March 3rd dates as possibilities.  Preop exam completed on 1/21/22  Discussed need for COVID test within 4 days of procedure date    Dede Bond, RN, BSN,   [facility]  Care coordinator

## 2022-02-08 NOTE — PROGRESS NOTES
Called pt to offer an earlier clinic visit on 2/14 d/t cancellation    Also to discuss upcoming procedure on 2/24 and the need for a new pre op exam d/t prior exam on 1/21 will be outdated    Left VM    1400  Pt returned call, will move up to 11:20am time slot for virtual visit on 2/14. Message routed to clinic coordinators  Also advised pt to arranged PAC appt for upcoming procedure, pt in agreement. This is now scheduled for 2/9 virtual PAC appt    Dede Bond RN, BSN,   [facility]  Care coordinator

## 2022-02-09 NOTE — ANESTHESIA PREPROCEDURE EVALUATION
Anesthesia Pre-Procedure Evaluation    Patient: Kody Reynaga   MRN: [id number] : 1945        Preoperative Diagnosis: * No surgery found *    Procedure :   Video Visit       Past Medical History:   Diagnosis Date     Anxiety      Depression      Gastroesophageal reflux disease      Gout      Hypercholesterolemia      Hypertension      IBS (irritable bowel syndrome)      Pancreatic disease      Prostate cancer (H)       Past Surgical History:   Procedure Laterality Date     CATARACT EXTRACTION W/ INTRAOCULAR LENS IMPLANT Right      COLONOSCOPY       cryoablation of prostate       ENDOSCOPIC RETROGRADE CHOLANGIOPANCREATOGRAM       ENDOSCOPIC RETROGRADE CHOLANGIOPANCREATOGRAM N/A 2022    Procedure: ENDOSCOPIC RETROGRADE CHOLANGIOPANCREATOGRAPHY, BILIARY STENT EXCHANGE, sPY WITH BIOPSIES AND BRUSHINGS;  Surgeon: Guru Ab Gonzalez MD;  Location: [facility] OR     ENDOSCOPIC RETROGRADE CHOLANGIOPANCREATOGRAM WITH SPYGLASS N/A 10/14/2021    Procedure: ENDOSCOPIC RETROGRADE CHOLANGIOPANCREATOGRAPHY, WITH DIRECT DUCT VISUALIZATION, USING PANCREATICOBILIARY FIBEROPTIC PROBE-spyglass, biliary sludge and clot removal, biliary biopsies, biliary stent exchange;  Surgeon: Anthony Abarca MD;  Location: [facility] OR     ENDOSCOPIC ULTRASOUND UPPER GASTROINTESTINAL TRACT (GI) N/A 2021    Procedure: ENDOSCOPIC ULTRASOUND, ESOPHAGOSCOPY / UPPER GASTROINTESTINAL TRACT (GI);  Surgeon: Babak Garvin MD;  Location: [facility] GI     ENDOSCOPIC ULTRASOUND UPPER GASTROINTESTINAL TRACT (GI) N/A 2022    Procedure: ENDOSCOPIC ULTRASOUND, ESOPHAGOSCOPY / UPPER GASTROINTESTINAL TRACT (GI);  Surgeon: Guru Ab Gonzalez MD;  Location: [facility] OR     ESOPHAGOSCOPY, GASTROSCOPY, DUODENOSCOPY (EGD), COMBINED N/A 10/14/2021    Procedure: ESOPHAGOGASTRODUODENOSCOPY, WITH FINE NEEDLE ASPIRATION BIOPSY, WITH ENDOSCOPIC ULTRASOUND GUIDANCE, biliary stent removal;  Surgeon: Babak Garvin MD;   Location: [facility] OR     MOUTH SURGERY       TONSILLECTOMY        No Known Allergies   Social History     Tobacco Use     Smoking status: Former Smoker     Types: Cigarettes     Quit date: 1970     Years since quittin.1     Smokeless tobacco: Never Used   Substance Use Topics     Alcohol use: Yes     Comment: cutting down, drinking NA beer      Wt Readings from Last 1 Encounters:   22 87.3 kg (192 lb 7.4 oz)        Anesthesia Evaluation   Pt has had prior anesthetic.     No history of anesthetic complications       ROS/MED HX  ENT/Pulmonary: Comment: Quit smoking     (+) tobacco use, Past use,  (-) asthma and sleep apnea   Neurologic:  - neg neurologic ROS   (+) no peripheral neuropathy  (-) no seizures and no CVA   Cardiovascular:     (+) Dyslipidemia hypertension-----Previous cardiac testing   Echo: Date: Results:    Stress Test: Date: Results:    ECG Reviewed: Date: 22 Results:  Sinus rhythm   No previous ECGs available  Ventricular rate 65 bpm    Cath: Date: Results:      METS/Exercise Tolerance: 4 - Raking leaves, gardening    Hematologic:  - neg hematologic  ROS  (-) history of blood clots and history of blood transfusion   Musculoskeletal: Comment: Gout        GI/Hepatic: Comment: Biliary stricture, hx ERCP with stent placement, prior EUS        (+) GERD, Asymptomatic on medication,  (-) liver disease   Renal/Genitourinary:  - neg Renal ROS  (-) renal disease   Endo:  - neg endo ROS  (-) Type II DM   Psychiatric/Substance Use:     (+) psychiatric history anxiety and depression alcohol abuse     Infectious Disease:  - neg infectious disease ROS     Malignancy: Comment: S/p cryoablation    (+) Malignancy, History of Prostate.Prostate CA status post Surgery.        Other:  - neg other ROS             OUTSIDE LABS:  CBC:   Lab Results   Component Value Date    WBC 7.3 2022    WBC 6.9 10/14/2021    HGB 13.3 2022    HGB 10.8 (L) 10/14/2021    HCT 39.4 (L) 2022    HCT 31.6 (L)  10/14/2021     01/24/2022     10/14/2021     BMP:   Lab Results   Component Value Date     01/24/2022     10/14/2021    POTASSIUM 4.4 01/24/2022    POTASSIUM 4.0 10/14/2021    CHLORIDE 103 01/24/2022    CHLORIDE 104 10/14/2021    CO2 28 01/24/2022    CO2 27 10/14/2021    BUN 19 01/24/2022    BUN 18 10/14/2021    CR 1.08 01/24/2022    CR 0.92 10/14/2021     (H) 01/24/2022     (H) 01/24/2022     COAGS:   Lab Results   Component Value Date    INR 1.06 01/24/2022     POC: No results found for: BGM, HCG, HCGS  HEPATIC:   Lab Results   Component Value Date    ALBUMIN 3.6 01/24/2022    PROTTOTAL 7.3 01/24/2022    ALT 46 01/24/2022    AST 25 01/24/2022    ALKPHOS 94 01/24/2022    BILITOTAL 0.8 01/24/2022     OTHER:   Lab Results   Component Value Date    AMIRAH 9.2 01/24/2022    LIPASE 165 01/24/2022    AMYLASE 38 01/24/2022             PAC Discussion and Assessment                          PAC Resident/NP Anesthesia Assessment: See H&P                                       Carol Gutierrez PA-C

## 2022-02-09 NOTE — TELEPHONE ENCOUNTER
Reason for Call:  Medication or medication refill:    Do you use a [facility]?  Name of the pharmacy and phone number for the current request:  [facility] [place]    Name of the medication requested: Patient will be flying for travel and is requesting high altitude medication    Other request: none    Can we leave a detailed message on this number? YES    Phone number patient can be reached at: Home number on file [phone] (home)    Best Time: any     Call taken on 2/9/2022 at 2:49 PM by Linda Simpson

## 2022-02-09 NOTE — H&P
Pre-Operative H & P     CC:  Preoperative exam to assess for increased cardiopulmonary risk while undergoing surgery and anesthesia.    Date of Encounter: 2/9/2022  Primary Care Physician:  Jerel Curran     Reason for Visit: Biliary stricture    HPI     Kody Reynaga is a 75 y/o male who presents for pre-operative H&P in preparation for ENDOSCOPIC ULTRASOUND, ESOPHAGOSCOPY / UPPER GASTROINTESTINAL TRACT (GI) & ENDOSCOPIC RETROGRADE CHOLANGIOPANCREATOGRAPHY with Babak Garvin MD & Anthony Abarca MD on 2/24/22 at [facility] for treatment of Biliary stricture.     Patient is being evaluated for comorbid conditions of HTN, HLD, former tobacco use, anxiety, depression, hx alcohol abuse, GERD, IBS, gout, hx prostate cancer.    Mr. Reynaga has a history of painless jaundice and was found to have biliary dilation with gradual tapering towards the head of the pancreas. He has had multiple evaluations and procedures, prior ERCP with stent placement, prior EUS exams, and biopsies of presumed pancreatic head mass were negative for malignancy. He now presents for the above procedure.    History was obtained from patient & chart review.     Hx of abnormal bleeding or anti-platelet use: denies      Past Medical History  Past Medical History:   Diagnosis Date     Anxiety      Depression      Gastroesophageal reflux disease      Gout      Hypercholesterolemia      Hypertension      IBS (irritable bowel syndrome)      Pancreatic disease      Prostate cancer (H)        Past Surgical History  Past Surgical History:   Procedure Laterality Date     CATARACT EXTRACTION W/ INTRAOCULAR LENS IMPLANT Right      COLONOSCOPY       cryoablation of prostate       ENDOSCOPIC RETROGRADE CHOLANGIOPANCREATOGRAM       ENDOSCOPIC RETROGRADE CHOLANGIOPANCREATOGRAM N/A 1/24/2022    Procedure: ENDOSCOPIC RETROGRADE CHOLANGIOPANCREATOGRAPHY, BILIARY STENT EXCHANGE, sPY WITH BIOPSIES AND BRUSHINGS;  Surgeon:  Guru Ab Gonzalez MD;  Location: [facility] OR     ENDOSCOPIC RETROGRADE CHOLANGIOPANCREATOGRAM WITH SPYGLASS N/A 10/14/2021    Procedure: ENDOSCOPIC RETROGRADE CHOLANGIOPANCREATOGRAPHY, WITH DIRECT DUCT VISUALIZATION, USING PANCREATICOBILIARY FIBEROPTIC PROBE-spyglass, biliary sludge and clot removal, biliary biopsies, biliary stent exchange;  Surgeon: Anthony Abarca MD;  Location: [facility] OR     ENDOSCOPIC ULTRASOUND UPPER GASTROINTESTINAL TRACT (GI) N/A 9/28/2021    Procedure: ENDOSCOPIC ULTRASOUND, ESOPHAGOSCOPY / UPPER GASTROINTESTINAL TRACT (GI);  Surgeon: Babak Garvin MD;  Location: [facility] GI     ENDOSCOPIC ULTRASOUND UPPER GASTROINTESTINAL TRACT (GI) N/A 1/24/2022    Procedure: ENDOSCOPIC ULTRASOUND, ESOPHAGOSCOPY / UPPER GASTROINTESTINAL TRACT (GI);  Surgeon: Guru Ab Gonzalez MD;  Location: [facility] OR     ESOPHAGOSCOPY, GASTROSCOPY, DUODENOSCOPY (EGD), COMBINED N/A 10/14/2021    Procedure: ESOPHAGOGASTRODUODENOSCOPY, WITH FINE NEEDLE ASPIRATION BIOPSY, WITH ENDOSCOPIC ULTRASOUND GUIDANCE, biliary stent removal;  Surgeon: Babak Garvin MD;  Location: [facility] OR     MOUTH SURGERY       TONSILLECTOMY         Prior to Admission Medications  Current Outpatient Medications   Medication Sig Dispense Refill     acetaminophen (TYLENOL) 325 MG tablet Take 325-650 mg by mouth every 6 hours as needed for mild pain       coenzyme Q-10 10 MG CAPS Take 1 capsule by mouth every morning        dicyclomine (BENTYL) 20 MG tablet Take 20 mg by mouth 3 times daily       escitalopram (LEXAPRO) 20 MG tablet Take 1 tablet by mouth every morning        loperamide (IMODIUM) 2 MG capsule Take 1 capsule by mouth as needed       losartan (COZAAR) 100 MG tablet Take 1 tablet (100 mg) by mouth daily (Patient taking differently: Take 100 mg by mouth every morning ) 90 tablet 1     lovastatin (MEVACOR) 40 MG tablet Take 40 mg by mouth every morning        melatonin 5 MG CAPS Take 1 tablet by mouth  nightly as needed        Multiple Vitamin (MULTIVITAMIN PO) Take 1 tablet by mouth every morning        omeprazole (PRILOSEC) 20 MG DR capsule Take 20 mg by mouth every morning        zolpidem (AMBIEN) 10 MG tablet Take 10 mg by mouth nightly as needed          Allergies  No Known Allergies    Social History  Social History     Socioeconomic History     Marital status:      Spouse name: Not on file     Number of children: Not on file     Years of education: Not on file     Highest education level: Not on file   Occupational History     Not on file   Tobacco Use     Smoking status: Former Smoker     Types: Cigarettes     Quit date:      Years since quittin.1     Smokeless tobacco: Never Used   Substance and Sexual Activity     Alcohol use: Yes     Comment: cutting down, drinking NA beer     Drug use: Not on file     Sexual activity: Not on file   Other Topics Concern     Not on file   Social History Narrative     Not on file     Social Determinants of Health     Financial Resource Strain: Not on file   Food Insecurity: Not on file   Transportation Needs: Not on file   Physical Activity: Not on file   Stress: Not on file   Social Connections: Not on file   Intimate Partner Violence: Not on file   Housing Stability: Not on file       Family History  Family History   Problem Relation Age of Onset     Heart Failure Mother      Cancer Father      Anesthesia Reaction No family hx of      Cardiovascular No family hx of      Deep Vein Thrombosis (DVT) No family hx of        Review of Systems  The complete review of systems is negative other than noted in the HPI or here.           ROS/MED HX  ENT/Pulmonary: Comment: Quit smoking     (+) tobacco use, Past use,  (-) asthma and sleep apnea   Neurologic:  - neg neurologic ROS   (+) no peripheral neuropathy  (-) no seizures and no CVA   Cardiovascular:     (+) Dyslipidemia hypertension-----Previous cardiac testing   Echo: Date: Results:    Stress Test: Date:  Results:    ECG Reviewed: Date: 1/24/22 Results:  Sinus rhythm   No previous ECGs available  Ventricular rate 65 bpm    Cath: Date: Results:      METS/Exercise Tolerance: 4 - Raking leaves, gardening    Hematologic:  - neg hematologic  ROS  (-) history of blood clots and history of blood transfusion   Musculoskeletal: Comment: Gout        GI/Hepatic: Comment: Biliary stricture, hx ERCP with stent placement, prior EUS        (+) GERD, Asymptomatic on medication,  (-) liver disease   Renal/Genitourinary:  - neg Renal ROS  (-) renal disease   Endo:  - neg endo ROS  (-) Type II DM   Psychiatric/Substance Use:     (+) psychiatric history anxiety and depression alcohol abuse     Infectious Disease:  - neg infectious disease ROS     Malignancy: Comment: S/p cryoablation    (+) Malignancy, History of Prostate.Prostate CA status post Surgery.        Other:  - neg other ROS          Virtual visit -  No vitals were obtained    Physical Exam  Constitutional: Awake, alert, cooperative, no apparent distress, and appears stated age.  Eyes: Pupils equal  HENT: Normocephalic  Respiratory: non labored breathing   Neurologic: Awake, alert, oriented to name, place and time.   Neuropsychiatric: Calm, cooperative. Normal affect.      Prior Labs/Diagnostic Studies   All labs and imaging personally reviewed     EKG/ stress test - please see in ROS above     CT CHEST/ABDOMEN/PELVIS W CONTRAST, 2/7/2022  IMPRESSION:     1.  Interval placement of a common bile duct stent. Improved  intrahepatic biliary dilatation. New pneumobilia, predominantly in the  left hepatic lobe is nonspecific and likely related to stent  placement.  2.  Nonocclusive thrombus in the main portal vein and a branch of the  superior mesenteric vein.  3.  Hypodense structure at the end of the pancreas adjacent to  nonocclusive portal vein thrombus is indeterminate, though suspicious  for mass.  4.  Prominent periportal lymph nodes including an 11 mm short axis  portacaval  lymph node are indeterminate.        Upper EUS 1/24/22  Impression:            - An indiscrete distortion of the distal portion of                          the bile duct/superior margin of the pancreatic head                          was seen. However, it was very challenging to advance                          the echoendoscope beyond the pylorus despite changing                          position and suctioning gastric content. Thus, EUS                          guided fine needle biopsies were not attempted and EUS                          evaluation was performed only from the stomach                          - Fatty liver.                          - Extrahepatic biliary dilation with pneumobilia.                          - Heterogenous pancreas, otherwise unremarkable.                          Though all exam was done from the stomach.    ERCP 1/24/22  Impression:            - Deformed pylorus with difficulty encountered passing                          scope to the duodenum (MOD-22).                          - Three partially occluded stents from the common bile                          duct were seen in the major papilla. Extracted.                          - Prior biliary sphincterotomy appeared open.                          - Cholangiogram showed mild distal segmental                          indeterminate biliary stricture.                          - The biliary tree was swept and debris was found.                          - SpyGlass visualization of the common duct with                          biopsies obtained with SpyBites from the middle and                          lower third of the main duct.                          - Cells for cytology obtained via brushing from the                          lower and middle third of the main bile duct.                          - One 10 mm x 6 cm Viabil covered metal stent was                          placed into the common bile duct.      The patient's records  and results personally reviewed by this provider.       COVID19 testing to be completed within 4 days of DOS      Assessment      Kody Reynaga is a 76 year old male was seen as a PAC referral for risk assessment and optimization for anesthesia.    Plan/Recommendations  Pt will be optimized for the proposed procedure.  See below for details on the assessment, risk, and preoperative recommendations    ANESTHESIA  - Pt has had prior anesthetic.  No history of anesthetic complications.    NEUROLOGY  - No history of TIA, CVA or seizure    ENT  - No current airway concerns.  Will need to be reassessed day of surgery.    CARDIAC  - METS 4   - HTN, will hold losartan DOS  - RCRI : No serious cardiac risks.  0.4% risk of major adverse cardiac event.    PULMONARY  - KLEVER 4/8 = intermediate risk  - Denies asthma or inhaler use  - Former smoker, quit 1970  - Tobacco History      History   Smoking Status     Former Smoker     Types: Cigarettes     Quit date: 1970   Smokeless Tobacco     Never Used       GI  - GERD, asymptomatic on priolsec   - Biliary stricture, hx ERCP with stent placement, prior EUS  - Risk of PONV score = 1.  If > 2, anti-emetic intervention recommended.    /RENAL  - Hx prostate cancer, s/p cryoablation in 2019    ENDOCRINE  - BMI:  27  - No history of Diabetes Mellitus    HEME  - VTE risk: 0.5%  - No history of abnormal bleeding or antiplatelet use.    MSK  - Gout    PSYCH  - Anxiety, depression, hx ETOH abuse      The patient is optimized for their procedure. AVS with information on surgery time/arrival time, meds and NPO status given by nursing staff. No further diagnostic testing indicated.      Video-Visit Details    Type of service:  Video Visit    Patient verbally consented to video service today: YES    Video Start Time: 1325  Video End Time (time video stopped): 1334    Originating Location (pt. Location): Home    Distant Location (provider location):  [facility] PREOPERATIVE [facility]      Mode of Communication:  Video Conference via [employer]  On the day of service:     Prep time: 14 minutes  Visit time: 9 minutes  Documentation time: 10 minutes  ------------------------------------------  Total time: 33 minutes      Carol Gutierrez PA-C  Preoperative Assessment Center  [facility]  Clinic and [facility] Center  Phone: [phone]  Fax: [phone]

## 2022-02-09 NOTE — H&P (VIEW-ONLY)
Pre-Operative H & P     CC:  Preoperative exam to assess for increased cardiopulmonary risk while undergoing surgery and anesthesia.    Date of Encounter: 2/9/2022  Primary Care Physician:  Jerel Curran     Reason for Visit: Biliary stricture    HPI     Kody Reynaga is a 77 y/o male who presents for pre-operative H&P in preparation for ENDOSCOPIC ULTRASOUND, ESOPHAGOSCOPY / UPPER GASTROINTESTINAL TRACT (GI) & ENDOSCOPIC RETROGRADE CHOLANGIOPANCREATOGRAPHY with Babak Garvin MD & Anthony Abarca MD on 2/24/22 at [facility] for treatment of Biliary stricture.     Patient is being evaluated for comorbid conditions of HTN, HLD, former tobacco use, anxiety, depression, hx alcohol abuse, GERD, IBS, gout, hx prostate cancer.    Mr. Reynaga has a history of painless jaundice and was found to have biliary dilation with gradual tapering towards the head of the pancreas. He has had multiple evaluations and procedures, prior ERCP with stent placement, prior EUS exams, and biopsies of presumed pancreatic head mass were negative for malignancy. He now presents for the above procedure.    History was obtained from patient & chart review.     Hx of abnormal bleeding or anti-platelet use: denies      Past Medical History  Past Medical History:   Diagnosis Date     Anxiety      Depression      Gastroesophageal reflux disease      Gout      Hypercholesterolemia      Hypertension      IBS (irritable bowel syndrome)      Pancreatic disease      Prostate cancer (H)        Past Surgical History  Past Surgical History:   Procedure Laterality Date     CATARACT EXTRACTION W/ INTRAOCULAR LENS IMPLANT Right      COLONOSCOPY       cryoablation of prostate       ENDOSCOPIC RETROGRADE CHOLANGIOPANCREATOGRAM       ENDOSCOPIC RETROGRADE CHOLANGIOPANCREATOGRAM N/A 1/24/2022    Procedure: ENDOSCOPIC RETROGRADE CHOLANGIOPANCREATOGRAPHY, BILIARY STENT EXCHANGE, sPY WITH BIOPSIES AND BRUSHINGS;  Surgeon:  Guru Ab Gonzalez MD;  Location: [facility] OR     ENDOSCOPIC RETROGRADE CHOLANGIOPANCREATOGRAM WITH SPYGLASS N/A 10/14/2021    Procedure: ENDOSCOPIC RETROGRADE CHOLANGIOPANCREATOGRAPHY, WITH DIRECT DUCT VISUALIZATION, USING PANCREATICOBILIARY FIBEROPTIC PROBE-spyglass, biliary sludge and clot removal, biliary biopsies, biliary stent exchange;  Surgeon: Anthony Abarca MD;  Location: [facility] OR     ENDOSCOPIC ULTRASOUND UPPER GASTROINTESTINAL TRACT (GI) N/A 9/28/2021    Procedure: ENDOSCOPIC ULTRASOUND, ESOPHAGOSCOPY / UPPER GASTROINTESTINAL TRACT (GI);  Surgeon: Babak Garvin MD;  Location: [facility] GI     ENDOSCOPIC ULTRASOUND UPPER GASTROINTESTINAL TRACT (GI) N/A 1/24/2022    Procedure: ENDOSCOPIC ULTRASOUND, ESOPHAGOSCOPY / UPPER GASTROINTESTINAL TRACT (GI);  Surgeon: Guru Ab Gonzalez MD;  Location: [facility] OR     ESOPHAGOSCOPY, GASTROSCOPY, DUODENOSCOPY (EGD), COMBINED N/A 10/14/2021    Procedure: ESOPHAGOGASTRODUODENOSCOPY, WITH FINE NEEDLE ASPIRATION BIOPSY, WITH ENDOSCOPIC ULTRASOUND GUIDANCE, biliary stent removal;  Surgeon: Babak Garvin MD;  Location: [facility] OR     MOUTH SURGERY       TONSILLECTOMY         Prior to Admission Medications  Current Outpatient Medications   Medication Sig Dispense Refill     acetaminophen (TYLENOL) 325 MG tablet Take 325-650 mg by mouth every 6 hours as needed for mild pain       coenzyme Q-10 10 MG CAPS Take 1 capsule by mouth every morning        dicyclomine (BENTYL) 20 MG tablet Take 20 mg by mouth 3 times daily       escitalopram (LEXAPRO) 20 MG tablet Take 1 tablet by mouth every morning        loperamide (IMODIUM) 2 MG capsule Take 1 capsule by mouth as needed       losartan (COZAAR) 100 MG tablet Take 1 tablet (100 mg) by mouth daily (Patient taking differently: Take 100 mg by mouth every morning ) 90 tablet 1     lovastatin (MEVACOR) 40 MG tablet Take 40 mg by mouth every morning        melatonin 5 MG CAPS Take 1 tablet by mouth  nightly as needed        Multiple Vitamin (MULTIVITAMIN PO) Take 1 tablet by mouth every morning        omeprazole (PRILOSEC) 20 MG DR capsule Take 20 mg by mouth every morning        zolpidem (AMBIEN) 10 MG tablet Take 10 mg by mouth nightly as needed          Allergies  No Known Allergies    Social History  Social History     Socioeconomic History     Marital status:      Spouse name: Not on file     Number of children: Not on file     Years of education: Not on file     Highest education level: Not on file   Occupational History     Not on file   Tobacco Use     Smoking status: Former Smoker     Types: Cigarettes     Quit date:      Years since quittin.1     Smokeless tobacco: Never Used   Substance and Sexual Activity     Alcohol use: Yes     Comment: cutting down, drinking NA beer     Drug use: Not on file     Sexual activity: Not on file   Other Topics Concern     Not on file   Social History Narrative     Not on file     Social Determinants of Health     Financial Resource Strain: Not on file   Food Insecurity: Not on file   Transportation Needs: Not on file   Physical Activity: Not on file   Stress: Not on file   Social Connections: Not on file   Intimate Partner Violence: Not on file   Housing Stability: Not on file       Family History  Family History   Problem Relation Age of Onset     Heart Failure Mother      Cancer Father      Anesthesia Reaction No family hx of      Cardiovascular No family hx of      Deep Vein Thrombosis (DVT) No family hx of        Review of Systems  The complete review of systems is negative other than noted in the HPI or here.           ROS/MED HX  ENT/Pulmonary: Comment: Quit smoking     (+) tobacco use, Past use,  (-) asthma and sleep apnea   Neurologic:  - neg neurologic ROS   (+) no peripheral neuropathy  (-) no seizures and no CVA   Cardiovascular:     (+) Dyslipidemia hypertension-----Previous cardiac testing   Echo: Date: Results:    Stress Test: Date:  Results:    ECG Reviewed: Date: 1/24/22 Results:  Sinus rhythm   No previous ECGs available  Ventricular rate 65 bpm    Cath: Date: Results:      METS/Exercise Tolerance: 4 - Raking leaves, gardening    Hematologic:  - neg hematologic  ROS  (-) history of blood clots and history of blood transfusion   Musculoskeletal: Comment: Gout        GI/Hepatic: Comment: Biliary stricture, hx ERCP with stent placement, prior EUS        (+) GERD, Asymptomatic on medication,  (-) liver disease   Renal/Genitourinary:  - neg Renal ROS  (-) renal disease   Endo:  - neg endo ROS  (-) Type II DM   Psychiatric/Substance Use:     (+) psychiatric history anxiety and depression alcohol abuse     Infectious Disease:  - neg infectious disease ROS     Malignancy: Comment: S/p cryoablation    (+) Malignancy, History of Prostate.Prostate CA status post Surgery.        Other:  - neg other ROS          Virtual visit -  No vitals were obtained    Physical Exam  Constitutional: Awake, alert, cooperative, no apparent distress, and appears stated age.  Eyes: Pupils equal  HENT: Normocephalic  Respiratory: non labored breathing   Neurologic: Awake, alert, oriented to name, place and time.   Neuropsychiatric: Calm, cooperative. Normal affect.      Prior Labs/Diagnostic Studies   All labs and imaging personally reviewed     EKG/ stress test - please see in ROS above     CT CHEST/ABDOMEN/PELVIS W CONTRAST, 2/7/2022  IMPRESSION:     1.  Interval placement of a common bile duct stent. Improved  intrahepatic biliary dilatation. New pneumobilia, predominantly in the  left hepatic lobe is nonspecific and likely related to stent  placement.  2.  Nonocclusive thrombus in the main portal vein and a branch of the  superior mesenteric vein.  3.  Hypodense structure at the end of the pancreas adjacent to  nonocclusive portal vein thrombus is indeterminate, though suspicious  for mass.  4.  Prominent periportal lymph nodes including an 11 mm short axis  portacaval  lymph node are indeterminate.        Upper EUS 1/24/22  Impression:            - An indiscrete distortion of the distal portion of                          the bile duct/superior margin of the pancreatic head                          was seen. However, it was very challenging to advance                          the echoendoscope beyond the pylorus despite changing                          position and suctioning gastric content. Thus, EUS                          guided fine needle biopsies were not attempted and EUS                          evaluation was performed only from the stomach                          - Fatty liver.                          - Extrahepatic biliary dilation with pneumobilia.                          - Heterogenous pancreas, otherwise unremarkable.                          Though all exam was done from the stomach.    ERCP 1/24/22  Impression:            - Deformed pylorus with difficulty encountered passing                          scope to the duodenum (MOD-22).                          - Three partially occluded stents from the common bile                          duct were seen in the major papilla. Extracted.                          - Prior biliary sphincterotomy appeared open.                          - Cholangiogram showed mild distal segmental                          indeterminate biliary stricture.                          - The biliary tree was swept and debris was found.                          - SpyGlass visualization of the common duct with                          biopsies obtained with SpyBites from the middle and                          lower third of the main duct.                          - Cells for cytology obtained via brushing from the                          lower and middle third of the main bile duct.                          - One 10 mm x 6 cm Viabil covered metal stent was                          placed into the common bile duct.      The patient's records  and results personally reviewed by this provider.       COVID19 testing to be completed within 4 days of DOS      Assessment      Kody Reynaga is a 76 year old male was seen as a PAC referral for risk assessment and optimization for anesthesia.    Plan/Recommendations  Pt will be optimized for the proposed procedure.  See below for details on the assessment, risk, and preoperative recommendations    ANESTHESIA  - Pt has had prior anesthetic.  No history of anesthetic complications.    NEUROLOGY  - No history of TIA, CVA or seizure    ENT  - No current airway concerns.  Will need to be reassessed day of surgery.    CARDIAC  - METS 4   - HTN, will hold losartan DOS  - RCRI : No serious cardiac risks.  0.4% risk of major adverse cardiac event.    PULMONARY  - KLEVER 4/8 = intermediate risk  - Denies asthma or inhaler use  - Former smoker, quit 1970  - Tobacco History      History   Smoking Status     Former Smoker     Types: Cigarettes     Quit date: 1970   Smokeless Tobacco     Never Used       GI  - GERD, asymptomatic on priolsec   - Biliary stricture, hx ERCP with stent placement, prior EUS  - Risk of PONV score = 1.  If > 2, anti-emetic intervention recommended.    /RENAL  - Hx prostate cancer, s/p cryoablation in 2019    ENDOCRINE  - BMI:  27  - No history of Diabetes Mellitus    HEME  - VTE risk: 0.5%  - No history of abnormal bleeding or antiplatelet use.    MSK  - Gout    PSYCH  - Anxiety, depression, hx ETOH abuse      The patient is optimized for their procedure. AVS with information on surgery time/arrival time, meds and NPO status given by nursing staff. No further diagnostic testing indicated.      Video-Visit Details    Type of service:  Video Visit    Patient verbally consented to video service today: YES    Video Start Time: 1325  Video End Time (time video stopped): 1334    Originating Location (pt. Location): Home    Distant Location (provider location):  [facility] PREOPERATIVE ASSESSMENT CENTER      Mode of Communication:  Video Conference via [employer]  On the day of service:     Prep time: 14 minutes  Visit time: 9 minutes  Documentation time: 10 minutes  ------------------------------------------  Total time: 33 minutes      Carol Gutierrez PA-C  Preoperative Assessment Center  [facility]  Clinic and Surgery Center  Phone: [phone]  Fax: [phone]

## 2022-02-09 NOTE — TELEPHONE ENCOUNTER
FUTURE VISIT INFORMATION      SURGERY INFORMATION:    Date: 22    [facility]    Surgeon:  Babak Garvin MD Freeman, Martin Lewis, MD    Anesthesia Type:  general    Procedure: ENDOSCOPIC ULTRASOUND, ESOPHAGOSCOPY / UPPER GASTROINTESTINAL TRACT (GI) ENDOSCOPIC RETROGRADE CHOLANGIOPANCREATOGRAPHY    RECORDS REQUESTED FROM:       Primary Care Provider: Contreras Garner MD  - [facility]     Pertinent Medical History: Hypertension      Most recent EKG+ Tracin22

## 2022-02-09 NOTE — TELEPHONE ENCOUNTER
Please call patient and ask her to make an Virtual appointment with me ASAP as we need to discuss about these meds and the side effects

## 2022-02-09 NOTE — PROGRESS NOTES
Kody is a 76 year old who is being evaluated via a billable video visit.      How would you like to obtain your AVS? MyChart      HPI       Review of Systems         Objective    Vitals - Patient Reported  Pain Score: No Pain (0)        Physical Exam       KULDEEP Whitley LPN

## 2022-02-09 NOTE — PATIENT INSTRUCTIONS
Preparing for Your Surgery      Name:  Kody Reynaga   MRN:  [id number]   :  1945   Today's Date:  2022       Arriving for surgery:  Surgery date:  22  Arrival time:  05:30 am    Restrictions due to COVID 19       Effective 22 [facility] is implementing the following visitor policy:     1 person may accompany the patient through the Pre-Op process.      Then that person will be asked to leave the building.        There will be no visitors in the Surgery Waiting Room.        Inpatients are allowed 1 consistent visitor for the duration of their stay.      Visitors must wear a mask.      Visitors must not be ill.      Visiting hours are 8 am to 8 pm.    [place] parking is available for anyone with mobility limitations or disabilities.  ([place]  24 hours/ 7 days a week; [facility] [place], [place]  7 am- 3:30 pm, Mon- Fri)    Please come to:   [facility] [address]  [address] [place] [address]  -    Please proceed to [address] on the 3rd floor. [phone]?     - ?If you are in need of directions, wheelchair or escort please stop at the Information Desk in the lobby.  Inform the information person that you are here for surgery; a wheelchair and escort to [address] will be provided.?     What can I eat or drink?  -  You may eat and drink normally for up to 8 hours before your surgery.   -  You may have clear liquids until 2 hours before surgery.   Examples of clear liquids:  Water  Clear broth  Juices (apple, white grape, white cranberry  and cider) without pulp  Noncarbonated, powder based beverages  (lemonade and Williams-Aid)  Sodas (Sprite, 7-Up, ginger ale and seltzer)  Coffee or tea (without milk or cream)  Gatorade    -  No Alcohol for at least 24 hours before surgery     Which medicines can I take?  Hold Aspirin for 7 days before surgery.   Hold Multivitamins for 7 days before surgery.  Hold Supplements (Co Q-10) for 7 days before  surgery.  Hold Ibuprofen (Advil, Motrin) for 1 day before surgery--unless otherwise directed by surgeon.  Hold Naproxen (Aleve) for 4 days before surgery.  -  DO NOT take these medications the day of surgery:  Losartan.  -  PLEASE TAKE these medications the day of surgery:  Tylenol if needed; take other morning medications.    How do I prepare myself?  - Please take 2 showers before surgery using Scrubcare or Hibiclens soap.    Use this soap only from the neck to your toes.     Leave the soap on your skin for one minute--then rinse thoroughly.      You may use your own shampoo and conditioner; no other hair products.   - Please remove all jewelry and body piercings.  - No lotions, deodorants or fragrance.  - No makeup or fingernail polish.   - Bring your ID and insurance card.    -If you have a Deep Brain Stimulator, Spinal Cord Stimulator or any neuro stimulator device---you must bring the remote control to the hospital     - All patients are required to have a Covid-19 test within 4 days of surgery/procedure.      -Patients will be contacted by the [facility] scheduling team within 1 week of surgery to make an appointment.      - Patients may call the Scheduling team at [phone] if they have not been scheduled within 4 days of  surgery.      ALL PATIENTS GOING HOME THE SAME DAY OF SURGERY ARE REQUIRED TO HAVE A RESPONSIBLE ADULT TO DRIVE AND BE IN ATTENDANCE WITH THEM FOR 24 HOURS FOLLOWING SURGERY.      Questions or Concerns:    - For any questions regarding the day of surgery or your hospital stay, please contact the Pre Admission Nursing Office at [phone].       - If you have health changes between today and your surgery please call your surgeon.       For questions after surgery please call your surgeons office.

## 2022-02-10 NOTE — TELEPHONE ENCOUNTER
Left message for patient to return call.  If he does, please schedule virtual visit with Dr. Magdy pratt

## 2022-02-14 NOTE — LETTER
2/14/2022         RE: Kody Reynaga  [address]  [facility] [address]        Dear Colleague,    Thank you for referring your patient, Kody Reynaga, to the [facility]. Please see a copy of my visit note below.      GI CLINIC VISIT:  FOLLOW-UP    ASSESSMENT/PLAN:  Indeterminate biliary stricture and pancreatic lesion.  We reviewed the differential diagnosis which includes chronic pancreatitis, cholangiocarcinoma and pancreatic cancer. We discussed that negative biopsies cannot exclude malignancy and again discussed that surgical resection would be required for cure if this is malignancy. The risk of delaying resection if malignant is that this may lead to vascular involvement or metastatic disease which precludes curative intervention. The significance of the portal vein thrombus is unclear at this time. There is no evidence of distant metastatic disease.    PLAN:  - Repeat EUS and ERCP as scheduled next week to reattempt EUS-guided sampling.   - Complete abstinence from alcohol discussed.    After that, will recommend follow-up with surgical oncology to more definitively address whether pt will undergo resection without a tissue diagnosis (if benign) or if malignancy is identified.    If not pursuing resection, then will discuss anticoagulation for the portal vein issue (EUS will held clarify if this is tumor) and then pursue non-invasive surveillance with longterm covered metal stent.      RTC as scheduled at upcoming EUS.    Thank you for this consultation.  It was a pleasure to participate in the care of this patient; please contact us with any further questions.  A total of  43 minutes was spent on the day of the visit, >50% of which was counseling regarding the above delineated issues. The remainder was review of records and imaging as well as documentation and coordination of care.      JESSICA Garvin MD  Professor of Medicine  Division of Gastroenterology, Hepatology  and Nutrition  [facility]  -------------------------------------------------------------------------------------------------------------------  HPI:  The pt is a/n 76 year old male who is being seen for follow-up regarding an indeterminate biliary stricture and abnormal imaging of the pancreas.     He presented in August with abdominal pain, jaundice and low-grade fevers to [facility] in [place]. At that time CT with contrast showed features of acute pancreatitis also with marked biliary dilation. There was an intrapancreatic transition point with enhancement of the bile duct wall. His lipase was > 4000.  US showed gallbladder sludge without stones.     Subsequent EUS and ERCP were performed by Dr. Phipps and a covered biliary stent was placed. No gallstones were found. EUS was reported as showing a 3 cm malignant-appearing mass, however FNA showed only inflammatory cells and debris. BIliary brushings were also negative for malignancy. CA 19-9 and CEA were normal. His jaundice and pain resolved. He subsequently moved to the [facility].    EUS here was initially performed by myself 9/28/21. This was difficult due to the indwelling metal stent but did show a 20 x 17 mm hypoechoic region in the pancreatic head in contact with the portal vein as well as more diffuse features of possible chronic pancreatitis. Needle biopsy at that time was benign with features of chronic pancreatitis.    At that time he was referred to surgical oncology (Dr. Benz) to discuss possible surgical resection. At that time it was decided to defer resection unless malignancy was proven, after discussion with the pt.    Repeat EUS and ERCP were performed 10/14/21 with removal of the covered metal stent and SpyGlass exam of the bile duct. EUS biopsy showed rare atypical cells and biliary biopsy was read as suspicious for malignancy, but non-diagnostic.    Repeat endoscopic exam was planned for 1/20, however I needed to  "cancel this due to a family emergency. It was rescheduled with Dr. Gonzalez on 1/24/22. At that time, he could not pass the EUS scope beyond the stomach and thus could not visualize the region of abnormality or repeat FNA. ERCP was performed with biliary sampling that was benign. A covered metal stent was again placed.    I am seeing him today to discuss long-term plans and options.    He reports mild intermittent bilateral flank discomfort but nothing like on intial presentation. His jaundice has not recurred since the initial ERCP. Appetite is good without nausea or vomiting. No diarrhea or steatorrhea. He initially lost 20 lb in August but has returned to baseline. Overall is \"feeling well\".    CT was performed 2/7 and shows:  1.  Interval placement of a common bile duct stent. Improved  intrahepatic biliary dilatation. New pneumobilia, predominantly in the  left hepatic lobe is nonspecific and likely related to stent  placement.  2.  Nonocclusive thrombus in the main portal vein and a branch of the  superior mesenteric vein.  3.  Hypodense structure at the end of the pancreas adjacent to  nonocclusive portal vein thrombus is indeterminate, though suspicious  for mass.  4.  Prominent periportal lymph nodes including an 11 mm short axis  portacaval lymph node are indeterminate.    ROS:  See history of present illness.    PERTINENT PAST MEDICAL HISTORY:  Past Medical History:   Diagnosis Date     Anxiety      Depression      Gastroesophageal reflux disease      Gout      Hypercholesterolemia      Hypertension      IBS (irritable bowel syndrome)      Pancreatic disease      Prostate cancer (H)        PREVIOUS SURGERIES:  Past Surgical History:   Procedure Laterality Date     CATARACT EXTRACTION W/ INTRAOCULAR LENS IMPLANT Right      COLONOSCOPY       cryoablation of prostate       ENDOSCOPIC RETROGRADE CHOLANGIOPANCREATOGRAM       ENDOSCOPIC RETROGRADE CHOLANGIOPANCREATOGRAM N/A 1/24/2022    Procedure: " ENDOSCOPIC RETROGRADE CHOLANGIOPANCREATOGRAPHY, BILIARY STENT EXCHANGE, sPY WITH BIOPSIES AND BRUSHINGS;  Surgeon: Guru Ab Gonzalez MD;  Location: [facility] OR     ENDOSCOPIC RETROGRADE CHOLANGIOPANCREATOGRAM WITH SPYGLASS N/A 10/14/2021    Procedure: ENDOSCOPIC RETROGRADE CHOLANGIOPANCREATOGRAPHY, WITH DIRECT DUCT VISUALIZATION, USING PANCREATICOBILIARY FIBEROPTIC PROBE-spyglass, biliary sludge and clot removal, biliary biopsies, biliary stent exchange;  Surgeon: Anthony Abarca MD;  Location: [facility] OR     ENDOSCOPIC ULTRASOUND UPPER GASTROINTESTINAL TRACT (GI) N/A 9/28/2021    Procedure: ENDOSCOPIC ULTRASOUND, ESOPHAGOSCOPY / UPPER GASTROINTESTINAL TRACT (GI);  Surgeon: Babak Garvin MD;  Location: [facility] GI     ENDOSCOPIC ULTRASOUND UPPER GASTROINTESTINAL TRACT (GI) N/A 1/24/2022    Procedure: ENDOSCOPIC ULTRASOUND, ESOPHAGOSCOPY / UPPER GASTROINTESTINAL TRACT (GI);  Surgeon: Guru Ab Gonzalez MD;  Location: [facility] OR     ESOPHAGOSCOPY, GASTROSCOPY, DUODENOSCOPY (EGD), COMBINED N/A 10/14/2021    Procedure: ESOPHAGOGASTRODUODENOSCOPY, WITH FINE NEEDLE ASPIRATION BIOPSY, WITH ENDOSCOPIC ULTRASOUND GUIDANCE, biliary stent removal;  Surgeon: Babak Garvin MD;  Location: [facility] OR     MOUTH SURGERY       TONSILLECTOMY         PREVIOUS ENDOSCOPY:  See HPI    ALLERGIES:   No Known Allergies    PERTINENT MEDICATIONS:    Current Outpatient Medications:      acetaminophen (TYLENOL) 325 MG tablet, Take 325-650 mg by mouth every 6 hours as needed for mild pain, Disp: , Rfl:      coenzyme Q-10 10 MG CAPS, Take 1 capsule by mouth every morning , Disp: , Rfl:      dicyclomine (BENTYL) 20 MG tablet, Take 20 mg by mouth 3 times daily, Disp: , Rfl:      escitalopram (LEXAPRO) 20 MG tablet, Take 1 tablet by mouth every morning , Disp: , Rfl:      loperamide (IMODIUM) 2 MG capsule, Take 1 capsule by mouth as needed, Disp: , Rfl:      losartan (COZAAR) 100 MG tablet, Take 1 tablet (100 mg) by  mouth daily (Patient taking differently: Take 100 mg by mouth every morning ), Disp: 90 tablet, Rfl: 1     lovastatin (MEVACOR) 40 MG tablet, Take 40 mg by mouth every morning , Disp: , Rfl:      melatonin 5 MG CAPS, Take 1 tablet by mouth nightly as needed , Disp: , Rfl:      Multiple Vitamin (MULTIVITAMIN PO), Take 1 tablet by mouth every morning , Disp: , Rfl:      omeprazole (PRILOSEC) 20 MG DR capsule, Take 20 mg by mouth every morning , Disp: , Rfl:      zolpidem (AMBIEN) 10 MG tablet, Take 10 mg by mouth nightly as needed , Disp: , Rfl:     SOCIAL HISTORY:  Social History     Socioeconomic History     Marital status:      Spouse name: Not on file     Number of children: Not on file     Years of education: Not on file     Highest education level: Not on file   Occupational History     Not on file   Tobacco Use     Smoking status: Former Smoker     Types: Cigarettes     Quit date:      Years since quittin.1     Smokeless tobacco: Never Used   Substance and Sexual Activity     Alcohol use: Yes     Comment: cutting down, drinking NA beer     Drug use: Not on file     Sexual activity: Not on file   Other Topics Concern     Not on file   Social History Narrative     Not on file     Social Determinants of Health     Financial Resource Strain: Not on file   Food Insecurity: Not on file   Transportation Needs: Not on file   Physical Activity: Not on file   Stress: Not on file   Social Connections: Not on file   Intimate Partner Violence: Not on file   Housing Stability: Not on file   Previously 4 drinks per day. Now 1-2.    FAMILY HISTORY:  Family History   Problem Relation Age of Onset     Heart Failure Mother      Cancer Father      Anesthesia Reaction No family hx of      Cardiovascular No family hx of      Deep Vein Thrombosis (DVT) No family hx of          PHYSICAL EXAMINATION:  Vitals reviewed, AFVSS   Wt   Wt Readings from Last 2 Encounters:   22 87.3 kg (192 lb 7.4 oz)   22 87.1 kg  (192 lb 1.6 oz)      Gen: aaox3, cooperative, pleasant, not dyspneic/diaphoretic, nad  HEENT:  anicteric, mmm  Skin:  no jaundice      PERTINENT STUDIES:    Lab on 01/21/2022   Component Date Value Ref Range Status     SARS CoV2 PCR 01/21/2022 Negative  Negative, Testing sent to reference lab. Results will be returned via unsolicited result Final         Again, thank you for allowing me to participate in the care of your patient.        Sincerely,        Babak Garvin MD

## 2022-02-14 NOTE — PROGRESS NOTES
"Answers for HPI/ROS submitted by the patient on 2/14/2022  How many servings of fruits and vegetables do you eat daily?: 2-3  On average, how many sweetened beverages do you drink each day (Examples: soda, juice, sweet tea, etc.  Do NOT count diet or artificially sweetened beverages)?: 0  How many minutes a day do you exercise enough to make your heart beat faster?: 20 to 29  How many days a week do you exercise enough to make your heart beat faster?: 5  Kody is a 76 year old who is being evaluated via a billable video visit.      How would you like to obtain your AVS? MyChart  If the video visit is dropped, the invitation should be resent by: Text to cell phone: [phone]  Will anyone else be joining your video visit? No      Video Start Time: 1:20 PM    Assessment & Plan     Altitude sickness preventative measures  [place] [facility] [place]  He had been to similar or even more elevations in the past  He does get mountain sickness and normally takes some medicines for that  He is not clear what he has taken in the past  It is going to take 2 days for him to get to the top and he is going to stay there for couple of days and then come down  I do not think steroids are a good option in his case because of his age and side effect profile of that  We will start him on some Diamox  Although nifedipine can be considered in this case, because it can cause potentially hypotension I am not keen on that  - acetaZOLAMIDE (DIAMOX) 125 MG tablet; Take 1 tablet (125 mg) by mouth 2 times daily      25 minutes spent on the date of the encounter doing chart review, history and exam, documentation and further activities per the note       BMI:   Estimated body mass index is 26.84 kg/m  as calculated from the following:    Height as of 1/24/22: 1.803 m (5' 11\").    Weight as of 1/24/22: 87.3 kg (192 lb 7.4 oz).           Return in about 3 months (around 5/14/2022).    Jerel Curran MD  [facility] MYERS " JOHANNA Gates is a 76 year old who presents for the following health issues     HPI   Requesting medicines for altitude sickness  He is going to be going to [facility]  He will be there on the top for a couple of days  He is going to take 2 days to get the  He had medication in the past but does not remember what their names are          Review of Systems   Constitutional, HEENT, cardiovascular, pulmonary, gi and gu systems are negative, except as otherwise noted.      Objective             Physical Exam   GENERAL: Healthy, alert and no distress  EYES: Eyes grossly normal to inspection.  No discharge or erythema, or obvious scleral/conjunctival abnormalities.  RESP: No audible wheeze, cough, or visible cyanosis.  No visible retractions or increased work of breathing.    SKIN: Visible skin clear. No significant rash, abnormal pigmentation or lesions.  NEURO: Cranial nerves grossly intact.  Mentation and speech appropriate for age.  PSYCH: Mentation appears normal, affect normal/bright, judgement and insight intact, normal speech and appearance well-groomed.                Video-Visit Details    Type of service:  Video Visit    Video End Time:1:20 PM    Originating Location (pt. Location): Home    Distant Location (provider location):  [facility]     Platform used for Video Visit: [employer]

## 2022-02-14 NOTE — PROGRESS NOTES
Kody is a 76 year old who is being evaluated via a billable video visit.      If the video visit is dropped, the invitation should be resent by: Send to e-mail at: [email or web address]  Will anyone else be joining your video visit? No    Video Start Time: 11:27  Video-Visit Details    Type of service:  Video Visit    Video End Time:11:44    Originating Location (pt. Location): Home    Distant Location (provider location):  [facility]     Platform used for Video Visit: [employer]     GI CLINIC VISIT:  FOLLOW-UP    ASSESSMENT/PLAN:  Indeterminate biliary stricture and pancreatic lesion.  We reviewed the differential diagnosis which includes chronic pancreatitis, cholangiocarcinoma and pancreatic cancer. We discussed that negative biopsies cannot exclude malignancy and again discussed that surgical resection would be required for cure if this is malignancy. The risk of delaying resection if malignant is that this may lead to vascular involvement or metastatic disease which precludes curative intervention. The significance of the portal vein thrombus is unclear at this time. There is no evidence of distant metastatic disease.    PLAN:  - Repeat EUS and ERCP as scheduled next week to reattempt EUS-guided sampling.   - Complete abstinence from alcohol discussed.    After that, will recommend follow-up with surgical oncology to more definitively address whether pt will undergo resection without a tissue diagnosis (if benign) or if malignancy is identified.    If not pursuing resection, then will discuss anticoagulation for the portal vein issue (EUS will held clarify if this is tumor) and then pursue non-invasive surveillance with longterm covered metal stent.      RTC as scheduled at upcoming EUS.    Thank you for this consultation.  It was a pleasure to participate in the care of this patient; please contact us with any further questions.  A total of  43 minutes was spent on the day of the  visit, >50% of which was counseling regarding the above delineated issues. The remainder was review of records and imaging as well as documentation and coordination of care.      JESSICA Garvin MD  Professor of Medicine  Division of Gastroenterology, Hepatology and Nutrition  [facility]  -------------------------------------------------------------------------------------------------------------------  HPI:  The pt is a/n 76 year old male who is being seen for follow-up regarding an indeterminate biliary stricture and abnormal imaging of the pancreas.     He presented in August with abdominal pain, jaundice and low-grade fevers to [facility] in [place]. At that time CT with contrast showed features of acute pancreatitis also with marked biliary dilation. There was an intrapancreatic transition point with enhancement of the bile duct wall. His lipase was > 4000.  US showed gallbladder sludge without stones.     Subsequent EUS and ERCP were performed by Dr. Phipps and a covered biliary stent was placed. No gallstones were found. EUS was reported as showing a 3 cm malignant-appearing mass, however FNA showed only inflammatory cells and debris. BIliary brushings were also negative for malignancy. CA 19-9 and CEA were normal. His jaundice and pain resolved. He subsequently moved to the [facility].    EUS here was initially performed by myself 9/28/21. This was difficult due to the indwelling metal stent but did show a 20 x 17 mm hypoechoic region in the pancreatic head in contact with the portal vein as well as more diffuse features of possible chronic pancreatitis. Needle biopsy at that time was benign with features of chronic pancreatitis.    At that time he was referred to surgical oncology (Dr. Benz) to discuss possible surgical resection. At that time it was decided to defer resection unless malignancy was proven, after discussion with the pt.    Repeat EUS and ERCP were performed  "10/14/21 with removal of the covered metal stent and SpyGlass exam of the bile duct. EUS biopsy showed rare atypical cells and biliary biopsy was read as suspicious for malignancy, but non-diagnostic.    Repeat endoscopic exam was planned for 1/20, however I needed to cancel this due to a family emergency. It was rescheduled with Dr. Gonzalez on 1/24/22. At that time, he could not pass the EUS scope beyond the stomach and thus could not visualize the region of abnormality or repeat FNA. ERCP was performed with biliary sampling that was benign. A covered metal stent was again placed.    I am seeing him today to discuss long-term plans and options.    He reports mild intermittent bilateral flank discomfort but nothing like on intial presentation. His jaundice has not recurred since the initial ERCP. Appetite is good without nausea or vomiting. No diarrhea or steatorrhea. He initially lost 20 lb in August but has returned to baseline. Overall is \"feeling well\".    CT was performed 2/7 and shows:  1.  Interval placement of a common bile duct stent. Improved  intrahepatic biliary dilatation. New pneumobilia, predominantly in the  left hepatic lobe is nonspecific and likely related to stent  placement.  2.  Nonocclusive thrombus in the main portal vein and a branch of the  superior mesenteric vein.  3.  Hypodense structure at the end of the pancreas adjacent to  nonocclusive portal vein thrombus is indeterminate, though suspicious  for mass.  4.  Prominent periportal lymph nodes including an 11 mm short axis  portacaval lymph node are indeterminate.    ROS:  See history of present illness.    PERTINENT PAST MEDICAL HISTORY:  Past Medical History:   Diagnosis Date     Anxiety      Depression      Gastroesophageal reflux disease      Gout      Hypercholesterolemia      Hypertension      IBS (irritable bowel syndrome)      Pancreatic disease      Prostate cancer (H)        PREVIOUS SURGERIES:  Past Surgical History: "   Procedure Laterality Date     CATARACT EXTRACTION W/ INTRAOCULAR LENS IMPLANT Right      COLONOSCOPY       cryoablation of prostate       ENDOSCOPIC RETROGRADE CHOLANGIOPANCREATOGRAM       ENDOSCOPIC RETROGRADE CHOLANGIOPANCREATOGRAM N/A 1/24/2022    Procedure: ENDOSCOPIC RETROGRADE CHOLANGIOPANCREATOGRAPHY, BILIARY STENT EXCHANGE, sPY WITH BIOPSIES AND BRUSHINGS;  Surgeon: Guru Ab Gonzalez MD;  Location: [facility]     ENDOSCOPIC RETROGRADE CHOLANGIOPANCREATOGRAM WITH SPYGLASS N/A 10/14/2021    Procedure: ENDOSCOPIC RETROGRADE CHOLANGIOPANCREATOGRAPHY, WITH DIRECT DUCT VISUALIZATION, USING PANCREATICOBILIARY FIBEROPTIC PROBE-spyglass, biliary sludge and clot removal, biliary biopsies, biliary stent exchange;  Surgeon: Anthony Abarca MD;  Location: [facility]     ENDOSCOPIC ULTRASOUND UPPER GASTROINTESTINAL TRACT (GI) N/A 9/28/2021    Procedure: ENDOSCOPIC ULTRASOUND, ESOPHAGOSCOPY / UPPER GASTROINTESTINAL TRACT (GI);  Surgeon: Babak Garvin MD;  [facility]     ENDOSCOPIC ULTRASOUND UPPER GASTROINTESTINAL TRACT (GI) N/A 1/24/2022    Procedure: ENDOSCOPIC ULTRASOUND, ESOPHAGOSCOPY / UPPER GASTROINTESTINAL TRACT (GI);  Surgeon: Guru Ab Gonzalez MD;  Location[facility]     ESOPHAGOSCOPY, GASTROSCOPY, DUODENOSCOPY (EGD), COMBINED N/A 10/14/2021    Procedure: ESOPHAGOGASTRODUODENOSCOPY, WITH FINE NEEDLE ASPIRATION BIOPSY, WITH ENDOSCOPIC ULTRASOUND GUIDANCE, biliary stent removal;  Surgeon: Babak Garvin MD;  Location: [facility] OR     MOUTH SURGERY       TONSILLECTOMY         PREVIOUS ENDOSCOPY:  See HPI    ALLERGIES:   No Known Allergies    PERTINENT MEDICATIONS:    Current Outpatient Medications:      acetaminophen (TYLENOL) 325 MG tablet, Take 325-650 mg by mouth every 6 hours as needed for mild pain, Disp: , Rfl:      coenzyme Q-10 10 MG CAPS, Take 1 capsule by mouth every morning , Disp: , Rfl:      dicyclomine (BENTYL) 20 MG tablet, Take 20 mg by mouth 3 times  daily, Disp: , Rfl:      escitalopram (LEXAPRO) 20 MG tablet, Take 1 tablet by mouth every morning , Disp: , Rfl:      loperamide (IMODIUM) 2 MG capsule, Take 1 capsule by mouth as needed, Disp: , Rfl:      losartan (COZAAR) 100 MG tablet, Take 1 tablet (100 mg) by mouth daily (Patient taking differently: Take 100 mg by mouth every morning ), Disp: 90 tablet, Rfl: 1     lovastatin (MEVACOR) 40 MG tablet, Take 40 mg by mouth every morning , Disp: , Rfl:      melatonin 5 MG CAPS, Take 1 tablet by mouth nightly as needed , Disp: , Rfl:      Multiple Vitamin (MULTIVITAMIN PO), Take 1 tablet by mouth every morning , Disp: , Rfl:      omeprazole (PRILOSEC) 20 MG DR capsule, Take 20 mg by mouth every morning , Disp: , Rfl:      zolpidem (AMBIEN) 10 MG tablet, Take 10 mg by mouth nightly as needed , Disp: , Rfl:     SOCIAL HISTORY:  Social History     Socioeconomic History     Marital status:      Spouse name: Not on file     Number of children: Not on file     Years of education: Not on file     Highest education level: Not on file   Occupational History     Not on file   Tobacco Use     Smoking status: Former Smoker     Types: Cigarettes     Quit date: 1970     Years since quittin.1     Smokeless tobacco: Never Used   Substance and Sexual Activity     Alcohol use: Yes     Comment: cutting down, drinking NA beer     Drug use: Not on file     Sexual activity: Not on file   Other Topics Concern     Not on file   Social History Narrative     Not on file     Social Determinants of Health     Financial Resource Strain: Not on file   Food Insecurity: Not on file   Transportation Needs: Not on file   Physical Activity: Not on file   Stress: Not on file   Social Connections: Not on file   Intimate Partner Violence: Not on file   Housing Stability: Not on file   Previously 4 drinks per day. Now 1-2.    FAMILY HISTORY:  Family History   Problem Relation Age of Onset     Heart Failure Mother      Cancer Father       Anesthesia Reaction No family hx of      Cardiovascular No family hx of      Deep Vein Thrombosis (DVT) No family hx of          PHYSICAL EXAMINATION:  Vitals reviewed, AFVSS   Wt   Wt Readings from Last 2 Encounters:   01/24/22 87.3 kg (192 lb 7.4 oz)   01/21/22 87.1 kg (192 lb 1.6 oz)      Gen: aaox3, cooperative, pleasant, not dyspneic/diaphoretic, nad  HEENT:  anicteric, mmm  Skin:  no jaundice      PERTINENT STUDIES:    Lab on 01/21/2022   Component Date Value Ref Range Status     SARS CoV2 PCR 01/21/2022 Negative  Negative, Testing sent to reference lab. Results will be returned via unsolicited result Final

## 2022-02-14 NOTE — PATIENT INSTRUCTIONS
Patient Education     Acute Mountain Sickness (Altitude Illness)    Altitude illness can occur when a person travels to higher altitudes than his or her body is used to. As altitude rises, there is less oxygen in the air. The body then gets less oxygen with each breath. This problem most often affects people who travel to 8,000 feet or higher. Acute mountain sickness (AMS) is the most common type of altitude illness.   You are more likely to have altitude illness if you:    Have had altitude illness before    Exercise or drink alcohol without adjusting to altitude changes first    Gain altitude quickly    Have a medical problem that affects breathing  Symptoms of AMS include:    Headache (often the main symptom)    Loss of appetite    Nausea and vomiting    Tiredness    Dizziness    Trouble sleeping  Treating AMS  If you have symptoms of AMS:    Rest until you no longer have symptoms. Don't travel higher until symptoms go away.    Don't drink alcohol. Also don't take sleeping pills or other sedatives.    You can take medicines for symptoms. For headache, an over-the-counter pain reliever such as ibuprofen, acetaminophen, or aspirin can help. Don't give aspirin to children under 18. Medicine for nausea can also help.     If your symptoms don't get better within 24 to 48 hours, go back down a lower altitude where you feel better.   Preventing AMS    Travel so you gain no more than 1,000 feet of elevation in a day. This gives your body more time to adjust to the change.    Sleep at a lower altitude than the highest altitude you traveled to during the day.    If you plan to keep moving up in altitude, stop and rest for one day at least every 2 to 3 days.    Watch for symptoms. If any symptoms come back, stop and rest right away. Or return to a lower altitude.    Ask your doctor about a prescription medicine that may help prevent altitude sickness.    Follow-up care  Follow up with your healthcare provider, or as  "advised.  When to seek medical advice  If your symptoms don't improve within 24 to 48 hours or get worse, return to a lower altitude and seek help.   Call 911  Go to a lower altitude and seek emergency help right away if you have any of the following:    Confusion and irritability    Severe tiredness, sleepiness, or weakness    Acting \"drunk\"    Trouble with speech or vision    Trouble walking or talking    Loss of consciousness    Seizure    Trouble breathing    Coughing up pink or foamy spit  Nicole last reviewed this educational content on 4/1/2018 2000-2021 [employer]. All rights reserved. This information is not intended as a substitute for professional medical care. Always follow your healthcare professional's instructions.           "

## 2022-02-19 NOTE — BRIEF OP NOTE
[facility] Brief Operative Note    Pre-operative diagnosis: Biliary stricture [K83.1]   Post-operative diagnosis As prior    Procedure: Procedure(s):  ENDOSCOPIC ULTRASOUND, ESOPHAGOSCOPY / UPPER GASTROINTESTINAL TRACT (GI)  ENDOSCOPIC RETROGRADE CHOLANGIOPANCREATOGRAPHY   Surgeon(s): Surgeon(s) and Role:  Panel 1:     * Babak Garvin MD - Primary  Panel 2:     * Anthony Abarca MD - Primary   Estimated blood loss: 1 mL    Specimens: * No specimens in log *   Findings:    76 year-old male with distal obstruction to the common bile duct and resultant stricture who had prior ERCP with FCMS placement, prior EUS exams and biopsies of presumed pancreatic head mass were negative for malignancy. Had a third EUS with biopsies at [facility] with pathology showed atypica. ERCP same day was notable for patient biliary sphincterotomy and a transpapillary Crowley pancreatic stent distally migrated. Stent self ejected and passed distally during the exam. SpyGlass cholangioscopy demonstrated a segmental biliary stricture involving the middle and lower third of the main bile duct. Biopsies were obtained with a SpyBite miniature forceps and ere suspicious for malignancy. Three 10 Fr x 9 cm SF biliary stents were placed into the common bile duct. Suspect visualized biliary stricture from both CP in the head area and mucosal changes from previosuly placed FCMS, rather than malignant appearing biliary obstruction. In the interim he was evaluated by surgery and plan is to have a definitive diagnosis before proceeding with surgical resection. Repeat EUS last month, unable to pass pylorus, no biopsies obtained, repeat ERCP with Spybited (not diagnostic) and a 10 mm x 6 cm Viabil FCMS placement.      Patient present today for repeat EUS with plans for further biopsies and ERCP for stents exchange.   EUS  - Edematous and distorted pylorus. Balloon dilated to 15 mm. No obvious tumor was seen in the areao r proximal duodenal  wall.  - An indiscrete hypoechoic mass-like lesion in the region in the pancreatic head/distal bile duct measuring 22 x 14 mm in diameter. No contact with major arteries or veins was appreciated and no adjacent lymphadenopathy. The abnormality is non-specific and could again represent sequale of chronic pancreatitis, however given concern for rpeated malignancy in light of the prior biliary obstruction and negative sampling, repeat needle biopsy performed. Results pending.   - Diffusely heterogenous pancreas with scattered hypoechoci regions, irregular duct contour, dilated sidebranches and simple-appearing cysts. Could represent sequelae of prior acute pancreatitis, underlying chronic pancreatitis or changes from pancreatic duct obstruction.   - No ascites, liver lesions or adenopathy.   - Non-occlusive portal vein thrombus.        ERCP  - Selective biliary cannulation through a patent biliary sphincterotomy.  - Cholangiogram with no apparant bile duct stricture.   - Removal of stent related debris and sludge from the bile duct with balloon sweep.  - Biopsies obtained from the common bile duct at the presumed stricture site with a biopsy forceps.   - Two 10 Fr plastic stents were placed into the common bile duct (7 cm intraductal and 5 cm transpapillary).     Plan:  - Await path  - Home discharge

## 2022-02-24 NOTE — ANESTHESIA PROCEDURE NOTES
Airway       Patient location during procedure: OR       Procedure Start/Stop Times: 2/24/2022 8:01 AM  Staff -        Anesthesiologist:  Lucia Che MD       CRNA: Martha Huff APRN CRNA       Performed By: CRNAIndications and Patient Condition       Indications for airway management: robin-procedural       Induction type:intravenous       Mask difficulty assessment: 1 - vent by mask    Final Airway Details       Final airway type: endotracheal airway       Successful airway: ETT - single  Endotracheal Airway Details        ETT size (mm): 8.0       Cuffed: yes       Successful intubation technique: direct laryngoscopy       DL Blade Type: Trevizo 2       Grade View of Cords: 1       Adjucts: stylet       Position: Right       Measured from: gums/teeth       Secured at (cm): 22       Bite block used: None    Post intubation assessment        Placement verified by: capnometry, equal breath sounds and chest rise        Number of attempts at approach: 1       Secured with: commercial tube reich       Ease of procedure: easy       Dentition: Intact and Unchanged

## 2022-02-24 NOTE — DISCHARGE INSTRUCTIONS
[facility], [place]  Same-Day EGD Procedure  Adult Discharge Orders & Instructions     You had a procedure known as an Esophagogastroduodenoscopy (EGD) of either the upper gastrointestinal (GI) tract. An UPPER EGD will place a camera into either your mouth or nose to examine your esophagus, stomach, and/or small intestines. Biopsies, small samples of tissue, are often taken to help diagnose and/or classify stages of disease growth. The EGD is also used to help locate areas of the GI tract that may require further treatment (dilation, stenting, clipping, removal, etc.) or medical interventions (medication specific).      After your procedure   1. Make sure to clarify with your healthcare provider any diet restrictions (For example, clear liquid, low fat, no caffeine, etc.)   2. Do NOT take aspirin containing medications or any other blood-thinning medicines (anticoagulants) until your healthcare provider says it's OK.   3. You MAY be prescribed antibiotics, depending on what was done and/or found during your EUS, make sure to take antibiotics as prescribed by your healthcare provider    For 24 hours after surgery  1. Get plenty of rest.  A responsible adult must stay with you for at least 24 hours after you leave the hospital.   2. Do not drive or use heavy equipment.  If you have weakness or tingling, don't drive or use heavy equipment until this feeling goes away.  3. Do not drink alcohol.  4. Avoid strenuous or risky activities (gym, yoga, cycling, etc.).  Ask for help when climbing stairs.   5. You may feel lightheaded.  IF so, sit for a few minutes before standing.  Have someone help you get up.   6. If you have nausea (feel sick to your stomach): Drink only clear liquids such as apple juice, ginger ale, broth or 7-Up.  Rest may also help.  Be sure to drink enough fluids.  Move to a regular diet as you feel able.  7. If you feel bloated or have too much gas, use a heating pad on your  belly to help reduce the discomfort. This should help you feel better.   8. You may have a slight fever. This is normal for the first 24 hours.   9. You may have a dry mouth, a sore throat, muscle aches or trouble sleeping.  These are normal and will go away after 24 hours. A sore throat is most common. Use lozenges or gargle with salt water to ease the discomfort.   10. Do not make important or legal decisions.      Call your doctor for any of the followin. Chest pain, and/or shortness of breath  2. Abdominal  pain, bloating or cramping that has not improved or does not respond to pain reliving medications (Tylenol or narcotics if prescribed)   3. Difficulty swallowing or feeling as though food or liquids are stuck in your throat   4. Sore throat lasting more than 2 days or pain that has worsened over time   5. Black or tarry stools   6. Nausea and/or vomiting that is not resolving or has not responded to anti-nausea medications prescribed to you   7. It has been over 8 to 10 hours since surgery and you are still not able to urinate (pass water)   8. Headache for over 24 hours   9. Fever over 100.5 F (38 C) lasting more than 24 hours after the procedure   10. Signs of jaundice or blockage (fever, chills, abdominal pain, yellowing of the whites of your eyes, yellowing of your skin, and/or passing darker than normal urine)     To contact a doctor, call:   [ ] Dr Garvin at the  GI clinic at [phone]  (Monday thru Friday 8:00am to 4:30pm)   [ ] [phone] and ask for the Gastroenterology resident on call (answered 24 hours a day)   [ ] Emergency Department: [facility]: [phone]   Take it easy when you get home.  Remember, same day surgery DOES NOT MEAN SAME DAY RECOVERY!  Healing is a gradual process.  You will need some time to recover - you may be more tired than you realize at first.  Rest and relax for at least the first 24 hours at home.  You'll feel better and heal faster if you take good  care of yourself.

## 2022-02-24 NOTE — ANESTHESIA PREPROCEDURE EVALUATION
Anesthesia Pre-Procedure Evaluation    Patient: Kody Reynaga   MRN: [id number] : 1945        Preoperative Diagnosis: * No surgery found *    Procedure :   Video Visit       Past Medical History:   Diagnosis Date     Anxiety      Depression      Gastroesophageal reflux disease      Gout      Hypercholesterolemia      Hypertension      IBS (irritable bowel syndrome)      Pancreatic disease      Prostate cancer (H)       Past Surgical History:   Procedure Laterality Date     CATARACT EXTRACTION W/ INTRAOCULAR LENS IMPLANT Right      COLONOSCOPY       cryoablation of prostate       ENDOSCOPIC RETROGRADE CHOLANGIOPANCREATOGRAM       ENDOSCOPIC RETROGRADE CHOLANGIOPANCREATOGRAM N/A 2022    Procedure: ENDOSCOPIC RETROGRADE CHOLANGIOPANCREATOGRAPHY, BILIARY STENT EXCHANGE, sPY WITH BIOPSIES AND BRUSHINGS;  Surgeon: Guru Ab Gonzalez MD;  Location: [facility] OR     ENDOSCOPIC RETROGRADE CHOLANGIOPANCREATOGRAM WITH SPYGLASS N/A 10/14/2021    Procedure: ENDOSCOPIC RETROGRADE CHOLANGIOPANCREATOGRAPHY, WITH DIRECT DUCT VISUALIZATION, USING PANCREATICOBILIARY FIBEROPTIC PROBE-spyglass, biliary sludge and clot removal, biliary biopsies, biliary stent exchange;  Surgeon: Anthony Abarca MD;  Location: [facility] OR     ENDOSCOPIC ULTRASOUND UPPER GASTROINTESTINAL TRACT (GI) N/A 2021    Procedure: ENDOSCOPIC ULTRASOUND, ESOPHAGOSCOPY / UPPER GASTROINTESTINAL TRACT (GI);  Surgeon: Babak Garvin MD;  Location: [facility] GI     ENDOSCOPIC ULTRASOUND UPPER GASTROINTESTINAL TRACT (GI) N/A 2022    Procedure: ENDOSCOPIC ULTRASOUND, ESOPHAGOSCOPY / UPPER GASTROINTESTINAL TRACT (GI);  Surgeon: Guru Ab Gonzalez MD;  Location: [facility] OR     ESOPHAGOSCOPY, GASTROSCOPY, DUODENOSCOPY (EGD), COMBINED N/A 10/14/2021    Procedure: ESOPHAGOGASTRODUODENOSCOPY, WITH FINE NEEDLE ASPIRATION BIOPSY, WITH ENDOSCOPIC ULTRASOUND GUIDANCE, biliary stent removal;  Surgeon: Babak Garvin MD;   Location: [facility] OR     MOUTH SURGERY       TONSILLECTOMY        No Known Allergies   Social History     Tobacco Use     Smoking status: Former Smoker     Types: Cigarettes     Quit date: 1970     Years since quittin.1     Smokeless tobacco: Never Used   Substance Use Topics     Alcohol use: Yes     Comment: cutting down, drinking NA beer      Wt Readings from Last 1 Encounters:   22 87.3 kg (192 lb 7.4 oz)        Anesthesia Evaluation   Pt has had prior anesthetic.     No history of anesthetic complications       ROS/MED HX  ENT/Pulmonary: Comment: Quit smoking     (+) tobacco use, Past use,  (-) asthma and sleep apnea   Neurologic:  - neg neurologic ROS   (+) no peripheral neuropathy  (-) no seizures and no CVA   Cardiovascular:     (+) Dyslipidemia hypertension-----Previous cardiac testing   Echo: Date: Results:    Stress Test: Date: Results:    ECG Reviewed: Date: 22 Results:  Sinus rhythm   No previous ECGs available  Ventricular rate 65 bpm    Cath: Date: Results:      METS/Exercise Tolerance: 4 - Raking leaves, gardening    Hematologic:  - neg hematologic  ROS  (-) history of blood clots and history of blood transfusion   Musculoskeletal: Comment: Gout        GI/Hepatic: Comment: Biliary stricture, hx ERCP with stent placement, prior EUS        (+) GERD, Asymptomatic on medication,  (-) liver disease   Renal/Genitourinary:  - neg Renal ROS  (-) renal disease   Endo:  - neg endo ROS  (-) Type II DM   Psychiatric/Substance Use:     (+) psychiatric history anxiety and depression alcohol abuse     Infectious Disease:  - neg infectious disease ROS     Malignancy: Comment: S/p cryoablation    (+) Malignancy, History of Prostate.Prostate CA status post Surgery.        Other:  - neg other ROS          Physical Exam    Airway        Mallampati: I   TM distance: > 3 FB   Neck ROM: full   Mouth opening: > 3 cm    Respiratory Devices and Support         Dental  no notable dental history          Cardiovascular   cardiovascular exam normal          Pulmonary   pulmonary exam normal                OUTSIDE LABS:  CBC:   Lab Results   Component Value Date    WBC 7.3 01/24/2022    WBC 6.9 10/14/2021    HGB 13.3 01/24/2022    HGB 10.8 (L) 10/14/2021    HCT 39.4 (L) 01/24/2022    HCT 31.6 (L) 10/14/2021     01/24/2022     10/14/2021     BMP:   Lab Results   Component Value Date     01/24/2022     10/14/2021    POTASSIUM 4.4 01/24/2022    POTASSIUM 4.0 10/14/2021    CHLORIDE 103 01/24/2022    CHLORIDE 104 10/14/2021    CO2 28 01/24/2022    CO2 27 10/14/2021    BUN 19 01/24/2022    BUN 18 10/14/2021    CR 1.08 01/24/2022    CR 0.92 10/14/2021     (H) 01/24/2022     (H) 01/24/2022     COAGS:   Lab Results   Component Value Date    INR 1.06 01/24/2022     POC: No results found for: BGM, HCG, HCGS  HEPATIC:   Lab Results   Component Value Date    ALBUMIN 3.6 01/24/2022    PROTTOTAL 7.3 01/24/2022    ALT 46 01/24/2022    AST 25 01/24/2022    ALKPHOS 94 01/24/2022    BILITOTAL 0.8 01/24/2022     OTHER:   Lab Results   Component Value Date    AMIRAH 9.2 01/24/2022    LIPASE 165 01/24/2022    AMYLASE 38 01/24/2022       Anesthesia Plan    ASA Status:  3   NPO Status:  NPO Appropriate    Anesthesia Type: General.     - Airway: ETT      Maintenance: Balanced.        Consents    Anesthesia Plan(s) and associated risks, benefits, and realistic alternatives discussed. Questions answered and patient/representative(s) expressed understanding.     - Discussed: Risks, Benefits and Alternatives for BOTH SEDATION and the PROCEDURE were discussed     - Discussed with:  Patient      - Extended Intubation/Ventilatory Support Discussed: No.      - Patient is DNR/DNI Status: No    Use of blood products discussed: No .     Postoperative Care    Pain management: IV analgesics.   PONV prophylaxis: Ondansetron (or other 5HT-3), Dexamethasone or Solumedrol     Comments:                PAC Discussion  and Assessment                          PAC Resident/NP Anesthesia Assessment: See H&P                                       Lucia Che MD

## 2022-02-24 NOTE — ANESTHESIA CARE TRANSFER NOTE
Patient: Kody Reynaga    Procedure: Procedure(s):  ESOPHAGOGASTRODUODENOSCOPY WITH DILATION AND STENT REMOVAL, ENDOSCOPIC ULTRASOUND WITH FINE NEEDLE BIOPSIES  ENDOSCOPIC RETROGRADE CHOLANGIOPANCREATOGRAPHY, with stent placement and bile duct biopsy       Diagnosis: Biliary stricture [K83.1]  Diagnosis Additional Information: No value filed.    Anesthesia Type:   General     Note:    Oropharynx: oropharynx clear of all foreign objects and spontaneously breathing  Level of Consciousness: awake  Oxygen Supplementation: nasal cannula  Level of Supplemental Oxygen (L/min / FiO2): 4  Independent Airway: airway patency satisfactory and stable  Dentition: dentition unchanged  Vital Signs Stable: post-procedure vital signs reviewed and stable  Report to RN Given: handoff report given  Patient transferred to: PACU    Handoff Report: Identifed the Patient, Identified the Reponsible Provider, Reviewed the pertinent medical history, Discussed the surgical course, Reviewed Intra-OP anesthesia mangement and issues during anesthesia, Set expectations for post-procedure period and Allowed opportunity for questions and acknowledgement of understanding      Vitals:  Vitals Value Taken Time   /96 02/24/22 1017   Temp     Pulse 80 02/24/22 1019   Resp     SpO2 100 % 02/24/22 1019   Vitals shown include unvalidated device data.    Electronically Signed By: KATHRINE Thomas CRNA  February 24, 2022  10:20 AM

## 2022-02-24 NOTE — ANESTHESIA POSTPROCEDURE EVALUATION
Patient: Kody Reynaga    Procedure: Procedure(s):  ESOPHAGOGASTRODUODENOSCOPY WITH DILATION AND STENT REMOVAL, ENDOSCOPIC ULTRASOUND WITH FINE NEEDLE BIOPSIES  ENDOSCOPIC RETROGRADE CHOLANGIOPANCREATOGRAPHY, with stent placement and bile duct biopsy       Anesthesia Type:  General    Note:  Disposition: Outpatient   Postop Pain Control: Uneventful            Sign Out: Well controlled pain   PONV: No   Neuro/Psych: Uneventful            Sign Out: Acceptable/Baseline neuro status   Airway/Respiratory: Uneventful            Sign Out: Acceptable/Baseline resp. status   CV/Hemodynamics: Uneventful            Sign Out: Acceptable CV status; No obvious hypovolemia; No obvious fluid overload   Other NRE: NONE   DID A NON-ROUTINE EVENT OCCUR? No           Last vitals:  Vitals Value Taken Time   /104 02/24/22 1026   Temp 36.1  C (96.98  F) 02/24/22 1022   Pulse 69 02/24/22 1037   Resp 16 02/24/22 1020   SpO2 100 % 02/24/22 1037   Vitals shown include unvalidated device data.    Electronically Signed By: Olivia Loo MD  February 24, 2022  10:38 AM

## 2022-02-24 NOTE — PROGRESS NOTES
Dr. Sweeney paged asking if we need post op labs/to see patient before discharge    No labs needed, provider will come see before discharge

## 2022-02-28 NOTE — TELEPHONE ENCOUNTER
Post ERCP (2-24-22) with Dr. Abarca: Follow-up    Post procedure recommendations:   Repeat ERCP with Dr. Abarca in 3 months to                          exchange/remove stents and further interrogate the                          biliary system.     Orders placed:   Please assist in scheduling:     Procedure/Imaging/Clinic: ERCP  Physician: Dr. Abarca  Timing: 3 months  Procedure length:provider average  Anesthesia:gen  Dx: Common bile duct stricture  Tier:2  [facility]    Patient states: feeling ok, no concerning sympotms. Spends the summer in [place], knows procedure due in May - would prefer early  In May    Clinic contact and scheduling numbers verified for future questions/concerns.    Carla Elizalde, RN Care Coordinator

## 2022-02-28 NOTE — TELEPHONE ENCOUNTER
EUS biopsies returned without evidence of malignancy and suggestive of chronic pancreatitis.    Called and updated pt.    Pt has follow-up visit scheduled this week with Dr. Benz to make a more definitive decision re whether will ever recommend surgical intervention in this setting.    Will be scheduling for ERCP and stent change in May.    I would recommend repeat CT at that time, presuming not have resection, and then if stable likely defer further imaging surveillance.    Will message primary care re possible anticoagulation for the recent non-occlusive PV thrombus.    JESSICA Garvin MD  Professor of Medicine  Division of Gastroenterology, Hepatology and Nutrition  [facility]

## 2022-03-04 NOTE — NURSING NOTE
"Oncology Rooming Note    March 4, 2022 10:26 AM   Kody Reynaga is a 76 year old male who presents for:    Chief Complaint   Patient presents with     Oncology Clinic Visit     Prostate Cancer      Initial Vitals: /76 (BP Location: Right arm, Patient Position: Sitting, Cuff Size: Adult Large)   Pulse 76   Temp 97.8  F (36.6  C) (Oral)   Ht 1.803 m (5' 10.98\")   Wt 88.1 kg (194 lb 4.8 oz)   SpO2 99%   BMI 27.11 kg/m   Estimated body mass index is 27.11 kg/m  as calculated from the following:    Height as of this encounter: 1.803 m (5' 10.98\").    Weight as of this encounter: 88.1 kg (194 lb 4.8 oz). Body surface area is 2.1 meters squared.  No Pain (0) Comment: Data Unavailable   No LMP for male patient.  Allergies reviewed: Yes  Medications reviewed: Yes    Medications: Medication refills not needed today.  Pharmacy name entered into [employer]: [id number] - [place], [place] - [address] AT [facility] [address]    Clinical concerns: Pt would like to know if he can get blood thinners because he has a blood clot.     Jose Thomas            "

## 2022-03-04 NOTE — LETTER
3/4/2022    RE: Kody Reynaga  [address]  [facility] [address]    Dear Colleague,    Thank you for referring your patient, Kody Reynaga, to the [facility]. Please see a copy of my visit note below.    Surgical Oncology - Established Patient  3/4/2022     76 M w/ diagnosis of biliary obstruction of unclear etiology.  He originally presented with painless jaundice and was found to have biliary dilation and gradual tapering towards the head of the pancreas on imaging.  There was no clear mass identified, but imaging showed other findings consistent with pancreatitis.  He was stented via ERCP and initially underwent EUS x 2 (including one at the [facility]) with biopsy showing no definitive evidence of malignancy, but atypical cells that were suspicious.  In the initial discussion at multidisciplinary conference and review with radiology, there continued to be significant concern for underlying malignancy.  At my first visit with the patient, we discussed that surgical resection was likely the best option for treatment; however, the patient expressed some concerns with that plan given we didn't have a definitive diagnosis.  Thus, the decision was made to re-attempt EUS/ERCP with biopsies to see if we could prove malignancy.  He underwent this, and again had findings of atypical cells/suspicion for malignancy, but no definitive diagnosis.  In repeat multidisciplinary discussion, there was definitely concern for malignancy, but there was also thought that this could present severe reactive atypia, possibly from inflammation/pancreatitis.  In talking with the patient again, he was hesitant to undergo high risk surgery at his age.  Thus, he was placed on surveillance.  His most recent EUS/ERCP on 2/24/2022 he was again seen to have a mass-like area in the superior head of the pancreas that could be neoplastic or inflammation.  A non-occlusive PV thrombus was seen and other changes in the  "pancreas could have been consistent with pancreatitis.  FNA of the mass-like area showed findings consistent with chronic pancreatitis.  ERCP biopsies showed reactive epithelial changes and inflammation.  The patient now presents for re-discussion of management.     Of Note: He has been doing well since his last visit and has not had any issues with pain or eating.  He denies any prior clinical history of pancreatitis.  He does mention a history of heavy alcohol use that he stopped several years ago.  The patient is generally healthy and has good physical status.  He is independent and does not use assistive devices for ambulation.  He has no significant cardiac or pulmonary disease.  No prior abdominal operations.  No blood thinning medications.  In talking with the patient, he clearly articulated that he would not want a surgical resection unless the presence of cancer was definitively proven.     /76 (BP Location: Right arm, Patient Position: Sitting, Cuff Size: Adult Large)   Pulse 76   Temp 97.8  F (36.6  C) (Oral)   Ht 1.803 m (5' 10.98\")   Wt 88.1 kg (194 lb 4.8 oz)   SpO2 99%   BMI 27.11 kg/m       Abdominal US (8/26/2021): IMPRESSION:  1. Positive ultrasonographic Hinton sign.  The gallbladder is moderately distended and filled with sludge.  The gallbladder wall is normal in thickness and there is no pericholecystic fluid.  Findings are equivocal for acute cholecystitis.  Recommend surgical consultation.  Consider further evaluation with a HIDA scan.   2. Dilated common bile duct up to 1.6 cm.  Sludge is seen within the distal common bile duct.  Partially visualized intrahepatic biliary duct dilatation.   Please refer to the subsequent MRCP of the abdomen.      CTAP (8/26/2021): IMPRESSION:  1. CT findings compatible with acute pancreatitis. Small focal area of blunted enhancement along the inferior margin of the proximal pancreatic tail is noted. This could reflect a small area of glandular " necrosis. Recommend attention on follow-up imaging.   2. Marked intra and extrahepatic biliary ductal dilatation focally transitioning at the level the proximal common bile duct. There is bile duct wall enhancement at this site. Findings could indicate underlying ductal stricture. Neoplastic process is not excluded. Additional consideration would include nonradiopaque stones. Although this is felt to be less likely. Recommend correlation with MRCP or ERCP.     MRI/MRCP (8/26/2021): IMPRESSION:  1. Marked intrahepatic biliary ductal dilatation extending through the common hepatic duct. There is abrupt transition of the duct at the level of the proximal CBD extending through the pancreatic head. This could indicate underlying stricture. While benign inflammatory causes could be considered, a neoplastic process is also a consideration. Recommend correlation with ERCP.   2. Sequela of pancreatitis. Several tiny cystic foci within the pancreas measure up to 7 mm. Recommend attention on follow-up imaging. Follow-up MRI or CT recommended in one year.   3. Gallbladder sludge.   4. Suspect underlying red marrow reconversion. Areas of increased T2 signal are seen throughout the thoracolumbar spine on nonfat sat images. Additional heterogeneous appearance of the marrow in the pelvis. While this may be a benign etiology, consider follow-up MRI confirm.      EUS (9/27/2021): IMPRESSION:  - Difficult exam of the pancreatic head due to indwelling metal biliary stent.   - Diffusely heterogenous pancreas with scattered hypoechoci regions, irregular duct contour, dilated sidebranches and simple-appearing cysts. Could represent sequelae of recent acute pancreatitis, underlying chronic pancreatitis or changes from pancreatic duct obstruction.   - More focal rounded region in the pancreatic head measuring 20 x 17 mm in diameter. This is in contact with the bile duct and also associated with apparent obstruction of the pancreatic duct.  This has loss of interface with the portal vein but no narrowing or occlusion. No contact with major arteries although imaging difficult due to stent artifact. Needle aspiration and needle biopsy performed. Results pending.   - Metal biliary and plastic pancreatic duct stents left in place.   - No ascites, liver lesions or adenopathy. The pancreatic head abnormality is technically non-specific and could represent acute or chronic pancreatitis, however this is concerning for malignancy in light of the recent biliary obstruction. Biopsies are pending. Will need consultation with surgical oncology in my opinion and should be considered malignant unless definitively proven otherwise.     Pathology (9/27/2021): No evidence of malignancy.    EUS (10/14/2021): IMPRESSION:            - Extraction of previously placed metal biliary stent. Plastic pancreatic duct stent not manipulated.   - An indiscrete hypoechoic mass-like lesion in the region in the pancreatic head/distal bile duct measuring 19 x 18 mm in diameter. No contact with major arteries or veins was appreciated and no adjacent lymphadenopathy. The abnormality is non-specific and could represent sequale of acute or chronic pancreatitis, however given concern for malignancy in light of the recent biliary obstruction and prior negative sampling, repeat needle biopsy performed. Results pending.   - Diffusely heterogenous pancreas with scattered hypoechocic regions, irregular duct contour, dilated sidebranches and simple-appearing cysts. Could represent sequelae of recent acute pancreatitis, underlying chronic pancreatitis or changes from pancreatic duct obstruction.   - No ascites, liver lesions or adenopathy.    Pathology (10/14/2021): Rare atypical cellular aggregates on FNA.  Bile duct stricture biopsies suspicious for malignancy.    CT CAP (2/7/2022): IMPRESSION:  1. Interval placement of a common bile duct stent. Improved intrahepatic biliary dilatation. New  pneumobilia, predominantly in the left hepatic lobe is nonspecific and likely related to stent placement.  2.  Nonocclusive thrombus in the main portal vein and a branch of the superior mesenteric vein.  3.  Hypodense structure at the end of the pancreas adjacent to nonocclusive portal vein thrombus is indeterminate, though suspicious for mass.  4.  Prominent periportal lymph nodes including an 11 mm short axis portacaval lymph node are indeterminate.    EUS/ERCP (2/24/2022): EUS IMPRESSION:              - Edematous and mildly distorted pylorus. Balloon dilated to 15 mm. No obvious tumor was seen in the area or proximal duodenum.   - An indiscrete hypoechoic mass-like lesion in the region in the pancreatic head/distal bile duct measuring 23 x 14 mm in diameter. No contact with major arteries or veins was appreciated and no adjacent lymphadenopathy. The abnormality is non-specific and could again represent sequale of chronic pancreatitis, however given concern for malignancy in light of the prior biliary obstruction and negative sampling, repeat needle biopsy performed. Results pending.   - Diffusely heterogenous pancreas with scattered hypoechoci regions, irregular duct contour, dilated sidebranches and simple-appearing cysts. Could represent sequelae of prior acute pancreatitis, underlying chronic pancreatitis or changes from pancreatic duct obstruction.   - No ascites, liver lesions or adenopathy.   - Non-occlusive portal vein thrombus.  ERCP IMPRESSION:              - Selective biliary cannulation through a patent biliary sphincterotomy.   - Cholangiogram with no apparant residual bile duct stricture, after previous removal of fully covered metallic stent.   - Removal of stent related debris and sludge from the bile duct with balloon sweep.   - Biopsies obtained from the common bile duct at the presumed stricture site with a biopsy forceps.   - Two 10 Fr plastic stents were placed into the common bile duct (7 cm  intraductal and 5 cm transpapillary).    Pathology (2/24/2022): FNA consistent with chronic pancreatitis.  Bile duct biopsies consistent with acute inflammation and reactive epithelial changes.     Assessment/Plan:  76 M w/ extra-hepatic biliary obstruction at the level of the pancreatic head and appearance concerning for malignancy, but without a biopsy that proves malignancy.  Findings could also represent changes associated with pancreatitis.  All of his EUS and ERCP biopsies to this point have not demonstrated evidence of malignancy.  In fact, the most recent biopsies demonstrate findings consistent with inflammation and chronic pancreatitis.  No apparent biliary stricture was noted after removal of the previous biliary stent (plastic stents replaced).  The patient continues to feel well and is otherwise relatively healthy and has a good functional status.  He appears that he would tolerate major abdominal surgery.  We discussed his situation and that often the only way to definitively prove the presence or absence of malignancy is to resect the area surgically, which would require a Whipple in his case.  I think this would be a very reasonable management plan.  Although I think he would likely tolerate this, there is still a larger chance of poor outcomes related to the surgery in a patient of his age and he is very aware of this.  Thus, resection carries the risk of associated complications and over-treatment in the event that no cancer is found.  On the other hand, surveillance definitely carries the risk of under-treatment and potentially missing a treatment window to manage a resectable cancer if one is present.  Thus, surveillance may compromise outcomes if there is an occult malignancy present.  In having this extensive discussion of the various options, pros and cons, the patient showed very good understanding of the situation and clearly articulated his preference to avoid surgery and continue  surveillance.  As of now, he would only want the surgery if a diagnosis of malignancy was proven.  We will plan on discussing him again at the upcoming multidisciplinary conference to talk about the findings and his wishes, but I think the patient is making a very reasonable and informed decision for himself.  With that, the tentative plan will be to continue on surveillance with the GI team.  We will discuss with GI regarding potential treatment of his non-occlusive PV thrombus.  Questions were answered and the patient was in agreement with and understanding of the plan.     A total of 50 minutes were spent on this encounter including more than 50% in face to face time and the remainder in imaging review, chart review, documentation, and coordination of care.  Again, thank you for allowing me to participate in the care of your patient.      Sincerely,    Anil Benz MD

## 2022-03-04 NOTE — PROGRESS NOTES
Surgical Oncology - Established Patient  3/4/2022     76 M w/ diagnosis of biliary obstruction of unclear etiology.  He originally presented with painless jaundice and was found to have biliary dilation and gradual tapering towards the head of the pancreas on imaging.  There was no clear mass identified, but imaging showed other findings consistent with pancreatitis.  He was stented via ERCP and initially underwent EUS x 2 (including one at the [facility]) with biopsy showing no definitive evidence of malignancy, but atypical cells that were suspicious.  In the initial discussion at multidisciplinary conference and review with radiology, there continued to be significant concern for underlying malignancy.  At my first visit with the patient, we discussed that surgical resection was likely the best option for treatment; however, the patient expressed some concerns with that plan given we didn't have a definitive diagnosis.  Thus, the decision was made to re-attempt EUS/ERCP with biopsies to see if we could prove malignancy.  He underwent this, and again had findings of atypical cells/suspicion for malignancy, but no definitive diagnosis.  In repeat multidisciplinary discussion, there was definitely concern for malignancy, but there was also thought that this could present severe reactive atypia, possibly from inflammation/pancreatitis.  In talking with the patient again, he was hesitant to undergo high risk surgery at his age.  Thus, he was placed on surveillance.  His most recent EUS/ERCP on 2/24/2022 he was again seen to have a mass-like area in the superior head of the pancreas that could be neoplastic or inflammation.  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  FNA of the mass-like area showed findings consistent with chronic pancreatitis.  ERCP biopsies showed reactive epithelial changes and inflammation.  The patient now presents for  "re-discussion of management.     Of Note: He has been doing well since his last visit and has not had any issues with pain or eating.  He denies any prior clinical history of pancreatitis.  He does mention a history of heavy alcohol use that he stopped several years ago.  The patient is generally healthy and has good physical status.  He is independent and does not use assistive devices for ambulation.  He has no significant cardiac or pulmonary disease.  No prior abdominal operations.  No blood thinning medications.  In talking with the patient, he clearly articulated that he would not want a surgical resection unless the presence of cancer was definitively proven.     /76 (BP Location: Right arm, Patient Position: Sitting, Cuff Size: Adult Large)   Pulse 76   Temp 97.8  F (36.6  C) (Oral)   Ht 1.803 m (5' 10.98\")   Wt 88.1 kg (194 lb 4.8 oz)   SpO2 99%   BMI 27.11 kg/m       Abdominal US (8/26/2021): IMPRESSION:  1. Positive ultrasonographic Hinton sign.  The gallbladder is moderately distended and filled with sludge.  The gallbladder wall is normal in thickness and there is no pericholecystic fluid.  Findings are equivocal for acute cholecystitis.  Recommend surgical consultation.  Consider further evaluation with a HIDA scan.   2. Dilated common bile duct up to 1.6 cm.  Sludge is seen within the distal common bile duct.  Partially visualized intrahepatic biliary duct dilatation.   Please refer to the subsequent MRCP of the abdomen.      CTAP (8/26/2021): IMPRESSION:  1. CT findings compatible with acute pancreatitis. Small focal area of blunted enhancement along the inferior margin of the proximal pancreatic tail is noted. This could reflect a small area of glandular necrosis. Recommend attention on follow-up imaging.   2. Marked intra and extrahepatic biliary ductal dilatation focally transitioning at the level the proximal common bile duct. There is bile duct wall enhancement at this site. Findings " could indicate underlying ductal stricture. Neoplastic process is not excluded. Additional consideration would include nonradiopaque stones. Although this is felt to be less likely. Recommend correlation with MRCP or ERCP.     MRI/MRCP (8/26/2021): IMPRESSION:  1. Marked intrahepatic biliary ductal dilatation extending through the common hepatic duct. There is abrupt transition of the duct at the level of the proximal CBD extending through the pancreatic head. This could indicate underlying stricture. While benign inflammatory causes could be considered, a neoplastic process is also a consideration. Recommend correlation with ERCP.   2. Sequela of pancreatitis. Several tiny cystic foci within the pancreas measure up to 7 mm. Recommend attention on follow-up imaging. Follow-up MRI or CT recommended in one year.   3. Gallbladder sludge.   4. Suspect underlying red marrow reconversion. Areas of increased T2 signal are seen throughout the thoracolumbar spine on nonfat sat images. Additional heterogeneous appearance of the marrow in the pelvis. While this may be a benign etiology, consider follow-up MRI confirm.      EUS (9/27/2021): IMPRESSION:  - Difficult exam of the pancreatic head due to indwelling metal biliary stent.   - Diffusely heterogenous pancreas with scattered hypoechoci regions, irregular duct contour, dilated sidebranches and simple-appearing cysts. Could represent sequelae of recent acute pancreatitis, underlying chronic pancreatitis or changes from pancreatic duct obstruction.   - More focal rounded region in the pancreatic head measuring 20 x 17 mm in diameter. This is in contact with the bile duct and also associated with apparent obstruction of the pancreatic duct. This has loss of interface with the portal vein but no narrowing or occlusion. No contact with major arteries although imaging difficult due to stent artifact. Needle aspiration and needle biopsy performed. Results pending.   - Metal  biliary and plastic pancreatic duct stents left in place.   - No ascites, liver lesions or adenopathy. The pancreatic head abnormality is technically non-specific and could represent acute or chronic pancreatitis, however this is concerning for malignancy in light of the recent biliary obstruction. Biopsies are pending. Will need consultation with surgical oncology in my opinion and should be considered malignant unless definitively proven otherwise.     Pathology (9/27/2021): No evidence of malignancy.    EUS (10/14/2021): IMPRESSION:            - Extraction of previously placed metal biliary stent. Plastic pancreatic duct stent not manipulated.   - An indiscrete hypoechoic mass-like lesion in the region in the pancreatic head/distal bile duct measuring 19 x 18 mm in diameter. No contact with major arteries or veins was appreciated and no adjacent lymphadenopathy. The abnormality is non-specific and could represent sequale of acute or chronic pancreatitis, however given concern for malignancy in light of the recent biliary obstruction and prior negative sampling, repeat needle biopsy performed. Results pending.   - Diffusely heterogenous pancreas with scattered hypoechocic regions, irregular duct contour, dilated sidebranches and simple-appearing cysts. Could represent sequelae of recent acute pancreatitis, underlying chronic pancreatitis or changes from pancreatic duct obstruction.   - No ascites, liver lesions or adenopathy.    Pathology (10/14/2021): Rare atypical cellular aggregates on FNA.  Bile duct stricture biopsies suspicious for malignancy.    CT CAP (2/7/2022): IMPRESSION:  1. Interval placement of a common bile duct stent. Improved intrahepatic biliary dilatation. New pneumobilia, predominantly in the left hepatic lobe is nonspecific and likely related to stent placement.  2.  Nonocclusive thrombus in the main portal vein and a branch of the superior mesenteric vein.  3.  Hypodense structure at the end  of the pancreas adjacent to nonocclusive portal vein thrombus is indeterminate, though suspicious for mass.  4.  Prominent periportal lymph nodes including an 11 mm short axis portacaval lymph node are indeterminate.    EUS/ERCP (2/24/2022): EUS IMPRESSION:              - Edematous and mildly distorted pylorus. Balloon dilated to 15 mm. No obvious tumor was seen in the area or proximal duodenum.   - An indiscrete hypoechoic mass-like lesion in the region in the pancreatic head/distal bile duct measuring 23 x 14 mm in diameter. No contact with major arteries or veins was appreciated and no adjacent lymphadenopathy. The abnormality is non-specific and could again represent sequale of chronic pancreatitis, however given concern for malignancy in light of the prior biliary obstruction and negative sampling, repeat needle biopsy performed. Results pending.   - Diffusely heterogenous pancreas with scattered hypoechoci regions, irregular duct contour, dilated sidebranches and simple-appearing cysts. Could represent sequelae of prior acute pancreatitis, underlying chronic pancreatitis or changes from pancreatic duct obstruction.   - No ascites, liver lesions or adenopathy.   - Non-occlusive portal vein thrombus.  ERCP IMPRESSION:              - Selective biliary cannulation through a patent biliary sphincterotomy.   - Cholangiogram with no apparant residual bile duct stricture, after previous removal of fully covered metallic stent.   - Removal of stent related debris and sludge from the bile duct with balloon sweep.   - Biopsies obtained from the common bile duct at the presumed stricture site with a biopsy forceps.   - Two 10 Fr plastic stents were placed into the common bile duct (7 cm intraductal and 5 cm transpapillary).    Pathology (2/24/2022): FNA consistent with chronic pancreatitis.  Bile duct biopsies consistent with acute inflammation and reactive epithelial changes.     Assessment/Plan:  76 M w/ extra-hepatic  biliary obstruction at the level of the pancreatic head and appearance concerning for malignancy, but without a biopsy that proves malignancy.  Findings could also represent changes associated with pancreatitis.  All of his EUS and ERCP biopsies to this point have not demonstrated evidence of malignancy.  In fact, the most recent biopsies demonstrate findings consistent with inflammation and chronic pancreatitis.  No apparent biliary stricture was noted after removal of the previous biliary stent (plastic stents replaced).  The patient continues to feel well and is otherwise relatively healthy and has a good functional status.  He appears that he would tolerate major abdominal surgery.  We discussed his situation and that often the only way to definitively prove the presence or absence of malignancy is to resect the area surgically, which would require a Whipple in his case.  I think this would be a very reasonable management plan.  Although I think he would likely tolerate this, there is still a larger chance of poor outcomes related to the surgery in a patient of his age and he is very aware of this.  Thus, resection carries the risk of associated complications and over-treatment in the event that no cancer is found.  On the other hand, surveillance definitely carries the risk of under-treatment and potentially missing a treatment window to manage a resectable cancer if one is present.  Thus, surveillance may compromise outcomes if there is an occult malignancy present.  In having this extensive discussion of the various options, pros and cons, the patient showed very good understanding of the situation and clearly articulated his preference to avoid surgery and continue surveillance.  As of now, he would only want the surgery if a diagnosis of malignancy was proven.  We will plan on discussing him again at the upcoming multidisciplinary conference to talk about the findings and his wishes, but I think the patient  is making a very reasonable and informed decision for himself.  With that, the tentative plan will be to continue on surveillance with the GI team.  We will discuss with GI regarding potential treatment of his non-occlusive PV thrombus.  Questions were answered and the patient was in agreement with and understanding of the plan.     A total of 50 minutes were spent on this encounter including more than 50% in face to face time and the remainder in imaging review, chart review, documentation, and coordination of care.

## 2022-03-07 NOTE — TELEPHONE ENCOUNTER
[facility] Call Center    Phone Message    May a detailed message be left on voicemail: yes     Reason for Call: Other: Pt called in requesting a call back because he was told that his PCP wouldn't be able to fill his order for blood thinners. Please reach out. Thank you.     Action Taken: Message routed to:  [facility] ([facility]): mercedes and tania    Travel Screening: Not Applicable

## 2022-03-07 NOTE — TELEPHONE ENCOUNTER
Returned call to patient. Per recent note from Dr Benz:   We will discuss with GI regarding potential treatment of his non-occlusive PV thrombus.      Per patient PCP not willing to order anticoagulation. Unknown if MD's have dicussed. Message sent to team.     ML

## 2022-03-30 NOTE — TELEPHONE ENCOUNTER
Patient calling for a refill on his medications. He states that he has been taking this medication for years and without it he can not sleep and then goes into depression.     Shauna Sainz RN [facility]

## 2022-04-26 NOTE — TELEPHONE ENCOUNTER
Called patient to discuss ERCP w/ CT prior in early May w/ Dr. Abarca    Picked 5/10 for procedure, will schedule CT am of procedure. Reviewed need for preop and covid test.     ML

## 2022-04-27 NOTE — TELEPHONE ENCOUNTER
Called patient to help him get preop physical scheduled. Orders placed for PAC  Left message for patient with PAC scheduling number    ML

## 2022-04-28 NOTE — PROGRESS NOTES
Called to schedule Ct prior to procedure on 5/10  Arrival 10:25, CT ordered and scheduled 9:40 am    ML

## 2022-04-29 NOTE — TELEPHONE ENCOUNTER
Patient requesting refill of Zolpidem. Patient would like additional refills as he is moving at the end of May to [place] and would like a couple extra refills until he is able to establish care      Martha MOROCHON, RN

## 2022-05-03 NOTE — PATIENT INSTRUCTIONS
We will send you lab results    Avoid aspirin, ibupoprofen, naproxen type meds for the next week    Tylenol ( acetaminophen ) okay    Hold meds am of procedure

## 2022-05-03 NOTE — PROGRESS NOTES
[facility]  [address] [place] [address]  [place] [address]  Phone: [phone]  Primary Provider: Jerel Curran  Pre-op Performing Provider: JONATHAN QUINTANA       PREOPERATIVE EVALUATION:  Today's date: 5/3/2022    Kody Reynaga is a 76 year old male who presents for a preoperative evaluation.    Surgical Information:  Surgery/Procedure: Endoscopic retrograde cholangiopancreatography   Surgery Location: [facility]  Surgeon: Tevin  Surgery Date: 05/10/2022  Time of Surgery: am  Where patient plans to recover: At home with family  Fax number for surgical facility: Note does not need to be faxed, will be available electronically in Epic.    Type of Anesthesia Anticipated: General         Subjective     HPI related to upcoming procedure: 76 year old male with biliary obstruction.  Had ercp with stent, endoscopic ultrasounds, biopsies.  No definitive malignancy but atypical cells.  To have repeat ERCP with biopsies.     Preop Questions 5/2/2022   1. Have you ever had a heart attack or stroke? No   2. Have you ever had surgery on your heart or blood vessels, such as a stent placement, a coronary artery bypass, or surgery on an artery in your head, neck, heart, or legs? No   3. Do you have chest pain with activity? No   4. Do you have a history of  heart failure? No   5. Do you currently have a cold, bronchitis or symptoms of other infection? No   6. Do you have a cough, shortness of breath, or wheezing? No   7. Do you or anyone in your family have previous history of blood clots? No   8. Do you or does anyone in your family have a serious bleeding problem such as prolonged bleeding following surgeries or cuts? No   9. Have you ever had problems with anemia or been told to take iron pills? No   10. Have you had any abnormal blood loss such as black, tarry or bloody stools? No   11. Have you ever had a blood transfusion? No   12. Are you willing to have a blood transfusion if it is medically needed  before, during, or after your surgery? Yes   13. Have you or any of your relatives ever had problems with anesthesia? No   14. Do you have sleep apnea, excessive snoring or daytime drowsiness? YES - a little tired during the day.  No apnea.   14a. Do you have a CPAP machine? No   15. Do you have any artifical heart valves or other implanted medical devices like a pacemaker, defibrillator, or continuous glucose monitor? No   16. Do you have artificial joints? No   17. Are you allergic to latex? No       Health Care Directive:  Patient does have a Health Care Directive or Living Will:     Preoperative Review of :  Only controlled substance is zolpidem           Review of Systems  Constitutional, neuro, ENT, endocrine, pulmonary, cardiac, gastrointestinal, genitourinary, musculoskeletal, integument and psychiatric systems are negative, except as otherwise noted.    No recent illnesses    Eating and drinking okay    Wt increasing    Some abd pain, maybe due to IBS per patient         Patient Active Problem List    Diagnosis Date Noted     Pancreatic mass 11/17/2021     Priority: Medium     Insomnia 11/17/2021     Priority: Medium     Dyslipidemia (high LDL; low HDL) 11/17/2021     Priority: Medium     Prostate cancer (H) 11/17/2021     Priority: Medium     Depression      Priority: Medium     Obstructive jaundice 08/26/2021     Priority: Medium     Major depressive disorder, recurrent episode, in full remission (H) 07/22/2016     Priority: Medium     Essential hypertension, benign 12/05/2005     Priority: Medium      Past Medical History:   Diagnosis Date     Anxiety      Depression      Gastroesophageal reflux disease      Gout      Hypercholesterolemia      Hypertension      IBS (irritable bowel syndrome)      Pancreatic disease      Prostate cancer (H)      Past Surgical History:   Procedure Laterality Date     CATARACT EXTRACTION W/ INTRAOCULAR LENS IMPLANT Right      COLONOSCOPY       cryoablation of prostate        ENDOSCOPIC RETROGRADE CHOLANGIOPANCREATOGRAM       ENDOSCOPIC RETROGRADE CHOLANGIOPANCREATOGRAM N/A 1/24/2022    Procedure: ENDOSCOPIC RETROGRADE CHOLANGIOPANCREATOGRAPHY, BILIARY STENT EXCHANGE, sPY WITH BIOPSIES AND BRUSHINGS;  Surgeon: Guru Ab Gonzalez MD;  [facility]     ENDOSCOPIC RETROGRADE CHOLANGIOPANCREATOGRAM N/A 2/24/2022    Procedure: ENDOSCOPIC RETROGRADE CHOLANGIOPANCREATOGRAPHY, with stent placement and bile duct biopsy;  Surgeon: Anthony Abarca MD;  Location: [facility]     ENDOSCOPIC RETROGRADE CHOLANGIOPANCREATOGRAM WITH SPYGLASS N/A 10/14/2021    Procedure: ENDOSCOPIC RETROGRADE CHOLANGIOPANCREATOGRAPHY, WITH DIRECT DUCT VISUALIZATION, USING PANCREATICOBILIARY FIBEROPTIC PROBE-spyglass, biliary sludge and clot removal, biliary biopsies, biliary stent exchange;  Surgeon: Anthony Abarca MD;  Location: [facility]     ENDOSCOPIC ULTRASOUND UPPER GASTROINTESTINAL TRACT (GI) N/A 9/28/2021    Procedure: ENDOSCOPIC ULTRASOUND, ESOPHAGOSCOPY / UPPER GASTROINTESTINAL TRACT (GI);  Surgeon: Babak Garvin MD;  Location: [facility]     ENDOSCOPIC ULTRASOUND UPPER GASTROINTESTINAL TRACT (GI) N/A 1/24/2022    Procedure: ENDOSCOPIC ULTRASOUND, ESOPHAGOSCOPY / UPPER GASTROINTESTINAL TRACT (GI);  Surgeon: Guru Ab Gonzalez MD;  Location: [facility]     ESOPHAGOSCOPY, GASTROSCOPY, DUODENOSCOPY (EGD), COMBINED N/A 10/14/2021    Procedure: ESOPHAGOGASTRODUODENOSCOPY, WITH FINE NEEDLE ASPIRATION BIOPSY, WITH ENDOSCOPIC ULTRASOUND GUIDANCE, biliary stent removal;  Surgeon: Babak Garvin MD;  Location: [facility]     ESOPHAGOSCOPY, GASTROSCOPY, DUODENOSCOPY (EGD), COMBINED N/A 2/24/2022    Procedure: ESOPHAGOGASTRODUODENOSCOPY WITH DILATION AND STENT REMOVAL, ENDOSCOPIC ULTRASOUND WITH FINE NEEDLE BIOPSIES;  Surgeon: Babak Garvin MD;  Location: [facility] OR     MOUTH SURGERY       TONSILLECTOMY       Current Outpatient Medications   Medication Sig Dispense Refill      acetaminophen (TYLENOL) 325 MG tablet Take 325-650 mg by mouth every 6 hours as needed for mild pain       acetaZOLAMIDE (DIAMOX) 125 MG tablet Take 1 tablet (125 mg) by mouth 2 times daily 30 tablet 0     coenzyme Q-10 10 MG CAPS Take 1 capsule by mouth every morning        dicyclomine (BENTYL) 20 MG tablet Take 20 mg by mouth 3 times daily       escitalopram (LEXAPRO) 20 MG tablet Take 1 tablet by mouth every morning        loperamide (IMODIUM) 2 MG capsule Take 1 capsule by mouth as needed       losartan (COZAAR) 100 MG tablet Take 1 tablet (100 mg) by mouth daily (Patient taking differently: Take 100 mg by mouth every morning) 90 tablet 1     lovastatin (MEVACOR) 40 MG tablet Take 40 mg by mouth every morning        melatonin 5 MG CAPS Take 1 tablet by mouth nightly as needed        Multiple Vitamin (MULTIVITAMIN PO) Take 1 tablet by mouth every morning        omeprazole (PRILOSEC) 20 MG DR capsule Take 20 mg by mouth every morning        zolpidem (AMBIEN) 10 MG tablet Take 1 tablet (10 mg) by mouth nightly as needed (Insomina) 30 tablet 2       No Known Allergies     Social History     Tobacco Use     Smoking status: Former Smoker     Types: Cigarettes     Quit date:      Years since quittin.3     Smokeless tobacco: Never Used   Substance Use Topics     Alcohol use: Yes     Comment: cutting down, drinking NA beer         History   Drug Use Not on file         Objective     /78 (BP Location: Right arm, Patient Position: Chair, Cuff Size: Adult Regular)   Pulse 88   Temp 97.7  F (36.5  C) (Temporal)   Wt 92.1 kg (203 lb)   BMI 28.33 kg/m      Physical Exam    GENERAL APPEARANCE: healthy, alert and no distress     EYES: EOMI,  PERRL     HENT: ear canals and TM's normal and nose and mouth without ulcers or lesions     NECK: no adenopathy, no asymmetry, masses, or scars and thyroid normal to palpation     RESP: lungs clear to auscultation - no rales, rhonchi or wheezes     CV: regular rates  and rhythm, normal S1 S2, no S3 or S4 and no murmur, click or rub     ABDOMEN:  soft, nontender, no HSM or masses and bowel sounds normal     MS: extremities normal- no gross deformities noted, no evidence of inflammation in joints, FROM in all extremities.     SKIN: no suspicious lesions or rashes     NEURO: Normal strength and tone, sensory exam grossly normal, mentation intact and speech normal     PSYCH: mentation appears normal. and affect normal/bright     LYMPHATICS: No cervical adenopathy    Recent Labs   Lab Test 02/24/22  0815 01/24/22  1417   HGB 13.1* 13.3   * 192   INR 1.12 1.06    137   POTASSIUM 4.2 4.4   CR 0.91 1.08        Diagnostics:      ekg done 1-24-22, normal, see in chart      Cbc and cmp drawn, pending       ASSESSMENT / PLAN:  (Z01.818) Preop general physical exam  (primary encounter diagnosis)  Comment: patient currently stable.  To have repeat ercp.  If labs okay,  Then okay for procedure.   Plan: Comprehensive metabolic panel, CBC with         Platelets & Differential, Lipase             (K83.1) Biliary obstruction  Comment: as above   Plan: as above      No cardiac history or symptoms.    No bleeding or clotting problems    No anesthesia problems    Not on any blood thinners       I reviewed the patient's medications, allergies, medical history, family history, and social history.    Wade Maldonado MD            Revised Cardiac Risk Index (RCRI):  The patient has the following serious cardiovascular risks for perioperative complications:   - No serious cardiac risks = 0 points     RCRI Interpretation: 0 points: Class I (very low risk - 0.4% complication rate)           Signed Electronically by: Wade Maldonado MD  Copy of this evaluation report is provided to requesting physician.

## 2022-05-05 NOTE — RESULT ENCOUNTER NOTE
Kidney test ( creatinine ) higher than last time.  Stay well hydrated.    Red blood count ( hemoglobin ) is a little lower than last time.    Other labs okay.    Okay for procedure.    Wade Maldonado MD

## 2022-05-08 NOTE — BRIEF OP NOTE
[facility] Brief Operative Note    Pre-operative diagnosis: Common bile duct stricture [K83.1]   Post-operative diagnosis As prior    Procedure: Procedure(s):  ENDOSCOPIC RETROGRADE CHOLANGIOPANCREATOGRAPHY   Surgeon(s): Surgeon(s) and Role:     * Anthony Abarca MD - Primary   Estimated blood loss: 1 mL    Specimens: * No specimens in log *   Findings:    76 year-old male with distal obstruction to the common bile duct and resultant stricture who had prior ERCP with FCMS placement, prior EUS exams and biopsies of presumed pancreatic head mass were negative for malignancy. Last EUS 3 months ago with edematous and distorted pylorus. Balloon dilated to 15 mm. No obvious tumor was seen in the area or proximal duodenal wall. FNB of pancreatic head with CP and no malignancy. ERCP showed a patent biliary sphincterotomy with no apparant bile duct stricture. Biopsies obtained from the common bile duct and were normal. Two 10 Fr plastic stents were placed into the common bile duct (7 cm intraductal and 5 cm transpapillary). He presents today for ERCP with plans for stent removal/exchange       - Deformed pylorus requiring two endoscopists, smaller caliber (140) scope, position changing and manual pressure to pass the stomach  - Two visibly patent 10 Fr stents from the common bile duct were seen in the major papilla. Extracted from bile duct. Longer stent was removed per os at the end of the exam while the shorter one ( 5 cm) left to migrate distally given difficult intubation of the pylorus as described above.   - Prior biliary sphincterotomy appeared open.   - The entire main bile duct was moderately dilated with resolution of previously reported distal biliary stricture.    - The biliary tree was swept and debris was found.   - One ''fall-out'' 5 Fr Crowley plastic stent was placed into the common bile duct.       - Observe patient in PACU for possible discharge same day.   - Obtain a KUB in 4 weeks to ensure  spontaneous passage of both extracted now duodenal and newly placed biliary plastic stents.  - Observe clinical course. Expect no further ERCPs as long as he remains asymptomatic and with normal LFTs.   - The findings and recommendations were discussed with the patient and their family.

## 2022-05-10 NOTE — ANESTHESIA PROCEDURE NOTES
Airway       Patient location during procedure: OR       Procedure Start/Stop Times: 5/10/2022 10:05 AM  Staff -        Anesthesiologist:  Akash Valdez MD       Performed By: anesthesiologist and other anesthesia staff  Consent for Airway        Urgency: elective  Indications and Patient Condition       Indications for airway management: robin-procedural       Induction type:intravenous       Mask difficulty assessment: 1 - vent by mask    Final Airway Details       Final airway type: endotracheal airway       Successful airway: ETT - single  Endotracheal Airway Details        ETT size (mm): 8.0       Cuffed: yes       Cuff volume (mL): 8       Successful intubation technique: direct laryngoscopy       DL Blade Type: MAC 4       Grade View of Cords: 1       Adjucts: stylet       Position: Right       Measured from: lips       Secured at (cm): 23    Post intubation assessment        Placement verified by: capnometry, equal breath sounds and chest rise        Secured with: pink tape       Ease of procedure: easy       Dentition: Intact and Unchanged    Medication(s) Administered   Medication Administration Time: 5/10/2022 10:05 AM    Additional Comments       Intubated by med student under the supervision of Dr. Valdez

## 2022-05-10 NOTE — ANESTHESIA POSTPROCEDURE EVALUATION
Patient: Kody Reynaga    Procedure: Procedure(s):  ENDOSCOPIC RETROGRADE CHOLANGIOPANCREATOGRAPHY, pyloric dilation, bebris removal, biliary stent exchange       Anesthesia Type:  No value filed.    Note:  Disposition: Outpatient   Postop Pain Control: Uneventful            Sign Out: Well controlled pain   PONV: No   Neuro/Psych: Uneventful            Sign Out: Acceptable/Baseline neuro status   Airway/Respiratory: Uneventful            Sign Out: Acceptable/Baseline resp. status   CV/Hemodynamics: Uneventful            Sign Out: Acceptable CV status; No obvious hypovolemia; No obvious fluid overload   Other NRE: NONE   DID A NON-ROUTINE EVENT OCCUR? No           Last vitals:  Vitals Value Taken Time   /97 05/10/22 1245   Temp 36.2  C (97.2  F) 05/10/22 1245   Pulse 70 05/10/22 1258   Resp 12 05/10/22 1258   SpO2 96 % 05/10/22 1259   Vitals shown include unvalidated device data.    Electronically Signed By: Armida Vazquez MD  May 10, 2022  1:02 PM

## 2022-05-10 NOTE — TELEPHONE ENCOUNTER
Neeta:   See below. Please contact pt tomorrow if does not call back today re increase in portal vein clot. Please arrange for referral to our anticoagulation clinic to discuss initiation of anticoagulation for an enlarging portal vein clot.        Messaged by Dr. Abarca re pt today. ERCP showed resolved biliary stricture and stent removed with plan for stent-free trial. Persistent duodenal stenosis required dilation to allow for scope passage, as per the prior EUS 2/24/22.    CT today showed no pancreatic mass. There was however signficant extension of the previously noted non-occlusive PV thrombus.    See previous procedure notes, surgery note from Dr. Benz 3/4/2022, clinic note from me 2/14/22 and phone note 2/28/22. Pt has had multiple prior EUS exams with benign cytology here and in [place] by Dr. Phipps. Has been assessed by surgical oncology and elected to not pursue resection. Per my prior phone note, plan was for this follow-up CT today to further assess for pancreatic mass and then consider deferring further imaging surveillance of the pancreas.    At the time of his last procedure he was instructed to discuss possible anticoagulation for the portal vein thrombus with his primary care provider, as I do not manage anticoagulation as part of my practice. The pt reported that the provider did not feel this was indicated.    At this point, given the extension of the clot I would recommend anticoagulation. I will arrange for referral to our anticoagulation clinic to manage this.    I called pt today but no answer. VM left for him to call and discussed the finding.    JESSICA Garvin MD  Professor of Medicine  Division of Gastroenterology, Hepatology and Nutrition  [facility]

## 2022-05-10 NOTE — ANESTHESIA PREPROCEDURE EVALUATION
Anesthesia Pre-Procedure Evaluation    Patient: Kody Reynaga   MRN: [id number] : 1945        Procedure : Procedure(s):  ENDOSCOPIC RETROGRADE CHOLANGIOPANCREATOGRAPHY          Past Medical History:   Diagnosis Date     Anxiety      Depression      Gastroesophageal reflux disease      Gout      Hypercholesterolemia      Hypertension      IBS (irritable bowel syndrome)      Pancreatic disease      Prostate cancer (H)       Past Surgical History:   Procedure Laterality Date     CATARACT EXTRACTION W/ INTRAOCULAR LENS IMPLANT Right      COLONOSCOPY       cryoablation of prostate       ENDOSCOPIC RETROGRADE CHOLANGIOPANCREATOGRAM       ENDOSCOPIC RETROGRADE CHOLANGIOPANCREATOGRAM N/A 2022    Procedure: ENDOSCOPIC RETROGRADE CHOLANGIOPANCREATOGRAPHY, BILIARY STENT EXCHANGE, sPY WITH BIOPSIES AND BRUSHINGS;  Surgeon: Guru Ab Gonzalez MD;  Location: [facility] OR     ENDOSCOPIC RETROGRADE CHOLANGIOPANCREATOGRAM N/A 2022    Procedure: ENDOSCOPIC RETROGRADE CHOLANGIOPANCREATOGRAPHY, with stent placement and bile duct biopsy;  Surgeon: Anthony Abarca MD;  Location: [facility] OR     ENDOSCOPIC RETROGRADE CHOLANGIOPANCREATOGRAM WITH SPYGLASS N/A 10/14/2021    Procedure: ENDOSCOPIC RETROGRADE CHOLANGIOPANCREATOGRAPHY, WITH DIRECT DUCT VISUALIZATION, USING PANCREATICOBILIARY FIBEROPTIC PROBE-spyglass, biliary sludge and clot removal, biliary biopsies, biliary stent exchange;  Surgeon: Anthony Abarca MD;  Location: [facility] OR     ENDOSCOPIC ULTRASOUND UPPER GASTROINTESTINAL TRACT (GI) N/A 2021    Procedure: ENDOSCOPIC ULTRASOUND, ESOPHAGOSCOPY / UPPER GASTROINTESTINAL TRACT (GI);  Surgeon: Babak Garvin MD;  Location: [facility] GI     ENDOSCOPIC ULTRASOUND UPPER GASTROINTESTINAL TRACT (GI) N/A 2022    Procedure: ENDOSCOPIC ULTRASOUND, ESOPHAGOSCOPY / UPPER GASTROINTESTINAL TRACT (GI);  Surgeon: Guru Ab Gonzalez MD;  Location: [facility] OR     ESOPHAGOSCOPY, GASTROSCOPY,  DUODENOSCOPY (EGD), COMBINED N/A 10/14/2021    Procedure: ESOPHAGOGASTRODUODENOSCOPY, WITH FINE NEEDLE ASPIRATION BIOPSY, WITH ENDOSCOPIC ULTRASOUND GUIDANCE, biliary stent removal;  Surgeon: Babak Garvin MD;  Location: [facility] OR     ESOPHAGOSCOPY, GASTROSCOPY, DUODENOSCOPY (EGD), COMBINED N/A 2022    Procedure: ESOPHAGOGASTRODUODENOSCOPY WITH DILATION AND STENT REMOVAL, ENDOSCOPIC ULTRASOUND WITH FINE NEEDLE BIOPSIES;  Surgeon: Babak Garvin MD;  Location: [facility] OR     MOUTH SURGERY       TONSILLECTOMY        No Known Allergies   Social History     Tobacco Use     Smoking status: Former Smoker     Types: Cigarettes     Quit date:      Years since quittin.3     Smokeless tobacco: Never Used   Substance Use Topics     Alcohol use: Yes     Comment: cutting down, drinking NA beer      Wt Readings from Last 1 Encounters:   05/10/22 90.6 kg (199 lb 11.8 oz)        Anesthesia Evaluation   Pt has had prior anesthetic.     No history of anesthetic complications       ROS/MED HX  ENT/Pulmonary: Comment: Quit smoking     (+) tobacco use, Past use,  (-) asthma and sleep apnea   Neurologic:  - neg neurologic ROS   (+) no peripheral neuropathy  (-) no seizures and no CVA   Cardiovascular:     (+) Dyslipidemia hypertension-----Previous cardiac testing   Echo: Date: Results:    Stress Test: Date: Results:    ECG Reviewed: Date: 22 Results:  Sinus rhythm   No previous ECGs available  Ventricular rate 65 bpm    Cath: Date: Results:      METS/Exercise Tolerance: 4 - Raking leaves, gardening    Hematologic:  - neg hematologic  ROS  (-) history of blood clots and history of blood transfusion   Musculoskeletal: Comment: Gout        GI/Hepatic: Comment: Biliary stricture, hx ERCP with stent placement        (+) GERD, Asymptomatic on medication,  (-) liver disease   Renal/Genitourinary:  - neg Renal ROS  (-) renal disease   Endo:  - neg endo ROS  (-) Type II DM   Psychiatric/Substance Use:     (+)  psychiatric history anxiety and depression alcohol abuse     Infectious Disease:  - neg infectious disease ROS     Malignancy: Comment: S/p cryoablation    (+) Malignancy, History of Prostate.Prostate CA status post Surgery.        Other:  - neg other ROS             OUTSIDE LABS:  CBC:   Lab Results   Component Value Date    WBC 6.1 05/10/2022    WBC 7.1 05/03/2022    HGB 11.9 (L) 05/10/2022    HGB 12.3 (L) 05/03/2022    HCT 36.2 (L) 05/10/2022    HCT 36.6 (L) 05/03/2022     (L) 05/10/2022     (L) 05/03/2022     BMP:   Lab Results   Component Value Date     05/10/2022     05/03/2022    POTASSIUM 4.1 05/10/2022    POTASSIUM 4.6 05/03/2022    CHLORIDE 105 05/10/2022    CHLORIDE 104 05/03/2022    CO2 24 05/10/2022    CO2 26 05/03/2022    BUN 27 05/10/2022    BUN 22 05/03/2022    CR 1.38 (H) 05/10/2022    CR 1.56 (H) 05/03/2022    GLC 99 05/10/2022    GLC 86 05/10/2022     COAGS:   Lab Results   Component Value Date    INR 1.22 (H) 05/10/2022     POC: No results found for: BGM, HCG, HCGS  HEPATIC:   Lab Results   Component Value Date    ALBUMIN 3.6 05/10/2022    PROTTOTAL 6.8 05/10/2022    ALT 27 05/10/2022    AST 25 05/10/2022    ALKPHOS 78 05/10/2022    BILITOTAL 1.2 05/10/2022     OTHER:   Lab Results   Component Value Date    AMIRAH 9.1 05/10/2022    LIPASE 323 05/03/2022    AMYLASE 45 02/24/2022       Anesthesia Plan    ASA Status:  3   NPO Status:  NPO Appropriate    Anesthesia Type: General.     - Airway: ETT   Induction: Intravenous, Propofol.   Maintenance: Balanced.        Consents    Anesthesia Plan(s) and associated risks, benefits, and realistic alternatives discussed. Questions answered and patient/representative(s) expressed understanding.    - Discussed:     - Discussed with:  Patient      - Extended Intubation/Ventilatory Support Discussed: No.      - Patient is DNR/DNI Status: No         Postoperative Care    Pain management: IV analgesics.   PONV prophylaxis: Ondansetron (or  other 5HT-3), Dexamethasone or Solumedrol     Comments:                Akash Valdez MD

## 2022-05-11 NOTE — CONFIDENTIAL NOTE
Called pt to follow up on Dr Garvin/Dr Abarca's recommendations for anticoag referral for PV clot. Left VM. Order placed for referral.     5/13/22 1315  Pt returned call, does have an appt with the clotting oncology clinic for Monday 5/16. He had no further questions    Dede Bond RN, BSN,   [facility]  Care coordinator

## 2022-05-16 NOTE — PROGRESS NOTES
[facility]  [address] [place] [address], [place] [address]  Main: [phone], Fax: [phone]    Patient seen at: [facility] at [address] [place] [address]    Outpatient Visit Note:    Patient: Kody Reynaga  MRN: [id number]  : 1945  CHELLE: May 16, 2022    Reason for visit:  Poral Vein Thrombosis.     HPI:  Kody is a 76 year old male with a history of hypertension, hyperlipidemia, anxiety, depression, history of alcohol abuse, GERD, IBS, gout and history of prostate cancer, referred by Dr. Babak Garvin of GI for consultation in regard to worsening portal vein thrombosis.     Kody was found to have painless jaundice and was found to have biliary dilation with gradual tapering towards the head of the pancreas back in around Aug 2021. He has had multiple evaluations and procedures, prior ERCP with stent placement, prior EUS exams, and biopsies of presumed pancreatic head mass were negative for malignancy. On 2022, Kody underwent another repeat ERCP as his common bile duct stent was occluded.     Then on 2022, he underwent a repeat CT abdomen/pelvis which showed a nonocclusive thrombus in the main portal vein and a branch of the superior mesenteric vein. Hypodense structure at the end of the pancreas adjacent to nonocclusive portal vein thrombus is indeterminate, though suspicious for mass. Unfortunately, this patient was NOT placed on anticoagulation therapy when the portal vein thrombosis (PVT) was found.     2022, he underwent yet another ERCP for biopsy and stent exchange. Biopsy showed findings consistent with chronic pancreatitis and reactive epithelial changes and inflammation.     3/4/2022, he saw Dr. Benz, surgeon for consultation who discussed with the patient about potentially Whipple procedure. However, the patient would not want to proceed with surgery unless the diagnosis of malignancy has been proven.  "    5/10/2022, he had another ERCP and had his stents extracted. On this day, he also has a repeat CT abdomen/pelvis done showing PVT has increased extending into the right hepatic lobe, superior mesenteric vein, and splenic vein. Common bile duct stents have been replaced and no discrete biliary or pancreatic mass is seen. Again, unfortunately he was not started on anticoagulation therapy despite extension of the PVT. Thus this clinic appointment.      ROS:  Denies any bleeding complications. Specifically, no frequent epistaxis. No issues with oral mucosal bleeding. Denies any hematuria or blood in stools. Denies any shortness of breath. No chest pain. No cough. No fever. He does endorse some right lower quadrant abdominal pain but he contributes this to his irritable bowel disease.       Medications:  Current Outpatient Medications   Medication     acetaminophen (TYLENOL) 325 MG tablet     coenzyme Q-10 10 MG CAPS     dicyclomine (BENTYL) 20 MG tablet     escitalopram (LEXAPRO) 20 MG tablet     loperamide (IMODIUM) 2 MG capsule     losartan (COZAAR) 100 MG tablet     lovastatin (MEVACOR) 40 MG tablet     melatonin 5 MG CAPS     Multiple Vitamin (MULTIVITAMIN PO)     omeprazole (PRILOSEC) 20 MG DR capsule     zolpidem (AMBIEN) 10 MG tablet     No current facility-administered medications for this visit.     Allergies:   No Known Allergies    PmHx:  Past Medical History:   Diagnosis Date     Anxiety      Depression      Gastroesophageal reflux disease      Gout      Hypercholesterolemia      Hypertension      IBS (irritable bowel syndrome)      Pancreatic disease      Prostate cancer (H)        Social History:   Deferred.    Family History:  Denies any family history of DVT/PE.     Objective:  Vitals: /70   Pulse 77   Temp 97.8  F (36.6  C) (Oral)   Resp 16   Ht 1.803 m (5' 11\")   Wt 89.2 kg (196 lb 9.6 oz)   SpO2 98%   BMI 27.42 kg/m     Exam:   Abdomen: Obese. Non tender. Non distended. Soft. "     Labs:  Component      Latest Ref Rng & Units 5/10/2022   Sodium      133 - 144 mmol/L 136   Potassium      3.4 - 5.3 mmol/L 4.1   Chloride      94 - 109 mmol/L 105   Carbon Dioxide      20 - 32 mmol/L 24   Anion Gap      3 - 14 mmol/L 7   Urea Nitrogen      7 - 30 mg/dL 27   Creatinine      0.66 - 1.25 mg/dL 1.38 (H)   Calcium      8.5 - 10.1 mg/dL 9.1   Glucose      70 - 99 mg/dL 99   Alkaline Phosphatase      40 - 150 U/L 78   AST      0 - 45 U/L 25   ALT      0 - 70 U/L 27   Protein Total      6.8 - 8.8 g/dL 6.8   Albumin      3.4 - 5.0 g/dL 3.6   Bilirubin Total      0.2 - 1.3 mg/dL 1.2   GFR Estimate      >60 mL/min/1.73m2 53 (L)   WBC      4.0 - 11.0 10e3/uL 6.1   RBC Count      4.40 - 5.90 10e6/uL 3.74 (L)   Hemoglobin      13.3 - 17.7 g/dL 11.9 (L)   Hematocrit      40.0 - 53.0 % 36.2 (L)   MCV      78 - 100 fL 97   MCH      26.5 - 33.0 pg 31.8   MCHC      31.5 - 36.5 g/dL 32.9   RDW      10.0 - 15.0 % 11.7   Platelet Count      150 - 450 10e3/uL 103 (L)       Imaging:  Reviewed and is as described above.     Assessment:  In summary, Kody is a 76 year old male with a history of hypertension, hyperlipidemia, anxiety, depression, history of alcohol abuse, GERD, IBS, gout and history of prostate cancer, referred by Dr. Babak Garvin of GI for consultation in regard to worsening portal vein thrombosis. As mentioned above, Kody was found to have biliary dilation with a concerning pancreatic mass back in Aug 2021. Since then, he had undergone multiple ERCP with common bile duct stents placement and replacements as well as biopsies of this mass, which never was found to have any signs of malignancy. He was found to have a nonocclusive portal vein thrombosis (PVT) back in Feb 2022 but unfortunately was never placed on anticoagulation therapy at the time. He fortunately was able to get all his stents removed as of 5/10/2022 but the CT abdomen on 5/10/2022 showed progression on his PVT to now occlusive. Still  unfortunately, he has not been started on anticoagulation therapy.     His portal vein thrombosis occur in the setting of frequent need for biliary stent replacement via ERCP. Thus this portal vein thrombosis was a provoked intra-abdominal venous thrombotic event.     Diagnosis:  1. 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   2. Distal obstruction to the common bile duct and resultant stricture seems to have resolved. All stents are removed as of 5/10/2022.     Plan:  I have a long discussion with the patient and his wife today.     I explain to them that he should be placed on anticoagulation therapy especially that his PVT has progressed. Given his last creatinine at 1.3 back on 5/10/2022, I do think the best choice of anticoagulation therapy for this patient is apixaban. I will start him on apixaban at 10 mg PO BID x 7 days as loading dose, then 5 mg PO BID thereafter. Will plan to repeat his abdominal CT in 3 months. Have him return to clinic to see me after the repeat CT scan. He probably will not need indefinite anticoagulation therapy as this PVT was a provoked event, but it will depends if there are any cavernous transformation after a period of anticoagulation therapy now the portal vein is occluded.     I will obtain antiphospholipid antibody panel, LUCIA 2 mutation and PNH screening today. Will repeat his CBC and CMP today as his platelet count was low at around 103k back on 5/10/2022.     Patient is instructed to call our clinic if he should experience any unusual bleeding complications or if he should need any invasive or surgical procedures. I educated him about anticoagulation therapy today as well.     Case discussed with Dr. Justin Saleh, staff hematologist. He agrees with the above plan and recommendation.         Pola Naranjo PA-C, MPAS  Physician Assistant  [facility].     61 minutes spent on the date of the encounter doing chart review, history and exam, documentation and further activities per the note.    Time IN: 13:48  Time OUT: 14:30

## 2022-05-16 NOTE — Clinical Note
2022       RE: Kody Reynaga  [address]  [facility] [address]     Dear Colleague,    Thank you for referring your patient, Kody Reynaga, to the [facility] at [facility]. Please see a copy of my visit note below.        [facility]  [address] [place] [address], [place] [address]  Main: [phone], Fax: [phone]    Patient seen at: [facility] at [address] [place] [address]    Outpatient Visit Note:    Patient: Kody Reynaga  MRN: [id number]  : 1945  CHELLE: May 16, 2022    Reason for visit:  Poral Vein Thrombosis.     HPI:  Kody is a 76 year old male with a history of hypertension, hyperlipidemia, anxiety, depression, history of alcohol abuse, GERD, IBS, gout and history of prostate cancer, referred by Dr. Babak Garvin of GI for consultation in regard to worsening portal vein thrombosis.     Kody was found to have painless jaundice and was found to have biliary dilation with gradual tapering towards the head of the pancreas back in around Aug 2021. He has had multiple evaluations and procedures, prior ERCP with stent placement, prior EUS exams, and biopsies of presumed pancreatic head mass were negative for malignancy. On 2022, Kody underwent another repeat ERCP as his common bile duct stent was occluded.     Then on 2022, he underwent a repeat CT abdomen/pelvis which showed a nonocclusive thrombus in the main portal vein and a branch of the superior mesenteric vein. Hypodense structure at the end of the pancreas adjacent to nonocclusive portal vein thrombus is indeterminate, though suspicious for mass. Unfortunately, this patient was NOT placed on anticoagulation therapy when the portal vein thrombosis (PVT) was found.     2022, he underwent yet another ERCP for biopsy and stent exchange. Biopsy showed findings  consistent with chronic pancreatitis and reactive epithelial changes and inflammation.     3/4/2022, he saw Dr. Benz, surgeon for consultation who discussed with the patient about potentially Whipple procedure. However, the patient would not want to proceed with surgery unless the diagnosis of malignancy has been proven.     5/10/2022, he had another ERCP and had his stents extracted. On this day, he also has a repeat CT abdomen/pelvis done showing PVT has increased extending into the right hepatic lobe, superior mesenteric vein, and splenic vein. Common bile duct stents have been replaced and no discrete biliary or pancreatic mass is seen. Again, unfortunately he was not started on anticoagulation therapy despite extension of the PVT. Thus this clinic appointment.      ROS:  Denies any bleeding complications. Specifically, no frequent epistaxis. No issues with oral mucosal bleeding. Denies any hematuria or blood in stools. Denies any shortness of breath. No chest pain. No cough. No fever.      Medications:  Current Outpatient Medications   Medication     acetaminophen (TYLENOL) 325 MG tablet     coenzyme Q-10 10 MG CAPS     dicyclomine (BENTYL) 20 MG tablet     escitalopram (LEXAPRO) 20 MG tablet     loperamide (IMODIUM) 2 MG capsule     losartan (COZAAR) 100 MG tablet     lovastatin (MEVACOR) 40 MG tablet     melatonin 5 MG CAPS     Multiple Vitamin (MULTIVITAMIN PO)     omeprazole (PRILOSEC) 20 MG DR capsule     zolpidem (AMBIEN) 10 MG tablet     No current facility-administered medications for this visit.     Allergies:   No Known Allergies    PmHx:  Past Medical History:   Diagnosis Date     Anxiety      Depression      Gastroesophageal reflux disease      Gout      Hypercholesterolemia      Hypertension      IBS (irritable bowel syndrome)      Pancreatic disease      Prostate cancer (H)        Social History:   Deferred.    Family History:  Deferred.    Objective:  Vitals: ***   Exam:    ***    Labs:  Component      Latest Ref Rng & Units 5/10/2022   Sodium      133 - 144 mmol/L 136   Potassium      3.4 - 5.3 mmol/L 4.1   Chloride      94 - 109 mmol/L 105   Carbon Dioxide      20 - 32 mmol/L 24   Anion Gap      3 - 14 mmol/L 7   Urea Nitrogen      7 - 30 mg/dL 27   Creatinine      0.66 - 1.25 mg/dL 1.38 (H)   Calcium      8.5 - 10.1 mg/dL 9.1   Glucose      70 - 99 mg/dL 99   Alkaline Phosphatase      40 - 150 U/L 78   AST      0 - 45 U/L 25   ALT      0 - 70 U/L 27   Protein Total      6.8 - 8.8 g/dL 6.8   Albumin      3.4 - 5.0 g/dL 3.6   Bilirubin Total      0.2 - 1.3 mg/dL 1.2   GFR Estimate      >60 mL/min/1.73m2 53 (L)   WBC      4.0 - 11.0 10e3/uL 6.1   RBC Count      4.40 - 5.90 10e6/uL 3.74 (L)   Hemoglobin      13.3 - 17.7 g/dL 11.9 (L)   Hematocrit      40.0 - 53.0 % 36.2 (L)   MCV      78 - 100 fL 97   MCH      26.5 - 33.0 pg 31.8   MCHC      31.5 - 36.5 g/dL 32.9   RDW      10.0 - 15.0 % 11.7   Platelet Count      150 - 450 10e3/uL 103 (L)       Imaging:  Reviewed and is as described above.     Assessment:  In summary, Kody is a 76 year old male with a history of hypertension, hyperlipidemia, anxiety, depression, history of alcohol abuse, GERD, IBS, gout and history of prostate cancer, referred by Dr. Babak Garvin of GI for consultation in regard to worsening portal vein thrombosis. As mentioned above, Kody was found to have biliary dilation with a concerning pancreatic mass back in Aug 2021. Since then, he had undergone multiple ERCP with common bile duct stents placement and replacements as well as biopsies of this mass, which never was found to have any signs of malignancy. He was found to have a nonocclusive portal vein thrombosis (PVT) back in Feb 2022 but unfortunately was never placed on anticoagulation therapy at the time. He fortunately was able to get all his stents removed as of 5/10/2022 but the CT abdomen on 5/10/2022 showed progression on his PVT to now occlusive.  Still unfortunately, he has not been started on anticoagulation therapy.     His portal vein thrombosis occur in the setting of frequent need for biliary stent replacement via ERCP. Thus this portal vein thrombosis was a provoked intra-abdominal venous thrombotic event.     Diagnosis:  1. 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   2. Distal obstruction to the common bile duct and resultant stricture seems to have resolved. All stents are removed as of 5/10/2022.     Plan:  I have a long discussion with the patient and his wife today.     I explain to them that he should be placed on anticoagulation therapy especially that his PVT has progressed. Given his last creatinine at 1.3 back on 5/10/2022, I do think the best choice of anticoagulation therapy for this patient is apixaban. I will start him on apixaban at 10 mg PO BID x 7 days as loading dose, then 5 mg PO BID thereafter. Will plan to repeat his abdominal CT in 3 months. Have him return to clinic to see me after the repeat CT scan. He probably will not need indefinite anticoagulation therapy as this PVT was a provoked event, but it will depends if there are any cavernous transformation after a period of anticoagulation therapy now the portal vein is occluded.     I will obtain antiphospholipid antibody panel, LUCIA 2 mutation and PNH screening today. Will repeat his CBC and CMP today as his platelet count was low at around 103k back on 5/10/2022.     Patient is instructed to call our clinic if he should experience any unusual bleeding complications or if he should need any invasive or surgical procedures. I educated him about anticoagulation therapy today as well.     Case discussed with Dr. Justin Saleh, staff hematologist. He agrees with the above plan and recommendation.         Pola Naranjo PA-C, MPAS  Physician Assistant  [facility]  [facility].     61 minutes spent on the date of the encounter doing chart review, history and exam, documentation and further activities per the note.    Time IN: 13:48  Time OUT: 14:30          Vitals completed along with medication and allergy review by Meryl Alejo CMA.          Again, thank you for allowing me to participate in the care of your patient.      Sincerely,    Pola Naranjo PA-C

## 2022-05-16 NOTE — LETTER
[facility]  [address] [place] [address], [place] [address]  Main: [phone], Fax: [phone]    Patient seen at: [facility] at [address] [place] [address]    Outpatient Visit Note:    Patient: Kody Reynaga  MRN: [id number]  : 1945  CHELLE: May 16, 2022    Reason for visit:  Poral Vein Thrombosis.     HPI:  Kody is a 76 year old male with a history of hypertension, hyperlipidemia, anxiety, depression, history of alcohol abuse, GERD, IBS, gout and history of prostate cancer, referred by Dr. Babak Garvin of GI for consultation in regard to worsening portal vein thrombosis.     Kody was found to have painless jaundice and was found to have biliary dilation with gradual tapering towards the head of the pancreas back in around Aug 2021. He has had multiple evaluations and procedures, prior ERCP with stent placement, prior EUS exams, and biopsies of presumed pancreatic head mass were negative for malignancy. On 2022, Kody underwent another repeat ERCP as his common bile duct stent was occluded.     Then on 2022, he underwent a repeat CT abdomen/pelvis which showed a nonocclusive thrombus in the main portal vein and a branch of the superior mesenteric vein. Hypodense structure at the end of the pancreas adjacent to nonocclusive portal vein thrombus is indeterminate, though suspicious for mass. Unfortunately, this patient was NOT placed on anticoagulation therapy when the portal vein thrombosis (PVT) was found.     2022, he underwent yet another ERCP for biopsy and stent exchange. Biopsy showed findings consistent with chronic pancreatitis and reactive epithelial changes and inflammation.     3/4/2022, he saw Dr. Benz, surgeon for consultation who discussed with the patient about potentially Whipple procedure. However, the patient would not want to proceed with surgery unless the diagnosis of malignancy has been  "proven.     5/10/2022, he had another ERCP and had his stents extracted. On this day, he also has a repeat CT abdomen/pelvis done showing PVT has increased extending into the right hepatic lobe, superior mesenteric vein, and splenic vein. Common bile duct stents have been replaced and no discrete biliary or pancreatic mass is seen. Again, unfortunately he was not started on anticoagulation therapy despite extension of the PVT. Thus this clinic appointment.      ROS:  Denies any bleeding complications. Specifically, no frequent epistaxis. No issues with oral mucosal bleeding. Denies any hematuria or blood in stools. Denies any shortness of breath. No chest pain. No cough. No fever. He does endorse some right lower quadrant abdominal pain but he contributes this to his irritable bowel disease.       Medications:  Current Outpatient Medications   Medication     acetaminophen (TYLENOL) 325 MG tablet     coenzyme Q-10 10 MG CAPS     dicyclomine (BENTYL) 20 MG tablet     escitalopram (LEXAPRO) 20 MG tablet     loperamide (IMODIUM) 2 MG capsule     losartan (COZAAR) 100 MG tablet     lovastatin (MEVACOR) 40 MG tablet     melatonin 5 MG CAPS     Multiple Vitamin (MULTIVITAMIN PO)     omeprazole (PRILOSEC) 20 MG DR capsule     zolpidem (AMBIEN) 10 MG tablet     No current facility-administered medications for this visit.     Allergies:   No Known Allergies    PmHx:  Past Medical History:   Diagnosis Date     Anxiety      Depression      Gastroesophageal reflux disease      Gout      Hypercholesterolemia      Hypertension      IBS (irritable bowel syndrome)      Pancreatic disease      Prostate cancer (H)        Social History:   Deferred.    Family History:  Denies any family history of DVT/PE.     Objective:  Vitals: /70   Pulse 77   Temp 97.8  F (36.6  C) (Oral)   Resp 16   Ht 1.803 m (5' 11\")   Wt 89.2 kg (196 lb 9.6 oz)   SpO2 98%   BMI 27.42 kg/m     Exam:   Abdomen: Obese. Non tender. Non distended. Soft. "     Labs:  Component      Latest Ref Rng & Units 5/10/2022   Sodium      133 - 144 mmol/L 136   Potassium      3.4 - 5.3 mmol/L 4.1   Chloride      94 - 109 mmol/L 105   Carbon Dioxide      20 - 32 mmol/L 24   Anion Gap      3 - 14 mmol/L 7   Urea Nitrogen      7 - 30 mg/dL 27   Creatinine      0.66 - 1.25 mg/dL 1.38 (H)   Calcium      8.5 - 10.1 mg/dL 9.1   Glucose      70 - 99 mg/dL 99   Alkaline Phosphatase      40 - 150 U/L 78   AST      0 - 45 U/L 25   ALT      0 - 70 U/L 27   Protein Total      6.8 - 8.8 g/dL 6.8   Albumin      3.4 - 5.0 g/dL 3.6   Bilirubin Total      0.2 - 1.3 mg/dL 1.2   GFR Estimate      >60 mL/min/1.73m2 53 (L)   WBC      4.0 - 11.0 10e3/uL 6.1   RBC Count      4.40 - 5.90 10e6/uL 3.74 (L)   Hemoglobin      13.3 - 17.7 g/dL 11.9 (L)   Hematocrit      40.0 - 53.0 % 36.2 (L)   MCV      78 - 100 fL 97   MCH      26.5 - 33.0 pg 31.8   MCHC      31.5 - 36.5 g/dL 32.9   RDW      10.0 - 15.0 % 11.7   Platelet Count      150 - 450 10e3/uL 103 (L)       Imaging:  Reviewed and is as described above.     Assessment:  In summary, Kody is a 76 year old male with a history of hypertension, hyperlipidemia, anxiety, depression, history of alcohol abuse, GERD, IBS, gout and history of prostate cancer, referred by Dr. Babak Garvin of GI for consultation in regard to worsening portal vein thrombosis. As mentioned above, Kody was found to have biliary dilation with a concerning pancreatic mass back in Aug 2021. Since then, he had undergone multiple ERCP with common bile duct stents placement and replacements as well as biopsies of this mass, which never was found to have any signs of malignancy. He was found to have a nonocclusive portal vein thrombosis (PVT) back in Feb 2022 but unfortunately was never placed on anticoagulation therapy at the time. He fortunately was able to get all his stents removed as of 5/10/2022 but the CT abdomen on 5/10/2022 showed progression on his PVT to now occlusive. Still  unfortunately, he has not been started on anticoagulation therapy.     His portal vein thrombosis occur in the setting of frequent need for biliary stent replacement via ERCP. Thus this portal vein thrombosis was a provoked intra-abdominal venous thrombotic event.     Diagnosis:  1. 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   2. Distal obstruction to the common bile duct and resultant stricture seems to have resolved. All stents are removed as of 5/10/2022.     Plan:  I have a long discussion with the patient and his wife today.     I explain to them that he should be placed on anticoagulation therapy especially that his PVT has progressed. Given his last creatinine at 1.3 back on 5/10/2022, I do think the best choice of anticoagulation therapy for this patient is apixaban. I will start him on apixaban at 10 mg PO BID x 7 days as loading dose, then 5 mg PO BID thereafter. Will plan to repeat his abdominal CT in 3 months. Have him return to clinic to see me after the repeat CT scan. He probably will not need indefinite anticoagulation therapy as this PVT was a provoked event, but it will depends if there are any cavernous transformation after a period of anticoagulation therapy now the portal vein is occluded.     I will obtain antiphospholipid antibody panel, LUCIA 2 mutation and PNH screening today. Will repeat his CBC and CMP today as his platelet count was low at around 103k back on 5/10/2022.     Patient is instructed to call our clinic if he should experience any unusual bleeding complications or if he should need any invasive or surgical procedures. I educated him about anticoagulation therapy today as well.     Case discussed with Dr. Justin Saleh, staff hematologist. He agrees with the above plan and recommendation.         Pola Naranjo PA-C, MPAS  Physician Assistant  [facility]  [facility].     61 minutes spent on the date of the encounter doing chart review, history and exam, documentation and further activities per the note.    Time IN: 13:48  Time OUT: 14:30      Vitals completed along with medication and allergy review by Meryl Alejo CMA.

## 2022-05-16 NOTE — PATIENT INSTRUCTIONS
[facility]  [facility][note 3][address], [place] [address]  Main: [phone], Fax: [phone]    It was a pleasure seeing you today.  Thank you for allowing us to be involved in your care. Please let us know if there is anything else we can do for you, so that we can be sure you are leaving completely satisfied with your care experience.    Labs today.  Our lab is located within our clinic space.  We will communicate these to you by Phone: [phone] . With your permission we may leave a detailed voicemail, please see below for our contact information should you have any questions about your results. Also, please note that some lab results may take a week or so to get back. Feel free to call or message if you have not heard back from us within 7-10 days.       Please stay on your blood thinner:  Eliquis 10mg twice a day for 7 days then decrease to 5mg twice a day.  Please call us with any bleeding issues that you may have.    We would like you to repeat your imaging in 3 months.  You are scheduled at [place] on Thursday August 11th at 1040am, arrive at 1010am.    Wear compression stockings if you have swelling in your leg. Take them off while you are sleeping. You may need to get new stockings every 3-6 months with regular wear.    Patient Resources:   For additional information, please see the following web links:  www.stoptheclot.org, www.clotconnect.org.    Call the [facility]  at [phone].     -If surgeries or procedures are planned (for holding instructions).     -If off anticoagulation, please call during high risk times (long-distance travel, broken bones or trauma, immobilization, surgery, pregnancy, or taking estrogen).     -Any new symptoms of DVT (deep vein thrombosis) or PE (pulmonary embolism)    -pain     -swelling     -redness    -warmth    -shortness of breath    -chest pain    -coughing  up blood    We would like a provider on our team to see you at least annually for optimal care and to allow us to continue to prescribe for you.  Pola Naranjo PA-C, Alivia Donohue, MPH, WESLEY  and Rhiannon Waters PA-C are our physician assistants that are specialized in bleeding and clotting disorders that you may be able to see more readily.    Return to clinic in  3 months, after the repeat CT scan.  Please schedule return appointment with Pola Naranjo PA-C .    Your nurse clinician is Nedra Tracy RN, [phone] .   If she is unavailable and you have immediate concerns, please call [phone] and ask for a nurse.

## 2022-05-16 NOTE — TELEPHONE ENCOUNTER
Post ERCP (5-10-22) with Dr. Abarca: Follow-up    Post procedure recommendations:   Obtain a KUB in 4 weeks to ensure spontaneous                          passage of both extracted now duodenal and newly                          placed biliary plastic stents.     Orders placed: KUB    Patient states: left message, [employer] sent    Clinic contact and scheduling numbers verified for future questions/concerns.    Carla Elizalde RN Care Coordinator

## 2022-05-19 NOTE — PROGRESS NOTES
VO -= Fax Abdomen 2vw order by Dr. Abarca created on 5/16/2022 to [facility] in [place].  Phone number [phone]    Called and faxed order to fax number:  [phone]    Completed!    Josey Tyson MA

## 2022-06-07 NOTE — LETTER
June 8, 2022      Kody Reynaga  [address]  [facility] [address]              Mr. Reynaga,     You had an abdominal x-ray and it did not show the pancreatic duct stent. There was no bowel obstruction.     Please contact the clinic if you have additional questions.  Thank you.     Sincerely,     KATHRINE Harden, NP-C   [facility]   Dr. Curran is currently out of the office and I am one of the providers helping to cover him.

## 2022-06-07 NOTE — RESULT ENCOUNTER NOTE
Send letter and results    Mr. Reynaga,    You had an abdominal x-ray and it did not show the pancreatic duct stent. There was no bowel obstruction.     Please contact the clinic if you have additional questions.  Thank you.    Sincerely,    KATHRINE Harden, NP-C  [facility]  Dr. Curran is currently out of the office and I am one of the providers helping to cover him.

## 2022-06-10 NOTE — CONFIDENTIAL NOTE
Kody Reynaga is a 76 -year-old male followed by Pola Naranjo PA-C at the [facility] for his history of provoked PVT. He was seen and evaluated by us on 5/16/22 with the plan to start Eliquis and have follow up imaging done in 3 months. We will see him in clinic after to determine whether or not he needs to continue on the Eliquis at that time.     Kody is calling today because he needs a prescription for Eliquis sent to his local pharmacy so that he can continue on the medication for 2 more months. Prescription updated and sent. Kody has our contact information for any further questions, comments or concerns.    Clarice Antonio  MSN, RN, PHN  [facility]  Office: [phone]  Fax: [phone]

## 2022-06-10 NOTE — TELEPHONE ENCOUNTER
----- Message from Martha Veronica RN sent at 1/25/2022  8:13 AM CST -----  Patient due for PSA check at around 7/20/22. Order PSA and contact patient to remind him that he is due for PSA.    Martha Veronica RN, BSN

## 2022-06-23 NOTE — TELEPHONE ENCOUNTER
Return in about 1 year (around 1/20/2023).    Ca rodriguez Procedure   Dermatology, General and Plastic Surgery, Urology Specialties   [facility] [place] [id number]

## 2022-06-23 NOTE — PROGRESS NOTES
Imaging disc and report received from [facility]    Scan: CLARY, DOS: 6-7-2022     CD sent to [facility] to be uploaded into PACS.

## 2022-08-09 NOTE — TELEPHONE ENCOUNTER
Reason for Call:  Same Day Appointment, Requested Provider:  Hospital follow up     PCP: Jerel Curran    Reason for visit: Patient is currently at [facility] in [facility] for a blood clot that was causing him to retain fluid.  Patient is to be discharged 8/9.  Providers at the hospital want him seen on Friday (8/12) or Monday (8/15)     Duration of symptoms: ongoing     Have you been treated for this in the past? Yes    Additional comments: Patient will be traveling home on 8/10     Can we leave a detailed message on this number? YES    Phone number patient can be reached at: Home number on file [phone] (home)    Best Time: anytime     Call taken on 8/9/2022 at 11:58 AM by Suzy Carter

## 2022-08-09 NOTE — TELEPHONE ENCOUNTER
I want to see him in person  I do not mind seeing him on Friday  I am okay if it is a 20-minute appointment and needs to be double booked  He needs to come to [facility] for that appointment  If he cannot make it on Friday I can see him on Monday  We can get him to [place]  Even though it is a hospital appointment I do not mind seeing him in a 20-minute slot  Please call him and schedule for the appointment

## 2022-08-11 NOTE — PROGRESS NOTES
Patient came for the INR and PT lab , but there is no order. Could you order test if needed.  Thank you   Breanne Haynes

## 2022-08-12 PROBLEM — S06.5XAA SDH (SUBDURAL HEMATOMA) (H): Status: ACTIVE | Noted: 2022-01-01

## 2022-08-12 PROBLEM — R47.01 EXPRESSIVE APHASIA: Status: ACTIVE | Noted: 2022-01-01

## 2022-08-12 PROBLEM — Z98.890 S/P CRANIOTOMY: Status: ACTIVE | Noted: 2022-01-01

## 2022-08-12 PROBLEM — R18.8 OTHER ASCITES: Status: ACTIVE | Noted: 2022-01-01

## 2022-08-12 PROBLEM — I81 PORTAL VEIN THROMBOSIS: Status: ACTIVE | Noted: 2022-01-01

## 2022-08-12 PROBLEM — K83.8 DILATED INTRAHEPATIC BILE DUCT: Status: ACTIVE | Noted: 2022-01-01

## 2022-08-12 NOTE — PROGRESS NOTES
ANTICOAGULATION MANAGEMENT     Kody Reynaga 77 year old male is on warfarin with subtherapeutic INR result. (Goal INR 2.0-3.0)    Recent labs: (last 7 days)     08/11/22  1328   INR 1.28*       ASSESSMENT       Source(s): Chart Review and Patient/Caregiver Call       Warfarin doses taken: Warfarin taken as instructed    Diet: eats lissa salad every day, used to eat spinach 2-3 times a week, but was told not to eat spiniach so has stopped that for now.    New illness, injury, or hospitalization: Yes: recently hospitalized in [place], new portal vein thrombosis, and pancreatits    Medication/supplement changes: None noted    Signs or symptoms of bleeding or clotting: Yes: new thrombosis    Previous INR: inr from hospital sub    Additional findings: sees heme on monday, thought is might go back on eliquis after that appt. only made one inr lab appt on monday, will address need after consult       PLAN     Recommended plan for ongoing change(s) affecting INR     Dosing Instructions: Continue your current warfarin dose with next INR in 3 days       Summary  As of 8/12/2022    Full warfarin instructions:  3 mg every day   Next INR check:  8/15/2022             Telephone call with  Asia who agrees to plan and repeated back plan correctly    Lab visit scheduled    Education provided: Please call back if any changes to your diet, medications or how you've been taking warfarin, Impact of vitamin K foods on INR, Potential interaction between warfarin and alcohol, Goal range and significance of current result, Importance of therapeutic range, Importance of following up at instructed interval, Importance of taking warfarin as instructed, Monitoring for bleeding signs and symptoms, Monitoring for clotting signs and symptoms, Importance of notifying clinic for changes in medications; a sooner lab recheck maybe needed., Importance of notifying clinic for diarrhea, nausea/vomiting, reduced intake, and/or illness; a sooner lab  recheck maybe needed. and Contact [phone]  with any changes, questions or concerns.     Plan made per [facility] anticoagulation protocol    Priscilla Fuentes RN  Anticoagulation Clinic  8/12/2022    _______________________________________________________________________     Anticoagulation Episode Summary     Current INR goal:  2.0-3.0   TTR:  --   Target end date:  9/12/2023   Send INR reminders to:  ARIS GREGORY    Indications    Portal vein thrombosis [I81]           Comments:           Anticoagulation Care Providers     Provider Role Specialty Phone number    Jerel Curran MD Referring Internal Medicine [phone]

## 2022-08-12 NOTE — PROGRESS NOTES
Assessment & Plan     Biliary obstruction  In the past he had stents placed  These were removed  The recent CT scan now shows that there is worsening extrahepatic biliary dilatation and the intrahepatic biliary ducts are getting thickened and inflamed  He would need urgent GI assessment  I have sent a message to the GI physician personally    Elevated serum creatinine  This probably prerenal from diuresis  We will watch this carefully also in the light of the fact that we are increasing the Lasix to 40 mg daily    SDH (subdural hematoma) (H)  Status postcraniotomy in June  After that he developed some expressive aphasia  He is currently having still a wound in the forehead which is healing well    S/P craniotomy  As above he had this in [place]  Wound is healing well  Wound care was following him over there    Expressive aphasia  He was getting speech therapy in [place] and we will reorder this  - Speech Therapy Referral; Future    Portal vein thrombosis  He was off anticoagulation for a couple of months  He was on Eliquis before  For reasons that remain unclear he was discharged on Coumadin from the hospital I am not clear why this was done  And why they did not put him back on Eliquis  At this point his INR is only 1.28  I will increase the Coumadin to 3 mg daily  I feel he should go back on Eliquis  I discussed the case with hematology they are going to see him on Monday  - warfarin ANTICOAGULANT (COUMADIN) 1 MG tablet; Take 1 tablet (1 mg) by mouth daily Take with 2 mg tablet for a total of 3 mg daily  - warfarin ANTICOAGULANT (COUMADIN) 2 MG tablet; Take 1 tablet (2 mg) by mouth daily Take with 1 mg tablet for a total of 3 mg daily  - Anticoagulation Clinic Referral    Other ascites  Ascites is probably from portal vein obstruction although the hepatic parenchyma looks good  Hepatic functions are good except for low albumin  He might need paracentesis again  Recently had 2 L taken out  - furosemide  "(LASIX) 40 MG tablet; Take 1 tablet (40 mg) by mouth daily  - Basic metabolic panel  (Ca, Cl, CO2, Creat, Gluc, K, Na, BUN); Future    Dilated intrahepatic bile duct  As above    Frailty syndrome in geriatric patient  This is from deconditioning due to recent long-term hospitalization  - Physical Therapy Referral; Future      45 minutes spent on the date of the encounter doing chart review, history and exam, documentation and further activities per the note       BMI:   Estimated body mass index is 27.64 kg/m  as calculated from the following:    Height as of this encounter: 1.803 m (5' 11\").    Weight as of this encounter: 89.9 kg (198 lb 3.2 oz).           Return in about 3 days (around 8/15/2022).    Jerel Curran MD  [facility] BRI Gates is a 77 year old, presenting for the following health issues:  Hospital F/U      [facility]       Hospital Follow-up Visit:    Hospital/Nursing Home/ Rehab Facility: [facility]  Date of Admission: 06/19/2022, 06/29/2022, 08/5/2022  Date of Discharge: 06/27/2022, 07/14/2022, 08/10/2022  Reason(s) for Admission: Due to fall    Was your hospitalization related to COVID-19? No   Problems taking medications regularly:  None  Medication changes since discharge: Yes  Problems adhering to non-medication therapy:  None    Summary of hospitalization:  CareEverywhere information obtained and reviewed  Diagnostic Tests/Treatments reviewed.  Follow up needed: none  Other Healthcare Providers Involved in Patient s Care:         Hematology and gastroenterology  Update since discharge: Same         Post Medication Reconciliation Status:        Plan of care communicated with patient             Review of Systems   Constitutional, HEENT, cardiovascular, pulmonary, gi and gu systems are negative, except as otherwise noted.      Objective    /70 (BP Location: Left arm, Patient Position: Sitting, Cuff Size: Adult Regular)   Pulse 93  " " Temp 99.2  F (37.3  C) (Tympanic)   Resp 21   Ht 1.803 m (5' 11\")   Wt 89.9 kg (198 lb 3.2 oz)   SpO2 95%   BMI 27.64 kg/m    Body mass index is 27.64 kg/m .  Physical Exam   GENERAL: healthy, alert and no distress  HENT: Wound on the forehead  Healing well  NECK: no adenopathy, no asymmetry, masses, or scars and thyroid normal to palpation  RESP: lungs clear to auscultation - no rales, rhonchi or wheezes  CV: regular rate and rhythm, normal S1 S2, no S3 or S4, no murmur, click or rub, no peripheral edema and peripheral pulses strong  ABDOMEN: Ascites present  Shifting dullness positive  MS: no gross musculoskeletal defects noted, no edema                    .  ..  "

## 2022-08-15 NOTE — ED PROVIDER NOTES
ED Provider Note  [facility]      History     Chief Complaint   Patient presents with     Abdominal Pain     HPI  Kody Reynaga is a 77 year old male who has a history significant for portal venous thrombosis and recurrent ascites.  Arrives to the emergency room due to concern of abdominal distention.  Patient reports mild generalized tenderness.  He reports significant distention without other symptoms such as exertional shortness of breath, chest discomfort, severe abdominal discomfort, fever, chills.  Patient reports he is currently working with his primary care physician to reach out to a gastroenterologist for further work-up of why he is having recurrent paracentesis.  Patient reports he is anticoagulated which has been transitioned to Eliquis.  He denies recent trauma, dysuria, urinary frequency, melena, hematochezia.  He reports no other symptoms at this time.  He is primarily requesting drainage.    Past Medical History  Past Medical History:   Diagnosis Date     Anxiety      Depression      Gastroesophageal reflux disease      Gout      Hypercholesterolemia      Hypertension      IBS (irritable bowel syndrome)      Pancreatic disease      Prostate cancer (H)      Past Surgical History:   Procedure Laterality Date     CATARACT EXTRACTION W/ INTRAOCULAR LENS IMPLANT Right      COLONOSCOPY       cryoablation of prostate       ENDOSCOPIC RETROGRADE CHOLANGIOPANCREATOGRAM       ENDOSCOPIC RETROGRADE CHOLANGIOPANCREATOGRAM N/A 1/24/2022    Procedure: ENDOSCOPIC RETROGRADE CHOLANGIOPANCREATOGRAPHY, BILIARY STENT EXCHANGE, sPY WITH BIOPSIES AND BRUSHINGS;  Surgeon: Guru Ab Gonzalez MD;  [facility]     ENDOSCOPIC RETROGRADE CHOLANGIOPANCREATOGRAM N/A 2/24/2022    Procedure: ENDOSCOPIC RETROGRADE CHOLANGIOPANCREATOGRAPHY, with stent placement and bile duct biopsy;  Surgeon: Anthony Abarca MD;  [facility]     ENDOSCOPIC RETROGRADE  CHOLANGIOPANCREATOGRAM N/A 5/10/2022    Procedure: ENDOSCOPIC RETROGRADE CHOLANGIOPANCREATOGRAPHY, pyloric dilation, bebris removal, biliary stent exchange;  Surgeon: Anthony Abarca MD;  [facility]     ENDOSCOPIC RETROGRADE CHOLANGIOPANCREATOGRAM WITH SPYGLASS N/A 10/14/2021    Procedure: ENDOSCOPIC RETROGRADE CHOLANGIOPANCREATOGRAPHY, WITH DIRECT DUCT VISUALIZATION, USING PANCREATICOBILIARY FIBEROPTIC PROBE-spyglass, biliary sludge and clot removal, biliary biopsies, biliary stent exchange;  Surgeon: Anthony Abarca MD;  [facility]     ENDOSCOPIC ULTRASOUND UPPER GASTROINTESTINAL TRACT (GI) N/A 9/28/2021    Procedure: ENDOSCOPIC ULTRASOUND, ESOPHAGOSCOPY / UPPER GASTROINTESTINAL TRACT (GI);  Surgeon: Babak Garvin MD;  [facility]     ENDOSCOPIC ULTRASOUND UPPER GASTROINTESTINAL TRACT (GI) N/A 1/24/2022    Procedure: ENDOSCOPIC ULTRASOUND, ESOPHAGOSCOPY / UPPER GASTROINTESTINAL TRACT (GI);  Surgeon: Guru Ab Gonzalez MD;  [facility]     ESOPHAGOSCOPY, GASTROSCOPY, DUODENOSCOPY (EGD), COMBINED N/A 10/14/2021    Procedure: ESOPHAGOGASTRODUODENOSCOPY, WITH FINE NEEDLE ASPIRATION BIOPSY, WITH ENDOSCOPIC ULTRASOUND GUIDANCE, biliary stent removal;  Surgeon: Babak Garvin MD;  [facility]     ESOPHAGOSCOPY, GASTROSCOPY, DUODENOSCOPY (EGD), COMBINED N/A 2/24/2022    Procedure: ESOPHAGOGASTRODUODENOSCOPY WITH DILATION AND STENT REMOVAL, ENDOSCOPIC ULTRASOUND WITH FINE NEEDLE BIOPSIES;  Surgeon: Babak Garvin MD;  [facility]     MOUTH SURGERY       TONSILLECTOMY       acetaminophen (TYLENOL) 325 MG tablet  coenzyme Q-10 10 MG CAPS  dicyclomine (BENTYL) 20 MG tablet  escitalopram (LEXAPRO) 20 MG tablet  furosemide (LASIX) 40 MG tablet  losartan (COZAAR) 100 MG tablet  lovastatin (MEVACOR) 40 MG tablet  melatonin 5 MG CAPS  omeprazole (PRILOSEC) 20 MG DR capsule  warfarin ANTICOAGULANT (COUMADIN) 1 MG tablet  warfarin ANTICOAGULANT  (COUMADIN) 2 MG tablet      No Known Allergies  Family History  Family History   Problem Relation Age of Onset     Heart Failure Mother      Cancer Father      Anesthesia Reaction No family hx of      Cardiovascular No family hx of      Deep Vein Thrombosis (DVT) No family hx of      Social History   Social History     Tobacco Use     Smoking status: Former Smoker     Types: Cigarettes     Quit date:      Years since quittin.6     Smokeless tobacco: Never Used   Substance Use Topics     Alcohol use: Yes     Comment: cutting down, drinking NA beer      Past medical history, past surgical history, medications, allergies, family history, and social history were reviewed with the patient. No additional pertinent items.       Review of Systems   Constitutional: Negative for activity change, appetite change, chills, diaphoresis and fever.   HENT: Negative.  Negative for congestion, ear pain and sore throat.    Respiratory: Negative.  Negative for cough and shortness of breath.    Cardiovascular: Negative.  Negative for chest pain and leg swelling.   Gastrointestinal: Positive for abdominal distention and abdominal pain. Negative for diarrhea, nausea and vomiting.   Genitourinary: Negative.  Negative for difficulty urinating and dysuria.   Musculoskeletal: Negative.  Negative for arthralgias and myalgias.   Skin: Negative.  Negative for rash.   Neurological: Negative for dizziness, weakness, light-headedness and headaches.   Psychiatric/Behavioral: Negative.  Negative for confusion.   All other systems reviewed and are negative.    A complete review of systems was performed with pertinent positives and negatives noted in the HPI, and all other systems negative.    Physical Exam   BP: 104/67  Pulse: 90  Temp: 98  F (36.7  C)  Resp: 17  SpO2: 98 %  Physical Exam  Vitals and nursing note reviewed.   Constitutional:       General: He is not in acute distress.     Appearance: Normal appearance. He is not ill-appearing,  toxic-appearing or diaphoretic.   HENT:      Head: Normocephalic and atraumatic.      Right Ear: External ear normal.      Left Ear: External ear normal.   Eyes:      General: No scleral icterus.     Extraocular Movements: Extraocular movements intact.      Conjunctiva/sclera: Conjunctivae normal.      Pupils: Pupils are equal, round, and reactive to light.   Cardiovascular:      Rate and Rhythm: Normal rate.      Pulses: Normal pulses.      Heart sounds: Normal heart sounds. No murmur heard.    No friction rub.   Pulmonary:      Effort: Pulmonary effort is normal. No respiratory distress.      Breath sounds: No stridor. No wheezing, rhonchi or rales.   Abdominal:      General: Abdomen is flat. There is distension.      Tenderness: There is no abdominal tenderness. There is no guarding or rebound.   Musculoskeletal:         General: No deformity or signs of injury. Normal range of motion.      Cervical back: Normal range of motion.      Right lower leg: Edema present.      Left lower leg: Edema present.   Skin:     General: Skin is warm.      Capillary Refill: Capillary refill takes less than 2 seconds.      Coloration: Skin is not pale.      Findings: No bruising or erythema.   Neurological:      General: No focal deficit present.      Mental Status: He is alert.      Cranial Nerves: No cranial nerve deficit.      Motor: No weakness.   Psychiatric:         Mood and Affect: Mood normal.         Behavior: Behavior normal.       ED Course      Procedures  Results for orders placed during the hospital encounter of 08/15/22    POC US GUIDE FOR PARACENTESIS    Impression  US Indication: Recurrent ascites    Limited abdominal ultrasound was performed and demonstrated an adequate fluid collection on the left side of the abdomen.    Doppler of the skin demonstrated an area at this site without significant vasculature.  A paracentesis at this site was subsequently performed.    Chris Koch MD                     Results  for orders placed or performed during the hospital encounter of 08/15/22   Paracentesis     Status: None    Narrative    Chris Koch MD     8/15/2022  5:18 PM  [facility]    Paracentesis    Date/Time: 8/15/2022 5:14 PM  Performed by: Chris Koch MD  Authorized by: Chris Koch MD     Risks, benefits and alternatives discussed.      UNIVERSAL PROTOCOL   Site Marked: Yes  Prior Images Obtained and Reviewed:  Yes  Required items: Required blood products, implants, devices and special   equipment available    Patient identity confirmed:  Verbally with patient, arm band, provided   demographic data and hospital-assigned identification number  Patient was reevaluated immediately before administering moderate or deep   sedation or anesthesia  Confirmation Checklist:  Patient's identity using two indicators, relevant   allergies, procedure was appropriate and matched the consent or emergent   situation and correct equipment/implants were available  Time out: Immediately prior to the procedure a time out was called    Universal Protocol: the Joint Commission Universal Protocol was followed    Preparation: Patient was prepped and draped in usual sterile fashion    ESBL (mL):  1    PRE-PROCEDURE DETAILS     Procedure purpose:  Therapeutic    ANESTHESIA (see MAR for exact dosages):     Anesthesia method:  Local infiltration    Local anesthetic:  Lidocaine 1% w/o epi    PROCEDURE DETAILS     Needle gauge:  18    Ultrasound guidance: yes      Puncture site:  L lower quadrant    Fluid removed amount:  4000    Fluid appearance:  Yellow    Dressing:  Adhesive bandage      PROCEDURE    Patient Tolerance:  Patient tolerated the procedure well with no immediate   complications   POC US Guide for Paracentesis     Status: None    Impression    US Indication: Recurrent ascites    Limited abdominal ultrasound was performed and demonstrated an adequate fluid collection on the left  side of the abdomen.     Doppler of the skin demonstrated an area at this site without significant vasculature.  A paracentesis at this site was subsequently performed.    Chris Koch MD    [place] [facility]     Status: None    Narrative    The following orders were created for panel order [place] [facility].  Procedure                               Abnormality         Status                     ---------                               -----------         ------                     Extra Red Top Tube[[id number]]                               Final result                 Please view results for these tests on the individual orders.   Extra Red Top Tube     Status: None   Result Value Ref Range    Hold Specimen JIC    [place] [facility]     Status: None    Narrative    The following orders were created for panel order [place] [facility].  Procedure                               Abnormality         Status                     ---------                               -----------         ------                     Extra Blue Top Tube[[id number]]                              Final result               Extra Green Top (Lithium...[[id number]]                      Final result               Extra Purple Top Tube[[id number]]                            Final result                 Please view results for these tests on the individual orders.   Extra Blue Top Tube     Status: None   Result Value Ref Range    Hold Specimen JIC    Extra Green Top (Lithium Heparin) Tube     Status: None   Result Value Ref Range    Hold Specimen JIC    Extra Purple Top Tube     Status: None   Result Value Ref Range    Hold Specimen JIC    Results for orders placed or performed in visit on 08/15/22   Comprehensive metabolic panel     Status: Abnormal   Result Value Ref Range    Sodium 134 133 - 144 mmol/L    Potassium 5.2 3.4 - 5.3 mmol/L    Chloride 103 94 - 109 mmol/L    Carbon Dioxide (CO2) 25 20 - 32 mmol/L    Anion Gap 6 3 - 14 mmol/L    Urea Nitrogen 42 (H) 7 - 30  mg/dL    Creatinine 2.04 (H) 0.66 - 1.25 mg/dL    Calcium 9.3 8.5 - 10.1 mg/dL    Glucose 143 (H) 70 - 99 mg/dL    Alkaline Phosphatase 85 40 - 150 U/L    AST 18 0 - 45 U/L    ALT 15 0 - 70 U/L    Protein Total 7.0 6.8 - 8.8 g/dL    Albumin 3.0 (L) 3.4 - 5.0 g/dL    Bilirubin Total 0.5 0.2 - 1.3 mg/dL    GFR Estimate 33 (L) >60 mL/min/1.73m2   CBC with platelets     Status: Abnormal   Result Value Ref Range    WBC Count 10.4 4.0 - 11.0 10e3/uL    RBC Count 3.47 (L) 4.40 - 5.90 10e6/uL    Hemoglobin 10.0 (L) 13.3 - 17.7 g/dL    Hematocrit 31.4 (L) 40.0 - 53.0 %    MCV 91 78 - 100 fL    MCH 28.8 26.5 - 33.0 pg    MCHC 31.8 31.5 - 36.5 g/dL    RDW 14.9 10.0 - 15.0 %    Platelet Count 194 150 - 450 10e3/uL   INR     Status: Abnormal   Result Value Ref Range    INR 1.31 (H) 0.85 - 1.15   Partial thromboplastin time     Status: Normal   Result Value Ref Range    aPTT 37 22 - 38 Seconds     Medications   lidocaine (PF) (XYLOCAINE) 1 % injection 30 mL (30 mLs Subcutaneous Given 8/15/22 1641)        Assessments & Plan (with Medical Decision Making)     Kody Reynaga is a 77 year old male who has a history significant for portal venous thrombosis and recurrent ascites.  Arrives to the emergency room due to concern of abdominal distention.  On arrival patient noted to be alert.  Is presently afebrile and hemodynamically stable.  He seated upright in a chair.  He appears nontoxic.  External evaluation demonstrated patient to have no sign of respiratory distress.  SPO2 98% on room air.  Abdomen is protuberant.  No significant tenderness with palpation.  Low suspicion for SBP, perforated bowel, bowel obstruction.  I have reviewed laboratory studies as well as recent imaging.  There is question of increasing extrahepatic duct dilation cannot rule out malignancy.  Patient has chronic portal venous thrombosis.  I did offer hospitalization for observation with consideration of GI consultation as he has not been evaluated now with  recurring ascites.  The patient has declined multiple times requesting only paracentesis.    Patient underwent a paracentesis successfully with no complication.  Was observed in the emergency department.  He is requested dismissal to home.  He does not want to stay or have GI consultation emergently.  He will follow-up with his primary care physician.  As noted I did discuss EUN and need for close outpatient follow-up and trend of his creatinine.  Should any increase in abdominal pain, fever, GI loss such as vomiting, diarrhea, melena or hematochezia were certainly happy to receive them for any other cause.      I have reviewed the nursing notes. I have reviewed the findings, diagnosis, plan and need for follow up with the patient.    Discharge Medication List as of 8/15/2022  5:09 PM          Final diagnoses:   Abdominal distention       --  Fran De Luna  [facility] EMERGENCY DEPARTMENT  8/15/2022     Fran De Luna MD  08/15/22 [id number]

## 2022-08-15 NOTE — PROGRESS NOTES
ANTICOAGULATION MANAGEMENT     Kody Reynaga 77 year old male is on warfarin with subtherapeutic INR result. (Goal INR 2.0-3.0)    Recent labs: (last 7 days)     08/15/22  1345   INR 1.31*       ASSESSMENT       Source(s): Chart Review    Previous INR was Subtherapeutic    Medication, diet, health changes since last INR chart reviewed; none identified           PLAN     Unable to reach Yolande today.    per Heme note today he may be stopping warfarin and starting back on Doac he however is in ER with ascites and possible admission . will need follow up breanne.    Follow up required to see if he stopped warfarin and started doac?    Anna Goodwin, RN  Anticoagulation Clinic  8/15/2022

## 2022-08-15 NOTE — PROCEDURES
[facility]  CAPS PROCEDURE NOTE  Date of Admission:  8/15/2022  Consult Requested by: ED  Reason for Consult: management of symptomatic ascites    Indication/HPI: Recurrent ascites    Pre-Procedure Diagnosis: Ascites    Post-Procedure Diagnosis: Ascites    Risk Assessment: Average risk, Technically straightforward; patient's anticoagulation has been held according to guidelines based on the agent and platelets and coags are within guidelines    [facility]    Paracentesis    Date/Time: 8/15/2022 5:14 PM  Performed by: Chris Koch MD  Authorized by: Chris Koch MD     Risks, benefits and alternatives discussed.      UNIVERSAL PROTOCOL   Site Marked: Yes  Prior Images Obtained and Reviewed:  Yes  Required items: Required blood products, implants, devices and special equipment available    Patient identity confirmed:  Verbally with patient, arm band, provided demographic data and hospital-assigned identification number  Patient was reevaluated immediately before administering moderate or deep sedation or anesthesia  Confirmation Checklist:  Patient's identity using two indicators, relevant allergies, procedure was appropriate and matched the consent or emergent situation and correct equipment/implants were available  Time out: Immediately prior to the procedure a time out was called    Universal Protocol: the Joint Commission Universal Protocol was followed    Preparation: Patient was prepped and draped in usual sterile fashion    ESBL (mL):  1    PRE-PROCEDURE DETAILS     Procedure purpose:  Therapeutic    ANESTHESIA (see MAR for exact dosages):     Anesthesia method:  Local infiltration    Local anesthetic:  Lidocaine 1% w/o epi    PROCEDURE DETAILS     Needle gauge:  18    Ultrasound guidance: yes      Puncture site:  L lower quadrant    Fluid removed amount:  4000    Fluid appearance:  Yellow    Dressing:  Adhesive  bandage      PROCEDURE    Patient Tolerance:  Patient tolerated the procedure well with no immediate complications      POC US Guide for Paracentesis     Impression  US Indication: Recurrent ascites    Limited abdominal ultrasound was performed and demonstrated an adequate fluid collection on the left side of the abdomen.    Doppler of the skin demonstrated an area at this site without significant vasculature.  A paracentesis at this site was subsequently performed.    Chris Koch MD       Discussed removing 4L with ED doc. Consent obtained from patient and time out called prior to procedure. Active US guidance used. Some movement of hands during procedure by patient, blocked to keep out of sterile field. 4000cc of yellow/serous fluid obtained. Therapeutic only, no labs. No intra or immediately post procedure complications.      Chris Koch MD  [facility]  Securely message with the Vocera Web Console (learn more here)  Text page via [facility] Paging/Directory   Please see signed in provider for up to date coverage information

## 2022-08-15 NOTE — TELEPHONE ENCOUNTER
Spoke with patient  Discussed the situation  I have not heard anything from the gastroenterologist  I will give him a call tomorrow  In the interim he has been in the ER today and they removed 4 L of fluids  He has also been switched back to Eliquis  I cannot increase his Lasix as his blood pressure seems to be borderline
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
## 2022-08-15 NOTE — ED TRIAGE NOTES
Pt arrives ambulatory to triage w/ c/o worsening abd distention and pain. Hx of ascites, w/ recent abd tap (8/7) Since then distention and pain has worsened. Endorses BLE swelling as well. Denies sob.

## 2022-08-15 NOTE — DISCHARGE INSTRUCTIONS
In the emergency department you underwent a paracentesis for recurrent fluid.  As we have discussed your recent CT scan did demonstrate some extrahepatic duct dilation concerning for some component of obstruction.  Your kidney function is slightly worse from normal as well.  This will require close clinical monitoring.  We did offer hospitalization multiple times with the suspected need for gastroenterology follow-up.  As you have requested you were dismissed to home with plan to follow-up with your primary care physician.  We are certainly happy to receive you anytime should you have recurrence of abdominal discomfort, distention, increasing shortness of breath, lightheadedness or weakness or any other concern.

## 2022-08-15 NOTE — ED PROVIDER NOTES
ED Triage Provider Note  [facility]  Encounter Date: Aug 15, 2022    History:  No chief complaint on file.    Kody Reynaga is a 77 year old male who presents to the ED with complaints of increasing abdominal distention and pain.  Since the beginning of August he has been accumulating ascites and has had to have this drained once.  He has a history of pancreatic mass, biliary obstruction and portal venous thrombosis and is currently anticoagulated.  He took warfarin last night and has now been switched to Eliquis which she is supposed to start taking tomorrow.  He was sent to the emergency department for consideration of paracentesis and to consult with gastroenterology to figure out why he is having recurrent ascites.  Labs were drawn already at clinic today and did demonstrate worsening renal function compared to basic metabolic panel done 6 days ago.    Review of Systems:  Patient denies fevers, nausea or vomiting, has had decreased urination at times.  He denies blood in his stool or black or tarry stools.    Exam:  /67 (BP Location: Left arm, Patient Position: Sitting, Cuff Size: Adult Regular)   Pulse 90   Temp 98  F (36.7  C) (Oral)   Resp 17   SpO2 98%   General: No acute distress. Appears stated age.   Cardio: Regular rate, extremities well perfused  Resp: Normal work of breathing, grossly normal respiratory rate  Neuro: Alert. CN II-XII grossly intact. Grossly intact strength.  Patient does have mild expressive aphasia  Abdominal exam: Abdomen is distended and mildly tender to palpation without peritoneal signs      Medical Decision Making:  Patient arriving to the ED with problem as above. A medical screening exam was performed.  The patient is appropriate to wait in the waiting room for available bed      Elvis Farley MD on 8/15/2022 at 2:41 PM       Elvis Farley MD  08/15/22 0631

## 2022-08-16 NOTE — PROGRESS NOTES
Clinic Care Coordination Contact  [facility]/Voicemail       Clinical Data: Care Coordinator Outreach  Outreach attempted x 1.  Left message on patient's voicemail with call back information and requested return call.  Plan: Care Coordinator will try to reach patient again in 1-2 business days.    .Kallie MARTIN Community Health Worker  Clinic Care Coordination  [facility]  Phone: [phone]

## 2022-08-16 NOTE — PROGRESS NOTES
ANTICOAGULATION  MANAGEMENT    Kody Reynaga is being discharged from the [facility] Anticoagulation Management Program ([facility]).    Reason for discharge: warfarin replaced by alternate therapy, Eliquis    Anticoagulation episode resolved, ACC referral closed and Standing order discontinued    If patient needs warfarin management in the future, please send a new referral    Anna Goodwin RN

## 2022-08-17 NOTE — PROGRESS NOTES
[facility] Care [facility] [place]    Background: Care Coordination referral placed from [facility] discharge report for reason of patient meeting criteria for a TCM outreach call by [facility] team.    Assessment: Upon chart review, [facility] Team member will cancel/close the referral for TCM outreach due to reason below:    Patient has a follow up appointment with an appropriate provider today for hospital discharge.    Plan: Care Coordination referral for TCM outreach canceled.      Keila Rodriguez MA  [facility], [facility]    *Connected Care Resource Team does NOT follow patient ongoing. Referrals are identified based on internal discharge reports and the outreach is to ensure patient has an understanding of their discharge instructions.

## 2022-08-17 NOTE — PROGRESS NOTES
"Kody is a 77 year old who is being evaluated via a billable video visit.      How would you like to obtain your AVS? MyChart  If the video visit is dropped, the invitation should be resent by: Send to e-mail at: [email or web address]  Will anyone else be joining your video visit? No          Assessment & Plan     Other ascites  This is from portal vein thrombosis  Today we discussed about fluid restriction to 1800 cc daily  Low-salt diet  Increase the Lasix to 40 mg twice a day  Because of the risk of hypokalemia asked him to eat some bananas and drink a little bit of orange juice  His blood pressure is running on the lower side so hold off the losartan for now while the Lasix dose is increased  Advised to monitor blood pressure at home and report back to me on Wednesday the readings  I discussed his case with the gastroenterologist Dr. LUCAS  He is going to get him back to clinic in the next 2 weeks and also he is looking at getting on demand paracentesis through the IR and possible recanalization of the portal vein with IR procedure  - Comprehensive metabolic panel (BMP + Alb, Alk Phos, ALT, AST, Total. Bili, TP); Future  - furosemide (LASIX) 40 MG tablet; Take 1 tablet (40 mg) by mouth 2 times daily    SDH (subdural hematoma) (H)  He has been cleared for anticoagulation  Back on full dose of Eliquis    S/P craniotomy  Wound is healing well    Dilated intrahepatic bile duct  His recent CT scan shows dilated intrahepatic bile duct  I discussed this with his GI team  They are going to see him in the clinic    Portal vein thrombosis  As above back on anticoagulation        30 minutes spent on the date of the encounter doing chart review, history and exam, documentation and further activities per the note       BMI:   Estimated body mass index is 28.17 kg/m  as calculated from the following:    Height as of 8/15/22: 1.803 m (5' 11\").    Weight as of 8/15/22: 91.6 kg (202 lb).           No follow-ups on " file.    Jerel Curran MD  [facility]    Corin Gates is a 77 year old, presenting for the following health issues:  No chief complaint on file.      HPI     Concern - Fluid re tension  Pain   Review of Systems   Constitutional, HEENT, cardiovascular, pulmonary, gi and gu systems are negative, except as otherwise noted.      Objective           Vitals:  No vitals were obtained today due to virtual visit.    Physical Exam   GENERAL: Healthy, alert and no distress  EYES: Eyes grossly normal to inspection.  No discharge or erythema, or obvious scleral/conjunctival abnormalities.  Dressing on forehead  RESP: No audible wheeze, cough, or visible cyanosis.  No visible retractions or increased work of breathing.    SKIN: Visible skin clear. No significant rash, abnormal pigmentation or lesions.  NEURO: Cranial nerves grossly intact.  Mentation and speech appropriate for age.  PSYCH: Mentation appears normal, affect normal/bright, judgement and insight intact, normal speech and appearance well-groomed.                Video-Visit Details    Video Start Time: 12:26 PM    Type of service:  Video Visit    Video End Time:12:26 PM    Originating Location (pt. Location): Home    Distant Location (provider location):  [facility]     Platform used for Video Visit: [place]    .  ..

## 2022-08-17 NOTE — Clinical Note
Can you please print the forms that patient has sent me via [email or web address] and placed on my desk for signing and completion

## 2022-08-17 NOTE — PROGRESS NOTES
"Called pt to follow up on request from Dr Curran who visited with patient today. Per Dr Garvin's recommendations:    1) clinic visit with me in 2-3 weeks with labs (CMP, CBC)   2) please arrange for a dietitian consultation to discuss a 2 gm sodium diet.   3) do you know how we can get pts set up for \"on-demand\" paracentesis?     Order placed for therapy plan for prn paracentesis, asked that they call pt to schedule. Will provide [facility] contact in case patient does not get a call.     Will arrange for in person clinic on 8/29 around 9am with labs just prior. Will send mychart confirmation once scheduled, wife confirmed they do check mychart    Dede Bond, RN, BSN,   [facility]  Care coordinator              "

## 2022-08-18 NOTE — TELEPHONE ENCOUNTER
"Received form with the provider portion completed and signed. Copy to TC and abstracting. Called and they would like the form mailed to the home address. Confirmed address and this will go out in tomorrow\"s mail.  Leanne Atkins MA  [facility]  2nd Floor  Primary Care    "

## 2022-08-23 NOTE — PROGRESS NOTES
Kody is a 77 year old who is being evaluated via a billable video visit.      How would you like to obtain your AVS? MyChart  If the video visit is dropped, the invitation should be resent by: Send to e-mail at: [email or web address]  Will anyone else be joining your video visit? No    Video-Visit Details    Video Start Time: 10:33 AM    Type of service:  Video Visit    Video End Time:11:05 AM    Originating Location (pt. Location): Home    Distant Location (provider location):  [facility] [place]     Platform used for Video Visit: [employer]     [facility] Outpatient Medical Nutrition Therapy      Time Spent:  32 minutes  Session Type:  Initial   Referring Physician:  Babak Garvin MD  Reason for RD Visit:   Portal vein thrombosis, ascites    Nutrition Assessment:  Patient is a 77 year old male with history that includes pancreatic mass, subdural hematoma, s/p craniotomy, dilated intrahepatic bile duct, portal vein thrombosis, ascites getting paracentesis and on a 2 g sodium restricted diet with 1800 ml fluid restriction.    His wife Asia was at his appointment today. She is somewhat familiar with low sodium diet because her father had to follow one. Overall feels comfortable with eating but reported decreased appetite and eating smaller portions. He stated that it is difficult to figure out if he had weight loss due to ascites but this morning his weight was 190 lbs and he will have paracentesis tomorrow. His wife stated that his highest weight was 205 lbs with fluids.     Patient Active Problem List   Diagnosis     Pancreatic mass     Depression     Essential hypertension, benign     Insomnia     Major depressive disorder, recurrent episode, in full remission (H)     Obstructive jaundice     Dyslipidemia (high LDL; low HDL)     Prostate cancer (H)     SDH (subdural hematoma) (H)     S/P craniotomy     Expressive aphasia     Portal vein thrombosis     Other ascites      "Dilated intrahepatic bile duct     Height:   Ht Readings from Last 1 Encounters:   08/15/22 1.803 m (5' 11\")     Weight: today was first day lost some weight. Will have paracentesis tomorrow. Weight had gotten up 205 lbs with fluids. Today's weight was 190 lbs  Wt Readings from Last 10 Encounters:   08/15/22 91.6 kg (202 lb)   08/12/22 89.9 kg (198 lb 3.2 oz)   05/16/22 89.2 kg (196 lb 9.6 oz)   05/10/22 90.6 kg (199 lb 11.8 oz)   05/03/22 92.1 kg (203 lb)   03/04/22 88.1 kg (194 lb 4.8 oz)   02/24/22 87.1 kg (192 lb 0.3 oz)   01/24/22 87.3 kg (192 lb 7.4 oz)   01/21/22 87.1 kg (192 lb 1.6 oz)   11/17/21 85 kg (187 lb 8 oz)        BMI: Estimated body mass index is 28.17 kg/m  as calculated from the following:    Height as of 8/15/22: 1.803 m (5' 11\").    Weight as of 8/15/22: 91.6 kg (202 lb).    Diet Recall:  (some usual/recent meals/snacks/beverages):  Meal Food    Breakfast Later/brunch gets up later: Raisin bran with fruit with milk and oj   Lunch Has a later breakfast d/t gets up later, so has a couple snacks between L and D   Dinner Last night beef stroganoff (noodles, beef) or spaghetti or steak or chicken casserole with sauerkraut and cranberry sauce and lettuce salad with fruit on it   Snacks Donut between L and D and cheese and crackers or nuts. Sometimes ice cream in evening   Beverages Overall ~50 oz water per day d/t fluid restriction ~2 cups OJ, some days black Coffee,  2-3 cups Water   Alcohol Intake Glass of wine with dinner, sometimes non alcoholic beer     Frequency of eating/taking out meals: not much especially lately. Previously 1x/week but now since the pandemic over past couple of year only 1-2 times per month (usually at their country club when they were there or near home italian foods. Will go to punch pizza with dtr.     Labs:    Last Comprehensive Metabolic Panel:  Sodium   Date Value Ref Range Status   08/22/2022 130 (L) 133 - 144 mmol/L Final     Potassium   Date Value Ref Range Status "   2022 5.2 3.4 - 5.3 mmol/L Final     Chloride   Date Value Ref Range Status   2022 96 94 - 109 mmol/L Final     Carbon Dioxide (CO2)   Date Value Ref Range Status   2022 26 20 - 32 mmol/L Final     Anion Gap   Date Value Ref Range Status   2022 8 3 - 14 mmol/L Final     Glucose   Date Value Ref Range Status   2022 207 (H) 70 - 99 mg/dL Final     Urea Nitrogen   Date Value Ref Range Status   2022 61 (H) 7 - 30 mg/dL Final     Creatinine   Date Value Ref Range Status   2022 2.59 (H) 0.66 - 1.25 mg/dL Final     GFR Estimate   Date Value Ref Range Status   2022 25 (L) >60 mL/min/1.73m2 Final     Comment:     Effective 2021 eGFRcr in adults is calculated using the  CKD-EPI creatinine equation which includes age and gender (Vikram et al., [place], DOI: 10.1056/QNHUsc1388358)     GFR, ESTIMATED POCT   Date Value Ref Range Status   2022 41 (L) >60 mL/min/1.73m2 Final     Calcium   Date Value Ref Range Status   2022 9.4 8.5 - 10.1 mg/dL Final     CBC RESULTS:   Recent Labs   Lab Test 22  1129   WBC 13.0*   RBC 3.48*   HGB 10.0*   HCT 31.2*   MCV 90   MCH 28.7   MCHC 32.1   RDW 14.3          Pertinent Medications/vitamin and mineral supplements:      Current Outpatient Medications   Medication     acetaminophen (TYLENOL) 325 MG tablet     coenzyme Q-10 10 MG CAPS     dicyclomine (BENTYL) 20 MG tablet     escitalopram (LEXAPRO) 20 MG tablet     furosemide (LASIX) 40 MG tablet     lovastatin (MEVACOR) 40 MG tablet     melatonin 5 MG CAPS     omeprazole (PRILOSEC) 20 MG DR capsule     warfarin ANTICOAGULANT (COUMADIN) 1 MG tablet     warfarin ANTICOAGULANT (COUMADIN) 2 MG tablet     No current facility-administered medications for this visit.       Food Allergies:  NKFA     Estimated Nutrition Needs based on ideal body weight of 78 k-2340 calories (25-30 kcals/kg), 78-94g protein (1-1.2g/kg). fluids per MD. Currently 1800 ml fluid  restriction.    MALNUTRITION:  % Weight Loss:  No significant weight loss  % Intake:  Decreased intake does not meet criteria for malnutrition -reported eating some smaller portions but did not report significantly less intake  Subcutaneous Fat Loss:  None observed  Muscle Loss:  None observed  Fluid Retention:  None noted    Malnutrition Diagnosis: Patient does not meet two of the above criteria necessary for diagnosing malnutrition  In Context of:  Acute illness or injury  Chronic illness or disease    Nutrition Prescription: low sodium diet and 1800 ml fluid restriction    Nutrition Intervention      Provided low sodium diet education and fluids restriction tips today.    Patient verbalized understanding of education provided. See Goals below.     Educational Materials Provided:  NCM: low sodium nutrition therapy    Goals:    1. Eat a low sodium diet that is 2000 mg or less of sodium per day.    Some lower sodium foods include:    --Fresh foods that you prepare at home including fresh meats (chicken, lean beef and pork and fish, shrimp, eggs, egg whites, tofu, dried and prepared beans (not canned) such as black beans, kidney beans, lentils, chickpeas, unsalted nuts, fresh or frozen vegetables (without added sauces), fresh fruit, rice, potatoes, sweet potatoes, quinoa, pasta, oats. Bread (some breads can be higher in sodium so double check labels)    --Can use dried or fresh herbs and spices without salt (such as Mrs[employer] Dash spices) to help flavor foods. Also vinegar, lemon juice can help flavor food without salt as well.     --Bottled salad dressings tend to be higher in sodium. Can make your own or use oil and vinegar or one lower sodium bottled option is [employer] dressings.    --Homemade soups are fine. If using a broth to make the soup, then choose a low sodium broth.    *note look for low sodium when possible versus reduced sodium. If a product says low sodium on the label, then 1 serving of that food has  to be 140 mg or less of sodium. Reduced sodium only has to be 25% less sodium than the original product per serving, so may still be high in sodium.    Higher sodium foods and ingredients to avoid/limit:    --Avoid/limit using soy sauce, oyster sauce, barbeque sauce, steak sauce, teriyaki sauce, hot sauces, bottled salad dressings, gravies and meat tenderizer as these are higher in sodium.   --Avoid using salt/salt shaker at the table on foods. Limit the amount of salt that you cook with.  --Avoid convenience foods, frozen meals, packaged/boxed/canned foods and canned soups as these are higher in sodium.   --Avoid canned meats, deli meats and smoked meats as these are higher in sodium.  --Avoid chicken broth or beef broth or bouillon unless you use a low sodium broth option.  --Avoid eating salty snacks such as salted nuts and chips. Choose unsalted nuts, unsalted crackers, low sodium snack options.    2. Can keep daily food and beverage journals to help you track how much sodium you are consuming in a day and also how much fluid in a day as well. A couple of phone apps to help with tracking include My Fitness Pal or Lose It (My Fitness Pal may also be available on your computer as well).    3. Continue to follow your doctor's 1800 ml per day fluid restriction recommendation.    4. If you feel like you are getting full very quickly or unable to eat much as meals, recommend eating multiple smaller meals spread out the day such as 4-6 smaller meals per day which may be better tolerated than fewer larger meals.    Nutrition Monitoring and Evaluation: Will monitor adherence to nutrition recommendations at future RD visits.     Further Medical Nutrition Therapy:  Follow-up as needed.    Patient was encouraged to call/contact RD with any further questions.    Abril Boyd, MS, RD, LD

## 2022-08-23 NOTE — LETTER
"    8/23/2022         RE: Kody Reynaga  [address]  [place] [address]        Dear Colleague,    Thank you for referring your patient, Kody Reynaga, to the [facility] [place]. Please see a copy of my visit note below.      [facility] Outpatient Medical Nutrition Therapy        Time Spent:  32 minutes  Session Type:  Initial   Referring Physician:  Babak Garvin MD  Reason for RD Visit:   Portal vein thrombosis, ascites    Nutrition Assessment:  Patient is a 77 year old male with history that includes pancreatic mass, subdural hematoma, s/p craniotomy, dilated intrahepatic bile duct, portal vein thrombosis, ascites getting paracentesis and on a 2 g sodium restricted diet with 1800 ml fluid restriction.    His wife Asia was at his appointment today. She is somewhat familiar with low sodium diet because her father had to follow one. Overall feels comfortable with eating but reported decreased appetite and eating smaller portions. He stated that it is difficult to figure out if he had weight loss due to ascites but this morning his weight was 190 lbs and he will have paracentesis tomorrow. His wife stated that his highest weight was 205 lbs with fluids.     Patient Active Problem List   Diagnosis     Pancreatic mass     Depression     Essential hypertension, benign     Insomnia     Major depressive disorder, recurrent episode, in full remission (H)     Obstructive jaundice     Dyslipidemia (high LDL; low HDL)     Prostate cancer (H)     SDH (subdural hematoma) (H)     S/P craniotomy     Expressive aphasia     Portal vein thrombosis     Other ascites     Dilated intrahepatic bile duct     Height:   Ht Readings from Last 1 Encounters:   08/15/22 1.803 m (5' 11\")     Weight: today was first day lost some weight. Will have paracentesis tomorrow. Weight had gotten up 205 lbs with fluids. Today's weight was 190 lbs  Wt Readings from Last 10 Encounters:   08/15/22 " "91.6 kg (202 lb)   08/12/22 89.9 kg (198 lb 3.2 oz)   05/16/22 89.2 kg (196 lb 9.6 oz)   05/10/22 90.6 kg (199 lb 11.8 oz)   05/03/22 92.1 kg (203 lb)   03/04/22 88.1 kg (194 lb 4.8 oz)   02/24/22 87.1 kg (192 lb 0.3 oz)   01/24/22 87.3 kg (192 lb 7.4 oz)   01/21/22 87.1 kg (192 lb 1.6 oz)   11/17/21 85 kg (187 lb 8 oz)        BMI: Estimated body mass index is 28.17 kg/m  as calculated from the following:    Height as of 8/15/22: 1.803 m (5' 11\").    Weight as of 8/15/22: 91.6 kg (202 lb).    Diet Recall:  (some usual/recent meals/snacks/beverages):  Meal Food    Breakfast Later/brunch gets up later: Raisin bran with fruit with milk and oj   Lunch Has a later breakfast d/t gets up later, so has a couple snacks between L and D   Dinner Last night beef stroganoff (noodles, beef) or spaghetti or steak or chicken casserole with sauerkraut and cranberry sauce and lettuce salad with fruit on it   Snacks Donut between L and D and cheese and crackers or nuts. Sometimes ice cream in evening   Beverages Overall ~50 oz water per day d/t fluid restriction ~2 cups OJ, some days black Coffee,  2-3 cups Water   Alcohol Intake Glass of wine with dinner, sometimes non alcoholic beer     Frequency of eating/taking out meals: not much especially lately. Previously 1x/week but now since the pandemic over past couple of year only 1-2 times per month (usually at their country club when they were there or near home italian foods. Will go to punch pizza with dtr.     Labs:    Last Comprehensive Metabolic Panel:  Sodium   Date Value Ref Range Status   08/22/2022 130 (L) 133 - 144 mmol/L Final     Potassium   Date Value Ref Range Status   08/22/2022 5.2 3.4 - 5.3 mmol/L Final     Chloride   Date Value Ref Range Status   08/22/2022 96 94 - 109 mmol/L Final     Carbon Dioxide (CO2)   Date Value Ref Range Status   08/22/2022 26 20 - 32 mmol/L Final     Anion Gap   Date Value Ref Range Status   08/22/2022 8 3 - 14 mmol/L Final     Glucose   Date " Value Ref Range Status   2022 207 (H) 70 - 99 mg/dL Final     Urea Nitrogen   Date Value Ref Range Status   2022 61 (H) 7 - 30 mg/dL Final     Creatinine   Date Value Ref Range Status   2022 2.59 (H) 0.66 - 1.25 mg/dL Final     GFR Estimate   Date Value Ref Range Status   2022 25 (L) >60 mL/min/1.73m2 Final     Comment:     Effective 2021 eGFRcr in adults is calculated using the  CKD-EPI creatinine equation which includes age and gender (Vikram et al., NEJ, DOI: 10.1056/UQFPry7521701)     GFR, ESTIMATED POCT   Date Value Ref Range Status   2022 41 (L) >60 mL/min/1.73m2 Final     Calcium   Date Value Ref Range Status   2022 9.4 8.5 - 10.1 mg/dL Final     CBC RESULTS:   Recent Labs   Lab Test 22  1129   WBC 13.0*   RBC 3.48*   HGB 10.0*   HCT 31.2*   MCV 90   MCH 28.7   MCHC 32.1   RDW 14.3          Pertinent Medications/vitamin and mineral supplements:      Current Outpatient Medications   Medication     acetaminophen (TYLENOL) 325 MG tablet     coenzyme Q-10 10 MG CAPS     dicyclomine (BENTYL) 20 MG tablet     escitalopram (LEXAPRO) 20 MG tablet     furosemide (LASIX) 40 MG tablet     lovastatin (MEVACOR) 40 MG tablet     melatonin 5 MG CAPS     omeprazole (PRILOSEC) 20 MG DR capsule     warfarin ANTICOAGULANT (COUMADIN) 1 MG tablet     warfarin ANTICOAGULANT (COUMADIN) 2 MG tablet     No current facility-administered medications for this visit.       Food Allergies:  NKFA     Estimated Nutrition Needs based on ideal body weight of 78 k-2340 calories (25-30 kcals/kg), 78-94g protein (1-1.2g/kg). fluids per MD. Currently 1800 ml fluid restriction.    MALNUTRITION:  % Weight Loss:  No significant weight loss  % Intake:  Decreased intake does not meet criteria for malnutrition -reported eating some smaller portions but did not report significantly less intake  Subcutaneous Fat Loss:  None observed  Muscle Loss:  None observed  Fluid Retention:   None noted    Malnutrition Diagnosis: Patient does not meet two of the above criteria necessary for diagnosing malnutrition  In Context of:  Acute illness or injury  Chronic illness or disease    Nutrition Prescription: low sodium diet and 1800 ml fluid restriction    Nutrition Intervention      Provided low sodium diet education and fluids restriction tips today.    Patient verbalized understanding of education provided. See Goals below.     Educational Materials Provided:  NCM: low sodium nutrition therapy    Goals:    1. Eat a low sodium diet that is 2000 mg or less of sodium per day.    Some lower sodium foods include:    --Fresh foods that you prepare at home including fresh meats (chicken, lean beef and pork and fish, shrimp, eggs, egg whites, tofu, dried and prepared beans (not canned) such as black beans, kidney beans, lentils, chickpeas, unsalted nuts, fresh or frozen vegetables (without added sauces), fresh fruit, rice, potatoes, sweet potatoes, quinoa, pasta, oats. Bread (some breads can be higher in sodium so double check labels)    --Can use dried or fresh herbs and spices without salt (such as Mrs. Dash spices) to help flavor foods. Also vinegar, lemon juice can help flavor food without salt as well.     --Bottled salad dressings tend to be higher in sodium. Can make your own or use oil and vinegar or one lower sodium bottled option is [employer] dressings.    --Homemade soups are fine. If using a broth to make the soup, then choose a low sodium broth.    *note look for low sodium when possible versus reduced sodium. If a product says low sodium on the label, then 1 serving of that food has to be 140 mg or less of sodium. Reduced sodium only has to be 25% less sodium than the original product per serving, so may still be high in sodium.    Higher sodium foods and ingredients to avoid/limit:    --Avoid/limit using soy sauce, oyster sauce, barbeque sauce, steak sauce, teriyaki sauce, hot sauces,  bottled salad dressings, gravies and meat tenderizer as these are higher in sodium.   --Avoid using salt/salt shaker at the table on foods. Limit the amount of salt that you cook with.  --Avoid convenience foods, frozen meals, packaged/boxed/canned foods and canned soups as these are higher in sodium.   --Avoid canned meats, deli meats and smoked meats as these are higher in sodium.  --Avoid chicken broth or beef broth or bouillon unless you use a low sodium broth option.  --Avoid eating salty snacks such as salted nuts and chips. Choose unsalted nuts, unsalted crackers, low sodium snack options.    2. Can keep daily food and beverage journals to help you track how much sodium you are consuming in a day and also how much fluid in a day as well. A couple of phone apps to help with tracking include My Fitness Pal or Lose It (My Fitness Pal may also be available on your computer as well).    3. Continue to follow your doctor's 1800 ml per day fluid restriction recommendation.    4. If you feel like you are getting full very quickly or unable to eat much as meals, recommend eating multiple smaller meals spread out the day such as 4-6 smaller meals per day which may be better tolerated than fewer larger meals.    Nutrition Monitoring and Evaluation: Will monitor adherence to nutrition recommendations at future RD visits.     Further Medical Nutrition Therapy:  Follow-up as needed.    Patient was encouraged to call/contact RD with any further questions.        Abril Boyd, MS, RD, LD

## 2022-08-23 NOTE — PATIENT INSTRUCTIONS
It was nice meeting you and Asia today. Below are the nutrition recommendations we discussed at your visit.    Please let me know if you have any additional questions.    Nutrition Recommendations    Eat a low sodium diet that is 2000 mg or less of sodium per day.    Some lower sodium foods include:    --Fresh foods that you prepare at home including fresh meats (chicken, lean beef and pork and fish, shrimp, eggs, egg whites, tofu, dried and prepared beans (not canned) such as black beans, kidney beans, lentils, chickpeas, unsalted nuts, fresh or frozen vegetables (without added sauces), fresh fruit, rice, potatoes, sweet potatoes, quinoa, pasta, oats. Bread (some breads can be higher in sodium so double check labels)    --Can use dried or fresh herbs and spices without salt (such as Mrs[employer] Dash spices) to help flavor foods. Also vinegar, lemon juice can help flavor food without salt as well.     --Bottled salad dressings tend to be higher in sodium. Can make your own or use oil and vinegar or one lower sodium bottled option is [employer] dressings.    --Homemade soups are fine. If using a broth to make the soup, then choose a low sodium broth.    *note look for low sodium when possible versus reduced sodium. If a product says low sodium on the label, then 1 serving of that food has to be 140 mg or less of sodium. Reduced sodium only has to be 25% less sodium than the original product per serving, so may still be high in sodium.    Higher sodium foods and ingredients to avoid/limit:    --Avoid/limit using soy sauce, oyster sauce, barbeque sauce, steak sauce, teriyaki sauce, hot sauces, bottled salad dressings, gravies and meat tenderizer as these are higher in sodium.   --Avoid using salt/salt shaker at the table on foods. Limit the amount of salt that you cook with.  --Avoid convenience foods, frozen meals, packaged/boxed/canned foods and canned soups as these are higher in sodium.   --Avoid canned meats, deli  meats and smoked meats as these are higher in sodium.  --Avoid chicken broth or beef broth or bouillon unless you use a low sodium broth option.  --Avoid eating salty snacks such as salted nuts and chips. Choose unsalted nuts, unsalted crackers, low sodium snack options.    2. Can keep daily food and beverage journals to help you track how much sodium you are consuming in a day and also how much fluid in a day as well. A couple of phone apps to help with tracking include My Fitness Pal or Lose It (My Fitness Pal may also be available on your computer as well).    3. Continue to follow your doctor's 1800 ml per day fluid restriction recommendation.    4. If you feel like you are getting full very quickly or unable to eat much as meals, recommend eating multiple smaller meals spread out the day such as 4-6 smaller meals per day which may be better tolerated than fewer larger meals.    Can follow up as needed.    If you would like to schedule, please call [phone].    Thank you,    Abirl Boyd, MS, RD, LD

## 2022-08-24 PROBLEM — N18.31 CHRONIC KIDNEY DISEASE, STAGE 3A (H): Status: ACTIVE | Noted: 2022-01-01

## 2022-08-24 NOTE — PROGRESS NOTES
Kody is a 77 year old who is being evaluated via a billable video visit.      How would you like to obtain your AVS? MyChart  If the video visit is dropped, the invitation should be resent by: Send to e-mail at: [email or web address]  Will anyone else be joining your video visit? No          Assessment & Plan     Other ascites  From portal vein thrombosis  He is going to see GI and getting some paracentesis set up  We will decrease the Lasix to 40 mg daily since his creatinine is going up  Cannot add spironolactone because of the renal function    Chronic kidney disease, stage 3a (H)  Creatinine has been slowly rising for the last several months and it is currently 2.69  GFR is 29  This is concerning  There is a multifactorial reason for this including overdiuresis and I am also worried about hepatorenal syndrome  He is currently on 40 mg of Lasix twice a day and 1800 cc of fluid restriction  I will cut down to 40 mg daily of Lasix and check BMP on Monday  - Basic metabolic panel  (Ca, Cl, CO2, Creat, Gluc, K, Na, BUN); Future    Elevated serum creatinine  As above  If it still goes up we will consider renal referral  - Basic metabolic panel  (Ca, Cl, CO2, Creat, Gluc, K, Na, BUN); Future    Open wound of scalp, unspecified open wound type, subsequent encounter  Referral be placed for wound care as the wound is not  healing well from secondary intention    Portal vein thrombosis  Remains on Eliquis currently the dose is fine but if his creatinine is creeping up more then we will have think about decreasing the dose if he goes into stage IV CKD    Dyslipidemia (high LDL; low HDL)  Decrease the dose of lovastatin 20 mg because of his renal function  - lovastatin (MEVACOR) 20 MG tablet; Take 2 tablets (40 mg) by mouth every morning    Essential hypertension, benign  Blood pressure is stable  - amLODIPine (NORVASC) 10 MG tablet; Take 1 tablet (10 mg) by mouth daily      30 minutes spent on the date of the encounter  "doing chart review, history and exam, documentation and further activities per the note       BMI:   Estimated body mass index is 28.17 kg/m  as calculated from the following:    Height as of 8/15/22: 1.803 m (5' 11\").    Weight as of 8/15/22: 91.6 kg (202 lb).           No follow-ups on file.    Jerel Curran MD  [facility]    Corin Gates is a 77 year old, presenting for the following health issues:  Recheck Medication      HPI     -pt is doing follow up today of ascites. He is taking 40 mg of lasix now. Swelling in abdomen has not gone down but has appt today for fluid drainage. Pt's wife Asia states that they have an appt every week for this.            Review of Systems   Constitutional, HEENT, cardiovascular, pulmonary, gi and gu systems are negative, except as otherwise noted.      Objective           Vitals:  No vitals were obtained today due to virtual visit.    Physical Exam   GENERAL: Healthy, alert and no distress  EYES: Eyes grossly normal to inspection.  No discharge or erythema, or obvious scleral/conjunctival abnormalities.  HENT: WOUND ON SCALP   RESP: No audible wheeze, cough, or visible cyanosis.  No visible retractions or increased work of breathing.    SKIN: Visible skin clear. No significant rash, abnormal pigmentation or lesions.  NEURO: Cranial nerves grossly intact.  Mentation and speech appropriate for age.  PSYCH: Mentation appears normal, affect normal/bright, judgement and insight intact, normal speech and appearance well-groomed.                Video-Visit Details    Video Start Time: 1120 AM    Type of service:  Video Visit    Video End Time:1140 AM    Originating Location (pt. Location): Home    Distant Location (provider location):  [facility]     Platform used for Video Visit: [place]    .  ..  "

## 2022-08-24 NOTE — PROGRESS NOTES
Paracentesis Nursing Note  Kody Reynaga presents today to [facility] for [facility].    During today's appointment orders from Dr. Babak Garvin were completed.    Progress Note:  Patient identification verified by name and date of birth.  Assessment completed.  Vitals monitored throughout appointment and recorded in Doc Flowsheets.  See proceduralist note in ultrasound.    Vascular Access: N/A  Labs: were not ordered for this appointment.    Date of consent or authorization: 8/24/22.  Invasive Procedure Safety Checklist was completed and sent for scanning.     Paracentesis performed by Dr. Tan.    The following labs were communicated to provider performing paracentesis:  Lab Results   Component Value Date     08/22/2022       Total amount of ascites fluid drained: 4 liters.  Color of ascites fluid: yellow.  Total amount of albumin given: 0 grams.    Patient tolerated procedure well.    Post procedure,denies pain or discomfort post paracentesis.      Discharge Plan:  Discharge instructions were reviewed with patient.  Patient/Representative verbalized understanding and all questions were answered.   Discharged from [facility] in stable condition.    Kari Noe RN       Administrations This Visit     lidocaine (PF) (XYLOCAINE) 1 % injection 20 mL     Admin Date  08/24/2022 Action  Given by Other Clinician Dose  10 mL Route  Subcutaneous Administered By  Kari Noe, RN                /74 (Patient Position: Standing)   Pulse 107   Temp 98  F (36.7  C) (Oral)   Wt 89 kg (196 lb 1.6 oz)   SpO2 95%   BMI 27.35 kg/m

## 2022-08-24 NOTE — PATIENT INSTRUCTIONS
Dear Kody Reynaga    Thank you for choosing [facility] ([facility]) for your paracentesis.  The following information is a summary of our appointment as well as important reminders.      We look forward in seeing you on your next appointment here at [facility] ([facility]).  Please don t hesitate to call us at [phone] to reschedule any of your appointments or to speak with one of the [facility] registered nurses.  It was a pleasure taking care of you today.    Sincerely,    [facility]  [facility]  [address] [place] [address]  [id number]  Phone:  [phone]

## 2022-08-24 NOTE — LETTER
8/24/2022         RE: Kody Reynaga  [address]  [place] [address]        Dear Colleague,    Thank you for referring your patient, Kody Reynaga, to the [facility]. Please see a copy of my visit note below.    Paracentesis Nursing Note  Kody Reynaga presents today to [facility] for a [facility]acentesis.    During today's appointment orders from Dr. Babak Garvin were completed.    Progress Note:  Patient identification verified by name and date of birth.  Assessment completed.  Vitals monitored throughout appointment and recorded in Doc Flowsheets.  See proceduralist note in ultrasound.    Vascular Access: N/A  Labs: were not ordered for this appointment.    Date of consent or authorization: 8/24/22.  Invasive Procedure Safety Checklist was completed and sent for scanning.     Paracentesis performed by Dr. Tan.    The following labs were communicated to provider performing paracentesis:  Lab Results   Component Value Date     08/22/2022       Total amount of ascites fluid drained: 4 liters.  Color of ascites fluid: yellow.  Total amount of albumin given: 0 grams.    Patient tolerated procedure well.    Post procedure,denies pain or discomfort post paracentesis.      Discharge Plan:  Discharge instructions were reviewed with patient.  Patient/Representative verbalized understanding and all questions were answered.   Discharged from [facility] in stable condition.    Kari Noe RN    Administrations This Visit     lidocaine (PF) (XYLOCAINE) 1 % injection 20 mL     Admin Date  08/24/2022 Action  Given by Other Clinician Dose  10 mL Route  Subcutaneous Administered By  Kari Noe, RN                /74 (Patient Position: Standing)   Pulse 107   Temp 98  F (36.7  C) (Oral)   Wt 89 kg (196 lb 1.6 oz)   SpO2 95%   BMI 27.35 kg/m          Again, thank you for allowing me to  participate in the care of your patient.      Sincerely,    [facility] Provider

## 2022-08-25 NOTE — TELEPHONE ENCOUNTER
Prakash Peralta MD  P [facility] [place]  Naeem Gates,     Here is your 6-month PSA which is stable.  Repeat PSA in 6 months       Thanks,   Prakash LEVIN. MD [place]     Received the 8/15/22 PSA results and the above note from Dr. Peralta. Called and spoke to patient and spouse. Informed patient of the above results. Future PSA lab ordered per Dr. Peralta. Lab only appointment scheduled for 2/16/23 at 1:30pm.     Martha Veronica RN, BSN

## 2022-08-25 NOTE — TELEPHONE ENCOUNTER
Does pt need additional labs done other than BMP that is ordered?        Routing to provider.      Pam Fraser RN  [facility]

## 2022-08-26 NOTE — TELEPHONE ENCOUNTER
Consult received via work queue from Jerel Curran MD for wound of the scalp    Please schedule with Dr. Alford, Dr. Valentin, or Lizzie Amador PA-C at [facility] for next available appointment.    Please call his wife, Asia at [phone], she does the scheduling for him.    Patient able to ambulate.

## 2022-08-29 PROBLEM — N17.9 ACUTE RENAL FAILURE, UNSPECIFIED ACUTE RENAL FAILURE TYPE (H): Status: ACTIVE | Noted: 2022-01-01

## 2022-08-29 PROBLEM — E87.5 HYPERKALEMIA: Status: ACTIVE | Noted: 2022-01-01

## 2022-08-29 NOTE — ED PROVIDER NOTES
"    [place] EMERGENCY DEPARTMENT ([facility])  8/29/22 ED 26 2:11 PM   History     Chief Complaint   Patient presents with     Abnormal Labs     The history is provided by medical records, the patient and the spouse.     Kody Reynaga is a 77 year old male with history of portal vein thrombosis, ascites, prostate cancer status post prostatectomy who presents with abdominal pain, distension and ascites reaccumulation in his abdomen. He states his peeing is \"off,\" has decreased urination. Today he had blood work done showing kidney function and elevated potassium and was told to come here for further evaluation. No urinary catheter.   Endorses worsening lower extremity edema. No hematuria. Wife states they were told he needs extra potassium and has been eating more bananas and orange juice to compensate for this.    Wt Readings from Last 10 Encounters:   08/29/22 87.3 kg (192 lb 8 oz)   08/24/22 85 kg (187 lb 4.8 oz)   08/15/22 91.6 kg (202 lb)   08/12/22 89.9 kg (198 lb 3.2 oz)   05/16/22 89.2 kg (196 lb 9.6 oz)   05/10/22 90.6 kg (199 lb 11.8 oz)   05/03/22 92.1 kg (203 lb)   03/04/22 88.1 kg (194 lb 4.8 oz)   02/24/22 87.1 kg (192 lb 0.3 oz)   01/24/22 87.3 kg (192 lb 7.4 oz)     Past Medical History  Past Medical History:   Diagnosis Date     Anxiety      Depression      Gastroesophageal reflux disease      Gout      Hypercholesterolemia      Hypertension      IBS (irritable bowel syndrome)      Pancreatic disease      Prostate cancer (H)      Past Surgical History:   Procedure Laterality Date     CATARACT EXTRACTION W/ INTRAOCULAR LENS IMPLANT Right      COLONOSCOPY       cryoablation of prostate       ENDOSCOPIC RETROGRADE CHOLANGIOPANCREATOGRAM       ENDOSCOPIC RETROGRADE CHOLANGIOPANCREATOGRAM N/A 1/24/2022    Procedure: ENDOSCOPIC RETROGRADE CHOLANGIOPANCREATOGRAPHY, BILIARY STENT EXCHANGE, sPY WITH BIOPSIES AND BRUSHINGS;  Surgeon: Guru Ab Gonzalez MD;  Location:  OR     " ENDOSCOPIC RETROGRADE CHOLANGIOPANCREATOGRAM N/A 2/24/2022    Procedure: ENDOSCOPIC RETROGRADE CHOLANGIOPANCREATOGRAPHY, with stent placement and bile duct biopsy;  Surgeon: Anthony Abarca MD;  Location: [facility] OR     ENDOSCOPIC RETROGRADE CHOLANGIOPANCREATOGRAM N/A 5/10/2022    Procedure: ENDOSCOPIC RETROGRADE CHOLANGIOPANCREATOGRAPHY, pyloric dilation, bebris removal, biliary stent exchange;  Surgeon: Anthony Abarca MD;  Location: [facility] OR     ENDOSCOPIC RETROGRADE CHOLANGIOPANCREATOGRAM WITH SPYGLASS N/A 10/14/2021    Procedure: ENDOSCOPIC RETROGRADE CHOLANGIOPANCREATOGRAPHY, WITH DIRECT DUCT VISUALIZATION, USING PANCREATICOBILIARY FIBEROPTIC PROBE-spyglass, biliary sludge and clot removal, biliary biopsies, biliary stent exchange;  Surgeon: Anthony Abarca MD;  Location: [facility] OR     ENDOSCOPIC ULTRASOUND UPPER GASTROINTESTINAL TRACT (GI) N/A 9/28/2021    Procedure: ENDOSCOPIC ULTRASOUND, ESOPHAGOSCOPY / UPPER GASTROINTESTINAL TRACT (GI);  Surgeon: Babak Garvin MD;  Location: [facility] GI     ENDOSCOPIC ULTRASOUND UPPER GASTROINTESTINAL TRACT (GI) N/A 1/24/2022    Procedure: ENDOSCOPIC ULTRASOUND, ESOPHAGOSCOPY / UPPER GASTROINTESTINAL TRACT (GI);  Surgeon: Guru Ab Gonzalez MD;  Location: [facility] OR     ESOPHAGOSCOPY, GASTROSCOPY, DUODENOSCOPY (EGD), COMBINED N/A 10/14/2021    Procedure: ESOPHAGOGASTRODUODENOSCOPY, WITH FINE NEEDLE ASPIRATION BIOPSY, WITH ENDOSCOPIC ULTRASOUND GUIDANCE, biliary stent removal;  Surgeon: Babak Garvin MD;  Location: [facility] OR     ESOPHAGOSCOPY, GASTROSCOPY, DUODENOSCOPY (EGD), COMBINED N/A 2/24/2022    Procedure: ESOPHAGOGASTRODUODENOSCOPY WITH DILATION AND STENT REMOVAL, ENDOSCOPIC ULTRASOUND WITH FINE NEEDLE BIOPSIES;  Surgeon: Babak Garvin MD;  Location: [facility] OR     MOUTH SURGERY       TONSILLECTOMY       acetaminophen (TYLENOL) 325 MG tablet  amLODIPine (NORVASC) 10 MG tablet  Apixaban (ELIQUIS PO)  dicyclomine (BENTYL) 20 MG  tablet  escitalopram (LEXAPRO) 20 MG tablet  furosemide (LASIX) 40 MG tablet  lovastatin (MEVACOR) 20 MG tablet  melatonin 5 MG CAPS  omeprazole (PRILOSEC) 20 MG DR capsule  coenzyme Q-10 10 MG CAPS      No Known Allergies  Family History  Family History   Problem Relation Age of Onset     Heart Failure Mother      Cancer Father      Anesthesia Reaction No family hx of      Cardiovascular No family hx of      Deep Vein Thrombosis (DVT) No family hx of      Social History   Social History     Tobacco Use     Smoking status: Former Smoker     Types: Cigarettes     Quit date: 1970     Years since quittin.6     Smokeless tobacco: Never Used   Vaping Use     Vaping Use: Never used   Substance Use Topics     Alcohol use: Yes     Comment: cutting down, drinking NA beer     Drug use: Never      Past medical history, past surgical history, medications, allergies, family history, and social history were reviewed with the patient. No additional pertinent items.       Review of Systems   Cardiovascular: Positive for leg swelling.   Gastrointestinal: Positive for abdominal distention and abdominal pain.   Genitourinary: Positive for decreased urine volume.   All other systems reviewed and are negative.    Physical Exam   BP: (!) 140/75  Pulse: 90  Temp: 98.1  F (36.7  C)  Resp: 24  SpO2: 100 %  Physical Exam  Vitals and nursing note reviewed.   Constitutional:       General: He is not in acute distress.     Appearance: He is well-developed. He is not ill-appearing, toxic-appearing or diaphoretic.      Comments: Patient is awake and alert, he is mentating normally and protecting his airway without difficulty.   HENT:      Head: Normocephalic and atraumatic.      Mouth/Throat:      Lips: Pink.      Mouth: Mucous membranes are moist.      Pharynx: Oropharynx is clear. No oropharyngeal exudate.   Eyes:      General: Lids are normal. No scleral icterus.     Extraocular Movements: Extraocular movements intact.      Right eye: No  nystagmus.      Left eye: No nystagmus.      Conjunctiva/sclera: Conjunctivae normal.      Pupils: Pupils are equal, round, and reactive to light.   Neck:      Thyroid: No thyromegaly.      Vascular: No JVD.      Trachea: No tracheal deviation.   Cardiovascular:      Rate and Rhythm: Normal rate and regular rhythm.      Pulses: Normal pulses.      Heart sounds: Normal heart sounds. No murmur heard.    No friction rub. No gallop.   Pulmonary:      Effort: Pulmonary effort is normal. No respiratory distress.      Breath sounds: Normal breath sounds.   Abdominal:      General: Bowel sounds are normal. There is distension.      Palpations: Abdomen is soft. There is fluid wave. There is no mass.      Tenderness: There is no abdominal tenderness. There is no right CVA tenderness, left CVA tenderness, guarding or rebound.   Musculoskeletal:         General: No tenderness. Normal range of motion.      Cervical back: Normal range of motion and neck supple. No erythema or rigidity.      Right lower leg: Edema present.      Left lower leg: Edema present.   Lymphadenopathy:      Cervical: No cervical adenopathy.   Skin:     General: Skin is warm and dry.      Capillary Refill: Capillary refill takes less than 2 seconds.      Coloration: Skin is not pale.      Findings: No erythema or rash.   Neurological:      Mental Status: He is alert and oriented to person, place, and time.      Cranial Nerves: No cranial nerve deficit.      Sensory: No sensory deficit.      Motor: Motor function is intact.   Psychiatric:         Mood and Affect: Mood and affect normal.         Speech: Speech normal.         Behavior: Behavior normal.         ED Course     ED Course as of 08/30/22 0933   Mon Aug 29, 2022   1417 Patient evaluated in ED 26 by Dr. Farley.     Procedures            EKG Interpretation:      Interpreted by Elvis Farley MD    Symptoms at time of EKG: Hyperkalemia  Rhythm: normal sinus   Rate: 86  Ectopy:  none  Conduction: Low voltage QRS  ST Segments/ T Waves: No acute ischemic changes  Q Waves: nonspecific  Comparison to prior: QRS voltage appears lower than previous EKG    Clinical Impression: Decreased QRS voltage compared to previous EKG, no findings consistent with hyperkalemic effects              Critical Care Addendum    My initial assessment, based on my review of prehospital provider report, review of nursing observations, review of vital signs, focused history and physical exam, established that Kody Reynaga has acute renal failure with hyperkalemia, which requires immediate intervention, and therefore he is critically ill.     After the initial assessment, the care team initiated multiple lab tests, initiated medication therapy with Calcium, sodium bicarbonate, insulin and glucose, Lasix, Lokelma and consulted with Nephrology to provide stabilization care. Due to the critical nature of this patient, I reassessed nursing observations, vital signs, physical exam and review of cardiac rhythm monitor multiple times prior to his disposition.     Time also spent performing documentation, reviewing test results, discussion with consultants and coordination of care.     Critical care time (excluding teaching time and procedures): 30 minutes.        Labs Ordered and Resulted from Time of ED Arrival to Time of ED Departure   INR - Abnormal       Result Value    INR 1.92 (*)    COMPREHENSIVE METABOLIC PANEL - Abnormal    Sodium 127 (*)     Potassium 6.7 (*)     Creatinine 4.28 (*)     Urea Nitrogen 83.4 (*)     Chloride 89 (*)     Carbon Dioxide (CO2) 18 (*)     Anion Gap 20 (*)     Glucose 137 (*)     Calcium 9.8      Protein Total 7.1      Albumin 3.8      Bilirubin Total 0.5      Alkaline Phosphatase 103      AST 27      ALT 13      GFR Estimate 14 (*)    MAGNESIUM - Abnormal    Magnesium 2.4 (*)    PHOSPHORUS - Abnormal    Phosphorus 6.3 (*)    CBC WITH PLATELETS AND DIFFERENTIAL - Abnormal    WBC Count 17.0  (*)     RBC Count 4.02 (*)     Hemoglobin 11.5 (*)     Hematocrit 35.8 (*)     MCV 89      MCH 28.6      MCHC 32.1      RDW 15.3 (*)     Platelet Count 323      % Neutrophils 85      % Lymphocytes 5      % Monocytes 9      % Eosinophils 0      % Basophils 0      % Immature Granulocytes 1      NRBCs per 100 WBC 0      Absolute Neutrophils 14.4 (*)     Absolute Lymphocytes 0.9      Absolute Monocytes 1.5 (*)     Absolute Eosinophils 0.1      Absolute Basophils 0.0      Absolute Immature Granulocytes 0.2      Absolute NRBCs 0.0     POTASSIUM - Abnormal    Potassium 6.9 (*)    POTASSIUM - Abnormal    Potassium 6.0 (*)    GLUCOSE BY METER - Abnormal    GLUCOSE BY METER POCT 129 (*)    GLUCOSE BY METER - Abnormal    GLUCOSE BY METER POCT 123 (*)    HEPATIC FUNCTION PANEL - Abnormal    Protein Total 6.6      Albumin 3.4 (*)     Bilirubin Total 0.4      Alkaline Phosphatase 95      AST        ALT 14      Bilirubin Direct <0.20     ALBUMIN LEVEL - Abnormal    Albumin 3.4 (*)    UREA NITROGEN RANDOM URINE - Abnormal    Urea Nitrogen Urine mg/dL 339.0 (*)    PROTEIN RANDOM URINE - Abnormal    Total Protein Urine mg/dL 62.1      Total Protein UR MG/MG CR 0.94 (*)     Creatinine Urine mg/dL 66.2     GLUCOSE BY METER - Abnormal    GLUCOSE BY METER POCT 144 (*)    POTASSIUM - Abnormal    Potassium 6.2 (*)    GLUCOSE BY METER - Abnormal    GLUCOSE BY METER POCT 166 (*)    POTASSIUM - Abnormal    Potassium 5.8 (*)    GLUCOSE BY METER - Abnormal    GLUCOSE BY METER POCT 245 (*)    GLUCOSE BY METER - Abnormal    GLUCOSE BY METER POCT 176 (*)    GLUCOSE BY METER - Abnormal    GLUCOSE BY METER POCT 170 (*)    GLUCOSE BY METER - Abnormal    GLUCOSE BY METER POCT 142 (*)    GLUCOSE BY METER - Abnormal    GLUCOSE BY METER POCT 146 (*)    GLUCOSE BY METER - Abnormal    GLUCOSE BY METER POCT 129 (*)    POTASSIUM - Abnormal    Potassium 5.7 (*)    GLUCOSE BY METER - Abnormal    GLUCOSE BY METER POCT 111 (*)    GLUCOSE BY METER - Abnormal     GLUCOSE BY METER POCT 106 (*)    CBC WITH PLATELETS AND DIFFERENTIAL - Abnormal    WBC Count 13.1 (*)     RBC Count 3.33 (*)     Hemoglobin 9.5 (*)     Hematocrit 29.3 (*)     MCV 88      MCH 28.5      MCHC 32.4      RDW 15.2 (*)     Platelet Count 175      % Neutrophils 82      % Lymphocytes 7      % Monocytes 9      % Eosinophils 1      % Basophils 0      % Immature Granulocytes 1      NRBCs per 100 WBC 0      Absolute Neutrophils 10.8 (*)     Absolute Lymphocytes 0.9      Absolute Monocytes 1.1      Absolute Eosinophils 0.1      Absolute Basophils 0.0      Absolute Immature Granulocytes 0.1      Absolute NRBCs 0.0     RBC AND PLATELET MORPHOLOGY - Abnormal    Platelet Assessment        Value: Automated Count Confirmed. Platelet morphology is normal.    Tonia Cells Slight (*)     Elliptocytes Moderate (*)     RBC Morphology Confirmed RBC Indices     COMPREHENSIVE METABOLIC PANEL - Abnormal    Sodium 127 (*)     Potassium 5.4 (*)     Creatinine 3.84 (*)     Urea Nitrogen 74.5 (*)     Chloride 89 (*)     Carbon Dioxide (CO2) 23      Anion Gap 15      Glucose 98      Calcium 9.8      Protein Total 6.6      Albumin 4.2      Bilirubin Total 0.7      Alkaline Phosphatase 75      AST 19      ALT <5 (*)     GFR Estimate 15 (*)    INR - Abnormal    INR 1.81 (*)    GLUCOSE BY METER - Abnormal    GLUCOSE BY METER POCT 110 (*)    COVID-19 VIRUS (CORONAVIRUS) BY PCR - Normal    SARS CoV2 PCR Negative     LACTIC ACID WHOLE BLOOD - Normal    Lactic Acid 1.9     GLUCOSE MONITOR NURSING POCT   GLUCOSE MONITOR NURSING POCT   GLUCOSE MONITOR NURSING POCT   GLUCOSE MONITOR NURSING POCT   GLUCOSE MONITOR NURSING POCT   GLUCOSE MONITOR NURSING POCT   BLOOD CULTURE   BLOOD CULTURE                Medications   glucose gel 15-30 g (has no administration in time range)     Or   dextrose 50 % injection 25-50 mL (has no administration in time range)     Or   glucagon injection 1 mg (has no administration in time range)   lidocaine 1 % 0.1-1  mL (has no administration in time range)   lidocaine (LMX4) cream (has no administration in time range)   sodium chloride (PF) 0.9% PF flush 3 mL (3 mLs Intracatheter Not Given 8/30/22 0300)   sodium chloride (PF) 0.9% PF flush 3 mL (has no administration in time range)   acetaminophen (TYLENOL) tablet 650 mg (650 mg Oral Given 8/30/22 0129)     Or   acetaminophen (TYLENOL) Suppository 650 mg ( Rectal See Alternative 8/30/22 0129)   polyethylene glycol (MIRALAX) Packet 17 g (has no administration in time range)   Patient is already receiving anticoagulation with heparin, enoxaparin (LOVENOX), warfarin (COUMADIN)  or other anticoagulant medication (has no administration in time range)   amLODIPine (NORVASC) tablet 10 mg (10 mg Oral Given 8/30/22 0929)   dicyclomine (BENTYL) tablet 20 mg ( Oral Automatically Held 9/4/22 2000)   escitalopram (LEXAPRO) tablet 20 mg (20 mg Oral Given 8/30/22 0929)   furosemide (LASIX) tablet 40 mg ( Oral Automatically Held 9/4/22 1600)   sodium zirconium cyclosilicate (LOKELMA) packet 15 g (15 g Oral Given 8/29/22 2030)   apixaban ANTICOAGULANT (ELIQUIS) tablet 2.5 mg ( Oral Automatically Held 9/4/22 2000)   glucose gel 15-30 g (has no administration in time range)     Or   dextrose 50 % injection 25-50 mL (has no administration in time range)     Or   glucagon injection 1 mg (has no administration in time range)   lidocaine (XYLOCAINE) 2 % external gel 1 Tube (1 Tube Topical Given 8/29/22 1423)   calcium gluconate 1 g in 50 mL sodium chloride intermittent infusion (premix) (1 g Intravenous Given 8/29/22 1559)   sodium bicarbonate 8.4 % injection 50 mEq (50 mEq Intravenous Given 8/29/22 1558)   dextrose 10% BOLUS (0 mLs Intravenous Stopped 8/29/22 2000)   dextrose 50 % injection 25 g (25 g Intravenous Given 8/29/22 1554)   insulin regular 1 unit/mL injection 8.73 Units (8.73 Units Intravenous Given 8/29/22 1604)   furosemide (LASIX) injection 40 mg (40 mg Intravenous Given 8/29/22 4692)    sodium zirconium cyclosilicate (LOKELMA) packet 15 g (15 g Oral Given 8/29/22 1608)   dextrose 10% BOLUS (0 mLs Intravenous Stopped 8/30/22 0011)   dextrose 50 % injection 25 g (25 g Intravenous Given 8/29/22 2019)   insulin regular 1 unit/mL injection 8.73 Units (8.73 Units Intravenous Given 8/29/22 2019)   albumin human 25 % injection 100 g (0 g Intravenous Stopped 8/29/22 2317)   calcium gluconate 1 g in 50 mL sodium chloride intermittent infusion (premix) (1 g Intravenous Given 8/29/22 2007)        Assessments & Plan (with Medical Decision Making)     This patient presented to the emergency department with acute renal failure and hyperkalemia.  I did discuss the case with the patient's primary physician.  Concern is raised for development of hepatorenal syndrome.  Patient does have a history of prostate cancer.  Bladder scan reported greater than a liter of urine retained in the bladder so Aparicio catheter was placed, but only about 70 cc of urine drained.  Bedside ultrasound demonstrated no evidence of a distended bladder, but massive ascites was noted.  I suspect that bladder scan was reading the ascites.  This decreases my suspicion for postobstructive uropathy that he will most likely renal failure secondary to hepatorenal syndrome.  Case was discussed with nephrology and medical shifting of potassium was initiated.  Patient will be followed by the nephrology service as a consult.  I spoke with the triage hospitalist and patient will be admitted to the internal medicine service for further treatment and evaluation.    I have reviewed the nursing notes. I have reviewed the findings, diagnosis, plan and need for follow up with the patient.    New Prescriptions    No medications on file       Final diagnoses:   Acute renal failure, unspecified acute renal failure type (H)   Hyperkalemia     I, Venus Jeff, am serving as a trained medical scribe to document services personally performed by Elvis Miller  Miguelito MCCARTHY based on the provider's statements to me on August 29, 2022.  This document has been checked and approved by the attending provider.    I, Elvis Farley MD, was physically present and have reviewed and verified the accuracy of this note documented by Venus Jeff, medical scribe.      Elvis Farley MD       [facility] EMERGENCY DEPARTMENT  8/29/2022     Elvis Farley MD  08/30/22 0933
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
## 2022-08-29 NOTE — NURSING NOTE
"Oncology Rooming Note    August 29, 2022 11:30 AM   Kody Reynaga is a 77 year old male who presents for:    Chief Complaint   Patient presents with     Oncology Clinic Visit     Prostate cancer     Initial Vitals: BP (!) 150/76   Pulse 97   Temp 97.9  F (36.6  C) (Oral)   Wt 87.3 kg (192 lb 8 oz)   SpO2 99%   BMI 26.85 kg/m   Estimated body mass index is 26.85 kg/m  as calculated from the following:    Height as of 8/15/22: 1.803 m (5' 11\").    Weight as of this encounter: 87.3 kg (192 lb 8 oz). Body surface area is 2.09 meters squared.  Severe Pain (7) Comment: Data Unavailable   No LMP for male patient.  Allergies reviewed: Yes  Medications reviewed: Yes    Medications: Medication refills not needed today.  Pharmacy name entered into EPIC:    [id number] - [place], [place] - [address]  [facility] [place] - [facility] [place]  [id number] - [place], [place] - [address]    Clinical concerns: none       Debra Antony"

## 2022-08-29 NOTE — PROGRESS NOTES
"Kody is a 77 year old who is being evaluated via a billable video visit.      How would you like to obtain your AVS? MyChart  If the video visit is dropped, the invitation should be resent by: Text to cell phone: [phone]  Will anyone else be joining your video visit? Yes: [phone]. How would they like to receive their invitation? Text to cell phone: [phone]          Assessment & Plan     Acute renal failure, unspecified acute renal failure type (H)  He is in acute renal failure  His creatinine has jumped up from 2.59 TO 4.6   potassium also went up to 6.6  He was taking Lasix 40 mg twice a day and last week I decreased it to 40 mg once a day because of his elevated creatinine  At that point I also stopped his ARB  The differential diagnosis for his elevated creatinine is possibly from hepatorenal syndrome versus prerenal causes such as hypotension  His Lasix needs to be stopped completely  He also needs to have a input and output evaluation  Last week we put him on a fluid restriction of 1800 cc and this could also potentially contributed to acute renal failure if he had any hypotension  Advised him to go to ER urgently  He has not been feeling very well for the past 2 days  Hyperkalemia  Potassium was 6.6  I think this is from the acute renal failure  Although he might have been taking some potassium rich foods  because he was having diuresis with Lasix and it was thought that it would drop his potassium I think the dietary potassium has negligible contribution to his hyperkalemia if anything and although his hyperkalemia is probably from acute renal failure  He might need dialysis        20 minutes spent on the date of the encounter doing chart review, history and exam, documentation and further activities per the note       BMI:   Estimated body mass index is 26.85 kg/m  as calculated from the following:    Height as of 8/15/22: 1.803 m (5' 11\").    Weight as of an earlier encounter on 8/29/22: 87.3 " kg (192 lb 8 oz).           Return in about 1 week (around 9/5/2022).    Jerel Curran MD  [facility]    Corin Gates is a 77 year old, presenting for the following health issues:  No chief complaint on file.      HPI   Abnormal labs  Elevated potassium and elevated creatinine  Today morning went for the lab WORK  Got a call from the lab that his blood work is abnormal  Call the patient  Has not been feeling well for the last 2 days          Review of Systems   Constitutional, HEENT, cardiovascular, pulmonary, gi and gu systems are negative, except as otherwise noted.      Objective             Physical Exam   GENERAL: Healthy, alert and no distress  RESP: No audible wheeze, cough, or visible cyanosis.  No visible retractions or increased work of breathing.    PSYCH: Mentation appears normal, affect normal/bright, judgement and insight intact, normal speech and appearance well-groomed.                This is a telephone visit  Originating Location (pt. Location): Home    Distant Location (provider location):  [facility]   Telephone call 10 minutes  .  ..

## 2022-08-29 NOTE — CONSULTS
"  Nephrology Initial Consult  August 30, 2022      Kody Reynaga MRN:[id number] YOB: 1945  Date of Admission:8/29/2022  Primary care provider: Jerel Curran  Requesting physician: Elvis Farley    ASSESSMENT AND RECOMMENDATIONS:   Kody Reynaga is a 77 year old M with a PMH of prostate cancer s/p cryoablation 2019, pancreatic mass, biliary stricture s/p biliary stent, portal venous thrombus on apixaban c/b recurrent ascites, SDH in s/p craniotomy, HTN, and DLD who presented to the ED from clinic for hyperkalemia and worsening EUN. See Dr Yan Marion's 8/28/22 \"Nephrology Cart review note\" for further details.    Reason for consult: EUN and hyperkalemia    Non-oliguric EUN  Azotemia w/o uremia  Cr has been increasing over last 6 months. Baseline Cr 0.9 in 2/2022. Cr at morning clinic appointment was 4.25 and BUN 84. Suspect prerenal etiology 2/2 decreased portal drainage from portal venous thrombosis given FeUrea 26.3%. Not clearly hepatorenal syndrome as patient does not have diagnosis of cirrhosis. Patient's history of portal venous thrombosis raises concern for hypercoagulable state and possible renal vein thrombosis.   - UA with microscopy  - Renal ultrasound with dopppler  - BMP in AM     Hyponatremia  Sodium initially 127, repeat this morning was 127. Suspect hypervolemic hypotonic hyponatremia given ascites and LE edema.    - Hold off on diuresis given paracentesis with 4L removed today    Hyperkalemia - improving  K 6.6 at clinic appointment this morning (~10am). EKG without peaked T waves or widened QRS. Improved after shifting and Lokelma. Suspect combination of EUN and increased potassium intake.  - Continue Lokelma 15g TID  - Shift with dextrose/insulin as needed for K >/= 6.0  - Renal diet  - Check K+ every 6 hours  - BMP in AM  - No acute need for HD    Ascites  Portal venous thrombosis  Hypervolemia  Currently on apixaban for anticoagulation. Has had recurrent ascites, " last paracentesis on 8/24 took of 4L. Diagnostic and therapeutic paracentesis today with 4000mL removed. Hepatology consulted and following.  - Agree with albumin administration after paracentesis      Recommendations were communicated to primary team via note.    Seen and discussed with attending Nephrologist, Dr. Abad.    Isidoro Ruiz MD   Internal Medicine and Pediatrics, PGY2  [facility] [place]  miguel angel  [employer]      REASON FOR CONSULT: Acute renal failure and hyperkalemia    HISTORY OF PRESENT ILLNESS:  Admitting provider and nursing notes reviewed  Kody Reynaga is a 77 year old M with a PMH of prostate cancer s/p cryoablation 2019, pancreatic mass, biliary stricture s/p biliary stent, portal venous thrombus on apixaban c/b recurrent ascites, SDH in s/p craniotomy, HTN, and DLD who presented to the ED from clinic for hyperkalemia and worsening EUN.    Patient has been following closely with his PCP, Dr. Curran, for recurrent ascites. He most recently saw him on 8/24, had a paracentesis with 4L removed on 8/24, and had his Lasix prescription changed to 40mg once daily from 40mg twice daily due to rising creatinine. The patient has noted that he has had more fatigue, abdominal distension, and abdominal discomfort. He has an appointment with his primary gastroenterology, Dr. Babak Garvin this morning where labs were drawn showing a K of 6.6, Cr of 4.25, and Na of 128.     PAST MEDICAL HISTORY:  Reviewed with patient on 08/29/2022     Past Medical History:   Diagnosis Date     Anxiety      Depression      Gastroesophageal reflux disease      Gout      Hypercholesterolemia      Hypertension      IBS (irritable bowel syndrome)      Pancreatic disease      Prostate cancer (H)        Past Surgical History:   Procedure Laterality Date     CATARACT EXTRACTION W/ INTRAOCULAR LENS IMPLANT Right      COLONOSCOPY       cryoablation of prostate       ENDOSCOPIC RETROGRADE CHOLANGIOPANCREATOGRAM       ENDOSCOPIC  RETROGRADE CHOLANGIOPANCREATOGRAM N/A 1/24/2022    Procedure: ENDOSCOPIC RETROGRADE CHOLANGIOPANCREATOGRAPHY, BILIARY STENT EXCHANGE, sPY WITH BIOPSIES AND BRUSHINGS;  Surgeon: Guru Ab Gonzalez MD;  Location: [facility] OR     ENDOSCOPIC RETROGRADE CHOLANGIOPANCREATOGRAM N/A 2/24/2022    Procedure: ENDOSCOPIC RETROGRADE CHOLANGIOPANCREATOGRAPHY, with stent placement and bile duct biopsy;  Surgeon: Anthony Abarca MD;  Location: [facility]     ENDOSCOPIC RETROGRADE CHOLANGIOPANCREATOGRAM N/A 5/10/2022    Procedure: ENDOSCOPIC RETROGRADE CHOLANGIOPANCREATOGRAPHY, pyloric dilation, bebris removal, biliary stent exchange;  Surgeon: Anthony Abarca MD;  Location: [facility]     ENDOSCOPIC RETROGRADE CHOLANGIOPANCREATOGRAM WITH SPYGLASS N/A 10/14/2021    Procedure: ENDOSCOPIC RETROGRADE CHOLANGIOPANCREATOGRAPHY, WITH DIRECT DUCT VISUALIZATION, USING PANCREATICOBILIARY FIBEROPTIC PROBE-spyglass, biliary sludge and clot removal, biliary biopsies, biliary stent exchange;  Surgeon: Anthony Abarca MD;  Location: [facility] OR     ENDOSCOPIC ULTRASOUND UPPER GASTROINTESTINAL TRACT (GI) N/A 9/28/2021    Procedure: ENDOSCOPIC ULTRASOUND, ESOPHAGOSCOPY / UPPER GASTROINTESTINAL TRACT (GI);  Surgeon: Babak Garvin MD;  Location: [facility] GI     ENDOSCOPIC ULTRASOUND UPPER GASTROINTESTINAL TRACT (GI) N/A 1/24/2022    Procedure: ENDOSCOPIC ULTRASOUND, ESOPHAGOSCOPY / UPPER GASTROINTESTINAL TRACT (GI);  Surgeon: Guru Ab Gonzalez MD;  Location: [facility]     ESOPHAGOSCOPY, GASTROSCOPY, DUODENOSCOPY (EGD), COMBINED N/A 10/14/2021    Procedure: ESOPHAGOGASTRODUODENOSCOPY, WITH FINE NEEDLE ASPIRATION BIOPSY, WITH ENDOSCOPIC ULTRASOUND GUIDANCE, biliary stent removal;  Surgeon: Babak Garvin MD;  Location: [facility]     ESOPHAGOSCOPY, GASTROSCOPY, DUODENOSCOPY (EGD), COMBINED N/A 2/24/2022    Procedure: ESOPHAGOGASTRODUODENOSCOPY WITH DILATION AND STENT REMOVAL, ENDOSCOPIC ULTRASOUND WITH FINE NEEDLE  BIOPSIES;  Surgeon: Babak Garvin MD;  Location: [facility] OR     MOUTH SURGERY       TONSILLECTOMY          MEDICATIONS:  PTA Meds  Prior to Admission medications    Medication Sig Last Dose Taking? Auth Provider Long Term End Date   acetaminophen (TYLENOL) 325 MG tablet Take 325 mg by mouth every 6 hours as needed for mild pain   Reported, Patient     amLODIPine (NORVASC) 10 MG tablet Take 1 tablet (10 mg) by mouth daily   Jerel Curran MD Yes    Apixaban (ELIQUIS PO) Take 5 mg by mouth 2 times daily   Reported, Patient     coenzyme Q-10 10 MG CAPS Take 1 capsule by mouth every morning    Reported, Patient     dicyclomine (BENTYL) 20 MG tablet Take 20 mg by mouth 3 times daily   Reported, Patient     escitalopram (LEXAPRO) 20 MG tablet Take 1 tablet by mouth every morning    Reported, Patient Yes    furosemide (LASIX) 40 MG tablet Take 1 tablet (40 mg) by mouth 2 times daily   Jerel Curran MD Yes    lovastatin (MEVACOR) 20 MG tablet Take 2 tablets (40 mg) by mouth every morning   Jerel Curran MD Yes    melatonin 5 MG CAPS Take 1 tablet by mouth nightly as needed    Reported, Patient     omeprazole (PRILOSEC) 20 MG DR capsule Take 20 mg by mouth every morning    Reported, Patient        Current Meds    calcium gluconate  1 g Intravenous Once     dextrose 10%  300 mL Intravenous Once     dextrose  25 g Intravenous Once     furosemide  40 mg Intravenous Once     insulin regular (HumuLIN R,NovoLIN R) for IV use  0.1 Units/kg Intravenous Once     sodium bicarbonate  50 mEq Intravenous Once     sodium zirconium cyclosilicate  15 g Oral Once     Infusion Meds      ALLERGIES:    No Known Allergies    REVIEW OF SYSTEMS:  A comprehensive of systems was negative except as noted above.    SOCIAL HISTORY:   Social History     Socioeconomic History     Marital status:      Spouse name: Not on file     Number of children: Not on file     Years of education: Not on file     Highest education level: Not on file    Occupational History     Not on file   Tobacco Use     Smoking status: Former Smoker     Types: Cigarettes     Quit date: 1970     Years since quittin.6     Smokeless tobacco: Never Used   Vaping Use     Vaping Use: Never used   Substance and Sexual Activity     Alcohol use: Yes     Comment: cutting down, drinking NA beer     Drug use: Never     Sexual activity: Not Currently   Other Topics Concern     Not on file   Social History Narrative     Not on file     Social Determinants of Health     Financial Resource Strain: Not on file   Food Insecurity: Not on file   Transportation Needs: Not on file   Physical Activity: Not on file   Stress: Not on file   Social Connections: Not on file   Intimate Partner Violence: Not on file   Housing Stability: Not on file     Reviewed with patient   Wife, Asia Reynaga accompanies Kody Reynaga in hospital room    FAMILY MEDICAL HISTORY:   Family History   Problem Relation Age of Onset     Heart Failure Mother      Cancer Father      Anesthesia Reaction No family hx of      Cardiovascular No family hx of      Deep Vein Thrombosis (DVT) No family hx of      Reviewed with patient     PHYSICAL EXAM:   Temp  Av.9  F (36.6  C)  Min: 97.9  F (36.6  C)  Max: 97.9  F (36.6  C)      Pulse  Av  Min: 80  Max: 97 Resp  Av.5  Min: 15  Max: 24  SpO2  Av.3 %  Min: 99 %  Max: 100 %       BP (!) 140/75   Pulse 80   Resp 15   SpO2 99%       Admit       GENERAL APPEARANCE: Reclined in bed, in no acute distress, awake  EYES: No scleral icterus, pupils equal  Lymphatics: no cervical or supraclavicular LAD  Pulmonary: lungs clear to auscultation with equal breath sounds bilaterally, no clubbing  CV: Regular rhythm, normal rate, no rub   - JVD none   - Edema 1+ up to mid thigh bilaterally  GI: soft, nontender, normal bowel sounds  MS: no evidence of inflammation in joints, no muscle tenderness  : valdovinos present  SKIN: no rash, warm, dry, no cyanosis  NEURO: face  symmetric, no asterixis     LABS:   I have reviewed the following labs:  CMP  Recent Labs   Lab 08/29/22  1047   *   POTASSIUM 6.6*   CHLORIDE 93*   CO2 20   ANIONGAP 15*   *   BUN 84*   CR 4.25*   GFRESTIMATED 14*   AMIRAH 9.2     CBC  Recent Labs   Lab 08/29/22  1420   HGB 11.5*   WBC 17.0*   RBC 4.02*   HCT 35.8*   MCV 89   MCH 28.6   MCHC 32.1   RDW 15.3*        INR  Recent Labs   Lab 08/29/22  1420   INR 1.92*     ABGNo lab results found in last 7 days.   URINE STUDIES  No lab results found.  No lab results found.  PTH  No lab results found.  IRON STUDIES  No lab results found.    IMAGING:  No new imaging    Isidoro Ruiz MD

## 2022-08-29 NOTE — ED TRIAGE NOTES
Patient arrives ambulatory to room per request of Dr. Farley. Patient had labs done today:     Sodium: 128  Potassium: 6.8  Creatnine: 4.3 (up from 2.5 x1 week ago)    Patient also endorses fluid retention and inability to adequately empty bladder

## 2022-08-30 NOTE — TELEPHONE ENCOUNTER
Neeta -     See clinic note.    Please arrange for follow-up with me in clinic in 6-8 weeks.    He is currently admitted and I have discussed other issues re lab evaluation of ascites with the consult service.    JESSICA Garvin MD  Professor of Medicine  Division of Gastroenterology, Hepatology and Nutrition  [facility]

## 2022-08-30 NOTE — ED NOTES
1600, blood draw for potassium and send by writer, Kath had not process lab . Writer made call out to lab at 1800. No result . 1600 potassium lab resulted for 6.9
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
## 2022-08-30 NOTE — CONSULTS
Care Management Initial Consult    General Information  Assessment completed with: Patient and Spouse; Kody and Asia  Type of CM/SW Visit: Initial Assessment    Primary Care Provider verified and updated as needed: Yes   Readmission within the last 30 days: pt declined IP last encounter (8/15); received paracentsis and EUN noted, per labs   Return Category: Progression of disease; further kidney dysfunction; Ascites     Reason for Consult: discharge planning  Advance Care Planning: Documents on file with PCP ([facility])  Advance Care Planning Reviewed: verified with patient, requested copy at this time.       Communication Assessment  Patient's communication style: spoken language (English or Bilingual)    Hearing Difficulty or Deaf: no   Wear Glasses or Blind: yes    Cognitive  Cognitive/Neuro/Behavioral: WDL                      Living Environment:   People in home: spouse     Current living Arrangements: house      Able to return to prior arrangements: yes       Family/Social Support:  Care provided by: self, spouse/significant other  Provides care for: no one  Marital Status: ; Wife - Asia       Description of Support System: Supportive, Involved    Support Assessment: Adequate family and caregiver support    Current Resources:   Patient receiving home care services: Yes     Community Resources: None  Equipment currently used at home: grab bar, tub/shower, walker, standard  Supplies currently used at home:      Employment/Financial:  Employment Status: retired        Financial Concerns: No concerns identified     Lifestyle & Psychosocial Needs:  Social Determinants of Health     Tobacco Use: Medium Risk     Smoking Tobacco Use: Former Smoker     Smokeless Tobacco Use: Never Used   Alcohol Use: Not on file   Financial Resource Strain: Not on file   Food Insecurity: Not on file   Transportation Needs: Not on file   Physical Activity: Not on file   Stress: Not on file   Social Connections: Not  on file   Intimate Partner Violence: Not on file   Depression: Not at risk     PHQ-2 Score: 1   Housing Stability: Not on file       Functional Status:  Prior to admission patient needed assistance:   Dependent ADLs:: Ambulation-walker (walker purchased; not in regular use)  Dependent IADLs:: Transportation  Assesssment of Functional Status: Not at  functional baseline    Mental Health Status:  Mental Health Status: Current Concern  Mental Health Management: Other (see comment) (spouse hinting at some concern; not specified)    Chemical Dependency Status:  Chemical Dependency Status: No Current Concerns             Values/Beliefs:  Spiritual, Cultural Beliefs, Quaker Practices, Values that affect care: no       Cultural/Quaker Practices Patient Routinely Participates In: personal space awareness       Additional Information:  77 year old M with a PMH of prostate cancer s/p cryoablation 2019, pancreatic mass, biliary stricture s/p biliary stent, portal venous thrombus on apixaban c/b recurrent ascites, SDH in s/p craniotomy, HTN, and DLD who presented to the ED from clinic for hyperkalemia and worsening EUN.    RNCC met with patient at the bedside; Pt.'s wife, Asia, also present at this time. Pt reporting to live independently with wife, at home noted in demographics. Pt does have a standard walker, but [facility]. Other medical equipment in home is limited to tub/shower grab bars. Pt.'s wife is involved and provides primary support, transportation included. PCP info is up-to-date and accurate, notably an in-network location, [facility]. Spouse reports advanced directives and healthcare directive info is on file with PCP; Epic does not show active documents, RNCC requested copies of noted items at this time. Potential or anticipated services may include more proactive OP support/service r/t recent paracentesis' (noteably removing up to 4L per episode). Pt able  to make their needs known; RNCC to continue to follow and keep in contact with patient through their admission and up until discharge, to aid in a safe and thorough disposition to home, per the pt/spouse's stated goal.       Morgan Lora RN BSN  5B RNCC  Phone: [phone]  Pager: [phone]    For Weekend & Holiday on call RN Care Coordinator:  (Tasks: Home care, home infusion, medical equipment, transportation, IMM & COLÓN forms, etc.)  [place] (0800 - 1630) Saturday and Sunday    Units: 4A, 4C, 4E, 5A and 5B: [phone]    Units: 6A, 6B, 6C, 6D: [phone]    Units: 7A, 7B, 7C, 7D, and 5C: [phone]

## 2022-08-30 NOTE — UTILIZATION REVIEW
"  Admission Status; Secondary Review Determination         Under the authority of the Utilization Management Committee, the utilization review process indicated a secondary review on the above patient.  The review outcome is based on review of the medical records, discussions with staff, and applying clinical experience noted on the date of the review.        (X)      Inpatient Status Appropriate - This patient's medical care is consistent with medical management for inpatient care and reasonable inpatient medical practice.      () Observation Status Appropriate - This patient does not meet hospital inpatient criteria and is placed in observation status. If this patient's primary payer is Medicare and was admitted as an inpatient, Condition Code 44 should be used and patient status changed to \"observation\".   () Admission Status NOT Appropriate - This patient's medical care is not consistent with medical management for Inpatient or Observation Status.          RATIONALE FOR DETERMINATION     78 yo male who presented with worsening kidney failure now with creatinine 4.28, baseline 8 months ago was normal, worsening ascites likely representing cardiorenal syndrome. PMH portal vein thrombosis on Eliquis, ascites which is new in the last several months, history of prostate cancer status post prostatectomy, CKD with prior creatinine 2.69, dyslipidemia, hypertension, reported pancreatic mass though last CT scans do not demonstrate this, subdural hematoma status postcraniotomy, dilated intrahepatic bile duct on CT.  Patient was admitted for fluid restriction, diagnostic and therapeutic paracentesis, correction of electrolytes, TTE, and nephrology and hepatology consultations.    The severity of illness, intensity of service provided, expected LOS and risk for adverse outcome make the care complex, high risk and appropriate for hospital admission.        The information on this document is developed by the utilization " review team in order for the business office to ensure compliance.  This only denotes the appropriateness of proper admission status and does not reflect the quality of care rendered.         The definitions of Inpatient Status and Observation Status used in making the determination above are those provided in the CMS Coverage Manual, Chapter 1 and Chapter 6, section 70.4.      Sincerely,     Emmanuel Quintanilla MD  Physician Advisor  Utilization Review/ Case Management  [facility].
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
## 2022-08-30 NOTE — PROVIDER NOTIFICATION
Provider notified (name): RASHAUN SWARTZ [ Msg Id [id number] ]  Reason for notification: pt has q4h BG check but no scheduled insulin. Does he still need q4h BG check    Response from provider: BG checks now scheduled with meals and at bedtime

## 2022-08-30 NOTE — CONSULTS
GASTROENTEROLOGY CONSULTATION      Pancreas/Biliary consult    Consult requested by Dr. Farah, Mikael, MD    Reason for consult: Portal vein thrombosis with worsening ascites and dilated intrahepatic ducts seen on CT which were stented in the past.  Likely with hepatorenal syndrome.    History is obtained from the patient    Date of Admission:  8/29/2022         History of Present Illness:   Kody Reynaga is a 77 year old male with a history of a pancreas lesion (suspected chronic pancreatitis) and portal, splenic and SMV thrombosis with portal hypertension.    His course is complex and GI course outlined below:    He had a history in 2021 of presenting to [place], [place] with painless jaundice and CT showing acute pancreatitis with distal biliary stricture with enhancing wall and upstream dilation.  Outside EUS with pancreatic head mass, but non-diagnostic FNA.  Brushings non-diagnostic.  Metal biliary stent placed.    9/28/2021 - Presents for EUS with Dr. Garvin after the above.  Hetereogeneous pancreas, irregular duct contour, dilated side branches, simple-appearing cysts.  Round region in pancrea measuring 20 x 17 mm with ? Obstruction of PD. Loss of interface with PV.  FNA performed (result benign pancreatic tissue with some features of chronic pancreatitis).      10/14/2021 - ERCP for common bile duct stricture with Dr. Abarca. Spyglass cholangioscopy with segmental stricture middle and lower third of main bile duct.  Spyglass biopsies (suspicious for malignancy, biliary type epithelium with atypia, rare markedly atypical cell groups suspicious for malignancy) - could not exclude reactive atypia.     10/14/2021 - Mass like lesion 19 x 18 mm in pancreatic head, no contact with vessels, could represent acute or chronic pancreatitis.  Repeat FNA (rare atypical cellular aggregates).  Only seen on one cytologic slides and not in cell block.  Reactive vs neoplastic.      1/24/2022 - Returns for EUS and ERCP  for definitive diagnosis prior to surgical resection. Deformed pylorus.  Three occluded stnets in CBD. Indeterminate biliary stricture.  Spyglass biopsies of middle and third bile duct, brushings (fragment of biliary epithelium with ulceration and acute and chronic inflammation, no definite dysplasia). 10 mm x 6 mm viabil placed.  Unable to obtain FNA sample due to technical difficulties advancing past pylorus.     2/24/2022 - Repeat EUS and ERCP stent exchange / prior was unable to obtain FNA. Bile duct stricture with removal of prior metal stent. Common bile duct biopsies (cytomorphologic changes consistent with chronic pancreaitis). Two 10 Fr plastic stents placed in CBD.  Mass-like lesion in pancreatic head 23 x 14 mm. Non-occlusive portal vein thrombus.     5/10/2022 - stent exchange, two visibly patent 10 Fr stents in the CBD removed, diffuse portal gastropathy.    Labs - bilirubin normal since 10/14/2021.    Last note from Dr. Garvin was 5/10/2022 with plans to referral for anticoagulation clinic.     Was planning for a stent free trial.     Had been discussed with surgical oncology and given multiple benign cytology elected to not pursue resection.    CT abdomen pelvis 8/11/2022 - worsening intrahepatic bile duct dilation      He is here today for abdominal distension / ascites.  He is set up to see IR in outpatient for potential recanalization.  He endorses a history of 'many' drinks per day but unclear.  Recent use is 1 glass of wine per day.  He is on anticoagulation currently with eliquis.           Social History:   , unclear history of alcohol use         Family History:   Denies relevant family history of gastrointestinal disease          Past Medical, Surgical and Procedural History (Summarized):   Pertinent Medical History:  Reviewed in EMR         Medications:     Current Facility-Administered Medications   Medication     acetaminophen (TYLENOL) tablet 650 mg    Or     acetaminophen  (TYLENOL) Suppository 650 mg     amLODIPine (NORVASC) tablet 10 mg     [Held by provider] apixaban ANTICOAGULANT (ELIQUIS) tablet 2.5 mg     glucose gel 15-30 g    Or     dextrose 50 % injection 25-50 mL    Or     glucagon injection 1 mg     [Held by provider] dicyclomine (BENTYL) tablet 20 mg     escitalopram (LEXAPRO) tablet 20 mg     [Held by provider] furosemide (LASIX) tablet 40 mg     lidocaine (LMX4) cream     lidocaine 1 % 0.1-1 mL     Patient is already receiving anticoagulation with heparin, enoxaparin (LOVENOX), warfarin (COUMADIN)  or other anticoagulant medication     polyethylene glycol (MIRALAX) Packet 17 g     sodium chloride (PF) 0.9% PF flush 3 mL     sodium chloride (PF) 0.9% PF flush 3 mL     sodium zirconium cyclosilicate (LOKELMA) packet 15 g          Previous Endoscopy:   Recent Endoscopic History:    See HPI for detailed history         Review of Systems:   A complete 10 point review of systems was obtained.  Please see the HPI for pertinent positives and negatives.  All other systems were reviewed and were found to be negative.          Physical Exam:   /72   Pulse 89   Temp 98.1  F (36.7  C) (Oral)   Resp 16   SpO2 100%   Wt:   Wt Readings from Last 2 Encounters:   08/29/22 87.3 kg (192 lb 8 oz)   08/24/22 85 kg (187 lb 4.8 oz)      Constitutional: Cooperative, pleasant, no apparent distress.  HEENT: No conjunctival icterus, moist mucus membranes.  CV: Normal rate.  No edema.  Respiratory: Unlabored breathing  Abdomen:   Distended abdomen    Skin: No jaundice, warm  Neuro: Alert, moves all extremities, no asterixis.  Psych: Normal affect, answers questions appropriately.          Data (Summarized):   Notable labs:  Sodium 127  K 5.4  Cr 3.84  WBC 13.1  INR 1.81    Imaging reviewed:  CT abdomen pelvis 8/11/2022 - worsening intrahepatic bile duct dilation           ASSESSMENT AND PLAN:   Kody Reynaga is a 77 year old male with a history of a pancreas lesion (suspected chronic  pancreatitis) and portal, splenic and SMV thrombosis with portal hypertension.    He presents with sequelae of portal hypertension as a result of his PV, splenic and mesenteric vein thrombosis.  We suspect this is due to chronic pancreatitis which had been extensively endoscopically evaluated (see HPI) to exclude malignancy.    He has been getting weekly paracentesis and will need a repeat paracentesis with studies as below.  Would give 100g albumin given his EUN unless felt otherwise by nephrology.    His acute kidney injury is not likely to be hepatorenal syndrome in the setting of normotension and non-cirrhotic portal hypertension from thrombus (we suspect he has a normal functioning liver).    In the outpatient setting, he will need to follow in clinic for management of his ascites and also need IR evaluation to see if he can benefit from recanalization (and potentially later on TIPS if needed).  He is set up for an IR evaluation on 9/6 in the outpatient setting.  Dr. Garvin had been seeing him for the pancreas lesion/findings, but now that he has developed portal hypertension will need outpatient GI follow-up for this too.    In regard to his biliary dilation, this is ongoing and no LFT abnormality - no role for GI drainage.    #1 Suspected non-cirrhotic portal hypernsion  #2 Chronic occlusive thrombus of splenic vein, superior mesenteric vein and portal vein  #3 Acute on chronic kidney injury    Recommendations:  -- Agree with home anticoagulation for occlusive SV, SMV, PV thrombi causing portal hypertension  -- Please obtain diagnostic paracentesis with fluid studies, gram stain, cell count, differential, cultures, albumin, total protein, concurrent serum albumin   -- Defer to nephrology re: EUN, but likely would give albumin in the setting of significant ascites and large volume paracentesis  -- 2g sodium diet  -- Outpatient GI follow-up after discharge  -- Please send CA 19-9 (ordered for you)  -- No role  for endoscopic biliary drainage with normal LFT    The case was staffed with attending, Dr. Wadsworth.  Pancreas/Biliary will continue to follow.    Edmodn Maynard MD  Gastroenterology Fellow, PGY-6
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
## 2022-08-30 NOTE — PLAN OF CARE
Goal Outcome Evaluation:    Plan of Care Reviewed With: patient     Overall Patient Progress: no change  Patient admitted from ed this afternoon. Has a sore that is being treated and notified doc to order woc consult or actual orders for wound care. On tele due to increase in k and normal sinus rthym. Patient is on renal diet. Had a bowel movement. Skin intact except the sore on his forehead.           Neg pap/ neg HPv letter

## 2022-08-30 NOTE — PROVIDER NOTIFICATION
Provider notified (name): Charline Bass PA-C  Reason for notification: Pt , no active insulin order. do we want insulin ordered to correct high value?    Response from provider: Insulin ordered for with meals and at bedtime

## 2022-08-30 NOTE — PROCEDURES
[facility]    Paracentesis    Date/Time: 8/30/2022 3:59 PM  Performed by: Rashad Nino MD  Authorized by: Rashad Nino MD     PRE-PROCEDURE DETAILS  Initial or subsequent exam: initial  Procedure purpose: therapeutic and diagnostic  Indications: abdominal discomfort secondary to ascites        UNIVERSAL PROTOCOL   Site Marked: Yes  Prior Images Obtained and Reviewed:  Yes  Required items: Required blood products, implants, devices and special equipment available    Patient identity confirmed:  Verbally with patient, arm band, provided demographic data and hospital-assigned identification number  Patient was reevaluated immediately before administering moderate or deep sedation or anesthesia  Confirmation Checklist:  Patient's identity using two indicators  Time out: Immediately prior to the procedure a time out was called    Universal Protocol: the Joint Commission Universal Protocol was followed    Preparation: Patient was prepped and draped in usual sterile fashion    ESBL (mL):  2     ANESTHESIA    Local Anesthetic: Lidocaine 1% without epinephrine  Anesthetic Total (mL):  5      SEDATION    Patient Sedated: No    POST PROCEDURE DETAILS  Needle gauge: 22  Ultrasound guidance: yes  Puncture site: right lower quadrant  Fluid removed: 4000(ml)  Fluid appearance: serous  Dressing: 4x4 sterile gauze and pressure dressing        PROCEDURE    Patient Tolerance:  Patient tolerated the procedure well with no immediate complications  Length of time physician/provider present for 1:1 monitoring during sedation: 0

## 2022-08-30 NOTE — PLAN OF CARE
Pt A&Ox4 overnight, calls appropriately, no falls. PRN tylenol given for abdominal pain with relief. Pt on RA. Pt up to bathroom to have BM x2 overnight. Pt has had nothing to eat overnight and plan for eliquis to be on hold for planned paracentesis today. Pt has valdovinos catheter in place. BP (!) 142/82   Pulse 88   Temp 98.1  F (36.7  C) (Oral)   Resp 14   SpO2 100%     Problem: Plan of Care - These are the overarching goals to be used throughout the patient stay.    Goal: Plan of Care Review/Shift Note  Description: The Plan of Care Review/Shift note should be completed every shift.  The Outcome Evaluation is a brief statement about your assessment that the patient is improving, declining, or no change.  This information will be displayed automatically on your shift note.  Outcome: Ongoing, Progressing  Flowsheets (Taken 8/30/2022 0321)  Plan of Care Reviewed With: patient  Overall Patient Progress: declining   Goal Outcome Evaluation:    Plan of Care Reviewed With: patient     Overall Patient Progress: declining
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
## 2022-08-31 NOTE — PLAN OF CARE
RN assumed cares at 1500 to 2300    Vitals: Temp: 98.4  F (36.9  C) Temp src: Oral BP: 118/61 Pulse: 93   Resp: 18 SpO2: 98 % O2 Device: None (Room air)     Pain: pain rated at a 2 out of 10, interventions offered and pt refused  Neuro: A/O, slightly slow movements  Cardiac: on Tele  Respiratory: stable on room air  GI/: Valdovinos for retention, abd distention.    IV/Drains: 1 PIV saline locked, valdovinos catheter  Activity: SBA  Skin: L forehead abrasion present on admit. Dressing changed this shift  Labs: BG checks per order. UA sample needed.     Plan of Care:  transport to paracentesis scheduled for 1100 8/31. Collect UA sample. Continue to monitor and contact MD as needed
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
## 2022-08-31 NOTE — PROGRESS NOTES
"  Nephrology Progress Note  08/31/2022         Assessment & Recommendations:   Kody Reynaga is a 77 year old M with a PMH of prostate cancer s/p cryoablation 2019, pancreatic mass, biliary stricture s/p biliary stent, portal venous thrombus on apixaban c/b recurrent ascites, SDH in s/p craniotomy, HTN, and DLD who presented to the ED from clinic for hyperkalemia and worsening EUN. See Dr Yan Marion's 8/28/22 \"Nephrology Cart review note\" for further details.     Reason for consult: EUN and hyperkalemia     Non-oliguric EUN  Azotemia w/o uremia  Cr has been increasing over last 6 months. Baseline Cr 0.9 in 2/2022. Cr at morning clinic appointment was 4.25 and BUN 84. Suspect prerenal etiology 2/2 decreased portal drainage from portal venous thrombosis given FeUrea 26.3%. Not clearly hepatorenal syndrome as patient does not have diagnosis of cirrhosis. Patient's history of portal venous thrombosis raises concern for hypercoagulable state and possible renal vein thrombosis.   - UA with microscopy  - BMP daily   - Albumin challenge with Alb 25%, 50g x 3 days      Hyponatremia  Sodium initially 127, repeat this morning was 127. Suspect hypervolemic hypotonic hyponatremia given ascites and LE edema.    - Hold off on diuresis given paracentesis with 4L removed today     Hyperkalemia - resolved  K 6.6 at clinic appointment this morning (~10am). EKG without peaked T waves or widened QRS. Improved after shifting and Lokelma. Suspect combination of EUN and increased potassium intake.  - Continue Lokelma 15g TID  - Shift with dextrose/insulin as needed for K >/= 6.0  - Renal diet  - BMP in daily  - No acute need for HD     Ascites  Portal venous thrombosis  Hypervolemia  ?Hepatorenal syndrome   Currently on apixaban for anticoagulation. Has had recurrent ascites, last paracentesis on 8/24 took of 4L. Diagnostic and therapeutic paracentesis today with 4000mL removed. Hepatology consulted and following.  - Agree with albumin " "administration after paracentesis  - Will continue workup with GI as outpatient        Recommendations were communicated to primary team via note.     Seen and discussed with attending Nephrologist, Dr. Abad.    Neeta Heredia MD  Nephrology Fellow   [id number]    Interval History :   Nursing and provider notes from last 24 hours reviewed.  In the last 24 hours Kody Reynaga had a 4L paracentesis     Review of Systems:   I reviewed the following systems:  GI: Goo appetite. No nausea or vomiting or diarrhea. Feels bloated.  Neuro:  No confusion  Constitutional:  no fever or chills  CV: No dyspnea or edema.  No chest pain.    Physical Exam:   I/O last 3 completed shifts:  In: 594 [P.O.:594]  Out: 850 [Urine:850]   BP 95/51 (BP Location: Right arm)   Pulse 97   Temp 98.5  F (36.9  C) (Oral)   Resp 16   Ht 1.803 m (5' 11\")   Wt 89.5 kg (197 lb 5 oz)   SpO2 98%   BMI 27.52 kg/m          GENERAL APPEARANCE: Reclined in bed, in no acute distress, awake  EYES: No scleral icterus, pupils equal  Lymphatics: no cervical or supraclavicular LAD  Pulmonary: lungs clear to auscultation with equal breath sounds bilaterally, no clubbing  CV: Regular rhythm, normal rate, no rub   - JVD none   - Edema 1+ up to mid thigh bilaterally  GI: soft, nontender, normal bowel sounds, decreased ascites but still significant  MS: no evidence of inflammation in joints, no muscle tenderness  : valdovinos present  SKIN: no rash, warm, dry, no cyanosis  NEURO: face symmetric, no asterixis     Labs:   All labs reviewed by me  Electrolytes/Renal - Recent Labs   Lab Test 08/31/22  0745 08/31/22  0149 08/30/22  2138 08/30/22  1729 08/30/22  1331 08/30/22  0807 08/30/22  0559 08/30/22  0355 08/29/22  1600 08/29/22  1416   NA  --   --   --   --  128*  --  127*  --   --  127*   POTASSIUM  --   --   --   --  5.2  --  5.4* 5.7*   < > 6.7*   CHLORIDE  --   --   --   --  89*  --  89*  --   --  89*   CO2  --   --   --   --  23  --  23  --   --  18*   BUN  --   " --   --   --  81.9*  --  74.5*  --   --  83.4*   CR  --   --   --   --  3.80*  --  3.84*  --   --  4.28*   * 169* 216*   < > 129*   < > 98  --    < > 137*   AMIRAH  --   --   --   --  10.0  --  9.8  --   --  9.8   MAG  --   --   --   --   --   --   --   --   --  2.4*   PHOS  --   --   --   --   --   --   --   --   --  6.3*    < > = values in this interval not displayed.       CBC -   Recent Labs   Lab Test 08/31/22  1037 08/30/22  0355 08/29/22  1420   WBC 14.1* 13.1* 17.0*   HGB 11.8* 9.5* 11.5*    175 323       LFTs -   Recent Labs   Lab Test 08/30/22  0559 08/29/22  1600 08/29/22  1416 08/22/22  1129   ALKPHOS 75 95 103 93   BILITOTAL 0.7 0.4 0.5 0.6   ALT <5* 14 13 17   AST 19  --  27 20   PROTTOTAL 6.6 6.6 7.1 7.0   ALBUMIN 4.2 3.4*  3.4* 3.8 3.0*       Iron Panel - No lab results found.      Imaging:  All imaging studies reviewed by me.     Current Medications:    amLODIPine  10 mg Oral Daily     apixaban ANTICOAGULANT  5 mg Oral BID     [Held by provider] dicyclomine  20 mg Oral TID     escitalopram  20 mg Oral QAM     [Held by provider] furosemide  40 mg Oral BID     insulin aspart  1-7 Units Subcutaneous TID AC     insulin aspart  1-5 Units Subcutaneous At Bedtime     lactulose  10 g Oral BID     sodium chloride (PF)  3 mL Intracatheter Q8H     sodium zirconium cyclosilicate  15 g Oral TID       - MEDICATION INSTRUCTIONS -       Neeta Marion MD
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
## 2022-08-31 NOTE — PROGRESS NOTES
Gastroenterology Inpatient Sign Off Note    Pancreas/Biliary service will sign off. No further recommendations at this time. If primary team has addition questions, please page the consult fellow listed in [place].    Current GI Consult Staff: Dr. Solis    A/P:  Mr. Reynaga is a 77-year-old man with a pancreas lesion (suspected d/t chronic pancreatitis) which has undergone multiple biopsies without malignancy.  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      Pancreas/biliary was consulted in setting of the above.    Recommendations:  From our perspective:    - No role for further pancreatic intervention or biliary intervention with normal LFT    - Paracentesis without evidence of SBP; please perform paracentesis PRN for comfort.  In the outpatient setting - he already has standing orders for PRN paracentesis.    - EUN per nephrology and diuretics as they recommend in setting of EUN.  After discharge, he will follow-up in clinic with GI to help manage his diuretics for ascites.    - Low sodium (2g) per day diet or unless otherwise per nephrology    - Has outpatient follow-up 9/6/2022 to evaluate for recanalization.  Dr. Garvin requested potential liver biopsy at that time given history of ETOH use.        Follow up recommendations:   Follow-up in GI clinic is indicated.  Follow-up with primary care provider at timing determined by discharge physician.    If outpatient GI follow-up is felt indicated by primary care, they may coordinate this by placing new GI referral and contacting  General GI clinic - ([phone])    Case staffed with attending, Dr. Solis who agrees with this assessment and plan.
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
## 2022-08-31 NOTE — PROVIDER NOTIFICATION
Provider notified (name): Dr. Her   Reason for notification: pt is asking for melatonin, normally takes 10 mg at home. can I get a PRN order?   Recommendation/request given to provider:  Response from provider: PRN melatonin order placed

## 2022-08-31 NOTE — PLAN OF CARE
Cares from: [id number]    V/S & pain: VSS on RA, denied pain   Neuro: A/O x4  Respiratory: stable on RA   Skin: Wound to L scalp dressing CDI, Paracentesis site   GI/: valdovinos in place w/ adequate UO, no BM   Nutrition: renal diet w/ 2L fluid restriction   Lines: L PIV SL   Activity: up SBA     Events this shift: PRN melatonin x1, BG stable overnight. UA sample sent. call light w/in reach and able to make needs known.    Plan: transport set up @11 for paracentesis        Goal Outcome Evaluation:    Plan of Care Reviewed With: patient     Overall Patient Progress: no change

## 2022-08-31 NOTE — PLAN OF CARE
"Blood pressure 120/65, pulse 96, temperature 97.7  F (36.5  C), temperature source Oral, resp. rate 12, height 1.803 m (5' 11\"), weight 89.5 kg (197 lb 5 oz), SpO2 98 %. Room air.    Cares from: [id number]     V/S & pain: VSS on RA, denied pain   Neuro: A/O x4; forgetful  Respiratory: stable on RA   Skin: Wound to L scalp dressing CDI, Paracentesis site   GI/: valdovinos in place w/ adequate UO, one bowel movement (not observed; patient reported)  Nutrition: 2g sodium w/ 2L fluid restriction; , 224 - insulin given per order  Lines: L PIV SL; R PIV albumin infusing  Activity: up SBA      Events this shift: call light w/in reach and able to make needs known.     Plan: continue to follow plan of care. Update MD with change.          Goal Outcome Evaluation:    Plan of Care Reviewed With: patient     Overall Patient Progress: no change           "

## 2022-09-01 NOTE — PROCEDURES
Consult and Procedure Service - Procedure Note    Attending: Syed Tapia  Resident: none  Procedure: therapeutic paracentesis    The risks and benefits of the procedure were explained to the patient  who expressed understanding and opted to proceed.  Consent was obtained previously and placed in the chart.  A time out was performed.  An area of ascites was located and marked using ultrasound guidance in the left lower quadrant; the area was prepped and draped in the usual sterile fashion.  8  ml of 1% lidocaine was instilled and ascites located.  The Bestowed paracentesis catheter and needle were inserted under real-time guidance until ascites obtained then the needle removed and the catheter advanced.  The apparatus was connected to vacuum bottles and a total of 4000 ml of straw colored fluid removed.   A specimen was not sent for analysis. The catheter was withdrawn and the area dressed.  Patient tolerated the procedure well with no immediate complications.  Please contact the Consult and Procedure Service if any complications or concerns arise.     Syed Tapia MD  DOS:  9/1/22

## 2022-09-01 NOTE — PROGRESS NOTES
09/01/22 1423   Cognitive Screens/Assessments   Cognitive Assessments Completed [facility] Mental Status Exam (UMS):  Total Score out of /30 17/30   [facility] Norms 1-20 equals dementia   [facility] Domains assessed: orientation, memory, attention, executive functions   [facility] Interpretation Administered UMS to screen cognition, pt scoring 17/30 indicating dementia-like cognitive presentation per assessment scoring criteria. Pt with most difficulty with delayed recall/delayed recall with interference, calculations, numeric calculation and registration, and executive function and extrapolation.

## 2022-09-01 NOTE — PLAN OF CARE
"/49 (BP Location: Right arm)   Pulse 82   Temp 98.2  F (36.8  C) (Oral)   Resp 17   Ht 1.803 m (5' 11\")   Wt 89.5 kg (197 lb 5 oz)   SpO2 93%   BMI 27.52 kg/m      Care from: [id number]      VS & Pain: VSS on room air, prn Tylenol x1 was given for R rib pain    Neuro: A&O x4    Respiratory: SOB, inspiratory wheezes    Cardiac: NSR with occasional PVCs on tele    Peripheral neurovascular: 3+ dependent, bi legs edema; 2+ bi ankles and feet edema; 1+ bi knees edema    GI/: 450 mL urine output from valdovinos- provided valdovinos care, no BM this shift    Nutrition: good appetite and oral intake    Skin: applied mepilex to blanchable redness at coccyx and changed scalp abrasion dressing    Musculoskeletal: generalized weakness     Lines: L PIV is saline locked    Activity: up with assist of 1    Events this shift: Ambulated in beodya x1. BGs: 128, 196. Call light within reach and bed alarm on.    Plan: Continue follow poc. Waiting for Potassium recheck result.      Goal Outcome Evaluation:    Plan of Care Reviewed With: patient      Overall Patient Progress: no change    Outcome Evaluation: A&O x4, prn Tylenol x1 was given for R rib pain, will trial discontinuing valdovinos tomorrow per MD    "

## 2022-09-01 NOTE — PROVIDER NOTIFICATION
Provider notified (name): Cherelle Trejo MD  Reason for notification: pt potassium lab result is 2.8 at 1400 9/1. can we initiate potassium replacement?    Response from provider: replacement ordered

## 2022-09-01 NOTE — PLAN OF CARE
"Blood pressure 120/65, pulse 96, temperature 97.7  F (36.5  C), temperature source Oral, resp. rate 12, height 1.803 m (5' 11\"), weight 89.5 kg (197 lb 5 oz), SpO2 98 %. RA.      V/S & pain: VSS on RA, denied pain   Neuro: A/O x4 forgetful  Respiratory: stable on RA   Skin: Wound to L scalp dressing CDI, Paracentesis site   GI/: valdovinos in place w/ adequate UO, Pt report had bowel movement x 1 this evening .  Nutrition: 2gm Na  w/ 2L fluid restriction   Lines: L PIV SL   Activity: up SBA , patient needs to be reminded has Valdovinos      Events this shift: BG  160 insulin given per range         Goal Outcome Evaluation:    Plan of Care Reviewed With: patient     Overall Patient Progress: no change           "

## 2022-09-01 NOTE — PLAN OF CARE
Goal Outcome Evaluation:    Assumed cares from 19:00 to 23:00    Patient calm and cooperative. A/OX4, no signs of distress or SOB. Tolerated all meds and requested PRN Melatonin at bedtime. Patient is on telemetry, 2L fluid restriction and 2 g. Sodium diet.  At 00:20  patient fell in the bathroom. Stated lost his balance and fell, hit his R lower chest over ribcage. No LOC, denied dizziness or lightheaded prior to the fall. No redness or bruises noted. Patient was able to get up with an assist and walk back to his bed with minimal assist. C/O discomfort over R rib cage. Continue to monitor and notify MD in case pain persists.    Plan of Care Reviewed With: patient     Overall Patient Progress: no change

## 2022-09-01 NOTE — PROVIDER NOTIFICATION
Provider notified (name):La Winslow MD  Reason for notification: patient fell  Recommendation/request given to provider: Asking for evaluation  Response from provider:

## 2022-09-01 NOTE — PROGRESS NOTES
Ang    Called to assess the patient after he fell in the bathroom. He walked to the bathroom independently, and as he tried to sit, he lost balance and fell, hit his R lower chest over ribcage. One of the nurses heard a thud and saw him on the floor of the bathroom. No LOC. He denied feeling dizzy or lightheaded prior to the fall. He was able to get up with an assist and walk back to his bed with minimal assist.    On exam, mild tenderness over R lower lateral chest over a rib. No erythema or swelling. Able to take in deep breath without eliciting severe pain. No head contusion.    - no need for imaging: if R lower rib pain persists, consider obtaining rib xray in am.  - discussed the importance of calling RN prior to walking to the bathroom

## 2022-09-01 NOTE — PROGRESS NOTES
"  Nephrology Progress Note  09/01/2022         Assessment & Recommendations:   Kody Reynaga is a 77 year old M with a PMH of prostate cancer s/p cryoablation 2019, pancreatic mass, biliary stricture s/p biliary stent, portal venous thrombus on apixaban c/b recurrent ascites, SDH in s/p craniotomy, HTN, and DLD who presented to the ED from clinic for hyperkalemia and worsening EUN. See Dr Yan Marion's 8/28/22 \"Nephrology Cart review note\" for further details.     Reason for consult: EUN and hyperkalemia     Non-oliguric EUN  Azotemia w/o uremia  Cr has been increasing over last 6 months. Baseline Cr 0.9 in 2/2022. Cr at morning clinic appointment was 4.25 and BUN 84. Suspect prerenal etiology 2/2 decreased portal drainage from portal venous thrombosis given FeUrea 26.3%. Not clearly hepatorenal syndrome as patient does not have diagnosis of cirrhosis. Patient's history of portal venous thrombosis raises concern for hypercoagulable state and possible renal vein thrombosis.   - BMP daily   - Albumin challenge with Alb 25%, 50g x 3 days (9/1-9/3) or until discharge  - Establish care with Nephrology outpatient, we will coordinate this     Hyponatremia - improving  Sodium initially 127, repeat this morning was 132. Suspect hypervolemic hypotonic hyponatremia given ascites and LE edema. Plan for repeat paracentesis today.  - BMP daily     Hyperkalemia - resolved  Hypokalemia  K 6.6 at clinic appointment 8/29 morning (~10am). EKG without peaked T waves or widened QRS. Improved after shifting and Lokelma. Suspect initial hyperkalemia combination of EUN and increased potassium intake. Now hypokalemic due to decreased K intake and several days of Lokelma 15mg TID.  - Stop Lokelma  - Repeat K this afternoon  - Replete K to normal  - BMP in daily     Ascites  Portal venous thrombosis  Hypervolemia  ?Hepatorenal syndrome   Currently on apixaban for anticoagulation. Has had recurrent ascites, last paracentesis on 8/24 took of " "4L. Diagnostic and therapeutic paracentesis today with 4000mL removed. Hepatology consulted and following.  - Agree with albumin administration after paracentesis  - Will continue workup with GI as outpatient        Recommendations were communicated to primary team via note.    Nephrology will sign off at this time. Please page fellow if further questions.     Seen and discussed with attending Nephrologist, Dr. Abad.    Isidoro Ruiz MD  Internal Medicine and Pediatrics, PGY2    Interval History :   Nursing and provider notes from last 24 hours reviewed.  In the last 24 hours Kody Reynaga had a fall overnight and hit his right ribs. No LOC, dizzines. Patient reports trying to brush his teeth and use the bathroom when he fell. Feels well this morning.     Review of Systems:   I reviewed the following systems:  GI: Good appetite. No nausea or vomiting or diarrhea. Feels bloated.  Neuro:  No confusion  Constitutional:  no fever or chills  CV: No dyspnea or edema.  No chest pain.    Physical Exam:   I/O last 3 completed shifts:  In: 500 [P.O.:300]  Out: 600 [Urine:600]   /70   Pulse 92   Temp 98.3  F (36.8  C) (Oral)   Resp 18   Ht 1.803 m (5' 11\")   Wt 89.5 kg (197 lb 5 oz)   SpO2 95%   BMI 27.52 kg/m          GENERAL APPEARANCE: Sitting in bed, no acute distress  EYES: No scleral icterus, pupils equal  Lymphatics: no cervical or supraclavicular LAD  Pulmonary: lungs clear to auscultation with equal breath sounds bilaterally, no clubbing  CV: Regular rhythm, normal rate, no rub   - JVD none   - Edema 1+ up to mid thigh bilaterally  GI: soft, nontender, normal bowel sounds, decreased ascites but still significant  MS: no evidence of inflammation in joints, no muscle tenderness  : valdovinos present  SKIN: no rash, warm, dry, no cyanosis  NEURO: face symmetric, no asterixis     Labs:   All labs reviewed by me  Electrolytes/Renal -   Recent Labs   Lab Test 09/01/22  1300 09/01/22  0756 09/01/22  0622 " 08/31/22  1353 08/31/22  1058 08/31/22  0745 08/31/22  0602 08/29/22  1600 08/29/22  1416   NA  --   --  132*  --  130*  --  131*   < > 127*   POTASSIUM  --   --  3.1*  --  4.0  --  4.6   < > 6.7*   CHLORIDE  --   --  89*  --  89*  --  91*   < > 89*   CO2  --   --  23  --  24  --  15*   < > 18*   BUN  --   --  76.0*  --  75.3*  --  77.2*   < > 83.4*   CR  --   --  2.90*  --  3.08*  --  3.19*   < > 4.28*   * 128* 121*   < > 119*   < > 116*   < > 137*   AMIRAH  --   --  9.3  --  9.7  --  8.9   < > 9.8   MAG  --   --   --   --   --   --   --   --  2.4*   PHOS  --   --   --   --   --   --   --   --  6.3*    < > = values in this interval not displayed.       CBC -   Recent Labs   Lab Test 09/01/22  0622 08/31/22  1037 08/30/22  0355   WBC 15.5* 14.1* 13.1*   HGB 9.9* 11.8* 9.5*    212 175       LFTs -   Recent Labs   Lab Test 09/01/22  0622 08/31/22  0602 08/30/22  0559   ALKPHOS 77 81 75   BILITOTAL 0.8 0.5 0.7   ALT 7* 7* <5*   AST 23 26 19   PROTTOTAL 6.5 6.3* 6.6   ALBUMIN 4.0 3.5 4.2       Iron Panel - No lab results found.      Imaging:  All imaging studies reviewed by me.     Current Medications:    albumin human  50 g Intravenous Daily     amLODIPine  10 mg Oral Daily     apixaban ANTICOAGULANT  5 mg Oral BID     [Held by provider] dicyclomine  20 mg Oral TID     escitalopram  20 mg Oral QAM     [Held by provider] furosemide  40 mg Oral BID     insulin aspart  1-7 Units Subcutaneous TID AC     insulin aspart  1-5 Units Subcutaneous At Bedtime     sodium chloride (PF)  3 mL Intracatheter Q8H     [Held by provider] sodium zirconium cyclosilicate  15 g Oral TID       - MEDICATION INSTRUCTIONS -       Isidoro Ruiz MD
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
## 2022-09-01 NOTE — PROVIDER NOTIFICATION
Provider notified (name): Felecia Kerr PA  Reason for notification: MD who completed paracentesis left sample if needed. are there any labs needed for sample? if so, please add order    Response from provider: No labs ordered, sample disposed of
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
## 2022-09-02 NOTE — PLAN OF CARE
Patient condition slightly improving. Alert and oriented x4, calm pleasant, cooperative and able to use call light appropriately. No c/o pin of pain or discomfort and no respiratory issues noted. Aparicio catheter in place, patent with good urine output. Sat on chair for meals this shift. Was seen and rounded by primary team, tele discontinued and order to discontinue Aparicio catheter. Wife in room attentive to cares. At 1430, patient ambulated in hallway using WW with steady gait and good balance. At 1511, Aparicio catheter removed. Sitting on chair as of this time. Wife in room attentive to cares.  P: Due to void in 4-6 hrs. Possible discharge to home tomorrow.    Goal Outcome Evaluation:    Plan of Care Reviewed With: patient     Overall Patient Progress: improving

## 2022-09-02 NOTE — PLAN OF CARE
RN assumed cares at 1500 to 2300    Vitals: Temp: 98.2  F (36.8  C) Temp src: Oral BP: 131/63 Pulse: 87   Resp: 18 SpO2: 96 % O2 Device: None (Room air)     Pain: pain reported at a 3 out of 10, no pain medication requested by pt this shift but pt did mentioned wanting tylenol around 0000 9/2 when he plans on going to bed  Neuro: slow and intermittently forgetful  Cardiac: NSR this shift  Respiratory: stable on room air  GI/: distended abd, 4L removed during paracentesis this shift  IV/Drains: 1 PIV, Valdovinos catheter  Activity: Up in chair most of shift. SBA to transfer to and from bed  Skin: abd bruising on R side, LUE bruising, sites of past paracentesis   Labs: no labs ordered on paracentesis sample collected this shift, sample disposed of. BG checks per order--insulin correction required this shift. Low potassium at start of shift, MD ordered replacement. Potassium recheck came back in normal range.     Plan of Care:  expected to trial without valdovinos 9/2. Continue to monitor potassium and BG. Continue to monitor and contact MD as needed

## 2022-09-02 NOTE — PROGRESS NOTES
"  Nephrology Progress Note  09/02/2022         Assessment & Recommendations:   Kody Reynaga is a 77 year old M with a PMH of prostate cancer s/p cryoablation 2019, pancreatic mass, biliary stricture s/p biliary stent, portal venous thrombus on apixaban c/b recurrent ascites, SDH in s/p craniotomy, HTN, and DLD who presented to the ED from clinic for hyperkalemia and worsening EUN. See Dr Yan Marion's 8/28/22 \"Nephrology Cart review note\" for further details.     Reason for consult: EUN and hyperkalemia     Non-oliguric EUN  Azotemia w/o uremia  Cr has been increasing over last 6 months. Baseline Cr 0.9 in 2/2022. Cr at morning clinic appointment was 4.25 and BUN 84. Suspect prerenal etiology 2/2 decreased portal drainage from portal venous thrombosis given FeUrea 26.3%. Not clearly hepatorenal syndrome as patient does not have diagnosis of cirrhosis. Patient's history of portal venous thrombosis raises concern for hypercoagulable state and possible renal vein thrombosis. His creatinine has decrease and appears to be stable.   - BMP daily   - Will complete Albumin challenge with Alb 25%, 50g today (9/1-9/3-) or until discharge per primary team  - Establish care with Nephrology outpatient, we will coordinate this  - If patient discharges he will need labs on Monday or Tuesday (BMP)     Hyponatremia - improving  Sodium initially 127, repeat this morning was 133. Suspect hypervolemic hypotonic hyponatremia given ascites and LE edema. Had repeat paracentesis on 9/1.  - BMP daily     Hyperkalemia - resolved  Hypokalemia  K 6.6 at clinic appointment 8/29 morning (~10am). EKG without peaked T waves or widened QRS. Improved after shifting and Lokelma. Suspect initial hyperkalemia combination of EUN and increased potassium intake. Now hypokalemic due to decreased K intake and several days of Lokelma 15mg TID.  - Stop Lokelma  - Replete K with 20 mEq PO today     Ascites  Portal venous " "thrombosis  Hypervolemia  ?Hepatorenal syndrome   Currently on apixaban for anticoagulation. Has had recurrent ascites, last paracentesis on 8/24 took of 4L. Diagnostic and therapeutic paracentesis today with 4000mL removed. Hepatology consulted and following.  - Agree with albumin administration after paracentesis  - Will continue workup with GI as outpatient     Recommendations were communicated to primary team via note and verbal.    Nephrology will sign off at this time. Please page fellow if further questions.     Seen and discussed with attending Nephrologist, Dr. Abad.    Neeta Heredia MD  Nephrology Fellow   [id number]    Interval History :   Nursing and provider notes from last 24 hours reviewed.  No acute events overnight     Review of Systems:   I reviewed the following systems:  GI: Good appetite. No nausea or vomiting or diarrhea. Feels bloated.  Neuro:  No confusion  Constitutional:  no fever or chills  CV: No dyspnea or edema.  No chest pain.    Physical Exam:   I/O last 3 completed shifts:  In: 739 [P.O.:739]  Out: 450 [Urine:450]   BP (!) 147/71 (BP Location: Right arm)   Pulse 82   Temp 98.3  F (36.8  C) (Oral)   Resp 14   Ht 1.803 m (5' 11\")   Wt 79.6 kg (175 lb 8 oz)   SpO2 96%   BMI 24.48 kg/m          GENERAL APPEARANCE: Sitting in bed, no acute distress  EYES: No scleral icterus, pupils equal  Lymphatics: no cervical or supraclavicular LAD  Pulmonary: lungs clear to auscultation with equal breath sounds bilaterally, no clubbing  CV: Regular rhythm, normal rate, no rub   - JVD none   - Edema 1+ up to mid thigh bilaterally  GI: soft, nontender, normal bowel sounds, decreased ascites but still significant  MS: no evidence of inflammation in joints, no muscle tenderness  : valdovinos present  SKIN: no rash, warm, dry, no cyanosis  NEURO: face symmetric, no asterixis     Labs:   All labs reviewed by me  Electrolytes/Renal -   Recent Labs   Lab Test 09/02/22  1229 09/02/22  0738 09/02/22  0552 " 09/01/22 2157 09/01/22  1938 09/01/22  1734 09/01/22  1359 09/01/22  0756 09/01/22  0622 08/31/22  1353 08/31/22  1058 08/29/22  1600 08/29/22  1416   NA  --   --  133*  --   --   --   --   --  132*  --  130*   < > 127*   POTASSIUM  --   --  3.2*  --  3.4  --  2.8*  --  3.1*  --  4.0   < > 6.7*   CHLORIDE  --   --  91*  --   --   --   --   --  89*  --  89*   < > 89*   CO2  --   --  24  --   --   --   --   --  23  --  24   < > 18*   BUN  --   --  78.9*  --   --   --   --   --  76.0*  --  75.3*   < > 83.4*   CR  --   --  2.99*  --   --   --   --   --  2.90*  --  3.08*   < > 4.28*   * 135* 116*   < >  --    < >  --    < > 121*   < > 119*   < > 137*   AMIRAH  --   --  9.2  --   --   --   --   --  9.3  --  9.7   < > 9.8   MAG  --   --   --   --   --   --   --   --   --   --   --   --  2.4*   PHOS  --   --   --   --   --   --   --   --   --   --   --   --  6.3*    < > = values in this interval not displayed.       CBC -   Recent Labs   Lab Test 09/02/22 0552 09/01/22 0622 08/31/22  1037   WBC 12.6* 15.5* 14.1*   HGB 9.3* 9.9* 11.8*    205 212       LFTs -   Recent Labs   Lab Test 09/02/22 0552 09/01/22 0622 08/31/22  0602   ALKPHOS 69 77 81   BILITOTAL 0.6 0.8 0.5   ALT 6* 7* 7*   AST 23 23 26   PROTTOTAL 6.4 6.5 6.3*   ALBUMIN 4.2 4.0 3.5       Iron Panel - No lab results found.      Imaging:  All imaging studies reviewed by me.     Current Medications:    amLODIPine  10 mg Oral Daily     apixaban ANTICOAGULANT  5 mg Oral BID     [Held by provider] dicyclomine  20 mg Oral TID     escitalopram  20 mg Oral QAM     [Held by provider] furosemide  40 mg Oral BID     insulin aspart  1-7 Units Subcutaneous TID AC     insulin aspart  1-5 Units Subcutaneous At Bedtime     sodium chloride (PF)  3 mL Intracatheter Q8H       - MEDICATION INSTRUCTIONS -       Neeta Marion MD

## 2022-09-02 NOTE — PLAN OF CARE
Goal Outcome Evaluation:    Assumed cares from 2300 to 0700    Patient calm and cooperative. A/OX4, no signs of distress or SOB. Tolerated all meds. Requested PRN Melatonin and Tylenol at bedtime. Patient is on telemetry, 2L fluid restriction and 2 g. Sodium diet.  C/O discomfort over R rib cage. Aparicio catheter removal scheduled for 09/02. Continue to monitor and follow POC.    Plan of Care Reviewed With: patient     Overall Patient Progress: no change

## 2022-09-03 NOTE — PLAN OF CARE
[id number] Able to void at 2100 + 0000 post valdovinos removal. Unmeasured urine occurrence at 0000. Attempted to get PVR after midnight but was unsuccessful. Got a range of values sporadically  mL. Abdominal ascites likely interfering with accurate bladder scan. Pt did not feel like he was currently retaining. Urine clear and yellow.  A/O x4. Sleep promoted; tylenol and melatonin given. Got about 5-6 hours of sleep. Slight tremor noted in upper hands. [place]. Swelling present in bilateral legs. Bed alarm on and call light within reach. Q1hr rounding completed [id number]. Q2hr rounding completed 5527-1918.  Ambulating to bathroom with stand by assist.   Goal Outcome Evaluation:    Plan of Care Reviewed With: patient     Overall Patient Progress: improving    Outcome Evaluation: Able to void x2 post valdovinos removal. A/O x4.

## 2022-09-03 NOTE — PLAN OF CARE
Discharged to: home  Transportation: ride with wife  Time: 1115  Prescriptions: none to pickup  Belongings: all packed up for pt  PIV/Access: deaccessed  Care Plan and Education discontinued: yes  Paperwork: discussed with pt and wife, given to wife    Assumed cares [id number]. AxOx4. VSS on RA. SBA with walker. On 2g K diet and 2L FR -- tolerating well. Miralax given prior to discharge per pt request. No acute events prior to discharge.
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
## 2022-09-03 NOTE — PLAN OF CARE
Physical Therapy Discharge Summary    Reason for therapy discharge:    Discharged to home with outpatient therapy.    Progress towards therapy goal(s). See goals on Care Plan in [facility] electronic health record for goal details.  Goals partially met.  Barriers to achieving goals:   discharge from facility.    Therapy recommendation(s):    Continued therapy is recommended.  Rationale/Recommendations:  endurance,, strength, improve safety with gait using 2WW and progressing no AD. Pt was issued 2WW for safety upon discharge.

## 2022-09-03 NOTE — PROGRESS NOTES
Care Management Discharge Note    Discharge Date: 09/03/2022       Discharge Disposition: Home, Transitional Care    Discharge Services: Other (see comment) (paracentesis; 4L out today)    Discharge DME:      Discharge Transportation: family or friend will provide    Private pay costs discussed: Not applicable    PAS Confirmation Code:  n/a  Patient/family educated on Medicare website which has current facility and service quality ratings:  no    Education Provided on the Discharge Plan: yes    Persons Notified of Discharge Plans: patient  Patient/Family in Agreement with the Plan: yes    Handoff Referral Completed: Yes    Additional Information:  Patient discharged home. RNCC called patient to go over IMM, offered questions, received verbal approval for signature, faxed form to hospitals ([phone]). Patient does not endorse any home discharge planning needs.    DON Adams, BA, RN, CMSRN, RNCC  Covering Units 6D/OBS  Pager: [phone]  Phone: [phone]  6th floor Weekend/Holiday Pager: [phone]  Observation weekend/after hours: [phone]

## 2022-09-04 NOTE — DISCHARGE SUMMARY
[facility]  Hospitalist Discharge Summary      Date of Admission:  8/29/2022  Date of Discharge:  9/3/2022 11:00 AM  Discharging Provider: RASHAUN SWARTZ MD  Discharge Service: Hospitalist Service, GOLD TEAM 9    Discharge Diagnoses   EUN, unclear if there was CKD  Hyponatremia  Hyperkalemia  Ascites  Non cirrhotic portal HTN  Thrombosis of portal vein        Follow-ups Needed After Discharge   Follow-up Appointments     Adult [facility] Follow-up and recommended labs and tests      Follow up with primary care provider, Jerel Curran, within 7 days for   hospital follow- up.  The following labs/tests are recommended: BMP in 3   days 9/6/22.      Appointments on [place] and/or [facility] (with [facility] or [facility]   provider or service). Call [phone] if you haven't heard regarding   these appointments within 7 days of discharge.             Unresulted Labs Ordered in the Past 30 Days of this Admission     Date and Time Order Name Status Description    8/30/2022 12:12 PM Ascites Fluid Aerobic Bacterial Culture Routine Preliminary     8/30/2022  9:21 AM Blood Culture Hand, Right Preliminary     8/30/2022  9:21 AM Blood Culture Wrist, Left Preliminary       These results will be followed up by pcp    Discharge Disposition   Discharged to home  Condition at discharge: Stable      Hospital Course              Kody Reynaga is a 77 year old male admitted on 8/29/2022. PMHx of  dyslipidemia, hypertension, subdural hematoma status postcraniotomy, hx of prostate cancer status post prostatectomy,Portal vein thrombosis on  Eliquis, ascites which is new in the last several months, chronic pancreatitis, questionable pancreatic lesion ?mass? workup has been negative/inconclusive, dilated intrahepatic bile duct on CT, who was admitted on 8/29/22 due to EUN, and worsening ascites, he underwent therapeutic and diagnostic paracentesis which were negative for any signs of infection.  His creatitine imrpoved with albumin and probably with therapeutic paracentesis.  He is being discharged with outpatient follow up with pcp, nephrology and gI.      EUN: Likely pre-renal, hepatorenal syndrome less likely given no known cirrhosis  Hyperkalemia: resolved, now hypokalemia. Lokelma stopped  Baseline Creatinine 0.9.1.0. Creatinine 4 at admission, now improved slightly  albumin 25% 50 gm daily for 3 days, last dose today  Plan:   Lasix discontinue to hold at discharge   Advised to avoid high potassium diet at home   Nephrology outpatient referral provided         Non Cirrhotic Portal HTN with ascites: likely due to portal vein thrombosis  As per GI, there has been discussions regarding possibility of cirrhosis and possible consideration of Liver biopsy as outpatient  Patient has history of painless jaundice with numerous placements of biliary stents (last placed 5/10/22) and inconclusive history of pancreatic mass- Repeated FNA negative or, some suspicious/ inconclusive findings (please refer to GI note for details)  LFTs wnl- No plan for pancreatic /biliary intervention during this admission per GI  8/30/22 Paracentesis with 4 L fluid removal,   9/1/22 repeat therapeutic paracentesis with 4 L removed  Cytology negative for malignancy  Plan:   Fluid restriction 2 L, diet sodium restricted renal diet   Continue AC with Eliquis 5 mg BID    IR evaluation scheduled for 9/6 for consideration of recanalization    F/u with Dr. Garvin for pancreatic lesion and GI referral provided      Thrombosis of Portal vein, SMV, Splenic vein- initially noted 02/2022  Started on AC with Apixaban in 05/2022  AC interrupted in June-July due to SDH- restarted in 08/2022             Consultations This Hospital Stay   NEPHROLOGY GENERAL ADULT IP CONSULT  INTERNAL MEDICINE PROCEDURE TEAM ADULT IP CONSULT [facility] - PARACENTES[facility]  NEPHROLOGY GENERAL ADULT IP CONSULT  GI HEPATOLOGY ADULT IP CONSULT  CARE MANAGEMENT / SOCIAL WORK IP  CONSULT  PHYSICAL THERAPY ADULT IP CONSULT  OCCUPATIONAL THERAPY ADULT IP CONSULT  INTERNAL MEDICINE PROCEDURE TEAM ADULT IP CONSULT [place] - PARACENTESIS  NURSING TO CONSULT FOR VASCULAR ACCESS CARE IP CONSULT    Code Status   Prior    Time Spent on this Encounter   I, RASHAUN SWARTZ MD, personally saw the patient today and spent greater than 30 minutes discharging this patient.       RASHAUN SWARTZ MD  [facility] [address] [place]  [address] [facility] [place] [address]  Phone: [phone]  ______________________________________________________________________    Physical Exam   Vital Signs: Temp: 97.5  F (36.4  C) Temp src: Oral BP: 126/60 Pulse: 91   Resp: 16 SpO2: 96 % O2 Device: None (Room air)    Weight: 183 lbs 6.76 oz  General Appearance: AO x3, no distress  Respiratory: CTA bilaterally  Cardiovascular: S1 and S2 normal  GI: non tender, + distended,   Skin: no skin rash  Other: No pedal edema        Primary Care Physician   Jerel Curran    Discharge Orders      Basic metabolic panel     Adult GI  Referral - Consult Only      Adult Nephrology  Referral      Adult GI  Referral - Consult Only      Primary Care - Care Coordination Referral      Reason for your hospital stay    Acute kidney injury, slightly improved.  Ascites, underwent paracentesis x 2, no infection of cancer in fluid     Activity    Your activity upon discharge: activity as tolerated     Adult [facility] Follow-up and recommended labs and tests    Follow up with primary care provider, Jerel Curran, within 7 days for hospital follow- up.  The following labs/tests are recommended: BMP in 3 days 9/6/22.      Appointments on [place] and/or [facility] (with [facility] or [facility] provider or service). Call [phone] if you haven't heard regarding these appointments within 7 days of discharge.     [place] Order    I, the undersigned, certify that the above prescribed supplies are medically necessary for this  patient and is both reasonable and necessary in reference to accepted standards of medical and necessary in reference to accepted standards of medical practice in the treatment of this patient's condition and is not prescribed as a convenience.      Diet    Follow this diet upon discharge: Orders Placed This Encounter      Fluid restriction 2000 ML FLUID      2 Gram K Diet       Significant Results and Procedures   Results for orders placed or performed during the hospital encounter of 08/29/22   XR Chest Port 1 View    Narrative    Portable chest    INDICATION: Leukocytosis    COMPARISON: Chest CT to/7/22    FINDINGS: Heart size normal. Degenerative changes in the thoracic  spine. Left lower lung subsegmental atelectasis. Bony structures  grossly intact otherwise.      Impression    IMPRESSION: Left lower lung subsegmental atelectasis. Recommend  follow-up to clearing to exclude developing infiltrate.    ISIDRO MCKEON MD         SYSTEM ID:  [id number]   US Renal Complete w Duplex Complete    Narrative    Exam: Duplex Doppler ultrasound of the kidneys and bilateral renal  arteries dated 8/31/2022 11:00 AM    Comparison Study: CT dated 8/11/2022    Clinical Information: EUN    Technique: B-mode (grayscale) and duplex Doppler evaluation of the  abdominal aorta and renal arteries performed. Velocity measurements  obtained with angle correction at or less than 60 degrees.    Ordering provider: Mikael Farah MD     Findings:    The right kidney measures 9.0 cm in length. Multiple parapelvic cysts  of the right kidney. The left kidney measures 12.6 cm in length.  Cortical thickness and echogenicity is normal. No evidence of stones,  masses or hydronephrosis.    Peak systolic velocities in the abdominal aorta are:     54 cm/sec in the suprarenal aorta.   Infrarenal aorta is obscured.    Right renal artery velocities:    Origin: Not visualized   Mid renal artery: Not visualized   Hilum/distal renal artery: 23/6  cm/sec    Resistive indices in the arcuate arteries:     Inferior pole: 0.80  Mid pole: 0.76  Superior pole: 0.68    Renal artery to aorta ratio: 0.48    Left renal artery velocities:    Origin: Not visualized  Mid renal artery: 35/10 cm/sec   Hilum/distal renal artery: 35/9 cm/sec    Resistive indices in the arcuate arteries:     Inferior pole: 0.73  Mid pole: 0.69  Superior pole: 0.73    Renal artery to aorta ratio: 0.65    Incidental ascites.       Impression    Impression: Limited examination secondary to bowel gas shadowing.    1. Right kidney:    1a. Renal parenchyma: Right kidney is asymmetrically decreased in size  compared to the left. Multiple parapelvic cysts. No hydronephrosis.  1b. Renal artery: Patent Doppler examination without evidence of renal  artery stenosis.    2. Left kidney:    2a. Renal parenchyma: Normal grayscale appearance of the left kidney.  2b. Renal artery: Patent Doppler examination without evidence of renal  artery stenosis.     Echocardiogram Complete     Value    LVEF  65-70%    Narrative    [id number]  [id number]  [id number]  [id number]^YOVANI^KELL     [facility],[place]  Echocardiography Laboratory  [address] [place] [id number]     Name: SUZIE CARSON  MRN: [id number]  : 1945  Study Date: 2022 08:46 AM  Age: 77 yrs  Gender: Male  [facility] Location: [facility]  Reason For Study: Edema  Ordering Physician: KELL PINA  Performed By: Carla Lomax     BSA: 2.1 m2  Height: 71 in  Weight: 192 lb  ______________________________________________________________________________  Procedure  Complete Portable Echo Adult.  ______________________________________________________________________________  Interpretation Summary  Global and regional left ventricular function is hyperkinetic with an EF of  65-70%.  The right ventricle is normal size.Global right ventricular function is  normal.  No significant valvular abnormality.  No pericardial  effusion is present.  There is no prior study for direct comparison.  ______________________________________________________________________________  Left Ventricle  Left ventricular wall thickness is normal. Left ventricular size is normal.  Global and regional left ventricular function is hyperkinetic with an EF of  65-70%. Left ventricular diastolic function is normal. No regional wall motion  abnormalities are seen.     Right Ventricle  The right ventricle is normal size. Global right ventricular function is  normal.     Atria  Both atria appear normal.     Mitral Valve  The mitral valve is normal.     Aortic Valve  Aortic valve is normal in structure and function. The aortic valve is  tricuspid.     Tricuspid Valve  The tricuspid valve is normal. The peak velocity of the tricuspid regurgitant  jet is not obtainable. Pulmonary artery systolic pressure cannot be assessed.     Pulmonic Valve  The valve leaflets are not well visualized. On Doppler interrogation, there is  no significant stenosis or regurgitation.     Vessels  Aortic root is borderline measuring 4cm (indexed at 1.9cm/m2). The thoracic  aorta is normal. The inferior vena cava cannot be assessed.     Pericardium  No pericardial effusion is present.     Miscellaneous  A left pleural effusion is present.     Compared to Previous Study  There is no prior study for direct comparison.  ______________________________________________________________________________  MMode/2D Measurements & Calculations  IVSd: 0.97 cm  LVIDd: 4.6 cm  LVIDs: 3.2 cm  LVPWd: 1.1 cm  FS: 29.5 %  LV mass(C)d: 161.8 grams  LV mass(C)dI: 78.1 grams/m2  Ao root diam: 4.1 cm  asc Aorta Diam: 3.9 cm  LVOT diam: 2.5 cm  LVOT area: 4.9 cm2  LA Volume (BP): 46.7 ml     LA Volume Index (BP): 22.6 ml/m2  RWT: 0.47     Doppler Measurements & Calculations  MV E max lora: 60.9 cm/sec  MV A max lora: 112.0 cm/sec  MV E/A: 0.54  MV dec slope: 287.0 cm/sec2  E/E' av.4  Lateral E/e': 7.2  Medial  E/e': 9.7     ______________________________________________________________________________  Report approved by: Boston Aguila 08/30/2022 10:27 AM               Discharge Medications   Discharge Medication List as of 9/3/2022 10:22 AM      CONTINUE these medications which have NOT CHANGED    Details   acetaminophen (TYLENOL) 325 MG tablet Take 325 mg by mouth every 6 hours as needed for mild pain, Historical      amLODIPine (NORVASC) 10 MG tablet Take 1 tablet (10 mg) by mouth daily, Disp-90 tablet, R-0, No Print Out      Apixaban (ELIQUIS PO) Take 5 mg by mouth 2 times daily, Historical      dicyclomine (BENTYL) 20 MG tablet Take 20 mg by mouth 3 times daily, Historical      escitalopram (LEXAPRO) 20 MG tablet Take 1 tablet by mouth every morning , Historical      lovastatin (MEVACOR) 20 MG tablet Take 2 tablets (40 mg) by mouth every morning, Disp-90 tablet, R-0, No Print Out      melatonin 5 MG CAPS Take 1 tablet by mouth nightly as needed , Historical      omeprazole (PRILOSEC) 20 MG DR capsule Take 20 mg by mouth every morning , Historical      coenzyme Q-10 10 MG CAPS Take 1 capsule by mouth every morning , Historical         STOP taking these medications       furosemide (LASIX) 40 MG tablet Comments:   Reason for Stopping:             Allergies   No Known Allergies

## 2022-09-06 PROBLEM — K76.7 HEPATORENAL SYNDROME (H): Status: ACTIVE | Noted: 2022-01-01

## 2022-09-06 NOTE — TELEPHONE ENCOUNTER
Wife calling saying that patient was in hospital all of last week for kidney failure and now today he is much weaker.  This RN advised patient be brought to ER now.  Wife agrees and will take him now.     Barb MOROCHON, RN

## 2022-09-06 NOTE — ED TRIAGE NOTES
BIBA from home  Was here Monday-Saturday for acute renal failure with DM  Today c/o weakness, confusion  Scheduled for a paracentesis tomorrow  States LBM today and did void today
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
## 2022-09-06 NOTE — ED PROVIDER NOTES
[facility] ([facility])  September 6, 2022     History     Chief Complaint   Patient presents with     Altered Mental Status     HPI  Kody Reynaga is a 77 year old male with a past medical history including acute renal failure, stage III CKD, subdural hematoma s/p craniotomy, portal vein thrombosis on Eliquis who presents to the Emergency Department for evaluation of altered mental status and fatigue.  Patient was recently hospitalized on 8/29 for worsening renal function likely in the context of hepatorenal syndrome.  Since discharge home, wife states he was unable to get out of bed for the past day or so and seemed very altered to her throughout the day today.  No fevers, chills or other infectious symptoms.  Patient is not reporting any chest pain or difficulty breathing.  He states he has been voiding normally, despite having issues prior to his last hospital stay.  He does endorse some abdominal pain and discomfort but has not had any vomiting or nausea.  Wife was unable to give him any of his medications today.  He is scheduled for paracentesis tomorrow.  He has been taking Eliquis and has not missed any doses of this.      Per chart review, patient was admitted to the hospital 8/29/2022-9/3/2022 due to EUN and worsening ascites.  He underwent therapeutic and diagnostic paracentesis which were negative for any signs of infection.  His creatinine improved his abdomen and probably with therapeutic paracentesis.  He was discharged with outpatient follow-up with PCP, nephrology, and GI.    Duplex Doppler ultrasound of the kidneys and bilateral renal arteries dated 8/31/2022 11:00 AM                                                            Impression: Limited examination secondary to bowel gas shadowing.  1. Right kidney:  1a. Renal parenchyma: Right kidney is asymmetrically decreased in size  compared to the left. Multiple parapelvic cysts. No hydronephrosis.  1b. Renal  artery: Patent Doppler examination without evidence of renal  artery stenosis.  2. Left kidney:  2a. Renal parenchyma: Normal grayscale appearance of the left kidney.  2b. Renal artery: Patent Doppler examination without evidence of renal  artery stenosis.    XR Chest Port 1 View 8/30/2022  9:39 AM  IMPRESSION: Left lower lung subsegmental atelectasis. Recommend  follow-up to clearing to exclude developing infiltrate.    Past Medical History  Past Medical History:   Diagnosis Date     Anxiety      Depression      Gastroesophageal reflux disease      Gout      Hypercholesterolemia      Hypertension      IBS (irritable bowel syndrome)      Pancreatic disease      Prostate cancer (H)      Past Surgical History:   Procedure Laterality Date     CATARACT EXTRACTION W/ INTRAOCULAR LENS IMPLANT Right      COLONOSCOPY       cryoablation of prostate       ENDOSCOPIC RETROGRADE CHOLANGIOPANCREATOGRAM       ENDOSCOPIC RETROGRADE CHOLANGIOPANCREATOGRAM N/A 1/24/2022    Procedure: ENDOSCOPIC RETROGRADE CHOLANGIOPANCREATOGRAPHY, BILIARY STENT EXCHANGE, sPY WITH BIOPSIES AND BRUSHINGS;  Surgeon: Guru Ab Gonzalez MD;  Location: [facility] OR     ENDOSCOPIC RETROGRADE CHOLANGIOPANCREATOGRAM N/A 2/24/2022    Procedure: ENDOSCOPIC RETROGRADE CHOLANGIOPANCREATOGRAPHY, with stent placement and bile duct biopsy;  Surgeon: Anthony Abarca MD;  Location: [facility] OR     ENDOSCOPIC RETROGRADE CHOLANGIOPANCREATOGRAM N/A 5/10/2022    Procedure: ENDOSCOPIC RETROGRADE CHOLANGIOPANCREATOGRAPHY, pyloric dilation, bebris removal, biliary stent exchange;  Surgeon: Anthony Abarca MD;  Location: [facility] OR     ENDOSCOPIC RETROGRADE CHOLANGIOPANCREATOGRAM WITH SPYGLASS N/A 10/14/2021    Procedure: ENDOSCOPIC RETROGRADE CHOLANGIOPANCREATOGRAPHY, WITH DIRECT DUCT VISUALIZATION, USING PANCREATICOBILIARY FIBEROPTIC PROBE-spyglass, biliary sludge and clot removal, biliary biopsies, biliary stent exchange;  Surgeon: Anthony Abarca MD;   Location: [facility] OR     ENDOSCOPIC ULTRASOUND UPPER GASTROINTESTINAL TRACT (GI) N/A 2021    Procedure: ENDOSCOPIC ULTRASOUND, ESOPHAGOSCOPY / UPPER GASTROINTESTINAL TRACT (GI);  Surgeon: Babak Garvin MD;  Location: [facility] GI     ENDOSCOPIC ULTRASOUND UPPER GASTROINTESTINAL TRACT (GI) N/A 2022    Procedure: ENDOSCOPIC ULTRASOUND, ESOPHAGOSCOPY / UPPER GASTROINTESTINAL TRACT (GI);  Surgeon: Guru Ab Gonzalez MD;  Location: [facility] OR     ESOPHAGOSCOPY, GASTROSCOPY, DUODENOSCOPY (EGD), COMBINED N/A 10/14/2021    Procedure: ESOPHAGOGASTRODUODENOSCOPY, WITH FINE NEEDLE ASPIRATION BIOPSY, WITH ENDOSCOPIC ULTRASOUND GUIDANCE, biliary stent removal;  Surgeon: Babak Garvin MD;  Location: [facility]     ESOPHAGOSCOPY, GASTROSCOPY, DUODENOSCOPY (EGD), COMBINED N/A 2022    Procedure: ESOPHAGOGASTRODUODENOSCOPY WITH DILATION AND STENT REMOVAL, ENDOSCOPIC ULTRASOUND WITH FINE NEEDLE BIOPSIES;  Surgeon: Babak Garvin MD;  Location: [facility] OR     MOUTH SURGERY       TONSILLECTOMY       acetaminophen (TYLENOL) 325 MG tablet  amLODIPine (NORVASC) 10 MG tablet  Apixaban (ELIQUIS PO)  coenzyme Q-10 10 MG CAPS  dicyclomine (BENTYL) 20 MG tablet  escitalopram (LEXAPRO) 20 MG tablet  lovastatin (MEVACOR) 20 MG tablet  melatonin 5 MG CAPS  omeprazole (PRILOSEC) 20 MG DR capsule      No Known Allergies  Family History  Family History   Problem Relation Age of Onset     Heart Failure Mother      Cancer Father      Anesthesia Reaction No family hx of      Cardiovascular No family hx of      Deep Vein Thrombosis (DVT) No family hx of      Social History   Social History     Tobacco Use     Smoking status: Former Smoker     Types: Cigarettes     Quit date: 1970     Years since quittin.7     Smokeless tobacco: Never Used   Vaping Use     Vaping Use: Never used   Substance Use Topics     Alcohol use: Yes     Comment: cutting down, drinking NA beer     Drug use: Never      Past medical history, past  "surgical history, medications, allergies, family history, and social history were reviewed with the patient. No additional pertinent items.       Review of Systems  A complete review of systems was performed with pertinent positives and negatives noted in the HPI, and all other systems negative.    Physical Exam   BP: (!) 136/96  Pulse: 91  Temp: 97.5  F (36.4  C)  Resp: 18  Height: 170.2 cm (5' 7\")  Weight: 83.9 kg (185 lb)  SpO2: 100 %  Physical Exam  General: no acute distress. Appears stated age.   HENT: mucus membranes frank, no oropharyngeal lesions  Eyes: PERRL, normal sclerae  Neck: non-tender, supple  Cardio: Regular rate. Regular rhythm. Extremities well perfused  Resp: Normal work of breathing, normal respiratory rate.  Chest/Back: no visual signs of trauma, no CVA tenderness  Abdomen: diffuse tenderness,distended, no rebound, no guarding  Neuro: alert, disoriented and unable to answer questions. CN II-XII grossly intact. Grossly normal strength and sensation in all extremities.   MSK: no deformities. Grossly normal ROM in extremities.   Integumentary/Skin: no rash visualized, normal color  Psych: normal affect    ED Course      Procedures            EKG Interpretation:      Interpreted by Charline Madsen MD  Time reviewed: 16:35  Symptoms at time of EKG: AMS   Rhythm: normal sinus   Rate: normal  Axis: normal  Ectopy: none  Conduction: normal  ST Segments/ T Waves: No ST-T wave changes  Q Waves: none  Comparison to prior: Unchanged from 8/29/22    Clinical Impression: normal EKG              Results for orders placed or performed during the hospital encounter of 09/06/22   CT Abdomen Pelvis w/o Contrast     Status: None    Narrative    EXAMINATION: CT ABDOMEN PELVIS W/O CONTRAST  9/6/2022 4:51 PM      CLINICAL HISTORY: elevated lipase    COMPARISON: CT 8/11/2022    PROCEDURE COMMENTS: CT of the abdomen was performed without  intravenous and oral contrast. Coronal and sagittal reformatted images  were " obtained.    FINDINGS:  Lower thorax:   Incompletely imaged right pleural effusion and compressive  atelectasis, new from prior CT. Left lower lobe basilar and inferior  lingular subsegmental atelectasis.    Abdomen and pelvis:  Increasing intrahepatic biliary dilation, for example a right  intrahepatic bile duct on series 3 image 72 measures 11 mm previously  up to 8 mm. Known portal, SMV and splenic vein thrombosis not able to  be assessed on noncontrast study. No cholelithiasis. Fatty atrophy of  the pancreas, however without substantial peripancreatic inflammation.  Unremarkable unenhanced spleen and adrenal glands. No hydronephrosis  or obstructing urinary calculi.    No small or large bowel obstruction. Diverticulosis without  diverticulitis.    Increasing now moderate to large volume of simple density ascites. No  pneumoperitoneum or portal venous gas. Vascular patency not assessed  on noncontrast exam.    Bones/soft tissues: New acute appearing and displaced left seventh  through ninth anterolateral rib fractures.       Impression    IMPRESSION:  1. Increasing intrahepatic biliary dilation from 8/11/2022.  2. Increasing now moderate to large volume abdominal ascites. New  partially imaged right pleural effusion.  3. Acute appearing right anterolateral seventh through ninth rib  fractures.    I have personally reviewed the examination and initial interpretation  and I agree with the findings.    JOSTIN DAVIDSON MD         SYSTEM ID:  [id number]   Head CT w/o contrast     Status: None    Narrative    CT HEAD W/O CONTRAST 9/6/2022 4:51 PM    History: confusion   ICD-10:    Comparison: No prior relevant imaging available for comparison    Technique: Using multidetector thin collimation helical acquisition  technique, axial, coronal and sagittal CT images from the skull base  to the vertex were obtained without intravenous contrast.   (topogram) image(s) also obtained and reviewed.    Findings: There is no new  intracranial hemorrhage, mass effect, or  midline shift. Left anterior frontal encephalomalacia/gliosis. No  acute loss of gray-white matter differentiation is suspected..  Ventricles are proportionate to the cerebral sulci. The basal cisterns  are clear. Midline posterior fossa arachnoid cyst versus an enlarged  cisterna magna.     Prior left temporal craniotomy. Underlying extra-axial hyperdensity,  presumably duraplasty changes. Trace amount of extra-axial fluid and  gas which is likely residual from prior procedural (series 8 image  38). However prior imaging is not available for comparison at the time  of dictation. The visualized portions of the paranasal sinuses and  mastoid air cells are clear.      Impression    Impression:  1. No definite evidence for acute intracranial pathology.  2. Left calvarial craniotomy, with presumed duraplasty. Trace residual  extra-axial fluid and air subjacent to the craniotomy flap, reportedly  present on outside CT reports. However imaging is not available at the  time of comparison.         I have personally reviewed the examination and initial interpretation  and I agree with the findings.    PASCUAL MAY MD         SYSTEM ID:  [id number]   [place] Draw     Status: None    Narrative    The following orders were created for panel order [place] Draw.  Procedure                               Abnormality         Status                     ---------                               -----------         ------                     Extra Blue Top Tube[[id number]]                              Final result               Extra Red Top Tube[[id number]]                               Final result               Extra Green Top (Lithium...[[id number]]                      Final result               Extra Purple Top Tube[[id number]]                            Final result                 Please view results for these tests on the individual orders.   Comprehensive metabolic panel     Status:  Abnormal   Result Value Ref Range    Sodium 131 (L) 136 - 145 mmol/L    Potassium 5.3 3.4 - 5.3 mmol/L    Creatinine 4.26 (H) 0.67 - 1.17 mg/dL    Urea Nitrogen 120.0 (H) 8.0 - 23.0 mg/dL    Chloride 89 (L) 98 - 107 mmol/L    Carbon Dioxide (CO2) 24 22 - 29 mmol/L    Anion Gap 18 (H) 7 - 15 mmol/L    Glucose 165 (H) 70 - 99 mg/dL    Calcium 9.6 8.8 - 10.2 mg/dL    Protein Total 6.5 6.4 - 8.3 g/dL    Albumin 4.0 3.5 - 5.2 g/dL    Bilirubin Total 1.3 (H) <=1.2 mg/dL    Alkaline Phosphatase 125 40 - 129 U/L    AST 27 10 - 50 U/L    ALT 12 10 - 50 U/L    GFR Estimate 14 (L) >60 mL/min/1.73m2   Lipase     Status: Abnormal   Result Value Ref Range    Lipase 548 (H) 13 - 60 U/L   INR     Status: Abnormal   Result Value Ref Range    INR 2.50 (H) 0.85 - 1.15   Ammonia (on ice)     Status: Abnormal   Result Value Ref Range    Ammonia 67 (H) 16 - 60 umol/L   Extra Blue Top Tube     Status: None   Result Value Ref Range    Hold Specimen JIC    Extra Red Top Tube     Status: None   Result Value Ref Range    Hold Specimen JIC    Extra Green Top (Lithium Heparin) Tube     Status: None   Result Value Ref Range    Hold Specimen JIC    Extra Purple Top Tube     Status: None   Result Value Ref Range    Hold Specimen JIC    CBC with platelets and differential     Status: Abnormal   Result Value Ref Range    WBC Count 19.2 (H) 4.0 - 11.0 10e3/uL    RBC Count 3.36 (L) 4.40 - 5.90 10e6/uL    Hemoglobin 9.4 (L) 13.3 - 17.7 g/dL    Hematocrit 28.8 (L) 40.0 - 53.0 %    MCV 86 78 - 100 fL    MCH 28.0 26.5 - 33.0 pg    MCHC 32.6 31.5 - 36.5 g/dL    RDW 16.1 (H) 10.0 - 15.0 %    Platelet Count 218 150 - 450 10e3/uL    % Neutrophils 88 %    % Lymphocytes 4 %    % Monocytes 7 %    % Eosinophils 0 %    % Basophils 0 %    % Immature Granulocytes 1 %    NRBCs per 100 WBC 0 <1 /100    Absolute Neutrophils 16.9 (H) 1.6 - 8.3 10e3/uL    Absolute Lymphocytes 0.8 0.8 - 5.3 10e3/uL    Absolute Monocytes 1.3 0.0 - 1.3 10e3/uL    Absolute Eosinophils 0.1  0.0 - 0.7 10e3/uL    Absolute Basophils 0.0 0.0 - 0.2 10e3/uL    Absolute Immature Granulocytes 0.2 <=0.4 10e3/uL    Absolute NRBCs 0.0 10e3/uL   Asymptomatic COVID-19 Virus (Coronavirus) by PCR Nasopharyngeal     Status: Normal    Specimen: Nasopharyngeal; Swab   Result Value Ref Range    SARS CoV2 PCR Negative Negative    Narrative    Testing was performed using the Sembraire[employer] Xpress SARS-CoV-2 Assay on the  Cepheid Gene-Xpert Instrument Systems. Additional information about  this Emergency Use Authorization (EUA) assay can be found via the Lab  Guide. This test should be ordered for the detection of SARS-CoV-2 in  individuals who meet SARS-CoV-2 clinical and/or epidemiological  criteria. Test performance is unknown in asymptomatic patients. This  test is for in vitro diagnostic use under the FDA EUA for  laboratories certified under CLIA to perform high complexity testing.  This test has not been FDA cleared or approved. A negative result  does not rule out the presence of PCR inhibitors in the specimen or  target RNA in concentration below the limit of detection for the  assay. The possibility of a false negative should be considered if  the patient's recent exposure or clinical presentation suggests  COVID-19. This test was validated by the [facility]. This laboratory is certified under  the Clinical Laboratory Improvement Amendments of 1988 (CLIA-88) as  qualified to perform high complexity laboratory testing.     EKG 12 lead     Status: None (Preliminary result)   Result Value Ref Range    Systolic Blood Pressure  mmHg    Diastolic Blood Pressure  mmHg    Ventricular Rate 93 BPM    Atrial Rate 93 BPM    MD Interval 146 ms    QRS Duration 94 ms     ms    QTc 474 ms    P Axis 69 degrees    R AXIS -13 degrees    T Axis 72 degrees    Interpretation ECG       Sinus rhythm  Low voltage QRS  Cannot rule out Anterior infarct , age undetermined  Abnormal ECG     CBC  with platelets differential     Status: Abnormal    Narrative    The following orders were created for panel order CBC with platelets differential.  Procedure                               Abnormality         Status                     ---------                               -----------         ------                     CBC with platelets and d...[[id number]]  Abnormal            Final result                 Please view results for these tests on the individual orders.     Medications   lactulose (CHRONULAC) solution 10 g (has no administration in time range)   cefTRIAXone (ROCEPHIN) 2 g vial to attach to  ml bag for ADULTS or NS 50 ml bag for PEDS (0 mg Intravenous Stopped 9/6/22 [id number])        Assessments & Plan (with Medical Decision Making)   Kody Reynaga is a 77-year-old male with history of cirrhosis, stage III CKD hepatorenal syndrome, subdural hematoma status postcraniotomy and portal vein thrombosis on Eliquis who presents to the emergency department for altered mental status.  Initiating work-up with CBC, CMP, ammonia, lipase, INR, head CT and CT abdomen pelvis.    Patient with white count of 19, hemoglobin of 9.4 which is similar to prior.  His creatinine is 4.2 from recent baseline of 2 and BUN is elevated to 120.  Potassium is 5.3.  EKG without peaked T waves.  LFTs are normal, with bili 1.3.  Lipase is elevated to 548 and patient does have history of pancreatitis.  Ammonia is 67.  INR is 2.5.    Given patient is anticoagulated and has an INR of 2.5, will defer paracentesis to IR tomorrow.  Starting 2 g of ceftriaxone for empiric SBP coverage.  CT abdomen pelvis with biliary dilation noted.  Lipase is elevated and will order abdominal ultrasound.  Giving lactulose for elevated ammonia to determine if his altered mental status is from his uremia or possibly hyperammonemia.  Head CT was unchanged from prior.  Patient was signed out to the admitting medicine team and the oncoming ED provider for  follow-up of his ultrasound.  Consulted nephrology who are following but no indication for acute dialysis at this time.    I have reviewed the nursing notes. I have reviewed the findings, diagnosis, plan and need for follow up with the patient.    New Prescriptions    No medications on file       Final diagnoses:   Acute renal failure superimposed on chronic kidney disease, unspecified CKD stage, unspecified acute renal failure type (H)   Hyperammonemia (H)   Uremia       --  Charline HOUSER [facility] EMERGENCY DEPARTMENT  9/6/2022     Charline Madsen MD  Resident  09/06/22 [id number]

## 2022-09-07 NOTE — PROVIDER NOTIFICATION
Provider notified (name/pager): La Winslow MD  Time initial page sent: 0212  Reason for notification: Patient triggered sepsis and lactic acid was 2.6. Code sepsis called.   Recommendation/request given to provider:   Response from provider: Awaiting response.

## 2022-09-07 NOTE — PROGRESS NOTES
"   09/07/22 0910   Quick Adds   Type of Visit Initial Occupational Therapy Evaluation   Living Environment   People in Home spouse   Current Living Arrangements condominium   Home Accessibility stairs to enter home;stairs within home   Number of Stairs, Main Entrance 2   Stair Railings, Main Entrance none   Number of Stairs, Within Home, Primary ten   Stair Railings, Within Home, Primary railing on left side (ascending)   Transportation Anticipated family or friend will provide   Living Environment Comments Per EMR \"Pt lives in [facility] with wife, 2 ALISHA, no railing. Everything pt needs is on main floor but has 10 stairs to basement with railing on L. side when ascending.\" Pt has tub shower.   Self-Care   Usual Activity Tolerance good   Current Activity Tolerance fair   Regular Exercise Yes   Activity/Exercise Type biking;walking;other (see comments)  (golfing)   Equipment Currently Used at Home shower chair;grab bar, tub/shower   Fall history within last six months yes   Number of times patient has fallen within last six months 3   Activity/Exercise/Self-Care Comment Per pt report, pt indep at baseline but recently not as active due to weakness/ confusion   Instrumental Activities of Daily Living (IADL)   IADL Comments Reporting he does some I/ADLs, but wife does most of them. Enjoys golfing and going to country clubs for fun.   General Information   Onset of Illness/Injury or Date of Surgery 09/06/22   Referring Physician Pedro Hanna PA-C   Patient/Family Therapy Goal Statement (OT) To get stronger   Additional Occupational Profile Info/Pertinent History of Current Problem Per EMR \"Kody Reynaga is a 77 year old male admitted on 9/6/2022. He has a past medical history of portal vein thrombosis (on AC), recurrent ascites, chronic pancreatitis, prostate cancer (S/P prostatectomy), SDH (S/P craniotomy), HTN, dyslipidemia. He had a recent admission from 8/29-9/3/22 for EUN, worsening ascites. Ultimately " "discharged home with recommendations for OP follow-up with Nephro, PCP, GI, but returns to ED today after presenting from home via ambulance after feeling unwell at the recommendation of PCP staff. He was admitted for further monitoring and management to Medicine service.\"   Performance Patterns (Routines, Roles, Habits) Roles:    Existing Precautions/Restrictions no known precautions/restrictions   Left Upper Extremity (Weight-bearing Status) full weight-bearing (FWB)   Right Upper Extremity (Weight-bearing Status) full weight-bearing (FWB)   Left Lower Extremity (Weight-bearing Status) full weight-bearing (FWB)   Right Lower Extremity (Weight-bearing Status) full weight-bearing (FWB)   General Observations and Info Activity orders: up with assist   Cognitive Status Examination   Orientation Status orientation to person, place and time   Affect/Mental Status (Cognitive) WFL;confused   Follows Commands follows one-step commands;75-90% accuracy   Cognitive Status Comments Pt admitted w/ confusion, not observed during this session. Pt able to answer RN orientation questions with 100% accuracy, per RN - pt confusion is improving. Will further assess.   Visual Perception   Visual Impairment/Limitations WFL   Sensory   Sensory Quick Adds No deficits were identified   Sensory Comments No sensory concerns reported by pt   Pain Assessment   Patient Currently in Pain No   Integumentary/Edema   Integumentary/Edema no deficits were identifed   Range of Motion Comprehensive   General Range of Motion bilateral upper extremity ROM WFL   Strength Comprehensive (MMT)   General Manual Muscle Testing (MMT) Assessment no strength deficits identified   Comment, General Manual Muscle Testing (MMT) Assessment MMT not formally assessed, strength WFL   Coordination   Upper Extremity Coordination No deficits were identified   Transfers   Transfers sit-stand transfer   Sit-Stand Transfer   Sit-Stand [place] (Transfers) contact " guard   Assistive Device (Sit-Stand Transfers) walker, standard   Bathing Assessment/Intervention   [place] Level (Bathing) minimum assist (75% patient effort)   Assistive Devices (Bathing) shower chair   Position (Bathing) unsupported sitting   Comment, (Bathing) per clinical judgment   Upper Body Dressing Assessment/Training   [place] Level (Upper Body Dressing) contact guard assist   Position (Upper Body Dressing) edge of bed sitting   Comment, (Upper Body Dressing) per clinical judgment   Lower Body Dressing Assessment/Training   [place] Level (Lower Body Dressing) minimum assist (75% patient effort)   Comment, (Lower Body Dressing) per clinical judgment   Grooming Assessment/Training   [place] Level (Grooming) modified independence;set up   Position (Grooming) unsupported sitting   Comment, (Grooming) per clinical judgment   Toileting   [place] Level (Toileting) minimum assist (75% patient effort)   Position (Toileting) unsupported sitting   Comment, (Toileting) per clinical judgment   Clinical Impression   Criteria for Skilled Therapeutic Interventions Met (OT) Yes, treatment indicated   OT Diagnosis Impaired I/ADL performance impacted by decreased activity tolerance and strength   OT Problem List-Impairments impacting ADL problems related to;activity tolerance impaired;cognition;strength   Assessment of Occupational Performance 1-3 Performance Deficits   Identified Performance Deficits LB dressing, bathing, functional mobility   Planned Therapy Interventions (OT) ADL retraining;IADL retraining;strengthening;progressive activity/exercise   Clinical Decision Making Complexity (OT) moderate complexity   Anticipated Equipment Needs Upon Discharge (OT)   (tbd)   Risk & Benefits of therapy have been explained evaluation/treatment results reviewed;care plan/treatment goals reviewed;risks/benefits reviewed;current/potential barriers reviewed;participants voiced agreement with care  plan;participants included;patient   OT Discharge Planning   OT Discharge Recommendation (DC Rec) Transitional Care Facility;home with home care occupational therapy;home with assist   OT Rationale for DC Rec Pt well below baseline for I/ADLs. On this date (09/07), pt primarily limited by decreased activity tolerance and strength. At this time, recommend TCU to increase strength and indep for I/ADLs. If pt were to receive 24/7 assist for I/ADLs, pt would be able to return home safely. Pt's home well set up w/ grab bars and AE at home. Wife able to help w/ I/ADLs. Will update discharge recommendations.   OT Brief overview of current status CGA w/ FWW   Total Evaluation Time (Minutes)   Total Evaluation Time (Minutes) 8   OT Goals   Therapy Frequency (OT) 5 times/wk   OT Predicted Duration/Target Date for Goal Attainment 09/28/22   OT Goals Lower Body Dressing;Transfers;Toilet Transfer/Toileting;OT Goal 1   OT: Upper Body Dressing Modified independent   OT: Lower Body Dressing Modified independent   OT: Transfer Modified independent  (tub transfer)   OT: Toilet Transfer/Toileting Modified independent;cleaning and garment management   OT: Goal 1 Pt will verbalize 3 WS/EC strategies and relation to I/ADLs.
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
## 2022-09-07 NOTE — PLAN OF CARE
Patient is alert and oriented to self and some times to place. Bed alarm on. Tachy when first came to unit. Triggered sepsis and lactic acid was 2.6. Code sepsis called, see provider's note. Denies pain. Was incontinent of bowel and bladder. L PIV SL. Albumin given as ordered. Possible paracentesis today. Sleeping between cares. Continue with plan of care.

## 2022-09-07 NOTE — PROGRESS NOTES
[facility]    Background: Transitional Care Management program auto-identified and prompting a chart review by [facility] team.    Assessment: Upon chart review, [facility] Team member will cancel/close this episode of Transitional Care Management program due to reason below:    Patient has presented to Emergency Department, been readmitted to hospital, or transferred to another hospital.    Plan: Transitional Care Management episode closed per reason above.      Inocencia Turcios  Community Health Worker  [facility]  [phone]      *[facility] Care Resource Team does NOT follow patient ongoing. Referrals are identified based on internal discharge reports and the outreach is to ensure patient has an understanding of their discharge instructions.

## 2022-09-07 NOTE — PROGRESS NOTES
Major Shift Events:    Neuro:  Neuro status unchanged from baseline. Pt is slow to respond.   Resp/Cards: Lungs clear on room air. B/P stable.  GI/: Incontinent of bowel and bladder. Receiving lactulose.  Pain: No complaint of discomfort. Does tolerate movement well.  Plan:  Continue to closely monitor. Tranfer orders in place when bed available.   For vital signs and complete assessments, please see documentation flowsheets.

## 2022-09-07 NOTE — PLAN OF CARE
Goal Outcome Evaluation: Progressing  Shift:   VS: Temp: 98.5  F (36.9  C) Temp src: Temporal BP: 118/51 Pulse: 98   Resp: 16 SpO2: 92 % O2 Device: None (Room air)    Pain: Denies  Neuro: A&OX4, forgetful and lethargic. Bed alarm on for safety.   Cardiac:   Denies chest pain/palpitations  Respiratory: RA, denies SOB  GI/Diet/Appetite: Incontinent of bowel and urine. Multiple loose BMs. Indwelling valdovinos catheter placed for strict I&O and urinary retention. Urine sample collected and sent to lab.   LDA's: PIV TKO  Skin: Forehead wound dressing changed, no other new deficit noted.   Activity: Up with Ax1 and walker  Tests/Procedures:   Pertinent Labs/Lab Collection: CXR and renal US completed. Paracentesis done today, site CDI.       Plan: Continue with cares and update team with any changes.

## 2022-09-07 NOTE — CODE/RAPID RESPONSE
Rapid Response Team Note    Assessment   In assessment a rapid response was called on Kody Reynaga due to SIRS/Sepsis trigger and lactic acidosis. Recent admit from ED with ascites, portal vein thrombosis and abdominal pain. Confused with ammonia of 67, currently receiving lactulose. Afebrile      Plan   -  Continue plan of rocephin and paracentesis in AM  - repeat lactic acid with AM labs    -  The Internal Medicine primary team was able to be reached and they are in agreement with the above plan.  -  Disposition: The patient will remain on the current unit. We will continue to monitor this patient closely.  -  Reassessment and plan follow-up will be performed by the primary team       KATHRINE Ryan CNP  [facility] RRT [facility] Job Code Contact #[phone]  [facility] Paging/Directory    Hospital Course   Brief Summary of events leading to rapid response:   BPA for sepsis eval due to leukocytosis and     Admission Diagnosis:   Hepatorenal syndrome (H) [K76.7]  Hyperammonemia (H) [E72.20]  Uremia [N19]  Acute renal failure superimposed on chronic kidney disease, unspecified CKD stage, unspecified acute renal failure type (H) [N17.9, N18.9]    Physical Exam   Temp: 97.6  F (36.4  C) Temp  Min: 97.5  F (36.4  C)  Max: 99.5  F (37.5  C)  Resp: 18 Resp  Min: 16  Max: 18  SpO2: 97 % SpO2  Min: 95 %  Max: 100 %  Pulse: 99 Pulse  Min: 91  Max: 103    No data recorded  BP: 133/66 Systolic (24hrs), Av , Min:116 , Max:136   Diastolic (24hrs), Av, Min:58, Max:96     I/Os: No intake/output data recorded.     Exam:   General: chronically ill appearing  Mental Status: baseline mental status.      Significant Results and Procedures   Lactic Acid:   Recent Labs   Lab Test 22  0151 22  0355   LACT 2.6* 1.9     CBC:   Recent Labs   Lab Test 22  1421 22  0509 22  0552   WBC 19.2* 14.1* 12.6*   HGB 9.4* 9.4* 9.3*   HCT 28.8* 28.8* 28.8*    161 157        Sepsis Evaluation    The patient is not known to have an infection. Scheduled for para in AM    Anti-infectives (From now, onward)    Start     Dose/Rate Route Frequency Ordered Stop    09/07/22 1700  cefTRIAXone (ROCEPHIN) 2 g vial to attach to  ml bag for ADULTS or NS 50 ml bag for PEDS         2,000 mg  over 30 Minutes Intravenous EVERY 24 HOURS 09/06/22 2032

## 2022-09-07 NOTE — CONSULTS
Nephrology Initial Consult  September 7, 2022      Kody Reynaga MRN:[id number] YOB: 1945  Date of Admission:9/6/2022  Primary care provider: Jerel Curran  Requesting physician: Tobias Snyder MD    ASSESSMENT AND RECOMMENDATIONS:   EUN on CKD-Difficult to determine baseline as Cr has been on the rise for the last 6 months likely with frequent EUN related to portal vein thrombosis.  Cr of 4.2 on admission, slightly better this am at 4.0 but gfr is certainly low and muscle mass suggests it may be worse than Cr would suggest.  No NSAID's at home, no nephrotoxins in recent hx.  Likely hemodynamic related to ascites due to portal vein thrombus and suspected SBP based on elevated WBC.  I had ~15 minute discussion with pt's wife (pt was too confused to meaningfully participate) that his long term trajectory is concerning for needing dialysis and that this would be a difficult course given his other co-morbidities and functional status but that we can treat for infection and see if he can rebound a bit in the short term.  Agree with management so far, I did order a UA and Alejandrina to see if there is any active sediment but I suspect it will suggest hemodynamic injury as discussed above.  CT yesterday showed no hydro, no distended bladder (has hx of prostate CA).     -EUN, likely hemodynamic related to poor intake in conjunction with ascities.  Obtaining UA and Alejandrina.     -Can continue supportive cares with albumin, agree with holding octreotide and midodrine with normal BP's.  Would hold amlodipine in acute setting.       -Discussed dialysis generally with pt's wife but did not try to make decision on whether we would pursue it at this point.  Will discuss more specifically depending on course in the next few days as his current gfr is quite low and he has mild asterixis on exam.     -I ordered bicarb tabs to try to avoid metabolic acidosis as indication to start HD.      Volume/BP's-Not hypotensive, agree with  holding on midodrine/octreotide.  Can stop amlodipine and would be permissive with HTN in acute setting while we evaluate for infection.      Electrolytes-K 4.9, bicarb 22.  Na 131, no acute issues.  Will start bicarb tabs to try to avoid needing HD for metabolic acidosis.      BMD-Ca 9.5, Mg and Phos WNL despite poor kidney fx suggests poor nutrition.      GI-Does not have overt liver disease (although imaging suggested cirrhosis?) but has tense abdomen related to portal vein thrombosis.  Getting diagnostic para with elevated WBC, on abx.      Anemia-Hgb down to 8.2 from 9.4 yesterday and ~10-12 generally.  WBC up, will check iron stores when ID issues subside.      Nutrition-Taking PO.     Time spent: 45 minutes on this date of encounter for chart review, physical exam, medical decision making and co-ordination of care.     Seen and discussed with Dr Corado    Recommendations were communicated to primary team via KATHRINE Davies CNS  Clinical Nurse Specialist  [phone]    REASON FOR CONSULT: EUN on CKD.     HISTORY OF PRESENT ILLNESS:  Kody Reynaga is a 77 yom with complex medical hx including portal vein thrombosis requiring weekly para's, chronic pancreatitis, HTN, who is re-admitted (after being admitted last week) for AMS, fatigue and EUN.  Cr was 0.8 in 2/2022, up to 1.4 in 5/2022 and ~2.0 during admission in August before being 4.2 on admission to [facility] on 9/6.  Nephrology consulted for management of EUN on CKD.      PAST MEDICAL HISTORY:  Past Medical History:   Diagnosis Date     Anxiety      Depression      Gastroesophageal reflux disease      Gout      Hypercholesterolemia      Hypertension      IBS (irritable bowel syndrome)      Pancreatic disease      Prostate cancer (H)        Past Surgical History:   Procedure Laterality Date     CATARACT EXTRACTION W/ INTRAOCULAR LENS IMPLANT Right      COLONOSCOPY       cryoablation of prostate       ENDOSCOPIC RETROGRADE  CHOLANGIOPANCREATOGRAM       ENDOSCOPIC RETROGRADE CHOLANGIOPANCREATOGRAM N/A 1/24/2022    Procedure: ENDOSCOPIC RETROGRADE CHOLANGIOPANCREATOGRAPHY, BILIARY STENT EXCHANGE, sPY WITH BIOPSIES AND BRUSHINGS;  Surgeon: Guru Ab Gonzalez MD;  Location: [facility] OR     ENDOSCOPIC RETROGRADE CHOLANGIOPANCREATOGRAM N/A 2/24/2022    Procedure: ENDOSCOPIC RETROGRADE CHOLANGIOPANCREATOGRAPHY, with stent placement and bile duct biopsy;  Surgeon: Anthony Abarca MD;  Location: [facility] OR     ENDOSCOPIC RETROGRADE CHOLANGIOPANCREATOGRAM N/A 5/10/2022    Procedure: ENDOSCOPIC RETROGRADE CHOLANGIOPANCREATOGRAPHY, pyloric dilation, bebris removal, biliary stent exchange;  Surgeon: Anthony Abarca MD;  Location: [facility] OR     ENDOSCOPIC RETROGRADE CHOLANGIOPANCREATOGRAM WITH SPYGLASS N/A 10/14/2021    Procedure: ENDOSCOPIC RETROGRADE CHOLANGIOPANCREATOGRAPHY, WITH DIRECT DUCT VISUALIZATION, USING PANCREATICOBILIARY FIBEROPTIC PROBE-spyglass, biliary sludge and clot removal, biliary biopsies, biliary stent exchange;  Surgeon: Anthony Abarca MD;  Location: [facility] OR     ENDOSCOPIC ULTRASOUND UPPER GASTROINTESTINAL TRACT (GI) N/A 9/28/2021    Procedure: ENDOSCOPIC ULTRASOUND, ESOPHAGOSCOPY / UPPER GASTROINTESTINAL TRACT (GI);  Surgeon: Babak Garvin MD;  Location: [facility] GI     ENDOSCOPIC ULTRASOUND UPPER GASTROINTESTINAL TRACT (GI) N/A 1/24/2022    Procedure: ENDOSCOPIC ULTRASOUND, ESOPHAGOSCOPY / UPPER GASTROINTESTINAL TRACT (GI);  Surgeon: Guru Ab Gonzalez MD;  Location: [facility] OR     ESOPHAGOSCOPY, GASTROSCOPY, DUODENOSCOPY (EGD), COMBINED N/A 10/14/2021    Procedure: ESOPHAGOGASTRODUODENOSCOPY, WITH FINE NEEDLE ASPIRATION BIOPSY, WITH ENDOSCOPIC ULTRASOUND GUIDANCE, biliary stent removal;  Surgeon: Babak Garvin MD;  Location: [facility] OR     ESOPHAGOSCOPY, GASTROSCOPY, DUODENOSCOPY (EGD), COMBINED N/A 2/24/2022    Procedure: ESOPHAGOGASTRODUODENOSCOPY WITH DILATION AND STENT REMOVAL,  ENDOSCOPIC ULTRASOUND WITH FINE NEEDLE BIOPSIES;  Surgeon: Babak Garvin MD;  Location: [facility] OR     MOUTH SURGERY       TONSILLECTOMY          MEDICATIONS:  PTA Meds  Prior to Admission medications    Medication Sig Last Dose Taking? Auth Provider Long Term End Date   acetaminophen (TYLENOL) 325 MG tablet Take 325 mg by mouth every 6 hours as needed for mild pain 9/5/2022 at Unknown time Yes Reported, Patient     amLODIPine (NORVASC) 10 MG tablet Take 1 tablet (10 mg) by mouth daily 9/5/2022 at Unknown time Yes Jerel Curran MD Yes    Apixaban (ELIQUIS PO) Take 5 mg by mouth 2 times daily 9/5/2022 at Unknown time Yes Reported, Patient     dicyclomine (BENTYL) 20 MG tablet Take 20 mg by mouth 3 times daily 9/5/2022 at Unknown time Yes Reported, Patient     escitalopram (LEXAPRO) 20 MG tablet Take 1 tablet by mouth every morning  9/5/2022 at Unknown time Yes Reported, Patient Yes    lovastatin (MEVACOR) 20 MG tablet Take 2 tablets (40 mg) by mouth every morning 9/5/2022 at Unknown time Yes Jerel Curran MD Yes    melatonin 5 MG CAPS Take 1 tablet by mouth nightly as needed  Unknown at as needed Yes Reported, Patient     omeprazole (PRILOSEC) 20 MG DR capsule Take 20 mg by mouth every morning  9/5/2022 at Unknown time Yes Reported, Patient     coenzyme Q-10 10 MG CAPS Take 1 capsule by mouth every morning    Reported, Patient     furosemide (LASIX) 40 MG tablet Take 1 tablet (40 mg) by mouth 2 times daily   Jerel Curran MD Yes 9/3/22      Current Meds    albumin human  50 g Intravenous Daily     [START ON 9/8/2022] albumin human  25 g Intravenous Once     amLODIPine  10 mg Oral Daily     apixaban ANTICOAGULANT  5 mg Oral BID     cefTRIAXone  2,000 mg Intravenous Q24H     dicyclomine  20 mg Oral TID     escitalopram  20 mg Oral QAM     lactulose  10 g Oral Once     lactulose  20 g Oral TID     pantoprazole  40 mg Oral QAM     pravastatin  40 mg Oral Daily     sodium chloride (PF)  3 mL Intracatheter Q8H  "    Infusion Meds    - MEDICATION INSTRUCTIONS -         ALLERGIES:    No Known Allergies    REVIEW OF SYSTEMS:  A 10 point review of systems was negative except as noted above.    SOCIAL HISTORY:   Social History     Socioeconomic History     Marital status:      Spouse name: Not on file     Number of children: Not on file     Years of education: Not on file     Highest education level: Not on file   Occupational History     Not on file   Tobacco Use     Smoking status: Former Smoker     Types: Cigarettes     Quit date: 1970     Years since quittin.7     Smokeless tobacco: Never Used   Vaping Use     Vaping Use: Never used   Substance and Sexual Activity     Alcohol use: Yes     Comment: cutting down, drinking NA beer     Drug use: Never     Sexual activity: Not Currently   Other Topics Concern     Not on file   Social History Narrative     Not on file     Social Determinants of Health     Financial Resource Strain: Not on file   Food Insecurity: Not on file   Transportation Needs: Not on file   Physical Activity: Not on file   Stress: Not on file   Social Connections: Not on file   Intimate Partner Violence: Not on file   Housing Stability: Not on file     Reviewed with patient, noncontributory social hx.      FAMILY MEDICAL HISTORY:   Family History   Problem Relation Age of Onset     Heart Failure Mother      Cancer Father      Anesthesia Reaction No family hx of      Cardiovascular No family hx of      Deep Vein Thrombosis (DVT) No family hx of      Reviewed with patient's family, noncontributory.     PHYSICAL EXAM:   Temp  Av.4  F (36.9  C)  Min: 97.5  F (36.4  C)  Max: 99.5  F (37.5  C)      Pulse  Av.1  Min: 88  Max: 103 Resp  Av.5  Min: 16  Max: 18  SpO2  Av.3 %  Min: 92 %  Max: 100 %       /51 (BP Location: Right arm)   Pulse 98   Temp 98.5  F (36.9  C) (Temporal)   Resp 16   Ht 1.702 m (5' 7\")   Wt 83.9 kg (185 lb)   SpO2 92%   BMI 28.98 kg/m     Date 22 " 0700 - 09/08/22 0659   Shift 9595-0981 7942-4066 6121-1485 24 Hour Total   INTAKE   P.O. 300   300   Shift Total(mL/kg) 300(3.58)   300(3.58)   OUTPUT   Shift Total(mL/kg)       Weight (kg) 83.91 83.91 83.91 83.91      Admit Weight: 83.9 kg (185 lb)     GENERAL APPEARANCE: Interactive but confused, wife answering most questions.   EYES: No scleral icterus  Pulmonary: lungs clear to auscultation with equal breath sounds bilaterally, no clubbing or cyanosis  CV: Regular rhythm, normal rate, no rub   - Edema +1-2 generalized. +++Abd distension.    GI: firm, mildly tender, very distended.    MS: no evidence of inflammation in joints, no muscle tenderness  : No Aparicio  SKIN: no rash, warm, dry  NEURO: Interactive but confused.  Mild asterixis when holding hands in front of him and closing eyes.      LABS:   CMP  Recent Labs   Lab 09/07/22  0735 09/06/22  2147 09/06/22  1421 09/03/22  0727 09/03/22  0509 09/02/22  0738 09/02/22  0552   *  --  131*  --  129*  --  133*   POTASSIUM 4.9  --  5.3  --  3.6  --  3.2*   CHLORIDE 91*  --  89*  --  89*  --  91*   CO2 22  --  24  --  23  --  24   ANIONGAP 18*  --  18*  --  17*  --  18*   * 139* 165* 134* 132*   < > 116*   .0*  --  120.0*  --  88.2*  --  78.9*   CR 4.05*  --  4.26*  --  2.96*  --  2.99*   GFRESTIMATED 14*  --  14*  --  21*  --  21*   AMIRAH 9.5  --  9.6  --  9.1  --  9.2   MAG  --   --   --   --  2.5*  --   --    PROTTOTAL 6.3*  --  6.5  --  6.3*  --  6.4   ALBUMIN 4.2  --  4.0  --  4.3  --  4.2   BILITOTAL 0.9  --  1.3*  --  0.7  --  0.6   ALKPHOS 160*  --  125  --  73  --  69   AST 27  --  27  --  23  --  23   ALT 11  --  12  --  7*  --  6*    < > = values in this interval not displayed.     CBC  Recent Labs   Lab 09/07/22  0735 09/06/22  1421 09/03/22  0509 09/02/22  0552   HGB 8.2* 9.4* 9.4* 9.3*   WBC 23.6* 19.2* 14.1* 12.6*   RBC 2.91* 3.36* 3.34* 3.31*   HCT 25.5* 28.8* 28.8* 28.8*   MCV 88 86 86 87   MCH 28.2 28.0 28.1 28.1   MCHC 32.2 32.6  32.6 32.3   RDW 16.3* 16.1* 15.3* 15.4*   * 218 161 157     INR  Recent Labs   Lab 09/06/22  1421 09/03/22  0509 09/02/22  0552 09/01/22  0622   INR 2.50* 2.29* 2.33* 2.30*     ABGNo lab results found in last 7 days.   URINE STUDIES  Recent Labs   Lab Test 08/31/22  0134   COLOR Light Yellow   APPEARANCE Clear   URINEGLC Negative   URINEBILI Negative   URINEKETONE Negative   SG 1.017   UBLD Moderate*   URINEPH 5.0   PROTEIN 20 *   NITRITE Negative   LEUKEST Moderate*   RBCU 29*   WBCU 18*     No lab results found.  PTH  No lab results found.  IRON STUDIES  No lab results found.

## 2022-09-07 NOTE — PROGRESS NOTES
Admitted/transferred from: ED  2 RN full   skin assessment completed by Lucas Ibarra RN and Brian HOUSER RN.  Skin assessment finding: Bruises on R lateral chest, L upper arm, and upper back. Forehead wound with dressing CDI.  Interventions/actions: Provider paged for WOC consult.    Will continue to monitor.

## 2022-09-07 NOTE — PROVIDER NOTIFICATION
"   09/07/22 0207   Call Information   Date of Call 09/07/22   Time of Call 0207   Name of person requesting the team Lucas Ibarra   Title of person requesting team RN   RRT Arrival time 0210   Time RRT ended 0220   Reason for call   Type of RRT Adult   Primary reason for call Sepsis suspected   Sepsis Suspected Elevated Lactate level;Heart Rate > 100;WBC <4 or >12   Was patient transferred from the ED, ICU, or PACU within last 24 hours prior to RRT call? Yes   SBAR   Situation LA 2.6   Background per provider H&P \"Kody Reynaga is a 77 year old male admitted on 9/6/2022. He has a past medical history of portal vein thrombosis (on AC), recurrent ascites, chronic pancreatitis, prostate cancer (S/P prostatectomy), SDH (S/P craniotomy), HTN, dyslipidemia. He had a recent admission from 8/29-9/3/22 for EUN, worsening ascites. Ultimately discharged home with recommendations for OP follow-up with Nephro, PCP, GI, but returns to ED today after presenting from home via ambulance after feeling unwell at the recommendation of PCP staff. He was admitted for further monitoring and management to Medicine service.\"   Notable History/Conditions Cancer;End-Stage disease;Hypertension;Neurological;Organ failure   Assessment VSS. Pt asleep in bed, wakes to voice. Per bedside RN pt is receiving lactulose for altered mental status related to liver dysfunction. Plan for repeat lactic acid with morning labs, and continue with current antibiotic until paracentesis this morning, when ascites fluid can be cultured and coverage can be narrowed.   Interventions Labs   Patient Outcome   Patient Outcome Stabilized on unit   RRT Team   Attending/Primary/Covering Physician Kaleb Night   Date Attending Physician notified 09/07/22   Time Attending Physician notified 0212   Physician(s) Amie Alonso, ALFONSO   Lead RN Anel Ibarra   RT n/a   Post RRT Intervention Assessment   Post RRT Assessment Stable/Improved   Date Follow Up " Done 09/07/22   Time Follow Up Done 0726   Comments VSS; await f/u LA draw due 0500

## 2022-09-07 NOTE — PROCEDURES
[facility]    Paracentesis    Date/Time: 9/7/2022 1:10 PM  Performed by: Mark Parra MD  Authorized by: Mark Parra MD     PRE-PROCEDURE DETAILS  Initial or subsequent exam: subsequent  Procedure purpose: diagnostic and therapeutic  Indications: abdominal discomfort secondary to ascites, secondary bacterial peritonitis and suspected peritonitis        UNIVERSAL PROTOCOL   Site Marked: Yes  Prior Images Obtained and Reviewed:  Yes  Required items: Required blood products, implants, devices and special equipment available    Patient identity confirmed:  Verbally with patient, arm band, provided demographic data and hospital-assigned identification number  NA - No sedation, light sedation, or local anesthesia  Confirmation Checklist:  Patient's identity using two indicators, relevant allergies, procedure was appropriate and matched the consent or emergent situation and correct equipment/implants were available  Time out: Immediately prior to the procedure a time out was called    Universal Protocol: the Joint Commission Universal Protocol was followed    Preparation: Patient was prepped and draped in usual sterile fashion    ESBL (mL):  0     ANESTHESIA    Anesthesia: Local infiltration  Local Anesthetic:  Lidocaine 1% without epinephrine  : 7.      SEDATION    Patient Sedated: No    POST PROCEDURE DETAILS  Needle gauge: 22  Ultrasound guidance: yes  Puncture site: left lower quadrant  Fluid removed: 2000(ml)  Fluid appearance: cloudy  Dressing: pressure dressing        PROCEDURE  Describe Procedure: Patient was prepped and draped in sterile fashion. Site was marked and cleaned. Local infiltration with lidocaine was utilized for anesthesia. Yueh paracentesis needle advanced under ultrasound guidance into peritoneal fluid and straw colored fluid was returned. A total of 2000 mL straw colored fluid was removed. Procedure was well tolerated.   Patient Tolerance:  Patient  tolerated the procedure well with no immediate complications  Length of time physician/provider present for 1:1 monitoring during sedation: 0

## 2022-09-07 NOTE — PROGRESS NOTES
[facility]    Medicine Progress Note - Hospitalist Service, GOLD TEAM 7    Date of Admission:  9/6/2022    Assessment & Plan      Kody Reynaga is a 77 year old male admitted on 9/6/2022. He has a past medical history of portal vein thrombosis (on AC), recurrent ascites, chronic pancreatitis, prostate cancer (S/P prostatectomy), SDH (S/P craniotomy), HTN, dyslipidemia. He had a recent admission from 8/29-9/3/22 for EUN, worsening ascites. Ultimately discharged home with recommendations for OP follow-up with Nephro, PCP, GI, but returns to ED today after presenting from home via ambulance after feeling unwell at the recommendation of PCP staff. He was admitted for further monitoring and management to Medicine service.    Today's plan   -Patient had paracentesis, awaiting on cell counts   -Placed valdovinos catheter as bedside RN mentioned patient is incontinent and would help accurately monitor urine output   -Patient was seen by OT / PT and would recommend skilled placement   -Noted further rise in white count, No infectious source found currently   -Plan to continue emperic ceftriaxone for now     Plan   Ascites, worsening  Portal Vein Thrombosis   Portal Hypertension  Abdominal Pain  Acute metabolic Encephalopathy   Hx of Benign Biliary Stricture  Has had hx of biliary strictures (benign on most recent EGD 05/10/22), S/P multiple biliary stents [id number], biopsies negative for carcinoma of biliary etiology. Recent admission for ascites as above, has required ~weekly paracenteses since, needing ~4L tapped. Negative for SBP last admission. No formal biopsy performed to assess cirrhosis, has scheduled GI appt 10/24 as OP to discuss. However, Abd US in ED does demonstrate cirrhotic configuration of liver. History severely limited by encephalopathy (head CT negative), concern for component of HE as also hyperammonemic, but no jaundice, unable to assess asterixis as  inconsistently following commands. WBC 19.2, but HDS, no s/sx of bleeding, plts WNL, though INR elevated at 2.5 (not on Warfarin).  -GI consult pending, appreciate recs  -s/p para on 9/7  -Daily weights  -Strict I/O's  -Monitor for melena or hematemesis  -Lactulose, titrate for 3-4 loose stools daily  -Albumin challenge for 3 days  -PT/OT consults     EUN, suspect pre-renal etiology vs HRS  Recent admission as above for EUN, Cr baseline 0.9-1.0, but spiked to 4.28, thought to be multifactorial in the setting of fluid restriction (d/t ascites as above) and use of Lasix (d/c'd on discharge). Treated w/ Albumin challenge last admission for ascites w/ improvement in Cr. In process of scheduling OP Nephrology appt. In ED, Cr 4.26 (up from 2.96 on 9/3/22). HDS. Has not been taking Lasix per discharge summary last admission. Suspect etiology here is pre-renal as poor PO intake per wife. Question HRS as newfound evidence of cirrhosis on abd US as above. No new medications or changes to suggest intrarenal etiology and no recent infections, no s/sx of post-renal process/obstruction. Recent renal US last admission with mild right hydro  -Nephrology consulted  -Albumin challenge as above  -Fluid restriction to 1,800cc daily, <2g Na daily  -Trend Cr with daily CMP as above  -Daily weights and I/O's as above     Acute Rib Fractures  Pleural Effusion  Rib pain occurred following a fall during previous hospitalization, but patient had noted improving pain. Found on CT abd/pelv imaging in ED at R anterolateral ribs 7-9 incidentally, and significant ecchymosis noted at R lateral chest wall (see exam below for photo). Also found to have new R pleural effusion, suspect related to inflammation in the chest wall r/t fractures as above vs infection vs hepatic hydrothorax. However, is breathing normal on RA, not hypoxic, HDS.  -Continue to monitor resp status and pain     Known Pancreatic Mass  Chronic Pancreatitis  Elevated  Lipase  Initially found on imaging as a 1cm on 02/2022 incidentally after being found to have biliary strictures as above. There was also noted to be lymph node swelling retroperitoneally. However, repeated FNA biopsies have been negative/inconclusive. Lipase in , but one year ago was ~80-90 at OSH. Pt's limited history makes it difficult to assess for true pancreatitis, but CT w/o evidence of new peripancreatic inflammation.  -Continue to monitor pain if mental status improves     Hyponatremia, mild  Hypochloremia  Elevated BUN  Elevated Anion Gap  Na 131, Cl 89, , AG 18. This is likely multifactorial. Suspect mild hyponatremia d/t poor PO intake at home per wife, no emesis or other GI losses to explain hypochloremia, but could also be d/t poor diet.   -Recheck CMP in AM     Hyperammonemia  Hyperbilirubinemia  He is hyperammonemic at 67 on admission, and Tbili is 1.3. Most likely etiology is cirrhosis as above.  -Recheck CMP in AM     Leukocytosis   Neutrophilia  Appears that he has had leukocytosis since admission in August, but no evidence of SBP or other infectious etiology last admission. In ED, WBC trending up . However, afebrile, HDS, no s/sx of infection. Potential for underlying SBP given AMS, Neutrophilia at 16.9/WBC 19.2. Other etiologies to consider would be demargination vs EUN.  -awaiting on ascites fluid for cytology to r/o SBP  -white count could also be reactionary d/2 recurrent falls, rib fractures      Chronic Normocytic Anemia  Likely in setting of chronic disease state. Hgb 9.4 on admission, appears to have baseline Hgb 9-11.5. HDS here, no s/sx of bleeding aside from ecchymosis on R lateral chest wall.  -Trend CBC given above leukocytosis      Thrombosis of SMV, Splenic Vein, Portal Vein  Initially found on abdominal CT 02/2022. Was scheduled to follow up with [facility] clinic tomorrow as OP. PTA on Apixaban 5mg BID.   -Discussed with pharmacy, despite EUN, no dose  "adjustments necessary for Apixaban. Can continue on this unless s/sx of bleeding     HTN  PTA on Amlodipine.   -Continue PTA Amlodipine     HLD  -Continue PTA Lexapro daily     GERD  Nausea  -Continue PTA PPI  -Continue PTA Bentyl                 Diet: Fluid restriction 1800 ML FLUID  2 Gram Sodium Diet    DVT Prophylaxis: Apixaban for dvt   Aparicio Catheter: Not present  Central Lines: None  Cardiac Monitoring: None  Code Status: Full Code      Disposition Plan      Expected Discharge Date: 09/09/2022        Discharge Comments: From ER. Work up pending.        The patient's care was discussed with the Bedside Nurse and Patient.    Tobias Snyder MD  Hospitalist Service, [address]  Securely message with the Vocera Web Console (learn more here)  Text page via [employer] Paging/Directory   Please see signed in provider for up to date coverage information      Clinically Significant Risk Factors Present on Admission         # Hyponatremia: Na = 131 mmol/L (Ref range: 136 - 145 mmol/L) on admission, will monitor as appropriate      # Acute Kidney Injury, unspecified: based on a >150% or 0.3 mg/dL increase in creatinine on admission compared to past 90 day average, will monitor renal function  # Coagulation Defect: home medication list includes an anticoagulant medication  # Thrombocytopenia: Plts = 121 10e3/uL (Ref range: 150 - 450 10e3/uL) on admission, will monitor for bleeding     # Overweight: Estimated body mass index is 28.98 kg/m  as calculated from the following:    Height as of this encounter: 1.702 m (5' 7\").    Weight as of this encounter: 83.9 kg (185 lb).        ______________________________________________________________________    Interval History   Patient has no new symptoms, No new abdominal pain, difficulty urinating, fevers or malaise     Data reviewed today: I reviewed all medications, new labs and imaging results over the last 24 hours. I " personally reviewed no images or EKG's today.    Physical Exam   Vital Signs: Temp: 98.5  F (36.9  C) Temp src: Temporal BP: 118/51 Pulse: 98   Resp: 16 SpO2: 92 % O2 Device: None (Room air)    Weight: 185 lbs 0 oz  Constitutional: awake, alert, cooperative, no apparent distress, and appears stated age  Eyes: Lids and lashes normal, pupils equal, round and reactive to light, extra ocular muscles intact, sclera clear, conjunctiva normal  ENT: Normocephalic, without obvious abnormality, atraumatic, sinuses nontender on palpation, external ears without lesions, oral pharynx with moist mucous membranes, tonsils without erythema or exudates, gums normal and good dentition.  Hematologic / Lymphatic: no cervical lymphadenopathy  Respiratory: No increased work of breathing, good air exchange, clear to auscultation bilaterally, no crackles or wheezing  Cardiovascular: Normal apical impulse, regular rate and rhythm, normal S1 and S2, no S3 or S4, and no murmur noted  GI: No scars, normal bowel sounds, soft, non-distended, non-tender, no masses palpated, no hepatosplenomegally  Genitounirinary:   Skin: no bruising or bleeding  Musculoskeletal: There is no redness, warmth, or swelling of the joints.  Full range of motion noted.  Motor strength is 5 out of 5 all extremities bilaterally.  Tone is normal.  Neurologic: Awake, alert, oriented to name, place and time.  Cranial nerves II-XII are grossly intact.  Motor is 5 out of 5 bilaterally.  Cerebellar finger to nose, heel to shin intact.  Sensory is intact.  Babinski down going, Romberg negative, and gait is normal.  Neuropsychiatric: General: normal, calm and normal eye contact    Data   Recent Labs   Lab 09/07/22  0735 09/06/22  2147 09/06/22  1421 09/03/22  0727 09/03/22  0509 09/02/22  0738 09/02/22  0552   WBC 23.6*  --  19.2*  --  14.1*  --  12.6*   HGB 8.2*  --  9.4*  --  9.4*  --  9.3*   MCV 88  --  86  --  86  --  87   *  --  218  --  161  --  157   INR  --   --   2.50*  --  2.29*  --  2.33*   *  --  131*  --  129*  --  133*   POTASSIUM 4.9  --  5.3  --  3.6  --  3.2*   CHLORIDE 91*  --  89*  --  89*  --  91*   CO2 22  --  24  --  23  --  24   .0*  --  120.0*  --  88.2*  --  78.9*   CR 4.05*  --  4.26*  --  2.96*  --  2.99*   ANIONGAP 18*  --  18*  --  17*  --  18*   AMIRAH 9.5  --  9.6  --  9.1  --  9.2   * 139* 165*   < > 132*   < > 116*   ALBUMIN 4.2  --  4.0  --  4.3  --  4.2   PROTTOTAL 6.3*  --  6.5  --  6.3*  --  6.4   BILITOTAL 0.9  --  1.3*  --  0.7  --  0.6   ALKPHOS 160*  --  125  --  73  --  69   ALT 11  --  12  --  7*  --  6*   AST 27  --  27  --  23  --  23   LIPASE  --   --  548*  --   --   --   --     < > = values in this interval not displayed.     Recent Results (from the past 24 hour(s))   Head CT w/o contrast    Narrative    CT HEAD W/O CONTRAST 9/6/2022 4:51 PM    History: confusion   ICD-10:    Comparison: No prior relevant imaging available for comparison    Technique: Using multidetector thin collimation helical acquisition  technique, axial, coronal and sagittal CT images from the skull base  to the vertex were obtained without intravenous contrast.   (topogram) image(s) also obtained and reviewed.    Findings: There is no new intracranial hemorrhage, mass effect, or  midline shift. Left anterior frontal encephalomalacia/gliosis. No  acute loss of gray-white matter differentiation is suspected..  Ventricles are proportionate to the cerebral sulci. The basal cisterns  are clear. Midline posterior fossa arachnoid cyst versus an enlarged  cisterna magna.     Prior left temporal craniotomy. Underlying extra-axial hyperdensity,  presumably duraplasty changes. Trace amount of extra-axial fluid and  gas which is likely residual from prior procedural (series 8 image  38). However prior imaging is not available for comparison at the time  of dictation. The visualized portions of the paranasal sinuses and  mastoid air cells are clear.       Impression    Impression:  1. No definite evidence for acute intracranial pathology.  2. Left calvarial craniotomy, with presumed duraplasty. Trace residual  extra-axial fluid and air subjacent to the craniotomy flap, reportedly  present on outside CT reports. However imaging is not available at the  time of comparison.         I have personally reviewed the examination and initial interpretation  and I agree with the findings.    PASCUAL MAY MD         SYSTEM ID:  [id number]   CT Abdomen Pelvis w/o Contrast    Narrative    EXAMINATION: CT ABDOMEN PELVIS W/O CONTRAST  9/6/2022 4:51 PM      CLINICAL HISTORY: elevated lipase    COMPARISON: CT 8/11/2022    PROCEDURE COMMENTS: CT of the abdomen was performed without  intravenous and oral contrast. Coronal and sagittal reformatted images  were obtained.    FINDINGS:  Lower thorax:   Incompletely imaged right pleural effusion and compressive  atelectasis, new from prior CT. Left lower lobe basilar and inferior  lingular subsegmental atelectasis.    Abdomen and pelvis:  Increasing intrahepatic biliary dilation, for example a right  intrahepatic bile duct on series 3 image 72 measures 11 mm previously  up to 8 mm. Known portal, SMV and splenic vein thrombosis not able to  be assessed on noncontrast study. No cholelithiasis. Fatty atrophy of  the pancreas, however without substantial peripancreatic inflammation.  Unremarkable unenhanced spleen and adrenal glands. No hydronephrosis  or obstructing urinary calculi.    No small or large bowel obstruction. Diverticulosis without  diverticulitis.    Increasing now moderate to large volume of simple density ascites. No  pneumoperitoneum or portal venous gas. Vascular patency not assessed  on noncontrast exam.    Bones/soft tissues: New acute appearing and displaced left seventh  through ninth anterolateral rib fractures.       Impression    IMPRESSION:  1. Increasing intrahepatic biliary dilation from 8/11/2022.  2.  Increasing now moderate to large volume abdominal ascites. New  partially imaged right pleural effusion.  3. Acute appearing right anterolateral seventh through ninth rib  fractures.    I have personally reviewed the examination and initial interpretation  and I agree with the findings.    JOSTIN DAVIDSON MD         SYSTEM ID:  [id number]   US Abdomen Limited w Abd/Pelvis Duplex Complete    Narrative    EXAMINATION: US ABDOMEN LIMITED WITH DOPPLER COMPLETE 9/6/2022 7:31 PM      COMPARISON: Same day abdomen and pelvis CT, abdominal ultrasound  8/31/2022, abdomen and pelvis CT 8/11/2022    HISTORY: Patient with portal vein thrombosis, please reevaluate    TECHNIQUE: The abdomen was scanned in standard fashion with  specialized ultrasound transducer(s) using both gray-scale, color  Doppler, and spectral flow techniques.    Findings:  Liver: Liver demonstrates coarsened hepatic echotexture with nodular  hepatic contours. No focal hepatic lesion.     Occluded extrahepatic portal vein, right portal vein, and left portal  vein. The splenic vein is obscured by overlying bowel gas. The splenic  and superior mesenteric veins were previously occluded on the  comparison abdomen pelvis CT obtained on 8/11/2022.    Flow in the hepatic artery is towards the liver and:  102 cm/s peak systolic  0.74 resistive index.     The left, middle, and right hepatic veins are patent with flow towards  the IVC with aphasic venous waveforms. The IVC is patent with flow  towards the heart.   The visualized aorta is not dilated.    Gallbladder: Intraluminal echogenic sludge. No gallbladder wall  thickening or gallbladder wall hyperemia. Sonographic Hinton sign is  negative.    Bile Ducts: Dilated intrahepatic and extrahepatic bile ducts are  unchanged the common bile duct measures 13 mm in the right  intrahepatic bile duct measures up to 6 mm.      Right kidney: Right kidney measures 8.6 cm. No suspicious right renal  lesion. No hydronephrosis or  shadowing stone. Prominent right  extrarenal pelvis.    Fluid: Moderate to large volume anechoic ascites. Partially visualized  right pleural effusion.      Impression    Impression:   1.  Cirrhotic configuration to the liver with evidence for portal  hypertension. No suspicious focal hepatic lesion.  2.  Continued thrombosis of the extrahepatic portal vein and  intrahepatic right and left portal veins. The known occlusive thrombus  of the splenic and superior mesenteric veins are not confidently  identified on today's exam due to overlying bowel gas, better  visualized on the comparison abdomen and pelvic CT on 8/11/2022.  3.  Aphasic venous waveforms of the hepatic veins consistent with the  patient's intrinsic hepatic dysfunction.  4.  Intrahepatic and extrahepatic bile duct dilation which appears  increased since 8/11/2022.  5.  Moderate to large volume ascites.  6.  Right pleural effusion.    I have personally reviewed the examination and initial interpretation  and I agree with the findings.    JOSTIN DAVIDSON MD         SYSTEM ID:  [id number]     Medications     - MEDICATION INSTRUCTIONS -         albumin human  50 g Intravenous Daily     [START ON 9/8/2022] albumin human  25 g Intravenous Once     amLODIPine  10 mg Oral Daily     apixaban ANTICOAGULANT  5 mg Oral BID     cefTRIAXone  2,000 mg Intravenous Q24H     dicyclomine  20 mg Oral TID     escitalopram  20 mg Oral QAM     lactulose  10 g Oral Once     lactulose  20 g Oral TID     pantoprazole  40 mg Oral QAM     pravastatin  40 mg Oral Daily     sodium chloride (PF)  3 mL Intracatheter Q8H

## 2022-09-08 NOTE — PROGRESS NOTES
Gastroenterology Follow up Note         Assessment and Plan:   oKdy Reynaga is a 77 year old male with a history of a pancreas lesion (suspected chronic pancreatitis) and portal, splenic and SMV thrombosis with portal hypertension ( recurrent ascites requiring weekly paracentesis) who was recently admitted from 8/29-9/3/22 for management of EUN and worsening ascites, he was discharged home with plans to follow up with IR to evaluate for recanalization  and GI for further management and plans for potential biopsy. He unfortunately returned to the ED with concerns for worsening fatigue and confusion noted by his wife.      US in the ED with cirrhotic configuration to the liver with evidence for portal hypertension, given this findings, presentation with confusion and ascites, hepatology is being consulted for assistance with management.      #Portal Hypertension of unclear etiology with Moderate to Large Ascites  #Chronic occlusive thrombus of splenic vein, superior mesenteric vein and portal vein     Patient presented with worsening confusion, fatigue and ascites. Admission labs with evidence of elevated INR  2.50, low platelet 121, anemia hgb 8.2 and leukocytosis WBC 23.6k.   Of note patient has had ascites requiring weekly paracentesis which was thought to be due to non cirrhotic portal hypertension in the setting of PVT. He was recently admitted from 8/29-9/3, at the time was managed for EUN and ascites and was discharged with plans to follow with IR for evaluation of recanalization and also with GI for consideration of liver biopsy given concern for the possibility of cirrhosis in the setting of a reported history of alcohol use.      US during this admission revealed cirrhotic configuration to the liver with evidence for portal hypertension which together with presenting symptoms and ascites prompted hepatology consult for assistance with management    Diagnostic paracentesis revealed SAAG > 1.1 and total  "fluid protein of 2.7, which overall doesn't necessarily fit into cirrhosis as the etiology of his portal hypertension, no evidence of right heart failure on echo.  PMN noted at ~230        #EUN  This does not appear to be the typical type 1 HRS, suspected to be likely pre renal EUN  on CKD ( which may have been due to recurrent EUN), though pt has ascites, BP has remained normal      CDIFF     Patient with recent positive Cdiff test, no evidence of fulminant infection and is clinically stable.           Recommendations:   --Continue therapeutic paracentesis as needed  --Please discontinue Ceftriaxone  --Vancomycin for Cdiff    --Continue Lactulose   --Hold diuretics in the setting of EUN  --Agree with albumin     It has been a pleasure to participate in the care of this patient.  Patient discussed with GI staff, Dr. De Jesus.  Please do not hesitate to contact the GI service with any questions or concerns.     Braxton Luciano MD  Gastroenterology Fellow  Pager [phone]         Subjective/Objective:   - No acute events overnight  -No GI related concerns at this time.         Physical Exam:   VS:  /51 (BP Location: Right arm)   Pulse 97   Temp 97.5  F (36.4  C) (Oral)   Resp 16   Ht 1.702 m (5' 7\")   Wt 83.9 kg (185 lb)   SpO2 98%   BMI 28.98 kg/m      Wt:   Wt Readings from Last 2 Encounters:   09/06/22 83.9 kg (185 lb)   09/03/22 83.2 kg (183 lb 6.8 oz)      Constitutional: cooperative, pleasant, no acute distress  Eyes: Conjunctiva anicteric  HEENT: Normal oropharynx without ulcers or exudate, mucus membranes moist  CV: No Hanny edema  Respiratory: Breathing comfortably on ambient air  Abd: Distended, nontender, no rebound or guarding   Skin: No jaundice  Neuro: AAO x 2 ( not oriented to date)         Laboratory:   BMP  Recent Labs   Lab 09/08/22  0715 09/07/22  2221 09/07/22  0735 09/06/22  2147 09/06/22  1421 09/03/22  0727 09/03/22  0509   *  --  131*  --  131*  --  129*   POTASSIUM 4.1  --  4.9  --  " 5.3  --  3.6   CHLORIDE 93*  --  91*  --  89*  --  89*   AMIRAH 9.7  --  9.5  --  9.6  --  9.1   CO2 24  --  22  --  24  --  23   .0*  --  126.0*  --  120.0*  --  88.2*   CR 3.30*  --  4.05*  --  4.26*  --  2.96*   * 188* 140* 139* 165*   < > 132*    < > = values in this interval not displayed.     CBC  Recent Labs   Lab 09/08/22 0715 09/07/22  0735 09/06/22 1421 09/03/22  0509   WBC 17.4* 23.6* 19.2* 14.1*   RBC 2.97* 2.91* 3.36* 3.34*   HGB 8.4* 8.2* 9.4* 9.4*   HCT 26.0* 25.5* 28.8* 28.8*   MCV 88 88 86 86   MCH 28.3 28.2 28.0 28.1   MCHC 32.3 32.2 32.6 32.6   RDW 16.5* 16.3* 16.1* 15.3*   * 121* 218 161     INR  Recent Labs   Lab 09/06/22  1421 09/03/22  0509 09/02/22  0552   INR 2.50* 2.29* 2.33*     LFTs  Recent Labs   Lab 09/08/22 0715 09/07/22 0735 09/06/22 1421 09/03/22  0509   ALKPHOS 167* 160* 125 73   AST 22 27 27 23   ALT 10 11 12 7*   BILITOTAL 0.8 0.9 1.3* 0.7   PROTTOTAL 6.3* 6.3* 6.5 6.3*   ALBUMIN 4.4 4.2 4.0 4.3      PANC  Recent Labs   Lab 09/06/22  1421   LIPASE 548*

## 2022-09-08 NOTE — PLAN OF CARE
Assumed care from 1900 to 0730  Neuro: a&ox4 with intermittent confusion.   Respiratory: Sats>94% RA  Cardiac: WDL.   GI/: Bowel sounds present x4. Had loose BMx2 overnight. Aparicio in place and OP ok.   Skin/Drains/Incisions: Bruising on right flank.   Lines: LPIV infusing TKO.   Pain: Rated pain 0/10 with no s/sx of pain/discomfort noted.    Diet: 2g Na+.   Labs: HS BG was 188. Positive for C-diff and MD notified.   Electrolytes Replacement: n/a.   Activity Level: Up with Ax2.   Plan: Monitor loose stools.

## 2022-09-08 NOTE — CONSULTS
Care Management Initial Consult    General Information  Assessment completed with: Spouse or significant other, Patient,    Type of CM/SW Visit: Initial Assessment    Primary Care Provider verified and updated as needed: Yes   Readmission within the last 30 days: previous discharge plan unsuccessful   Return Category: Exacerbation of disease  Reason for Consult: facility placement  Advance Care Planning: Advance Care Planning Reviewed: present on chart          Communication Assessment  Patient's communication style: spoken language (English or Bilingual)    Hearing Difficulty or Deaf: no   Wear Glasses or Blind: yes    Cognitive  Cognitive/Neuro/Behavioral: .WDL except, orientation, level of consciousness  Level of Consciousness: lethargic  Arousal Level: arouses to voice  Orientation: disoriented to, situation, time  Mood/Behavior: calm  Best Language: 0 - No aphasia  Speech: clear    Living Environment:   People in home: spouse     Current living Arrangements: apartment      Able to return to prior arrangements: yes       Family/Social Support:  Care provided by: spouse/significant other, self  Provides care for: no one, unable/limited ability to care for self  Marital Status:   Wife          Description of Support System: Supportive, Involved    Support Assessment: Adequate family and caregiver support    Current Resources:   Patient receiving home care services: No     Community Resources: None  Equipment currently used at home: shower chair, grab bar, tub/shower  Supplies currently used at home: None    Employment/Financial:  Employment Status: retired        Financial Concerns: No concerns identified           Lifestyle & Psychosocial Needs:  Social Determinants of Health     Tobacco Use: Medium Risk     Smoking Tobacco Use: Former Smoker     Smokeless Tobacco Use: Never Used   Alcohol Use: Not on file   Financial Resource Strain: Not on file   Food Insecurity: Not on file   Transportation Needs: Not  on file   Physical Activity: Not on file   Stress: Not on file   Social Connections: Not on file   Intimate Partner Violence: Not on file   Depression: Not at risk     PHQ-2 Score: 1   Housing Stability: Not on file       Functional Status:  Prior to admission patient needed assistance:   Dependent ADLs:: Ambulation-walker  Dependent IADLs:: Transportation, Cooking, Cleaning, Laundry       Mental Health Status:  Mental Health Status: No Current Concerns       Chemical Dependency Status:                Values/Beliefs:  Spiritual, Cultural Beliefs, Jew Practices, Values that affect care:                 Additional Information:  77 year old male admitted on 9/6/2022. He has a past medical history of portal vein thrombosis (on AC), recurrent ascites, chronic pancreatitis, prostate cancer (S/P prostatectomy), SDH (S/P craniotomy), HTN, dyslipidemia. He had a recent admission from 8/29-9/3/22 for EUN, worsening ascites. Ultimately discharged home with recommendations for OP follow-up with Nephro, PCP, GI, but returns to ED 9/6 after presenting from home via ambulance after feeling unwell at the recommendation of PCP staff.     CMA completed today at bedside with Pt and his wife. Pt was very drowsy and slept most of the visit but did confirm he is ok with going to a TCU. Pt worked with Pt and has recommendations for TCU when medically ready. RNCC explained the referral process and the medicare.gov website. Wife stated she is ok with this writer making referrals on their behalf.     Pt does not drive and only recently started using his walker at home.     RNCC placed referrals today via destination.     Pending  [facility] ([facility])    Annel Laureano, RN  [facility]  [phone]

## 2022-09-08 NOTE — PLAN OF CARE
"Goal Outcome Evaluation:    Plan of Care Reviewed With: patient, spouse     Patient lethargic today. Does arouse to verbal. Oriented to self, date and at times \"in the hospital\". Able to follow some commands.  Very shaky in hands and arms when standing & working with PT this morning. Was able to take a few small steps which per PT is decreased from activity tolerance yesterday.  Neuros intact.  Takes meds with water. Very poor appetite today. Fluid restriction continues.  Cognitive eval per OT.  Adequate urine output via valdovinos. Abdomen firm/distended. New +C diff; started on oral Vancomycin.  Skin laceration on forehead assessed by WOC. Bacitracin & primapore applied.  Bed alarm at all times for safety.                 "

## 2022-09-08 NOTE — PROGRESS NOTES
Nephrology Progress Note  09/08/2022       Kody Reynaga is a 77 yom with complex medical hx including portal vein thrombosis requiring weekly para's, chronic pancreatitis, HTN, who is re-admitted (after being admitted last week) for AMS, fatigue and EUN.  Cr was 0.8 in 2/2022, up to 1.4 in 5/2022 and ~2.0 during admission in August before being 4.2 on admission to [facility] on 9/6.  Nephrology consulted for management of EUN on CKD.      Interval History :   Mr Reynaga's Cr is significantly improved from 4.0=>3.3=>3.0 in the past 3 days.  Following labs but not actively managing any issues from renal standpoint.  Long term still is high risk for progression of CKD due to liver/ascites requiring para's, may do better with low volume but more frequent taps.  Up in bedoya this am so is a bit improved from functional status, PT/OT is recommending TCU on discharge.      Assessment and Recommendations  EUN on CKD-Difficult to determine baseline as Cr has been on the rise for the last 6 months likely with frequent EUN related to portal vein thrombosis.  Cr of 4.2 on admission, slightly better this am at 4.0 but gfr is certainly low and muscle mass suggests it may be worse than Cr would suggest.  No NSAID's at home, no nephrotoxins in recent hx.  Likely hemodynamic related to ascites due to portal vein thrombus and suspected SBP based on elevated WBC, also may be congestion with need for weekly para's.  I had ~15 minute discussion on 9/7 with pt and his wife that his long term trajectory is concerning for needing dialysis and that this would be a difficult course given his other co-morbidities and functional status but that we can treat for infection and see if he can rebound a bit in the short term.                  -EUN, likely hemodynamic related to poor intake in conjunction with ascities.  Improved this am after para for 2L removal 9/7.                -Can continue supportive cares with albumin, agree with holding  "octreotide and midodrine with normal BP's.  Holding amlodipine in acute setting.                    -Discussed dialysis generally with pt's wife but trending away from needing acute HD.                  -I ordered bicarb tabs to try to avoid metabolic acidosis as indication to start HD, may be able to stop these if renal fx improves.         Volume/BP's-Not hypotensive, agree with holding on midodrine/octreotide.  UOP picking up, on track for ~1L today.       Electrolytes-K 4.1, bicarb 24.  Na 135, no acute issues.  Started bicarb tabs to try to avoid needing HD for metabolic acidosis.       BMD-Ca 9.5, Mg and Phos WNL despite poor kidney fx suggests poor nutrition.       GI-Does not have overt liver disease (although imaging suggested cirrhosis?) but has tense abdomen related to portal vein thrombosis.  Getting diagnostic para with elevated WBC, on abx.       Anemia-Hgb down to 8.0 from 9.4 yesterday and ~10-12 generally.  WBC up, will check iron stores when ID issues subside.       Nutrition-Taking PO.      Time spent: 25 minutes on this date of encounter for chart review, physical exam, medical decision making and co-ordination of care.      Seen and discussed with Dr Corado     Recommendations were communicated to primary team via page       KATHRINE Jenkins CNS  Clinical Nurse Specialist  [phone]      Review of Systems:   I reviewed the following systems:  Gen: No fevers or chills  CV: No CP at rest  Resp: No SOB at rest  GI: No N/V    Physical Exam:   I/O last 3 completed shifts:  In: 225 [P.O.:225]  Out: 1075 [Urine:1075]   /51 (BP Location: Right arm)   Pulse 97   Temp 97.5  F (36.4  C) (Oral)   Resp 16   Ht 1.702 m (5' 7\")   Wt 83.9 kg (185 lb)   SpO2 98%   BMI 28.98 kg/m       GENERAL APPEARANCE: More awake today but mildly confused.    EYES: No scleral icterus  Pulmonary: lungs clear to auscultation with equal breath sounds bilaterally, no clubbing or cyanosis  CV: Regular rhythm, " normal rate, no rub   - Edema +1-2 generalized. +++Abd distension.    GI: firm, mildly tender, very distended.    MS: no evidence of inflammation in joints, no muscle tenderness  : No Aparicio  SKIN: no rash, warm, dry  NEURO: Interactive but confused.      Labs:   All labs reviewed by me  Electrolytes/Renal - Recent Labs   Lab Test 09/08/22  0715 09/07/22  2221 09/07/22  0735 09/06/22  2147 09/06/22  1421 09/03/22  0727 09/03/22  0509 08/29/22  1600 08/29/22  1416   *  --  131*  --  131*  --  129*   < > 127*   POTASSIUM 4.1  --  4.9  --  5.3  --  3.6   < > 6.7*   CHLORIDE 93*  --  91*  --  89*  --  89*   < > 89*   CO2 24  --  22  --  24  --  23   < > 18*   .0*  --  126.0*  --  120.0*  --  88.2*   < > 83.4*   CR 3.30*  --  4.05*  --  4.26*  --  2.96*   < > 4.28*   * 188* 140*   < > 165*   < > 132*   < > 137*   AMIRAH 9.7  --  9.5  --  9.6  --  9.1   < > 9.8   MAG  --   --   --   --   --   --  2.5*  --  2.4*   PHOS  --   --   --   --   --   --   --   --  6.3*    < > = values in this interval not displayed.       CBC -   Recent Labs   Lab Test 09/08/22  0715 09/07/22  0735 09/06/22  1421   WBC 17.4* 23.6* 19.2*   HGB 8.4* 8.2* 9.4*   * 121* 218       LFTs -   Recent Labs   Lab Test 09/08/22  0715 09/07/22  0735 09/06/22  1421   ALKPHOS 167* 160* 125   BILITOTAL 0.8 0.9 1.3*   ALT 10 11 12   AST 22 27 27   PROTTOTAL 6.3* 6.3* 6.5   ALBUMIN 4.4 4.2 4.0       Iron Panel - No lab results found.        Current Medications:    albumin human  50 g Intravenous Daily     amLODIPine  10 mg Oral Daily     apixaban ANTICOAGULANT  5 mg Oral BID     cefTRIAXone  2,000 mg Intravenous Q24H     dicyclomine  20 mg Oral TID     escitalopram  20 mg Oral QAM     lactulose  10 g Oral Once     lactulose  20 g Oral TID     pantoprazole  40 mg Oral QAM     pravastatin  40 mg Oral Daily     sodium bicarbonate  1,300 mg Oral TID     sodium chloride (PF)  3 mL Intracatheter Q8H     vancomycin  125 mg Oral 4x Daily       -  MEDICATION INSTRUCTIONS -

## 2022-09-08 NOTE — PROGRESS NOTES
"   09/08/22 0935   Quick Adds   Type of Visit Initial PT Evaluation       Present no   Living Environment   People in Home spouse   Current Living Arrangements condominium   Home Accessibility stairs to enter home;stairs within home   Number of Stairs, Main Entrance 2   Stair Railings, Main Entrance none   Number of Stairs, Within Home, Primary ten   Stair Railings, Within Home, Primary railing on left side (ascending)   Transportation Anticipated family or friend will provide   Living Environment Comments Per chart review, pt lives in condo with spouse. 2 ALIS[address]. All needs can be met on main level, though flight of stairs to basement. Per chart review, pt has tub shower, though pt reports walk in shower.   Self-Care   Usual Activity Tolerance good   Current Activity Tolerance fair   Equipment Currently Used at Home shower chair;grab bar, tub/shower   Fall history within last six months yes   Number of times patient has fallen within last six months 3   Activity/Exercise/Self-Care Comment Pt poor historian this date, unable to identify clear PLOF/baseline. Per chart review, pt IND at baseline.   General Information   Onset of Illness/Injury or Date of Surgery 09/06/22   Referring Physician Pedro Hanna PA-C   Patient/Family Therapy Goals Statement (PT) none stated   Pertinent History of Current Problem (include personal factors and/or comorbidities that impact the POC) Per EMR: \"Kody Reynaga is a 77 year old male admitted on 9/6/2022. He has a past medical history of portal vein thrombosis (on AC), recurrent ascites, chronic pancreatitis, prostate cancer (S/P prostatectomy), SDH (S/P craniotomy), HTN, dyslipidemia. He had a recent admission from 8/29-9/3/22 for EUN, worsening ascites. Ultimately discharged home with recommendations for OP follow-up with Nephro, PCP, GI, but returns to ED today after presenting from home via ambulance after feeling unwell at the recommendation of PCP staff. " "He was admitted for further monitoring and management to Medicine service.\"   Existing Precautions/Restrictions fall   Weight-Bearing Status - LUE full weight-bearing   Weight-Bearing Status - RUE full weight-bearing   Weight-Bearing Status - LLE full weight-bearing   Weight-Bearing Status - RLE full weight-bearing   General Observations activity: up with assist   Cognition   Affect/Mental Status (Cognition) confused   Orientation Status (Cognition) oriented to;person;place   Follows Commands (Cognition) follows one-step commands;50-74% accuracy;physical/tactile prompts required;repetition of directions required;verbal cues/prompting required   Cognitive Status Comments Able to state month and year. Pt demostrates confusion/forgetfulness during conversation.   Pain Assessment   Patient Currently in Pain Yes, see Vital Sign flowsheet  (abd pain)   Integumentary/Edema   Integumentary/Edema no deficits were identifed   Posture    Posture Forward head position;Protracted shoulders   Range of Motion (ROM)   ROM Comment B LE WFL   Strength (Manual Muscle Testing)   Strength Comments B LE functional weakness   Bed Mobility   Comment, (Bed Mobility) sup to sit with CGA   Transfers   Comment, (Transfers) sit to stand with CGA-Pardeep and FWW   Gait/Stairs (Locomotion)   Comment, (Gait/Stairs) Gait impaired, pt takes a few steps from EOB with CGAx2 for safety with FWW.   Balance   Balance Comments Uses B UE support to maintain seated balance. Impaired standing/dynamic balance   Sensory Examination   Sensory Perception Comments Pt initially reports no numbness or tingling. Later in session, pt reports tingling in arms and legs.   Coordination   Coordination no deficits were identified   Coordination Comments UE shakiness; per discussion with MD/RN, pt had UE shakiness yesterday as well.   Muscle Tone   Muscle Tone no deficits were identified   Clinical Impression   Criteria for Skilled Therapeutic Intervention Yes, treatment " indicated   PT Diagnosis (PT) impaired functional mobility   Influenced by the following impairments pain, decreased endurance/activity tolerance, decreased strength, impaired balance, falls risk, confusion   Functional limitations due to impairments bed mobility, transfers, gait, stairs   Clinical Presentation (PT Evaluation Complexity) Stable/Uncomplicated   Clinical Presentation Rationale clinical judgement   Clinical Decision Making (Complexity) low complexity   Planned Therapy Interventions (PT) balance training;bed mobility training;gait training;home exercise program;neuromuscular re-education;patient/family education;ROM (range of motion);stair training;strengthening;stretching;transfer training;wheelchair management/propulsion training;progressive activity/exercise;home program guidelines   Anticipated Equipment Needs at Discharge (PT)   (TBD)   Risk & Benefits of therapy have been explained evaluation/treatment results reviewed;care plan/treatment goals reviewed;risks/benefits reviewed   Clinical Impression Comments Pt presenting with the aformentioned impairments that are affecting safety and IND with functional mobility and placing pt at an increased risk for falls. Pt to benefit from skilled IP PT in order to address these impairments, progress towards PLOF, and assist in facilitation of safe discharge plan.   PT Discharge Planning   PT Discharge Recommendation (DC Rec) Transitional Care Facility;home with assist;home with home care physical therapy   PT Rationale for DC Rec Pt presenting greatly below functional mobility baseline. Currently requiring consistent Ax1 for safe mobility. Pt with increased confusion and high risk for falls. At this time, would recommend TCU for progression of safety, IND, and strength prior to return to home for preventing risk for re-admission and falls prevention. If pt were to return home, would need FWW, consistent assist of 1 for all upright mobility, ADLs, and IADLs,  and as well as home therapies.   PT Brief overview of current status Ax1 with FWW and gait belt   Plan of Care Review   Plan of Care Reviewed With patient   Total Evaluation Time   Total Evaluation Time (Minutes) 10   Physical Therapy Goals   PT Frequency 5x/week   PT Predicted Duration/Target Date for Goal Attainment 09/23/22   PT Goals Bed Mobility;Transfers;Gait;Stairs   PT: Bed Mobility Independent;Supine to/from sit   PT: Transfers Modified independent;Sit to/from stand;Bed to/from chair;Assistive device  (LRAD)   PT: Gait Modified independent;Assistive device;100 feet  (LRAD)   PT: Stairs Modified independent;2 stairs  (railings)

## 2022-09-08 NOTE — PLAN OF CARE
Goal Outcome Evaluation: HD4 admitted on advice of PCP because he was feeling worse that at discharge on 9/3. A&Ox3 disoriented at times to situation, VSS on room air. Forgetful, slow to answer. Abd rounded and distended with recurrent ascites. Last paracentesis 9/7, Site is CDI. Large bruise to R abd. Albumin started very late, the PIV access lost. Antibiotic delayed. UC sent. Bed alarm on for safety. NO stool this shift. Temors with hands when moving them. Wound to forehead from previous fall CDI. On a fluid restriction. Pt will discharge to TCU. Continue with POC.

## 2022-09-08 NOTE — PROVIDER NOTIFICATION
Received call at 0454 from lab with Pt lab result. Pt cdiff positive. Notified provider and bedside RN.

## 2022-09-08 NOTE — PROGRESS NOTES
Paged Gold cc  Pt has been having frequent loose stools. Can you please give an order for C-diff specimen collection. Thanks.     MD put in an order for C-diff specimen collection.

## 2022-09-08 NOTE — PROGRESS NOTES
[facility]    Medicine Progress Note - Hospitalist Service, GOLD TEAM 7    Date of Admission:  9/6/2022    Assessment & Plan   Kody Reynaga is a 77 year old male admitted on 9/6/2022. He has a past medical history of portal vein thrombosis (on AC), recurrent ascites, chronic pancreatitis, prostate cancer (S/P prostatectomy), SDH (S/P craniotomy), HTN, dyslipidemia. He had a recent admission from 8/29-9/3/22 for EUN, worsening ascites. Ultimately discharged home with recommendations for OP follow-up with Nephro, PCP, GI, but returns to ED today after presenting from home via ambulance after feeling unwell at the recommendation of PCP staff. He was admitted for further monitoring and management to Medicine service.    Today's plan   -Patient had paracentesis 9/7, reviewed cell count of 230 would rule out SBP  -Urine from 9/7 slightly dirty would get urine culture   -Overnight Clostridium difficle testing that came back positive, started oral vancomycin   -Noted improvement in creatinine       Plan   Ascites, worsening  Portal Vein Thrombosis   Portal Hypertension  Abdominal Pain  Acute metabolic Encephalopathy   Hx of Benign Biliary Stricture  Has had hx of biliary strictures (benign on most recent EGD 05/10/22), S/P multiple biliary stents [id number], biopsies negative for carcinoma of biliary etiology. Recent admission for ascites as above, has required ~weekly paracenteses since, needing ~4L tapped. Negative for SBP last admission. No formal biopsy performed to assess cirrhosis, has scheduled GI appt 10/24 as OP to discuss. However, Abd US in ED does demonstrate cirrhotic configuration of liver. History severely limited by encephalopathy (head CT negative), concern for component of HE as also hyperammonemic, but no jaundice, unable to assess asterixis as inconsistently following commands. WBC 19.2, but HDS, no s/sx of bleeding, plts WNL, though INR elevated at 2.5  (not on Warfarin).  -GI consult , appreciate recs  -s/p para on 9/7  -Daily weights  -Strict I/O's  -Monitor for melena or hematemesis  -Lactulose, titrate for 3-4 loose stools daily  -Albumin challenge for 3 days  -PT/OT consults     EUN, suspect pre-renal etiology vs HRS  Recent admission as above for EUN, Cr baseline 0.9-1.0, but spiked to 4.28, thought to be multifactorial in the setting of fluid restriction (d/t ascites as above) and use of Lasix (d/c'd on discharge). Treated w/ Albumin challenge last admission for ascites w/ improvement in Cr. In process of scheduling OP Nephrology appt. In ED, Cr 4.26 (up from 2.96 on 9/3/22). HDS. Has not been taking Lasix per discharge summary last admission. Suspect etiology here is pre-renal as poor PO intake per wife. Question HRS as newfound evidence of cirrhosis on abd US as above. No new medications or changes to suggest intrarenal etiology and no recent infections, no s/sx of post-renal process/obstruction. Recent renal US last admission with mild right hydro  -Nephrology consulted  -Albumin challenge as above  -Fluid restriction to 1,800cc daily, <2g Na daily  -Trend Cr with daily CMP as above  -Daily weights and I/O's as above     Acute Rib Fractures  Pleural Effusion  Rib pain occurred following a fall during previous hospitalization, but patient had noted improving pain. Found on CT abd/pelv imaging in ED at R anterolateral ribs 7-9 incidentally, and significant ecchymosis noted at R lateral chest wall (see exam below for photo). Also found to have new R pleural effusion, suspect related to inflammation in the chest wall r/t fractures as above vs infection vs hepatic hydrothorax. However, is breathing normal on RA, not hypoxic, HDS.  -Continue to monitor resp status and pain     Known Pancreatic Mass  Chronic Pancreatitis  Elevated Lipase  Initially found on imaging as a 1cm on 02/2022 incidentally after being found to have biliary strictures as above. There was  also noted to be lymph node swelling retroperitoneally. However, repeated FNA biopsies have been negative/inconclusive. Lipase in , but one year ago was ~80-90 at OSH. Pt's limited history makes it difficult to assess for true pancreatitis, but CT w/o evidence of new peripancreatic inflammation.  -Continue to monitor pain if mental status improves     Hyponatremia, mild  Hypochloremia  Elevated BUN  Elevated Anion Gap  Na 131, Cl 89, , AG 18. This is likely multifactorial. Suspect mild hyponatremia d/t poor PO intake at home per wife, no emesis or other GI losses to explain hypochloremia, but could also be d/t poor diet.   -Recheck CMP in AM     Hyperammonemia  Hyperbilirubinemia  He is hyperammonemic at 67 on admission, and Tbili is 1.3. Most likely etiology is cirrhosis as above.  -Recheck CMP in AM     Leukocytosis   Neutrophilia  U/a slightly dry from 9/7, awaiting on cultures, C diff tested positive 9/8, Chest x ray without infiltrate, ct head and ct abdomen unremarkable from 9/6     Urinalysis 9/7   Uti   C/w ceftriaxone for UTI for possible 7 day course   Awaiting cultures     Clostridium difficile 9/8  Started oral vancomycin for 10 day course      Chronic Normocytic Anemia  Likely in setting of chronic disease state. Hgb 9.4 on admission, appears to have baseline Hgb 9-11.5. HDS here, no s/sx of bleeding aside from ecchymosis on R lateral chest wall.  -Trend CBC given above leukocytosis      Thrombosis of SMV, Splenic Vein, Portal Vein  Initially found on abdominal CT 02/2022. Was scheduled to follow up with [facility] tomorrow as OP. PTA on Apixaban 5mg BID.   -Discussed with pharmacy, despite EUN, no dose adjustments necessary for Apixaban. Can continue on this unless s/sx of bleeding     HTN  PTA on Amlodipine.   -Continue PTA Amlodipine     HLD  -Continue PTA Lexapro daily     GERD  Nausea  -Continue PTA PPI  -Continue PTA Bentyl                   Diet: Fluid restriction 1800 ML  "FLUID  2 Gram Sodium Diet    DVT Prophylaxis: Apixaban for dvt ppx   Aparicio Catheter: PRESENT, indication: Strict 1-2 Hour I&O  Central Lines: None  Cardiac Monitoring: None  Code Status: Full Code      Disposition Plan      Expected Discharge Date: 09/12/2022      Destination: inpatient rehabilitation facility  Discharge Comments: From ER. Work up continues.  C-dif and Hepatic Encephalopy.  Needs TCU.  ??? Goals of Care Plan.        The patient's care was discussed with the Bedside Nurse and Patient.    Tobias Snyder MD  Hospitalist Service, [address]  Securely message with the Vocera Web Console (learn more here)  Text page via [facility] Paging/Directory   Please see signed in provider for up to date coverage information      Clinically Significant Risk Factors Present on Admission               # Overweight: Estimated body mass index is 28.98 kg/m  as calculated from the following:    Height as of this encounter: 1.702 m (5' 7\").    Weight as of this encounter: 83.9 kg (185 lb).        ______________________________________________________________________    Interval History   Patient has no new abdominal pain, difficulty urinating, fevers or malaise,     Data reviewed today: I reviewed all medications, new labs and imaging results over the last 24 hours. I personally reviewed no images or EKG's today.    Physical Exam   Vital Signs: Temp: 97.5  F (36.4  C) Temp src: Oral BP: 125/51 Pulse: 97   Resp: 16 SpO2: 98 % O2 Device: None (Room air)    Weight: 185 lbs 0 oz  Constitutional: awake, alert, cooperative, no apparent distress, and appears stated age  Eyes: Lids and lashes normal, pupils equal, round and reactive to light, extra ocular muscles intact, sclera clear, conjunctiva normal  ENT: Normocephalic, without obvious abnormality, atraumatic, sinuses nontender on palpation, external ears without lesions, oral pharynx with moist mucous membranes, tonsils " without erythema or exudates, gums normal and good dentition.  Hematologic / Lymphatic: no cervical lymphadenopathy  Respiratory: No increased work of breathing, good air exchange, clear to auscultation bilaterally, no crackles or wheezing  Cardiovascular: Normal apical impulse, regular rate and rhythm, normal S1 and S2, no S3 or S4, and no murmur noted  GI: No scars, normal bowel sounds, soft, non-distended, non-tender, no masses palpated, no hepatosplenomegally  Genitounirinary:   Skin: no bruising or bleeding  Musculoskeletal: There is no redness, warmth, or swelling of the joints.  Full range of motion noted.  Motor strength is 5 out of 5 all extremities bilaterally.  Tone is normal.  Neurologic: Awake, alert, oriented to name, place and time.  Cranial nerves II-XII are grossly intact.  Motor is 5 out of 5 bilaterally.  Cerebellar finger to nose, heel to shin intact.  Sensory is intact.  Babinski down going, Romberg negative, and gait is normal.  Neuropsychiatric: General: normal, calm and normal eye contact    Data   Recent Labs   Lab 09/08/22  0715 09/07/22  2221 09/07/22  0735 09/06/22  2147 09/06/22  1421 09/03/22  0727 09/03/22  0509 09/02/22  0738 09/02/22  0552   WBC 17.4*  --  23.6*  --  19.2*  --  14.1*  --  12.6*   HGB 8.4*  --  8.2*  --  9.4*  --  9.4*  --  9.3*   MCV 88  --  88  --  86  --  86  --  87   *  --  121*  --  218  --  161  --  157   INR  --   --   --   --  2.50*  --  2.29*  --  2.33*   *  --  131*  --  131*  --  129*  --  133*   POTASSIUM 4.1  --  4.9  --  5.3  --  3.6  --  3.2*   CHLORIDE 93*  --  91*  --  89*  --  89*  --  91*   CO2 24  --  22  --  24  --  23  --  24   .0*  --  126.0*  --  120.0*  --  88.2*  --  78.9*   CR 3.30*  --  4.05*  --  4.26*  --  2.96*  --  2.99*   ANIONGAP 18*  --  18*  --  18*  --  17*  --  18*   AMIRAH 9.7  --  9.5  --  9.6  --  9.1  --  9.2   * 188* 140*   < > 165*   < > 132*   < > 116*   ALBUMIN 4.4  --  4.2  --  4.0  --  4.3  --   4.2   PROTTOTAL 6.3*  --  6.3*  --  6.5  --  6.3*  --  6.4   BILITOTAL 0.8  --  0.9  --  1.3*  --  0.7  --  0.6   ALKPHOS 167*  --  160*  --  125  --  73  --  69   ALT 10  --  11  --  12  --  7*  --  6*   AST 22  --  27  --  27  --  23  --  23   LIPASE  --   --   --   --  548*  --   --   --   --     < > = values in this interval not displayed.     Recent Results (from the past 24 hour(s))   US Renal Complete    Narrative    EXAMINATION: US RENAL COMPLETE, 9/7/2022 3:34 PM     COMPARISON: 8/31/2022, CT abdomen 9/6/2022    HISTORY: 77-year-old male with hydronephrosis and rising creatinine.    TECHNIQUE: The kidneys and bladder were scanned in the standard  fashion with specialized ultrasound transducer(s) using both gray  scale and limited color/spectral Doppler techniques.    FINDINGS:    Right kidney: Atrophic and measures 8.4 cm in length. Cortical  thickness is reduced(1cm). No focal mass. Mild hydronephrosis,  unchanged from 8/31/2022.    Left kidney: The left kidney is not well visualized due to bowel gas.  On prior CT from 9/6/22, the left kidney measured 13 cm in length with  no hydronephrosis.     Bladder: Unremarkable.    Large amount of ascites.      Impression    IMPRESSION:  1.  Mildly atrophic right kidney with mild hydronephrosis, unchanged  from prior.  2.  Large amount of ascites.    I have personally reviewed the examination and initial interpretation  and I agree with the findings.    ROQUE HERCULES MD         SYSTEM ID:  [id number]   XR Chest Port 1 View    Narrative    EXAM: XR CHEST PORT 1 VIEW  9/7/2022 8:18 PM     HISTORY:  please asses for pneumonia       COMPARISON:  Chest x-ray 8/30/2022, abdomen and pelvis CT 9/6/2022    FINDINGS: AP radiograph of the chest. The cardiomediastinal silhouette  is within normal limits. Atherosclerotic calcifications of the aortic  arch. Low lung volumes. Streaky bibasilar and retrocardiac opacities.  Small right pleural effusion. The visualized upper abdomen  is  unremarkable. No acute osseous abnormality.      Impression    IMPRESSION:  1. No focal airspace opacity.  2. Small right pleural effusion with streaky bibasilar atelectasis.    I have personally reviewed the examination and initial interpretation  and I agree with the findings.    ROYAL OBANDO MD         SYSTEM ID:  [id number]     Medications     - MEDICATION INSTRUCTIONS -         albumin human  50 g Intravenous Daily     amLODIPine  10 mg Oral Daily     apixaban ANTICOAGULANT  5 mg Oral BID     cefTRIAXone  1 g Intravenous Q24H     dicyclomine  20 mg Oral TID     escitalopram  20 mg Oral QAM     lactulose  10 g Oral Once     lactulose  20 g Oral TID     pantoprazole  40 mg Oral QAM     pravastatin  40 mg Oral Daily     sodium bicarbonate  1,300 mg Oral TID     sodium chloride (PF)  3 mL Intracatheter Q8H     vancomycin  125 mg Oral 4x Daily

## 2022-09-09 PROBLEM — E87.5 HYPERKALEMIA: Status: RESOLVED | Noted: 2022-01-01 | Resolved: 2022-01-01

## 2022-09-09 PROBLEM — K83.1 OBSTRUCTIVE JAUNDICE (H): Status: RESOLVED | Noted: 2021-08-26 | Resolved: 2022-01-01

## 2022-09-09 PROBLEM — N17.9 PRERENAL ACUTE RENAL FAILURE (H): Status: ACTIVE | Noted: 2022-01-01

## 2022-09-09 PROBLEM — N17.9 ACUTE RENAL FAILURE, UNSPECIFIED ACUTE RENAL FAILURE TYPE (H): Status: RESOLVED | Noted: 2022-01-01 | Resolved: 2022-01-01

## 2022-09-09 NOTE — PROGRESS NOTES
Gastroenterology Follow up Note         Assessment and Plan:   Kody Reynaga is a 77 year old male with a history of a pancreas lesion (suspected chronic pancreatitis) and portal, splenic and SMV thrombosis with portal hypertension ( recurrent ascites requiring weekly paracentesis) who was recently admitted from 8/29-9/3/22 for management of EUN and worsening ascites, he was discharged home with plans to follow up with IR to evaluate for recanalization  and GI for further management and plans for potential biopsy. He unfortunately returned to the ED with concerns for worsening fatigue and confusion noted by his wife.      US in the ED with cirrhotic configuration to the liver with evidence for portal hypertension, given this findings, presentation with confusion and ascites, hepatology is being consulted for assistance with management.      #Portal Hypertension of unclear etiology with Moderate to Large Ascites  #Chronic occlusive thrombus of splenic vein, superior mesenteric vein and portal vein     Patient presented with worsening confusion, fatigue and ascites. Admission labs with evidence of elevated INR  2.50, low platelet 121, anemia hgb 8.2 and leukocytosis WBC 23.6k.   Of note patient has had ascites requiring weekly paracentesis which was thought to be due to non cirrhotic portal hypertension in the setting of PVT. He was recently admitted from 8/29-9/3, at the time was managed for EUN and ascites and was discharged with plans to follow with IR for evaluation of recanalization and also with GI for consideration of liver biopsy given concern for the possibility of cirrhosis in the setting of a reported history of alcohol use.      US during this admission revealed cirrhotic configuration to the liver with evidence for portal hypertension which together with presenting symptoms and ascites prompted hepatology consult for assistance with management    SAAG>1.1.  It is possible that the patient has cirrhosis  "although his portal hypertension may equally be non-cirrhotic related to PVT.  PMN noted at ~230 not diagnostic of SBP hence treatment not needed.     We will continue to manage the patient with PRN paracentesis for now, we could consider outpatient TIPS for refractory ascites. Patient seems to be a little less confused today and is clinically stable.         #EUN  This does not appear to be the typical type 1 HRS, suspected to be likely pre renal EUN  on CKD ( which may have been due to recurrent EUN), though pt has ascites, BP has remained normal .   Renal function improving with holding diuretics and albumin.     CDIFF     Patient with recent positive Cdiff test, no evidence of fulminant infection and is clinically stable.           Recommendations:   --Continue therapeutic paracentesis as needed, could consider outpatient TIPS in the setting of refractory ascites  --Please discontinue Ceftriaxone  --Vancomycin for Cdiff    --Continue Lactulose   --Continue to hold diuretics in the setting of EUN  --Agree with albumin   --Monitor creatinine     It has been a pleasure to participate in the care of this patient.  Patient discussed with GI staff, Dr. De Jesus.  Please do not hesitate to contact the GI service with any questions or concerns.     Braxton Luciano MD  Gastroenterology Fellow  Pager [phone]         Subjective/Objective:   - No acute events overnight  -No GI related concerns at this time.         Physical Exam:   VS:  /53 (BP Location: Left arm)   Pulse 94   Temp 97.6  F (36.4  C) (Temporal)   Resp 16   Ht 1.702 m (5' 7\")   Wt 83.9 kg (185 lb)   SpO2 91%   BMI 28.98 kg/m      Wt:   Wt Readings from Last 2 Encounters:   09/06/22 83.9 kg (185 lb)   09/03/22 83.2 kg (183 lb 6.8 oz)      Constitutional: cooperative, pleasant, no acute distress  Eyes: Conjunctiva anicteric  HEENT: Normal oropharynx without ulcers or exudate, mucus membranes moist  CV: mild LE edema   Respiratory: Breathing comfortably " on ambient air  Abd: Distended, nontender, no rebound or guarding   Skin: No jaundice  Neuro: AAO x 2          Laboratory:   BMP  Recent Labs   Lab 09/09/22 0728 09/08/22 2213 09/08/22 0715 09/07/22 2221 09/07/22  0735 09/06/22 2147 09/06/22  1421   *  --  135*  --  131*  --  131*   POTASSIUM 4.2  --  4.1  --  4.9  --  5.3   CHLORIDE 91*  --  93*  --  91*  --  89*   AMIRAH 9.5  --  9.7  --  9.5  --  9.6   CO2 25  --  24  --  22  --  24   .0*  --  131.0*  --  126.0*  --  120.0*   CR 3.06*  --  3.30*  --  4.05*  --  4.26*   * 176* 144* 188* 140*   < > 165*    < > = values in this interval not displayed.     CBC  Recent Labs   Lab 09/09/22 0728 09/08/22 0715 09/07/22  0735 09/06/22  1421   WBC 16.0* 17.4* 23.6* 19.2*   RBC 2.85* 2.97* 2.91* 3.36*   HGB 8.0* 8.4* 8.2* 9.4*   HCT 25.2* 26.0* 25.5* 28.8*   MCV 88 88 88 86   MCH 28.1 28.3 28.2 28.0   MCHC 31.7 32.3 32.2 32.6   RDW 16.2* 16.5* 16.3* 16.1*   * 109* 121* 218     INR  Recent Labs   Lab 09/06/22  1421 09/03/22  0509   INR 2.50* 2.29*     LFTs  Recent Labs   Lab 09/09/22 0728 09/08/22  0715 09/07/22  0735 09/06/22  1421   ALKPHOS 164* 167* 160* 125   AST 22 22 27 27   ALT 12 10 11 12   BILITOTAL 0.9 0.8 0.9 1.3*   PROTTOTAL 6.2* 6.3* 6.3* 6.5   ALBUMIN 4.6 4.4 4.2 4.0      PANC  Recent Labs   Lab 09/06/22  1421   LIPASE 548*

## 2022-09-09 NOTE — PROGRESS NOTES
Antimicrobial Stewardship Team Note    Antimicrobial Stewardship Program - A joint venture between [facility] and  Physicians to optimize antibiotic management.  NOT a formal consult - Restricted Antimicrobial Review     Patient: Kody Reynaga  MRN: [id number]  Allergies: Patient has no known allergies.    Brief Summary: Kody Reynaga is a 77 year old male with history of portal vein thrombosis (on Eliquis), recurrent ascites requiring weekly paracenteses, chronic pancreatitis, biliary strictures (benign on EGD 5/10/22) s/p multiple biliary stents in 5763-0334, CKD stage III, prostate cancer (S/P prostatectomy), SDH (S/P craniotomy), HTN, and dyslipidemia who was admitted on 9/6/2022 for evaluation of fatigue and altered mental status.    HPI: Patient was recently admitted from 8/29-9/3/22 for EUN and worsening ascites, underwent therapeutic and diagnostic paracentesis which were negative for any signs of infection. Creatinine improved and was ultimately discharged with recommendations for outpatient follow-up with nephrology and GI. Since discharge home, patient's wife states he has been unable to get out of bed for the past day or so and seemed very altered throughout the day.  No reports of fevers, chills or other symptoms. Physical exam notable for distended abdomen with diffuse tenderness without rebound or guarding. Alerted but disoriented and unable to answer questions. VS wnl. Labs were notable for WBC 19.2, Scr 4.26 (baseline 0.9-1), ammonia 67, and Tbili 1.3. CT head without acute findings. CT abdomen showed increasing intrahepatic biliary dilation from 8/11/2022 and increasing now moderate to large volume abdominal ascites. New partially imaged right pleural effusion. AUS: Cirrhotic configuration to the liver with evidence for portal hypertension without suspicious focal hepatic lesion and continued thrombosis of the extrahepatic portal vein, intrahepatic right and left portal veins.  Moderate to large volume ascites. Empiric ceftriaxone was started for possible SBP. Patient underwent paracentesis on 9/7 with removal of 2 L of straw colored fluid, <250 PMNs. No organisms seen on gram stain and culture remains NGTD x1 day. UA non-inflammatory and urine culture pending.  On 9/8 patient was noted to have increasing loose stools with worsening leukocytosis. C diff PCR test positive and oral vancomycin was started.            Active Anti-infective Medications   (From admission, onward)                 Start     Stop    09/08/22 2000  cefTRIAXone  1 g,   Intravenous,   EVERY 24 HOURS        Urinary Tract Infection        --    09/08/22 0800  vancomycin  125 mg,   Oral,   4 TIMES DAILY        Clostridioides difficile        --                  Assessment: C/f SBP and UTI in the setting of AMS; CDI  Patient has remained afebrile and hemodynamically stable without SBP symptoms (i.e. acute abdominal pain and rebound with tenderness). Ascitic fluid showed < 250 PMNs and no organisms on culture indicating SBP is less likely. Patient has gotten some relief and improvement with paracentesis and gets weekly paracentesis prior to admission. Furthermore, patient does not have urinary symptoms and UA non-inflammatory. We recommend against treating for asymptomatic bacteriuria as altered mental status is more likely related to cirrhosis than infectious cause. Leukocytosis is more likely due to C diff infection in the setting of increased loose stools and positive C diff PCR test. Agree with continuing oral vancomycin to complete a duration of 10 days. We recommend stopping empiric ceftriaxone given lack of bacterial infection and avoid ongoing systemic antibiotic exposure in the setting of C diff infection.     Recommendations:  Stop ceftriaxone  Continue oral vancomycin to complete 10 days of therapy (end date ~9/18/2022 )     Pharmacy took the following actions: Called/paged provider, Electronic note  created.    Discussed with ID Staff: MD Capri Soares, PharmD, [facility]  Pager: [phone]    Vital Signs/Clinical Features:  Vitals  Report        09/07 0700 09/08 0659 09/08 0700 09/09 0659 09/09 0700 09/09 1123   Most Recent      Temp ( F) 98 -  98.5    97.2 -  98.2      97.6     97.6 (36.4) 09/09 0712    Pulse 96 -  98    95 -  97      94     94 09/09 0712    Resp   16      16      16     16 09/09 0712    /51 -  132/59    125/51 -  130/63      111/53     111/53 09/09 0712    SpO2 (%) 92 -  96    94 -  98    90 -  91     91 09/09 0945            Labs  Estimated Creatinine Clearance: 20.9 mL/min (A) (based on SCr of 3.06 mg/dL (H)).  Recent Labs   Lab Test 09/02/22  0552 09/03/22  0509 09/06/22 1421 09/07/22  0735 09/08/22  0715 09/09/22  0728   CR 2.99* 2.96* 4.26* 4.05* 3.30* 3.06*       Recent Labs   Lab Test 09/02/22  0552 09/03/22  0509 09/06/22 1421 09/07/22  0735 09/08/22  0715 09/09/22  0728   WBC 12.6* 14.1* 19.2* 23.6* 17.4* 16.0*   HGB 9.3* 9.4* 9.4* 8.2* 8.4* 8.0*   HCT 28.8* 28.8* 28.8* 25.5* 26.0* 25.2*   MCV 87 86 86 88 88 88    161 218 121* 109* 127*       Recent Labs   Lab Test 09/02/22  0552 09/03/22  0509 09/06/22 1421 09/07/22  0735 09/08/22  0715 09/09/22  0728   BILITOTAL 0.6 0.7 1.3* 0.9 0.8 0.9   ALKPHOS 69 73 125 160* 167* 164*   ALBUMIN 4.2 4.3 4.0 4.2 4.4 4.6   AST 23 23 27 27 22 22   ALT 6* 7* 12 11 10 12       Recent Labs   Lab Test 08/30/22  0355 09/07/22  0151 09/07/22  0735   LACT 1.9 2.6* 1.9             Culture Results:  7-Day Micro Results       Procedure Component Value Units Date/Time    Urine Culture [[id number]] Collected: 09/08/22 1705    Order Status: Completed Lab Status: Preliminary result Updated: 09/09/22 1116    Specimen: Urine, NOS      Culture No growth, less than 1 day    Clostridium difficile toxin B PCR [[id number]]  (Abnormal) Collected: 09/08/22 0324    Order Status: Completed Lab Status: Final result Updated: 09/08/22  0433    Specimen: Stool from Per Rectum      C Difficile Toxin B by PCR Positive     Comment: Detection of C. difficile nucleic acid in stools confirms the presence of these organisms in diarrheal patients but may not indicate that C. difficile are the etiologic agents of the diarrhea. Results from the Xpert C. difficile assay should be interpreted in conjunction with other laboratory and clinical data available to the clinician.       Narrative:      The [employer] Xpert C. difficile Assay, performed on the [employer]  [employer], is a qualitative in vitro diagnostic test for rapid detection of toxin B gene sequences from unformed (liquid or soft) stool specimens collected from patients suspected of having Clostridioides difficile infection (CDI). The test utilizes automated real-time polymerase chain reaction (PCR) to detect toxin gene sequences associated with toxin producing C. difficile. The Xpert C. difficile Assay is intended as an aid in the diagnosis of CDI.    Gram stain [[id number]] Collected: 09/07/22 1230    Order Status: Canceled Lab Status: No result Updated: 09/07/22 1252    Specimen: Ascites Fluid from Abdomen     Ascites Fluid Aerobic Bacterial Culture Routine with Gram Stain [[id number]] Collected: 09/07/22 1230    Order Status: Completed Lab Status: Preliminary result Updated: 09/09/22 0943    Specimen: Ascites Fluid from Peritoneum      Culture No growth after 1 day     Gram Stain Result No organisms seen      3+ WBC seen     Comment: Predominantly mononuclear cells               Recent Labs   Lab Test 08/31/22  0134 09/07/22  1814   URINEPH 5.0 5.0   NITRITE Negative Negative   LEUKEST Moderate* Trace*   WBCU 18* 3                   Recent Labs   Lab Test 09/08/22  0324   CDBPCT Positive*       Imaging: US Renal Complete    Result Date: 9/7/2022  EXAMINATION: US RENAL COMPLETE, 9/7/2022 3:34 PM COMPARISON: 8/31/2022, CT abdomen 9/6/2022 HISTORY: 77-year-old male with  hydronephrosis and rising creatinine. TECHNIQUE: The kidneys and bladder were scanned in the standard fashion with specialized ultrasound transducer(s) using both gray scale and limited color/spectral Doppler techniques. FINDINGS: Right kidney: Atrophic and measures 8.4 cm in length. Cortical thickness is reduced(1cm). No focal mass. Mild hydronephrosis, unchanged from 8/31/2022. Left kidney: The left kidney is not well visualized due to bowel gas. On prior CT from 9/6/22, the left kidney measured 13 cm in length with no hydronephrosis. Bladder: Unremarkable. Large amount of ascites.     IMPRESSION: 1.  Mildly atrophic right kidney with mild hydronephrosis, unchanged from prior. 2.  Large amount of ascites. I have personally reviewed the examination and initial interpretation and I agree with the findings. ROQUE HERCULES MD   SYSTEM ID:  [id number]    XR Chest Port 1 View    Result Date: 9/7/2022  EXAM: XR CHEST PORT 1 VIEW  9/7/2022 8:18 PM HISTORY:  please asses for pneumonia   COMPARISON:  Chest x-ray 8/30/2022, abdomen and pelvis CT 9/6/2022 FINDINGS: AP radiograph of the chest. The cardiomediastinal silhouette is within normal limits. Atherosclerotic calcifications of the aortic arch. Low lung volumes. Streaky bibasilar and retrocardiac opacities. Small right pleural effusion. The visualized upper abdomen is unremarkable. No acute osseous abnormality.     IMPRESSION: 1. No focal airspace opacity. 2. Small right pleural effusion with streaky bibasilar atelectasis. I have personally reviewed the examination and initial interpretation and I agree with the findings. ROYAL OBANDO MD   SYSTEM ID:  [id number]    CT Abdomen Pelvis w/o Contrast    Result Date: 9/6/2022  EXAMINATION: CT ABDOMEN PELVIS W/O CONTRAST  9/6/2022 4:51 PM  CLINICAL HISTORY: elevated lipase COMPARISON: CT 8/11/2022 PROCEDURE COMMENTS: CT of the abdomen was performed without intravenous and oral contrast. Coronal and sagittal reformatted  images were obtained. FINDINGS: Lower thorax: Incompletely imaged right pleural effusion and compressive atelectasis, new from prior CT. Left lower lobe basilar and inferior lingular subsegmental atelectasis. Abdomen and pelvis: Increasing intrahepatic biliary dilation, for example a right intrahepatic bile duct on series 3 image 72 measures 11 mm previously up to 8 mm. Known portal, SMV and splenic vein thrombosis not able to be assessed on noncontrast study. No cholelithiasis. Fatty atrophy of the pancreas, however without substantial peripancreatic inflammation. Unremarkable unenhanced spleen and adrenal glands. No hydronephrosis or obstructing urinary calculi. No small or large bowel obstruction. Diverticulosis without diverticulitis. Increasing now moderate to large volume of simple density ascites. No pneumoperitoneum or portal venous gas. Vascular patency not assessed on noncontrast exam. Bones/soft tissues: New acute appearing and displaced left seventh through ninth anterolateral rib fractures.     IMPRESSION: 1. Increasing intrahepatic biliary dilation from 8/11/2022. 2. Increasing now moderate to large volume abdominal ascites. New partially imaged right pleural effusion. 3. Acute appearing right anterolateral seventh through ninth rib fractures. I have personally reviewed the examination and initial interpretation and I agree with the findings. JOSTIN DAVIDSON MD   SYSTEM ID:  [id number]    Head CT w/o contrast    Result Date: 9/6/2022  CT HEAD W/O CONTRAST 9/6/2022 4:51 PM History: confusion ICD-10: Comparison: No prior relevant imaging available for comparison Technique: Using multidetector thin collimation helical acquisition technique, axial, coronal and sagittal CT images from the skull base to the vertex were obtained without intravenous contrast.  (topogram) image(s) also obtained and reviewed. Findings: There is no new intracranial hemorrhage, mass effect, or midline shift. Left anterior  frontal encephalomalacia/gliosis. No acute loss of gray-white matter differentiation is suspected.. Ventricles are proportionate to the cerebral sulci. The basal cisterns are clear. Midline posterior fossa arachnoid cyst versus an enlarged cisterna magna. Prior left temporal craniotomy. Underlying extra-axial hyperdensity, presumably duraplasty changes. Trace amount of extra-axial fluid and gas which is likely residual from prior procedural (series 8 image 38). However prior imaging is not available for comparison at the time of dictation. The visualized portions of the paranasal sinuses and mastoid air cells are clear.     Impression: 1. No definite evidence for acute intracranial pathology. 2. Left calvarial craniotomy, with presumed duraplasty. Trace residual extra-axial fluid and air subjacent to the craniotomy flap, reportedly present on outside CT reports. However imaging is not available at the time of comparison.  I have personally reviewed the examination and initial interpretation and I agree with the findings. PASCUAL MAY MD   SYSTEM ID:  [id number]    US Abdomen Limited w Abd/Pelvis Duplex Complete    Result Date: 9/6/2022  EXAMINATION: US ABDOMEN LIMITED WITH DOPPLER COMPLETE 9/6/2022 7:31 PM COMPARISON: Same day abdomen and pelvis CT, abdominal ultrasound 8/31/2022, abdomen and pelvis CT 8/11/2022 HISTORY: Patient with portal vein thrombosis, please reevaluate TECHNIQUE: The abdomen was scanned in standard fashion with specialized ultrasound transducer(s) using both gray-scale, color Doppler, and spectral flow techniques. Findings: Liver: Liver demonstrates coarsened hepatic echotexture with nodular hepatic contours. No focal hepatic lesion. Occluded extrahepatic portal vein, right portal vein, and left portal vein. The splenic vein is obscured by overlying bowel gas. The splenic and superior mesenteric veins were previously occluded on the comparison abdomen pelvis CT obtained on 8/11/2022. Flow  in the hepatic artery is towards the liver and: 102 cm/s peak systolic 0.74 resistive index. The left, middle, and right hepatic veins are patent with flow towards the IVC with aphasic venous waveforms. The IVC is patent with flow towards the heart.   The visualized aorta is not dilated. Gallbladder: Intraluminal echogenic sludge. No gallbladder wall thickening or gallbladder wall hyperemia. Sonographic Hinton sign is negative. Bile Ducts: Dilated intrahepatic and extrahepatic bile ducts are unchanged the common bile duct measures 13 mm in the right intrahepatic bile duct measures up to 6 mm.  Right kidney: Right kidney measures 8.6 cm. No suspicious right renal lesion. No hydronephrosis or shadowing stone. Prominent right extrarenal pelvis. Fluid: Moderate to large volume anechoic ascites. Partially visualized right pleural effusion.     Impression: 1.  Cirrhotic configuration to the liver with evidence for portal hypertension. No suspicious focal hepatic lesion. 2.  Continued thrombosis of the extrahepatic portal vein and intrahepatic right and left portal veins. The known occlusive thrombus of the splenic and superior mesenteric veins are not confidently identified on today's exam due to overlying bowel gas, better visualized on the comparison abdomen and pelvic CT on 8/11/2022. 3.  Aphasic venous waveforms of the hepatic veins consistent with the patient's intrinsic hepatic dysfunction. 4.  Intrahepatic and extrahepatic bile duct dilation which appears increased since 8/11/2022. 5.  Moderate to large volume ascites. 6.  Right pleural effusion. I have personally reviewed the examination and initial interpretation and I agree with the findings. JOSTIN DAVIDSON MD   SYSTEM ID:  [id number]

## 2022-09-09 NOTE — PROGRESS NOTES
Care Management Follow Up    Length of Stay (days): 3    Expected Discharge Date: 09/14/2022     Concerns to be Addressed: discharge planning     Patient plan of care discussed at interdisciplinary rounds: Yes    Anticipated Discharge Disposition: Transitional Care     Anticipated Discharge Services: None  Anticipated Discharge DME: None    Patient/family educated on Medicare website which has current facility and service quality ratings: yes  Education Provided on the Discharge Plan:    Patient/Family in Agreement with the Plan: yes    Referrals Placed by CM/SW: Post Acute Facilities  Private pay costs discussed: Not applicable    Additional Information:  JESSICA is 9/14. [facility] followed up with TCU referrals, VM left requesting call back.     Care coordination will continue to follow and support safe discharge planning as needed.      Annel Laureano RN  [facility]  [phone]

## 2022-09-09 NOTE — PROGRESS NOTES
[facility]    Medicine Progress Note - Hospitalist Service, GOLD TEAM 7    Date of Admission:  9/6/2022    Assessment & Plan        Kody Reynaga is a 77 year old male admitted on 9/6/2022. He has a past medical history of portal vein thrombosis (on AC), recurrent ascites, chronic pancreatitis, prostate cancer (S/P prostatectomy), SDH (S/P craniotomy), HTN, dyslipidemia. He had a recent admission from 8/29-9/3/22 for EUN, worsening ascites. Ultimately discharged home with recommendations for OP follow-up with Nephro, PCP, GI, but returns to ED today after presenting from home via ambulance after feeling unwell at the recommendation of PCP staff. He was admitted for further monitoring and management to Medicine service.    Today's plan   -stopped ceftriaxone 9/9   -creatinine continues to improve   -Spoke to wife Asia and explained the plan of care     Plan   Ascites, worsening  Portal Vein Thrombosis   Portal Hypertension  Abdominal Pain  Acute metabolic Encephalopathy   Hx of Benign Biliary Stricture  Has had hx of biliary strictures (benign on most recent EGD 05/10/22), S/P multiple biliary stents 2797-7739, biopsies negative for carcinoma of biliary etiology. Recent admission for ascites as above, has required ~weekly paracenteses since, needing ~4L tapped. Negative for SBP last admission. No formal biopsy performed to assess cirrhosis, has scheduled GI appt 10/24 as OP to discuss. However, Abd US in ED does demonstrate cirrhotic configuration of liver. History severely limited by encephalopathy (head CT negative), concern for component of HE as also hyperammonemic, but no jaundice, unable to assess asterixis as inconsistently following commands. WBC 19.2, but HDS, no s/sx of bleeding, plts WNL, though INR elevated at 2.5 (not on Warfarin).  -GI consult , appreciate recs  -s/p para on 9/7  -Daily weights  -Strict I/O's  -Monitor for melena or  hematemesis  -Lactulose, titrate for 3-4 loose stools daily  -Albumin challenge for 3 days  -PT/OT consults     EUN, suspect pre-renal etiology vs HRS  Recent admission as above for EUN, Cr baseline 0.9-1.0, but spiked to 4.28, thought to be multifactorial in the setting of fluid restriction (d/t ascites as above) and use of Lasix (d/c'd on discharge). Treated w/ Albumin challenge last admission for ascites w/ improvement in Cr. In process of scheduling OP Nephrology appt. In ED, Cr 4.26 (up from 2.96 on 9/3/22). HDS. Has not been taking Lasix per discharge summary last admission. Suspect etiology here is pre-renal as poor PO intake per wife. Question HRS as newfound evidence of cirrhosis on abd US as above. No new medications or changes to suggest intrarenal etiology and no recent infections, no s/sx of post-renal process/obstruction. Recent renal US last admission with mild right hydro  -Nephrology consulted  -Albumin challenge as above  -Fluid restriction to 1,800cc daily, <2g Na daily  -Trend Cr with daily CMP as above  -Daily weights and I/O's as above     Acute Rib Fractures  Pleural Effusion  Rib pain occurred following a fall during previous hospitalization, but patient had noted improving pain. Found on CT abd/pelv imaging in ED at R anterolateral ribs 7-9 incidentally, and significant ecchymosis noted at R lateral chest wall (see exam below for photo). Also found to have new R pleural effusion, suspect related to inflammation in the chest wall r/t fractures as above vs infection vs hepatic hydrothorax. However, is breathing normal on RA, not hypoxic, HDS.  -Continue to monitor resp status and pain     Known Pancreatic Mass  Chronic Pancreatitis  Elevated Lipase  Initially found on imaging as a 1cm on 02/2022 incidentally after being found to have biliary strictures as above. There was also noted to be lymph node swelling retroperitoneally. However, repeated FNA biopsies have been negative/inconclusive.  Lipase in , but one year ago was ~80-90 at OSH. Pt's limited history makes it difficult to assess for true pancreatitis, but CT w/o evidence of new peripancreatic inflammation.  -Continue to monitor pain if mental status improves     Hyponatremia, mild  Hypochloremia  Elevated BUN  Elevated Anion Gap  Na 131, Cl 89, , AG 18. This is likely multifactorial. Suspect mild hyponatremia d/t poor PO intake at home per wife, no emesis or other GI losses to explain hypochloremia, but could also be d/t poor diet.   -Recheck CMP in AM     Hyperammonemia  Hyperbilirubinemia  He is hyperammonemic at 67 on admission, and Tbili is 1.3. Most likely etiology is cirrhosis as above.  -Recheck CMP in AM     Leukocytosis   Neutrophilia  U/a slightly dry from 9/7, awaiting on cultures, C diff tested positive 9/8, Chest x ray without infiltrate, ct head and ct abdomen unremarkable from 9/6     Urinalysis 9/7   Cystitis   S/p 3 days ceftriaxone 9/6-9/9  Awaiting cultures     Clostridium difficile 9/8  Started oral vancomycin for 10 day course      Chronic Normocytic Anemia  Likely in setting of chronic disease state. Hgb 9.4 on admission, appears to have baseline Hgb 9-11.5. HDS here, no s/sx of bleeding aside from ecchymosis on R lateral chest wall.  -Trend CBC given above leukocytosis      Thrombosis of SMV, Splenic Vein, Portal Vein  Initially found on abdominal CT 02/2022. Was scheduled to follow up with [facility] clinic tomorrow as OP. PTA on Apixaban 5mg BID.   -Discussed with pharmacy, despite UEN, no dose adjustments necessary for Apixaban. Can continue on this unless s/sx of bleeding     HTN  -Continue PTA Amlodipine     HLD  -Continue PTA Lexapro daily     GERD  Nausea  -Continue PTA PPI  -Continue PTA Bentyl                     Diet: Fluid restriction 1800 ML FLUID  2 Gram Sodium Diet    DVT Prophylaxis: Apixaban for dvt ppx   Aparicio Catheter: PRESENT, indication: Strict 1-2 Hour I&O  Central Lines:  "None  Cardiac Monitoring: None  Code Status: Full Code      Disposition Plan      Expected Discharge Date: 09/14/2022      Destination: inpatient rehabilitation facility  Discharge Comments: From ER. Work up continues.  C-dif and Hepatic Encephalopy.  Needs TCU, improving        The patient's care was discussed with the Bedside Nurse and Patient.    Tobias Snyder MD  Hospitalist Service, [address]  Securely message with the Vocera Web Console (learn more here)  Text page via [facility] Paging/Directory   Please see signed in provider for up to date coverage information      Clinically Significant Risk Factors Present on Admission               # Overweight: Estimated body mass index is 28.98 kg/m  as calculated from the following:    Height as of this encounter: 1.702 m (5' 7\").    Weight as of this encounter: 83.9 kg (185 lb).        ______________________________________________________________________    Interval History   Patient has been doing well, he has no new abdominal pain, diarrhea, constipation or fevers     Data reviewed today: I reviewed all medications, new labs and imaging results over the last 24 hours. I personally reviewed no images or EKG's today.    Physical Exam   Vital Signs: Temp: 97.6  F (36.4  C) Temp src: Temporal BP: 111/53 Pulse: 94   Resp: 16 SpO2: 91 % O2 Device: None (Room air) Oxygen Delivery: 1 LPM  Weight: 185 lbs 0 oz  Constitutional: awake, alert, cooperative, no apparent distress, and appears stated age  Eyes: Lids and lashes normal, pupils equal, round and reactive to light, extra ocular muscles intact, sclera clear, conjunctiva normal  ENT: Normocephalic, without obvious abnormality, atraumatic, sinuses nontender on palpation, external ears without lesions, oral pharynx with moist mucous membranes, tonsils without erythema or exudates, gums normal and good dentition.  Hematologic / Lymphatic: no cervical " lymphadenopathy  Respiratory: No increased work of breathing, good air exchange, clear to auscultation bilaterally, no crackles or wheezing  Cardiovascular: Normal apical impulse, regular rate and rhythm, normal S1 and S2, no S3 or S4, and no murmur noted  GI: No scars, normal bowel sounds, soft, non-distended, non-tender, no masses palpated, no hepatosplenomegally  Genitounirinary:   Skin: no bruising or bleeding  Musculoskeletal: There is no redness, warmth, or swelling of the joints.  Full range of motion noted.  Motor strength is 5 out of 5 all extremities bilaterally.  Tone is normal.  Neurologic: Awake, alert, oriented to name, place and time.  Cranial nerves II-XII are grossly intact.  Motor is 5 out of 5 bilaterally.  Cerebellar finger to nose, heel to shin intact.  Sensory is intact.  Babinski down going, Romberg negative, and gait is normal.  Neuropsychiatric: General: normal, calm and normal eye contact    Data   Recent Labs   Lab 09/09/22  0728 09/08/22  2213 09/08/22  0715 09/07/22  2221 09/07/22  0735 09/06/22  2147 09/06/22  1421 09/03/22  0727 09/03/22  0509   WBC 16.0*  --  17.4*  --  23.6*  --  19.2*  --  14.1*   HGB 8.0*  --  8.4*  --  8.2*  --  9.4*  --  9.4*   MCV 88  --  88  --  88  --  86  --  86   *  --  109*  --  121*  --  218  --  161   INR  --   --   --   --   --   --  2.50*  --  2.29*   *  --  135*  --  131*  --  131*  --  129*   POTASSIUM 4.2  --  4.1  --  4.9  --  5.3  --  3.6   CHLORIDE 91*  --  93*  --  91*  --  89*  --  89*   CO2 25  --  24  --  22  --  24  --  23   .0*  --  131.0*  --  126.0*  --  120.0*  --  88.2*   CR 3.06*  --  3.30*  --  4.05*  --  4.26*  --  2.96*   ANIONGAP 17*  --  18*  --  18*  --  18*  --  17*   AMIRAH 9.5  --  9.7  --  9.5  --  9.6  --  9.1   * 176* 144*   < > 140*   < > 165*   < > 132*   ALBUMIN 4.6  --  4.4  --  4.2  --  4.0  --  4.3   PROTTOTAL 6.2*  --  6.3*  --  6.3*  --  6.5  --  6.3*   BILITOTAL 0.9  --  0.8  --  0.9  --   1.3*  --  0.7   ALKPHOS 164*  --  167*  --  160*  --  125  --  73   ALT 12  --  10  --  11  --  12  --  7*   AST 22  --  22  --  27  --  27  --  23   LIPASE  --   --   --   --   --   --  548*  --   --     < > = values in this interval not displayed.     No results found for this or any previous visit (from the past 24 hour(s)).  Medications     - MEDICATION INSTRUCTIONS -         amLODIPine  10 mg Oral Daily     apixaban ANTICOAGULANT  5 mg Oral BID     dicyclomine  20 mg Oral TID     escitalopram  20 mg Oral QAM     lactulose  10 g Oral Once     lactulose  20 g Oral TID     pantoprazole  40 mg Oral QAM     pravastatin  40 mg Oral Daily     sodium bicarbonate  1,300 mg Oral TID     sodium chloride (PF)  3 mL Intracatheter Q8H     vancomycin  125 mg Oral 4x Daily

## 2022-09-09 NOTE — PLAN OF CARE
Patient is alert and oriented to self and place, intermittent disoriented to time and situation. Bed alarm on for safety. Denies pain and nausea. Patient had a medium formed BM this shift. Ambulated with assist of 1-2 to bathroom. PIV x2 SL between abx. Aparicio in place with adequate urine output. Enteric precaution maintained. Sleeping between cares. Continue with plan of care.

## 2022-09-09 NOTE — CONSULTS
[facility] Nurse Inpatient Assessment     Consulted for: forehead wound     Patient History (according to provider note(s):      77 year old male admitted on 9/6/2022. He has a past medical history of portal vein thrombosis (on AC), recurrent ascites, chronic pancreatitis, prostate cancer (S/P prostatectomy), SDH (S/P craniotomy), HTN, dyslipidemia. He had a recent admission from 8/29-9/3/22 for EUN, worsening ascites. Presented to ED via ambulance after feeling unwell at the recommendation of PCP staff. He was admitted for further monitoring and management to Medicine service.    Areas Assessed:    Wound location: left forehead      Last photo: 9/8/22  Wound due to: Surgical Wound  Wound history/plan of care: s/p craniotomy.  Has been followed closely by wound care at his previous rehab facility.  Most recent dressing:  hydrofera blue packing.  Per spouse, wound has become much smaller in all dimensions with this dressing.  Pt has a wound clinic appt in about a month.      Wound base: 100 % granulation tissue,     Palpation of the wound bed: normal      Drainage: scant     Description of drainage: serosanguinous     Measurements (length x width x depth, in cm): 0.6  x 0.9  x  0.2 cm      Tunneling: N/A     Undermining: N/A  Periwound skin: Intact and well healed incision scar      Color: normal and consistent with surrounding tissue      Temperature: normal   Odor: none  Pain: no grimacing or signs of discomfort,   Pain interventions prior to dressing change: N/A  Treatment goal: Heal   STATUS: initial assessment, improving  Supplies ordered: at bedside    Treatment Plan:     Forehead wound: Daily    Cleanse with wound cleanser and gauze.  Pat dry.   Apply Bacitracin nickel thick to wound bed.   Cover with primapore dressing.      Orders: Written    RECOMMEND PRIMARY TEAM ORDER: Bacitracin  Education provided: plan of care and wound progress  Discussed plan of care  with: Family and Nurse  WOC nurse follow-up plan: weekly  Notify WOC if wound(s) deteriorate.  Nursing to notify the Provider(s) and re-consult the WOC Nurse if new skin concern.    DATA:     Current support surface: Standard  Atmos Air mattress  BMI: Body mass index is 28.98 kg/m .   Active diet order: Orders Placed This Encounter      2 Gram Sodium Diet     Output: I/O last 3 completed shifts:  In: 225 [P.O.:225]  Out: 1075 [Urine:1075]     Labs: Recent Labs   Lab 09/08/22  0715 09/07/22  0735 09/06/22  1421   ALBUMIN 4.4   < > 4.0   HGB 8.4*   < > 9.4*   INR  --   --  2.50*   WBC 17.4*   < > 19.2*    < > = values in this interval not displayed.     Pressure injury risk assessment:   Sensory Perception: 3-->slightly limited  Moisture: 3-->occasionally moist  Activity: 3-->walks occasionally  Mobility: 2-->very limited  Nutrition: 2-->probably inadequate  Friction and Shear: 2-->potential problem  Fracisco Score: 15

## 2022-09-10 NOTE — PROCEDURES
[facility]    Paracentesis    Date/Time: 9/10/2022 4:08 PM  Performed by: Mark Parra MD  Authorized by: Mark Parra MD     PRE-PROCEDURE DETAILS  Initial or subsequent exam: subsequent  Procedure purpose: therapeutic  Indications: abdominal discomfort secondary to ascites        UNIVERSAL PROTOCOL   Site Marked: Yes  Prior Images Obtained and Reviewed:  Yes  Required items: Required blood products, implants, devices and special equipment available    Patient identity confirmed:  Verbally with patient, arm band, provided demographic data and hospital-assigned identification number  NA - No sedation, light sedation, or local anesthesia  Confirmation Checklist:  Patient's identity using two indicators, relevant allergies, procedure was appropriate and matched the consent or emergent situation and correct equipment/implants were available  Time out: Immediately prior to the procedure a time out was called    Universal Protocol: the Joint Commission Universal Protocol was followed    Preparation: Patient was prepped and draped in usual sterile fashion    ESBL (mL):  0     ANESTHESIA    Local Anesthetic: Lidocaine 1% without epinephrine  Anesthetic Total (mL):  8      SEDATION    Patient Sedated: No    POST PROCEDURE DETAILS  Needle gauge: 22  Ultrasound guidance: yes  Puncture site: left lower quadrant  Fluid removed: 5000(ml)  Fluid appearance: cloudy  Dressing: pressure dressing        PROCEDURE    Patient Tolerance:  Patient tolerated the procedure well with no immediate complications  Length of time physician/provider present for 1:1 monitoring during sedation: 0

## 2022-09-10 NOTE — PROGRESS NOTES
[facility]    Medicine Progress Note - Hospitalist Service, GOLD TEAM 7    Date of Admission:  9/6/2022    Assessment & Plan        Kody Reynaga is a 77 year old male admitted on 9/6/2022. He has a past medical history of portal vein thrombosis (on AC), recurrent ascites, chronic pancreatitis, prostate cancer (S/P prostatectomy), SDH (S/P craniotomy), HTN, dyslipidemia. He had a recent admission from 8/29-9/3/22 for EUN, worsening ascites. Ultimately discharged home with recommendations for OP follow-up with Nephro, PCP, GI, but returns to ED today after presenting from home via ambulance after feeling unwell at the recommendation of PCP staff. He was admitted for further monitoring and management to Medicine service.    Today's plan   -Noted further rise in creatinine   -Noted worsening oxygen needs likely from pulmonary edema   -on renal recommendations would perform 5 litre's paracentesis to alleviate abdominal pressure      Plan   Ascites, worsening  Portal Vein Thrombosis   Portal Hypertension  Abdominal Pain  Acute metabolic Encephalopathy   Hx of Benign Biliary Stricture  Has had hx of biliary strictures (benign on most recent EGD 05/10/22), S/P multiple biliary stents [id number], biopsies negative for carcinoma of biliary etiology. Recent admission for ascites as above, has required ~weekly paracenteses since, needing ~4L tapped. Negative for SBP last admission. No formal biopsy performed to assess cirrhosis, has scheduled GI appt 10/24 as OP to discuss. However, Abd US in ED does demonstrate cirrhotic configuration of liver. History severely limited by encephalopathy (head CT negative), concern for component of HE as also hyperammonemic, but no jaundice, unable to assess asterixis as inconsistently following commands. WBC 19.2, but HDS, no s/sx of bleeding, plts WNL, though INR elevated at 2.5 (not on Warfarin).  -GI consult , appreciate recs  -s/p para on  9/7, 9/10  -Daily weights  -Strict I/O's  -Monitor for melena or hematemesis  -Lactulose, titrate for 3-4 loose stools daily  -Albumin challenge for 3 days  -PT/OT consults     EUN, suspect pre-renal etiology vs HRS  Recent admission as above for EUN, Cr baseline 0.9-1.0, but spiked to 4.28, thought to be multifactorial in the setting of fluid restriction (d/t ascites as above) and use of Lasix (d/c'd on discharge). Treated w/ Albumin challenge last admission for ascites w/ improvement in Cr. In process of scheduling OP Nephrology appt. In ED, Cr 4.26 (up from 2.96 on 9/3/22). HDS. Has not been taking Lasix per discharge summary last admission. Suspect etiology here is pre-renal as poor PO intake per wife. Question HRS as newfound evidence of cirrhosis on abd US as above. No new medications or changes to suggest intrarenal etiology and no recent infections, no s/sx of post-renal process/obstruction. Recent renal US last admission with mild right hydro  -Nephrology consulted  -Albumin challenge as above  -Fluid restriction to 1,800cc daily, <2g Na daily  -Trend Cr with daily CMP as above  -Daily weights and I/O's as above     Acute Rib Fractures  Pleural Effusion  Rib pain occurred following a fall during previous hospitalization, but patient had noted improving pain. Found on CT abd/pelv imaging in ED at R anterolateral ribs 7-9 incidentally, and significant ecchymosis noted at R lateral chest wall (see exam below for photo). Also found to have new R pleural effusion, suspect related to inflammation in the chest wall r/t fractures as above vs infection vs hepatic hydrothorax. However, is breathing normal on RA, not hypoxic, HDS.  -Continue to monitor resp status and pain     Known Pancreatic Mass  Chronic Pancreatitis  Elevated Lipase  Initially found on imaging as a 1cm on 02/2022 incidentally after being found to have biliary strictures as above. There was also noted to be lymph node swelling retroperitoneally.  However, repeated FNA biopsies have been negative/inconclusive. Lipase in , but one year ago was ~80-90 at OSH. Pt's limited history makes it difficult to assess for true pancreatitis, but CT w/o evidence of new peripancreatic inflammation.  -Continue to monitor pain if mental status improves     Hyponatremia, mild  Hypochloremia  Elevated BUN  Elevated Anion Gap  Na 131, Cl 89, , AG 18. This is likely multifactorial. Suspect mild hyponatremia d/t poor PO intake at home per wife, no emesis or other GI losses to explain hypochloremia, but could also be d/t poor diet.   -Recheck CMP in AM     Hyperammonemia  Hyperbilirubinemia  He is hyperammonemic at 67 on admission, and Tbili is 1.3. Most likely etiology is cirrhosis as above.  -Recheck CMP in AM     Leukocytosis   Neutrophilia  U/a slightly dry from 9/7, awaiting on cultures, C diff tested positive 9/8, Chest x ray without infiltrate, ct head and ct abdomen unremarkable from 9/6     Urinalysis 9/7   Cystitis   S/p 3 days ceftriaxone 9/6-9/9      Clostridium difficile 9/8  Started oral vancomycin for 10 day course      Chronic Normocytic Anemia  Likely in setting of chronic disease state. Hgb 9.4 on admission, appears to have baseline Hgb 9-11.5. HDS here, no s/sx of bleeding aside from ecchymosis on R lateral chest wall.  -Trend CBC given above leukocytosis      Thrombosis of SMV, Splenic Vein, Portal Vein  Initially found on abdominal CT 02/2022. Was scheduled to follow up with [facility] tomorrow as OP. PTA on Apixaban 5mg BID.   -Discussed with pharmacy, despite EUN, no dose adjustments necessary for Apixaban. Can continue on this unless s/sx of bleeding     HTN  -Continue PTA Amlodipine     HLD  -Continue PTA Lexapro daily     GERD  Nausea  -Continue PTA PPI  -Continue PTA Bentyl                       Diet: Fluid restriction 1800 ML FLUID  2 Gram Sodium Diet    DVT Prophylaxis: Apixaban for dvt ppx   Aparicio Catheter: PRESENT, indication:  "Strict 1-2 Hour I&O  Central Lines: None  Cardiac Monitoring: None  Code Status: Full Code      Disposition Plan             The patient's care was discussed with the Bedside Nurse and Patient.    Tobias Snyder MD  Hospitalist Service, [facility] TEAM [address] [place] [address]  Securely message with the Vocera Web Console (learn more here)  Text page via [facility] Paging/Directory   Please see signed in provider for up to date coverage information      Clinically Significant Risk Factors Present on Admission               # Obesity: Estimated body mass index is 33.81 kg/m  as calculated from the following:    Height as of this encounter: 1.702 m (5' 7\").    Weight as of this encounter: 97.9 kg (215 lb 14.4 oz).        ______________________________________________________________________    Interval History   Patient has no new abdominal pain, difficulty urinating, fevers or malaise,     Data reviewed today: I reviewed all medications, new labs and imaging results over the last 24 hours. I personally reviewed no images or EKG's today.    Physical Exam   Vital Signs: Temp: 97.4  F (36.3  C) Temp src: Oral BP: (!) 108/39 Pulse: 89   Resp: 16 SpO2: 92 % O2 Device: Nasal cannula Oxygen Delivery: 6 LPM  Weight: 215 lbs 14.4 oz  Constitutional: awake, alert, cooperative, no apparent distress, and appears stated age  Eyes: Lids and lashes normal, pupils equal, round and reactive to light, extra ocular muscles intact, sclera clear, conjunctiva normal  ENT: Normocephalic, without obvious abnormality, atraumatic, sinuses nontender on palpation, external ears without lesions, oral pharynx with moist mucous membranes, tonsils without erythema or exudates, gums normal and good dentition.  Hematologic / Lymphatic: no cervical lymphadenopathy  Respiratory: No increased work of breathing, good air exchange, clear to auscultation bilaterally, no crackles or wheezing  Cardiovascular: Normal apical impulse, " regular rate and rhythm, normal S1 and S2, no S3 or S4, and no murmur noted  GI: No scars, normal bowel sounds, soft, non-distended, non-tender, no masses palpated, no hepatosplenomegally  Genitounirinary:   Skin: no bruising or bleeding  Musculoskeletal: There is no redness, warmth, or swelling of the joints.  Full range of motion noted.  Motor strength is 5 out of 5 all extremities bilaterally.  Tone is normal.  Neurologic: Awake, alert, oriented to name, place and time.  Cranial nerves II-XII are grossly intact.  Motor is 5 out of 5 bilaterally.  Cerebellar finger to nose, heel to shin intact.  Sensory is intact.  Babinski down going, Romberg negative, and gait is normal.  Neuropsychiatric: General: normal, calm and normal eye contact    Data   Recent Labs   Lab 09/10/22  0900 09/09/22  2305 09/09/22  1643 09/09/22  0800 09/09/22  0728 09/08/22  2213 09/08/22  0715 09/06/22  2147 09/06/22  1421   WBC 20.3*  --   --   --  16.0*  --  17.4*   < > 19.2*   HGB 8.3*  --   --   --  8.0*  --  8.4*   < > 9.4*   MCV 89  --   --   --  88  --  88   < > 86   *  --   --   --  127*  --  109*   < > 218   INR  --   --   --   --   --   --   --   --  2.50*   *  --   --   --  133*  --  135*   < > 131*   POTASSIUM 4.4  --   --   --  4.2  --  4.1   < > 5.3   CHLORIDE 92*  --   --   --  91*  --  93*   < > 89*   CO2 25  --   --   --  25  --  24   < > 24   .0*  --   --   --  127.0*  --  131.0*   < > 120.0*   CR 3.76*  --   --   --  3.06*  --  3.30*   < > 4.26*   ANIONGAP 18*  --   --   --  17*  --  18*   < > 18*   AMIRAH 9.7  --   --   --  9.5  --  9.7   < > 9.6   * 150* 213*   < > 144*   < > 144*   < > 165*   ALBUMIN 4.4  --   --   --  4.6  --  4.4   < > 4.0   PROTTOTAL 6.5  --   --   --  6.2*  --  6.3*   < > 6.5   BILITOTAL 1.0  --   --   --  0.9  --  0.8   < > 1.3*   ALKPHOS 158*  --   --   --  164*  --  167*   < > 125   ALT 12  --   --   --  12  --  10   < > 12   AST 26  --   --   --  22  --  22   < > 27    LIPASE  --   --   --   --   --   --   --   --  548*    < > = values in this interval not displayed.     No results found for this or any previous visit (from the past 24 hour(s)).  Medications     - MEDICATION INSTRUCTIONS -         [Held by provider] amLODIPine  10 mg Oral Daily     apixaban ANTICOAGULANT  5 mg Oral BID     dicyclomine  20 mg Oral TID     escitalopram  20 mg Oral QAM     lactulose  10 g Oral Once     lactulose  20 g Oral TID     pantoprazole  40 mg Oral QAM     pravastatin  40 mg Oral Daily     sodium bicarbonate  1,300 mg Oral TID     sodium chloride (PF)  3 mL Intracatheter Q8H     vancomycin  125 mg Oral 4x Daily

## 2022-09-10 NOTE — PROGRESS NOTES
Nephrology Progress Note  09/10/2022       Nephrology attending note:  This patient has been seen and evaluated by me, Arsenio Ray MD on September 10, 2022. I have reviewed the history of present illness and the patient's clinical course. I personally evaluated, interviewed and examined the patient on the date of the encounter.       I reviewed the relevant labs, previous notes and imaging studies, and participated in the formulation of a diagnostic and treatment plan. This note reflects my direct involvement in the patient's care.     [place] history:   Kody Reynaga is a 77 yom with complex medical hx including portal vein thrombosis requiring weekly para's, chronic pancreatitis, HTN, who is re-admitted (after being admitted last week) for AMS, fatigue and EUN.  Cr was 0.8 in 2/2022, up to 1.4 in 5/2022 and ~2.0 during admission in August before being 4.2 on admission to [facility] on 9/6.  Nephrology consulted for management of EUN on CKD.      Interval history:  No acute events overnight.  His wife is by his side today.She says that he has been more sleepy lately and sometimes altered.   Weight has significantly increased over the past 3 days; almost 14 Kg.   His abdominal girth is increasing.      Key management decisions:   EUN on CKD- could be multifactorial: hemodynamic, increased Intraabdominal pressure from tense ascites and possible HS physiology. His BUN and creatinine are worsening today and he has subtle uremic symptoms.   -Suggest insertion of an abdominal drain with paracentesis around 5 Liters today and daily afterwards  -If his UOP and biochemical parameters did not improve after paracentesis, then will discuss dialysis with him and his wife    Volume/BP's-volume up with ascites; BP soft; Discontinue Amlodipine    Electrolytes-K 4.4, bicarb 25.  Na 135, no acute issues.       Anemia-Hgb 8.3 likely from chronic disease       Nutrition-poor oral intake       Key exam findings:   BP (!) 108/39 (BP  "Location: Left arm)   Pulse 89   Temp 97.4  F (36.3  C) (Oral)   Resp 16   Ht 1.702 m (5' 7\")   Wt 97.9 kg (215 lb 14.4 oz)   SpO2 92%   BMI 33.81 kg/m    Gen: somnolent, pale  HEENT: No JVD  Lungs: symmetrical bilateral air entry anteriorly  Heart: RRR   Abdomen: positive bowel sounds, Soft, tense ascites  Lower extremities: +2 edema   Mild asterixis     Intake/Output Summary (Last 24 hours) at 9/10/2022 1407  Last data filed at 9/10/2022 1212  Gross per 24 hour   Intake 280 ml   Output 240 ml   Net 40 ml       Electrolytes/Renal -   Recent Labs   Lab Test 09/10/22  0900 09/09/22  0728 09/08/22  0715 08/29/22  1600 08/29/22  1416   * 133* 135*   < > 127*   POTASSIUM 4.4 4.2 4.1   < > 6.7*   CO2 25 25 24   < > 18*   CR 3.76* 3.06* 3.30*   < > 4.28*   AMIRAH 9.7 9.5 9.7   < > 9.8   PHOS  --   --   --   --  6.3*    < > = values in this interval not displayed.       CBC -   Recent Labs   Lab Test 09/10/22  0900 09/09/22  0728 09/08/22  0715   WBC 20.3* 16.0* 17.4*   HGB 8.3* 8.0* 8.4*   * 127* 109*     Current Facility-Administered Medications   Medication     acetaminophen (TYLENOL) tablet 650 mg    Or     acetaminophen (TYLENOL) Suppository 650 mg     amLODIPine (NORVASC) tablet 10 mg     apixaban ANTICOAGULANT (ELIQUIS) tablet 5 mg     dicyclomine (BENTYL) tablet 20 mg     escitalopram (LEXAPRO) tablet 20 mg     lactulose (CHRONULAC) solution 10 g     lactulose (CHRONULAC) solution 20 g     lidocaine (LMX4) cream     lidocaine 1 % 0.1-1 mL     magnesium hydroxide (MILK OF MAGNESIA) suspension 30 mL     melatonin tablet 1 mg     ondansetron (ZOFRAN ODT) ODT tab 4 mg    Or     ondansetron (ZOFRAN) injection 4 mg     pantoprazole (PROTONIX) EC tablet 40 mg     Patient is already receiving anticoagulation with heparin, enoxaparin (LOVENOX), warfarin (COUMADIN)  or other anticoagulant medication     pravastatin (PRAVACHOL) tablet 40 mg     senna-docusate (SENOKOT-S/PERICOLACE) 8.6-50 MG per tablet 1 " tablet    Or     senna-docusate (SENOKOT-S/PERICOLACE) 8.6-50 MG per tablet 2 tablet     sodium bicarbonate tablet 1,300 mg     sodium chloride (PF) 0.9% PF flush 3 mL     sodium chloride (PF) 0.9% PF flush 3 mL     vancomycin (VANCOCIN) capsule 125 mg      Please avoid placing a PICC line in any patient with CKD III or above, EUN, or ESKD, as this will impact negatively the ability of the patient to have an AV fistula or AV Graft should they need it in the future.  A medline is the preferred type of access in patients with kidney disease. Thank you.    Arsenio Ray MD   [facility]   Department of Medicine   Division of Renal Disease and Hypertension

## 2022-09-10 NOTE — PLAN OF CARE
Alert and oriented to self and place, disoriented to time and situation. Bed alarm on for safety. VSS on RA. Had 2 loose BM this shift. Abdominal discomfort managed with prn tylenol. Denies nausea. Aparicio in place, low urine output. Provider notified and Albumin ordered. PIV x2 SL. Continue with plan of care.

## 2022-09-10 NOTE — PLAN OF CARE
A&O x 2, disoriented to place and situation. Reports dyspnea on exertion, requiring 6L of O2 to maintain sats today (MD notified). Mg add-on ordered, results still un-resulted (will need RN managed protocol ordered). Tolerating small amounts of low sodium diet, needs assistance with feeding. Last BM today (3 since midnight), loose and incontinent. Lactulose held today. Abdomen very distended and firm (MD notified), tylenol given for discomfort. Aparicio in place with low output (MD notified). Repositions in bed w/ assist of 1-2. Forehead wound dressing changed. Spouse at bedside, requesting MD update - MD paged, has not seen the patient/family yet. Continue w/ POC.

## 2022-09-11 PROBLEM — A41.9 SEVERE SEPSIS WITH LACTIC ACIDOSIS (H): Status: ACTIVE | Noted: 2022-01-01

## 2022-09-11 PROBLEM — E87.20 SEVERE SEPSIS WITH LACTIC ACIDOSIS (H): Status: ACTIVE | Noted: 2022-01-01

## 2022-09-11 PROBLEM — R65.20 SEVERE SEPSIS WITH LACTIC ACIDOSIS (H): Status: ACTIVE | Noted: 2022-01-01

## 2022-09-11 NOTE — PROVIDER NOTIFICATION
Notified DO Sebastian at 6:59 PM regarding pt requiring more oxygen.      Orders were not obtained.    Comments: Had to increase O2 to 5 LPM from 2.5 LPM. Will continue current POC & team would like to be contacted if oxygen needs increase

## 2022-09-11 NOTE — CODE/RAPID RESPONSE
Rapid Response Team Note    Assessment   In assessment a rapid response was called on Kody Reynaga due to SIRS/Sepsis trigger and lactic acidosis. This presentation is likely due to liver disease and c diff infection.     Plan   -  Patient already receiving albumin  -  Recheck lactic acid around 1900    -  The Internal Medicine primary team was able to be reached and they are in agreement with the above plan.  -  Disposition: The patient will remain on the current unit. We will continue to monitor this patient closely.  -  Reassessment and plan follow-up will be performed by the primary team    KATHRINE Lemus CNP  [facility] RRT [facility] Job Code Contact #[phone]  [facility] Paging/Directory    Hospital Course   Brief Summary of events leading to rapid response:   Patient with leukocytosis (WBC 27.4) and tachypnea (RR 22) prompting SIRS protocol with resulting lactic acid of 2.9    Admission Diagnosis:   Hepatorenal syndrome (H) [K76.7]  Hyperammonemia (H) [E72.20]  Uremia [N19]  Acute renal failure superimposed on chronic kidney disease, unspecified CKD stage, unspecified acute renal failure type (H) [N17.9, N18.9]    Physical Exam   Temp: 98.5  F (36.9  C) Temp  Min: 97.2  F (36.2  C)  Max: 98.5  F (36.9  C)  Resp: 18 Resp  Min: 16  Max: 22  SpO2: 94 % SpO2  Min: 92 %  Max: 96 %  Pulse: 91 Pulse  Min: 82  Max: 93    No data recorded  BP: 128/62 Systolic (24hrs), Av , Min:112 , Max:135   Diastolic (24hrs), Av, Min:49, Max:62     I/Os: I/O last 3 completed shifts:  In: 1000 [P.O.:1000]  Out: 465 [Urine:465]     Physical Exam  Vitals reviewed.   Constitutional:       General: He is not in acute distress.  Cardiovascular:      Rate and Rhythm: Normal rate and regular rhythm.      Heart sounds: No murmur heard.  Pulmonary:      Effort: Pulmonary effort is normal. No respiratory distress.      Breath sounds: Normal breath sounds.   Abdominal:      General: There is distension.      Tenderness: There is  abdominal tenderness.   Neurological:      Mental Status: He is alert. Mental status is at baseline.       Significant Results and Procedures   Lactic Acid:   Recent Labs   Lab Test 09/11/22  1513 09/07/22  0735 09/07/22  0151   LACT 2.9* 1.9 2.6*     CBC:   Recent Labs   Lab Test 09/11/22  0809 09/10/22  0900 09/09/22  0728   WBC 27.4* 20.3* 16.0*   HGB 8.8* 8.3* 8.0*   HCT 27.8* 26.0* 25.2*   * 136* 127*        Sepsis Evaluation   The patient is known to have an infection.  Kody Reynaga meets SIRS criteria AND has a lactate >2 or other evidence of acute organ damage.  These vital signs, lab and physical exam findings constitute a diagnosis of SEVERE SEPSIS.    Sepsis Time-Zero (time severe sepsis diagnosis confirmed):   09/11/22 as this was the time when Lactate resulted, and the level was > 2.0 and Lab results revealing acute organ dysfunction due to infection (Cr, Bili, Plt)     Anti-infectives (From now, onward)    Start     Dose/Rate Route Frequency Ordered Stop    09/08/22 0800  vancomycin (VANCOCIN) capsule 125 mg         125 mg Oral 4 TIMES DAILY 09/08/22 0721          Current antibiotic coverage is appropriate for source of infection.    3 Hour Severe Sepsis Bundle Completion:  1. Initial Lactic Acid result shown above. Repeat lactic acid ordered for 2 hours from now.   2. Blood Cultures before Antibiotics: Yes  3. Broad Spectrum Antibiotics Administered: yes  4. Fluids: Fluid bolus not indicated due to: ESLD, ESRD

## 2022-09-11 NOTE — PLAN OF CARE
Pt on 2.5 LPM, but otherwise vss. A&Ox3- disoriented to situation. Pt was lethargic this shift & slow to respond to commands. Bed alarm on for safety. Denies pain. Pt was too lethargic to stand this shift so pt was turned q2-3 hours. Denies nausea. 1:1 feed. Tolerating 1800 mL fluid restriction & 2 g Na diet but had minimal intake this shift. Aparicio in place with low amounts of output- team aware. Passing gas. PIV x2 are saline locked. Dressing on head is clean, dry, & intact. Paracentesis and chest Xray done this shift. BG BID & at bedtime. Mg will be rechecked tomorrow AM. Will continue plan of care.

## 2022-09-11 NOTE — PROGRESS NOTES
Nephrology Progress Note  09/11/2022       Nephrology attending note:  This patient has been seen and evaluated by me, Arsenio Ray MD on September 11, 2022. I have reviewed the history of present illness and the patient's clinical course. I personally evaluated, interviewed and examined the patient on the date of the encounter.       I reviewed the relevant labs, previous notes and imaging studies, and participated in the formulation of a diagnostic and treatment plan. This note reflects my direct involvement in the patient's care.     [place] history:   Kody Reynaga is a 77 yom with complex medical hx including portal vein thrombosis requiring weekly para's, chronic pancreatitis, HTN, who is re-admitted (after being admitted last week) for AMS, fatigue and EUN.  Cr was 0.8 in 2/2022, up to 1.4 in 5/2022 and ~2.0 during admission in August before being 4.2 on admission to [facility] on 9/6.  Nephrology consulted for management of EUN on CKD.      Interval history:  No acute events overnight. Underwent 5 L of paracentesis yesterday with cloudy fluids documented.  He looks more awake today though his numbers are worse.   Weight has significantly increased over the past 3 days; almost 14 Kg.   No more diarrhea; had 1 BM yesterday. He is on lactulose      Key management decisions:   EUN on CKD- could be multifactorial: hemodynamic, increased Intraabdominal pressure from tense ascites and possible HRS physiology. He underwent 5 Liters paracentesis yesterday. His BUN and creatinine are still worsening today but his mental status looked better than yesterday when I visited today.  I talked with him and his wife about dialysis and he would go for it if needed. His wife would like to understand what to expect with his recurrent ascites.  -Given his stable mental function today, will hold on dialysis though this is very likely in the coming future.  -I would optimize his intra-abdominal pressure by another 4 L of paracentesis  "today; concomitant with albumin which I suggest to continue q 8 hrs. If his BP drops after paracentesis, then suggest to start midodrine and octreotide.  -suggest to send fluids for analysis and culture    Volume/BP's-volume up with ascites; BP soft; If his BP drops after paracentesis, then suggest to start midodrine and octreotide.    Electrolytes-K 4.4, bicarb 24.  Na 135, no acute issues.  Please check ABG      Anemia-Hgb 8.8 likely from chronic disease       Nutrition-poor oral intake     Oral Thrush: Please start oral nystatin      Key exam findings:   /49 (BP Location: Left arm)   Pulse 93   Temp 97.2  F (36.2  C) (Axillary)   Resp 16   Ht 1.702 m (5' 7\")   Wt 97.9 kg (215 lb 14.4 oz)   SpO2 92%   BMI 33.81 kg/m    Gen: pale; more awake  HEENt: oral thrush  HEENT: No JVD  Lungs: symmetrical bilateral air entry anteriorly  Heart: RRR   Abdomen: positive bowel sounds, Soft, tense ascites  Lower extremities: +2 edema   Mild asterixis     Intake/Output Summary (Last 24 hours) at 9/10/2022 1407  Last data filed at 9/10/2022 1212  Gross per 24 hour   Intake 280 ml   Output 240 ml   Net 40 ml       Electrolytes/Renal -   Recent Labs   Lab Test 09/11/22  0809 09/10/22  0900 09/09/22  0728 08/29/22  1600 08/29/22  1416   * 135* 133*   < > 127*   POTASSIUM 4.4 4.4 4.2   < > 6.7*   CO2 24 25 25   < > 18*   CR 4.18* 3.76* 3.06*   < > 4.28*   AMIRAH 9.3 9.7 9.5   < > 9.8   PHOS  --   --   --   --  6.3*    < > = values in this interval not displayed.       CBC -   Recent Labs   Lab Test 09/11/22  0809 09/10/22  0900 09/09/22 0728   WBC 27.4* 20.3* 16.0*   HGB 8.8* 8.3* 8.0*   * 136* 127*     Current Facility-Administered Medications   Medication     acetaminophen (TYLENOL) tablet 650 mg    Or     acetaminophen (TYLENOL) Suppository 650 mg     [Held by provider] amLODIPine (NORVASC) tablet 10 mg     apixaban ANTICOAGULANT (ELIQUIS) tablet 5 mg     dicyclomine (BENTYL) tablet 20 mg     escitalopram " (LEXAPRO) tablet 20 mg     lactulose (CHRONULAC) solution 10 g     lactulose (CHRONULAC) solution 20 g     lidocaine (LMX4) cream     lidocaine 1 % 0.1-1 mL     magnesium hydroxide (MILK OF MAGNESIA) suspension 30 mL     melatonin tablet 1 mg     ondansetron (ZOFRAN ODT) ODT tab 4 mg    Or     ondansetron (ZOFRAN) injection 4 mg     pantoprazole (PROTONIX) EC tablet 40 mg     Patient is already receiving anticoagulation with heparin, enoxaparin (LOVENOX), warfarin (COUMADIN)  or other anticoagulant medication     pravastatin (PRAVACHOL) tablet 40 mg     senna-docusate (SENOKOT-S/PERICOLACE) 8.6-50 MG per tablet 1 tablet    Or     senna-docusate (SENOKOT-S/PERICOLACE) 8.6-50 MG per tablet 2 tablet     sodium bicarbonate tablet 1,300 mg     sodium chloride (PF) 0.9% PF flush 3 mL     sodium chloride (PF) 0.9% PF flush 3 mL     vancomycin (VANCOCIN) capsule 125 mg      Please avoid placing a PICC line in any patient with CKD III or above, EUN, or ESKD, as this will impact negatively the ability of the patient to have an AV fistula or AV Graft should they need it in the future.  A medline is the preferred type of access in patients with kidney disease. Thank you.    Arsenio Ray MD   [facility]   Department of Medicine   Division of Renal Disease and Hypertension

## 2022-09-11 NOTE — PLAN OF CARE
"/61 (BP Location: Right arm)   Pulse 91   Temp 98.1  F (36.7  C) (Oral)   Resp 22   Ht 1.702 m (5' 7\")   Wt 97.9 kg (215 lb 14.4 oz)   SpO2 93%   BMI 33.81 kg/m      Care from: 0700 - 1500      VS & Pain: hypertensive & tachypneic- triggered sepsis. Abdominal pain- Tylenol given x1    Neuro: disoriented to situation, place, & time    Respiratory: 2.5L nasal canula     Cardiac: WDL    Peripheral neurovascular: WDL    GI/: valdovinos in place with marginal urine output- provider aware. Abdominal distension; + bowel sounds. Last BM yesterday     Nutrition: 2 g Na+ diet & fluid restriction of 1800 mL. Poor diet this shift,     Skin: forehead wound- dressing changed. Bruising left upper arm, right abdominal     Musculoskeletal: mildly impaired     Lines: R/L PIV. R. PIV infusing albumin     Activity: assist x1    Plan: per spouse plan to meet with MD and palliative care team to discuss goals of care 9/12 at noon   Goal Outcome Evaluation:  Plan of Care Reviewed With: spouse   Overall Patient Progress: no change           "

## 2022-09-11 NOTE — PLAN OF CARE
A&O times 3. Pt disoriented to situation. Pt denies pain, SOB ,nausea, and dizziness. Pt on CPOX and took his oxygen off several times tonigt. Oxygen was replaced and pt was reoriented and educated to keep it on. Pt has no further needs and is safe with call light in reach.     Plan of Care Reviewed With: patient     Overall Patient Progress: no change

## 2022-09-11 NOTE — PROGRESS NOTES
[facility]    Medicine Progress Note - Hospitalist Service, GOLD TEAM 7    Date of Admission:  9/6/2022    Assessment & Plan      Kody Reynaga is a 77 year old male admitted on 9/6/2022. He has a past medical history of portal vein thrombosis (on AC), recurrent ascites, chronic pancreatitis, prostate cancer (S/P prostatectomy), SDH (S/P craniotomy), HTN, dyslipidemia. He had a recent admission from 8/29-9/3/22 for EUN, worsening ascites. Ultimately discharged home with recommendations for OP follow-up with Nephro, PCP, GI, but returns to ED today after presenting from home via ambulance after feeling unwell at the recommendation of PCP staff. He was admitted for further monitoring and management to Medicine service.    Today's plan   -Noted further rise in creatinine   -Spoke to renal and patient might need  Dialysis if creatinine doesn't improve   -Will give albumin challenge   -Spoke to Dr Ray and discussed potentially holding paracentesis today and doing it tomorrow seeing renal response to albumin challenge today   -Had detailed discussion with wife rashaad and they would like care conference at noon 9/12 to assess goals of care     Plan   Ascites, worsening  Portal Vein Thrombosis   Portal Hypertension  Abdominal Pain  Acute metabolic Encephalopathy   Hx of Benign Biliary Stricture  Has had hx of biliary strictures (benign on most recent EGD 05/10/22), S/P multiple biliary stents [id number], biopsies negative for carcinoma of biliary etiology. Recent admission for ascites as above, has required ~weekly paracenteses since, needing ~4L tapped. Negative for SBP last admission. No formal biopsy performed to assess cirrhosis, has scheduled GI appt 10/24 as OP to discuss. However, Abd US in ED does demonstrate cirrhotic configuration of liver. History severely limited by encephalopathy (head CT negative), concern for component of HE as also hyperammonemic, but no  jaundice, unable to assess asterixis as inconsistently following commands. WBC 19.2, but HDS, no s/sx of bleeding, plts WNL, though INR elevated at 2.5 (not on Warfarin).  -GI consult , appreciate recs  -s/p para on 9/7, 9/10  -Daily weights  -Strict I/O's  -Monitor for melena or hematemesis  -Lactulose, titrate for 3-4 loose stools daily  -Albumin challenge for 3 days  -PT/OT consults     EUN, suspect pre-renal etiology vs HRS  Recent admission as above for EUN, Cr baseline 0.9-1.0, but spiked to 4.28, thought to be multifactorial in the setting of fluid restriction (d/t ascites as above) and use of Lasix (d/c'd on discharge). Treated w/ Albumin challenge last admission for ascites w/ improvement in Cr. In process of scheduling OP Nephrology appt. In ED, Cr 4.26 (up from 2.96 on 9/3/22). HDS. Has not been taking Lasix per discharge summary last admission. Suspect etiology here is pre-renal as poor PO intake per wife. Question HRS as newfound evidence of cirrhosis on abd US as above. No new medications or changes to suggest intrarenal etiology and no recent infections, no s/sx of post-renal process/obstruction. Recent renal US last admission with mild right hydro  -Nephrology consulted  -Albumin challenge as above  -Fluid restriction to 1,800cc daily, <2g Na daily  -Trend Cr with daily CMP as above  -Daily weights and I/O's as above     Acute Rib Fractures  Pleural Effusion  Rib pain occurred following a fall during previous hospitalization, but patient had noted improving pain. Found on CT abd/pelv imaging in ED at R anterolateral ribs 7-9 incidentally, and significant ecchymosis noted at R lateral chest wall (see exam below for photo). Also found to have new R pleural effusion, suspect related to inflammation in the chest wall r/t fractures as above vs infection vs hepatic hydrothorax. However, is breathing normal on RA, not hypoxic, HDS.  -Continue to monitor resp status and pain     Known Pancreatic  Mass  Chronic Pancreatitis  Elevated Lipase  Initially found on imaging as a 1cm on 02/2022 incidentally after being found to have biliary strictures as above. There was also noted to be lymph node swelling retroperitoneally. However, repeated FNA biopsies have been negative/inconclusive. Lipase in , but one year ago was ~80-90 at OSH. Pt's limited history makes it difficult to assess for true pancreatitis, but CT w/o evidence of new peripancreatic inflammation.  -Continue to monitor pain if mental status improves     Hyponatremia, mild  Hypochloremia  Elevated BUN  Elevated Anion Gap  Na 131, Cl 89, , AG 18. This is likely multifactorial. Suspect mild hyponatremia d/t poor PO intake at home per wife, no emesis or other GI losses to explain hypochloremia, but could also be d/t poor diet.   -Recheck CMP in AM     Hyperammonemia  Hyperbilirubinemia  He is hyperammonemic at 67 on admission, and Tbili is 1.3. Most likely etiology is cirrhosis as above.  -Recheck CMP in AM     Leukocytosis   Neutrophilia  U/a slightly dry from 9/7, awaiting on cultures, C diff tested positive 9/8, Chest x ray without infiltrate, ct head and ct abdomen unremarkable from 9/6     Urinalysis 9/7   Cystitis   S/p 3 days ceftriaxone 9/6-9/9      Clostridium difficile 9/8  Started oral vancomycin for 10 day course      Chronic Normocytic Anemia  Likely in setting of chronic disease state. Hgb 9.4 on admission, appears to have baseline Hgb 9-11.5. HDS here, no s/sx of bleeding aside from ecchymosis on R lateral chest wall.  -Trend CBC given above leukocytosis      Thrombosis of SMV, Splenic Vein, Portal Vein  Initially found on abdominal CT 02/2022. Was scheduled to follow up with [facility] tomorrow as OP. PTA on Apixaban 5mg BID.   -Discussed with pharmacy, despite EUN, no dose adjustments necessary for Apixaban. Can continue on this unless s/sx of bleeding     HTN  -Continue PTA Amlodipine     HLD  -Continue PTA  Lexapro daily     GERD  Nausea  -Continue PTA PPI  -Continue PTA Bentyl                         Diet: Fluid restriction 1800 ML FLUID  2 Gram Sodium Diet    DVT Prophylaxis: Apixaban for dvt ppx   Aparicio Catheter: PRESENT, indication: Strict 1-2 Hour I&O  Central Lines: None  Cardiac Monitoring: None  Code Status: Full Code      Disposition Plan      Expected Discharge Date: 09/14/2022      Destination: inpatient rehabilitation facility  Discharge Comments: From ER.creatinine stabilized  C-dif and Hepatic Encephalopy.  Needs TCU, improving        The patient's care was discussed with the Bedside Nurse and Patient.    Tobias Snyder MD  Hospitalist Service, [address]  Securely message with the Vocera Web Console (learn more here)  Text page via [employer] Paging/Directory   Please see signed in provider for up to date coverage information      Clinically Significant Risk Factors Present on Admission                      ______________________________________________________________________    Interval History   Patient has no new abdominal pain, difficulty urinating, fevers or malaise     Data reviewed today: I reviewed all medications, new labs and imaging results over the last 24 hours. I personally reviewed no images or EKG's today.    Physical Exam   Vital Signs: Temp: 98.1  F (36.7  C) Temp src: Oral BP: 135/61 Pulse: 91   Resp: 22 SpO2: 93 % O2 Device: Nasal cannula Oxygen Delivery: 2.5 LPM  Weight: 215 lbs 14.4 oz  Constitutional: awake, alert, cooperative, no apparent distress, and appears stated age  Eyes: Lids and lashes normal, pupils equal, round and reactive to light, extra ocular muscles intact, sclera clear, conjunctiva normal  ENT: Normocephalic, without obvious abnormality, atraumatic, sinuses nontender on palpation, external ears without lesions, oral pharynx with moist mucous membranes, tonsils without erythema or exudates, gums normal and good  dentition.  Hematologic / Lymphatic: no cervical lymphadenopathy  Respiratory: No increased work of breathing, good air exchange, clear to auscultation bilaterally, no crackles or wheezing  Cardiovascular: Normal apical impulse, regular rate and rhythm, normal S1 and S2, no S3 or S4, and no murmur noted  GI: No scars, normal bowel sounds, soft, non-distended, non-tender, no masses palpated, no hepatosplenomegally  Genitounirinary:   Skin: no bruising or bleeding  Musculoskeletal: There is no redness, warmth, or swelling of the joints.  Full range of motion noted.  Motor strength is 5 out of 5 all extremities bilaterally.  Tone is normal.  Neurologic: Awake, alert, oriented to name, place and time.  Cranial nerves II-XII are grossly intact.  Motor is 5 out of 5 bilaterally.  Cerebellar finger to nose, heel to shin intact.  Sensory is intact.  Babinski down going, Romberg negative, and gait is normal.  Neuropsychiatric: General: normal, calm and normal eye contact    Data   Recent Labs   Lab 09/11/22  0809 09/10/22  2105 09/10/22  1655 09/10/22  0900 09/09/22  0800 09/09/22  0728 09/06/22  2147 09/06/22  1421   WBC 27.4*  --   --  20.3*  --  16.0*   < > 19.2*   HGB 8.8*  --   --  8.3*  --  8.0*   < > 9.4*   MCV 89  --   --  89  --  88   < > 86   *  --   --  136*  --  127*   < > 218   INR  --   --   --   --   --   --   --  2.50*   *  --   --  135*  --  133*   < > 131*   POTASSIUM 4.4  --   --  4.4  --  4.2   < > 5.3   CHLORIDE 91*  --   --  92*  --  91*   < > 89*   CO2 24  --   --  25  --  25   < > 24   .0*  --   --  138.0*  --  127.0*   < > 120.0*   CR 4.18*  --   --  3.76*  --  3.06*   < > 4.26*   ANIONGAP 20*  --   --  18*  --  17*   < > 18*   AMIRAH 9.3  --   --  9.7  --  9.5   < > 9.6   * 163* 149* 135*   < > 144*   < > 165*   ALBUMIN 4.0  --   --  4.4  --  4.6   < > 4.0   PROTTOTAL 6.1*  --   --  6.5  --  6.2*   < > 6.5   BILITOTAL 0.9  --   --  1.0  --  0.9   < > 1.3*   ALKPHOS 159*  --    --  158*  --  164*   < > 125   ALT 10  --   --  12  --  12   < > 12   AST 25  --   --  26  --  22   < > 27   LIPASE  --   --   --   --   --   --   --  548*    < > = values in this interval not displayed.     Recent Results (from the past 24 hour(s))   XR Chest Port 1 View    Narrative    EXAM: XR Chest 1 view 9/10/2022 5:18 PM      HISTORY: acute hypoxemia, Suspect pulmonary edema.    COMPARISON: Radiograph 9/7/2022.     TECHNIQUE: Frontal view of the chest.    FINDINGS: Trachea is midline. Heart size is not enlarged. Aortic arch  atherosclerosis. Stable to slightly perihilar pulmonary opacities.  Increased right basilar pulmonary opacity. No appreciable pleural  effusion. No pneumothoraces.       Impression    IMPRESSION:   1. Stable to slightly decreased perihilar pulmonary opacities,  pulmonary edema and/or atelectasis.  2. Slightly increased right basilar opacities, which may represent  atelectasis or infection.    I have personally reviewed the examination and initial interpretation  and I agree with the findings.    RACHEL COLEMAN DO         SYSTEM ID:  [id number]     Medications     - MEDICATION INSTRUCTIONS -         [Held by provider] amLODIPine  10 mg Oral Daily     apixaban ANTICOAGULANT  5 mg Oral BID     dicyclomine  20 mg Oral TID     escitalopram  20 mg Oral QAM     lactulose  10 g Oral Once     lactulose  20 g Oral TID     pantoprazole  40 mg Oral QAM     pravastatin  40 mg Oral Daily     sodium bicarbonate  1,300 mg Oral TID     sodium chloride (PF)  3 mL Intracatheter Q8H     vancomycin  125 mg Oral 4x Daily

## 2022-09-11 NOTE — PROVIDER NOTIFICATION
Notified PA at 9:30 PM regarding low urine output.      Spoke with: Pedro Hanna PA-C    Orders were not obtained.    Comments: PA stated that low urine output is expected with pt that is on albumin challenge

## 2022-09-11 NOTE — PROVIDER NOTIFICATION
Notified Dr. Tobias Snyder MD at 3:45 PM regarding pt's lactic acid 2.9.      Spoke with: Dr. Tobias Snyder    Orders were not obtained.    Comments: Per MD, an elevated lactic acid is expected for a pt with liver complications

## 2022-09-12 PROBLEM — N17.9 ACUTE KIDNEY FAILURE, UNSPECIFIED (H): Status: ACTIVE | Noted: 2022-01-01

## 2022-09-12 PROBLEM — N19 UREMIA: Status: ACTIVE | Noted: 2022-01-01

## 2022-09-12 PROBLEM — N18.9 ACUTE RENAL FAILURE SUPERIMPOSED ON CHRONIC KIDNEY DISEASE, UNSPECIFIED CKD STAGE, UNSPECIFIED ACUTE RENAL FAILURE TYPE: Status: ACTIVE | Noted: 2022-01-01

## 2022-09-12 PROBLEM — E72.20 HYPERAMMONEMIA (H): Status: ACTIVE | Noted: 2022-01-01

## 2022-09-12 PROBLEM — Z20.822 LAB TEST NEGATIVE FOR COVID-19 VIRUS: Status: ACTIVE | Noted: 2022-01-01

## 2022-09-12 PROBLEM — N17.9 ACUTE RENAL FAILURE SUPERIMPOSED ON CHRONIC KIDNEY DISEASE, UNSPECIFIED CKD STAGE, UNSPECIFIED ACUTE RENAL FAILURE TYPE: Status: ACTIVE | Noted: 2022-01-01

## 2022-09-12 NOTE — PROGRESS NOTES
Transfer  Transferred to: 4C  Via:bed  Reason for transfer:Pt no longer appropriate for 6B- worsened patient condition  Family: Aware of transfer-wife, Asia, called by Dr. Tovar  Belongings: Packed and sent with pt  Chart: Delivered with pt to next unit  Medications: Meds sent to new unit with pt  Report given to: Matthew PERES    Neuro: Alert to lethargic. Disoriented to situation.   Cardiac: ST. VSS.   Respiratory: Sating 88% on 100% Bipap.  GI/: Minimal urine output via valdovinos. UA sent.  Diet/appetite: 2G NA diet. 1800mL FR.   Activity:  Assist of 2 with repositioning q2h.   Pain: At acceptable level on current regimen with PRN tylenol.   Skin: Blanchable redness to sacrum, ears, and L toes. Scab on scalp.   LDA's: R PIV x2. Valdovinos.

## 2022-09-12 NOTE — CODE/RAPID RESPONSE
Rapid Response Team Note    Assessment   A rapid response was called on Kody Reynaga due to increasing oxygen needs. Previously on med/surg, SpO2 80s with noted emesis. He was placed on HFNC and transferred to IMC. There was minimal improvement with HFNC and he was placed on Bipap. Started on rocephin, and blood cultures done with previous RRT.     Plan   - ABG  - lasix and broaden to zosyn   - transfer to MICU    -  The Internal Medicine primary team was at bedside.  -  Disposition: The patient will be transferred to the ICU.  -  Reassessment and plan follow-up will be performed by the accepting ICU team    KATHRINE Ryan CNP  [facility] RRT [facility] Job Code Contact #[phone]  [facility] Paging/Directory    Hospital Course   Brief Summary of events leading to rapid response:   Possible aspiration     Admission Diagnosis:   Hepatorenal syndrome (H) [K76.7]  Hyperammonemia (H) [E72.20]  Uremia [N19]  Acute renal failure superimposed on chronic kidney disease, unspecified CKD stage, unspecified acute renal failure type (H) [N17.9, N18.9]    Physical Exam   Temp: 98.4  F (36.9  C) Temp  Min: 96.5  F (35.8  C)  Max: 98.5  F (36.9  C)  Resp: 20 Resp  Min: 16  Max: 24  SpO2: 93 % SpO2  Min: 86 %  Max: 98 %  Pulse: 99 Pulse  Min: 82  Max: 100    No data recorded  BP: 123/66 Systolic (24hrs), Av , Min:112 , Max:135   Diastolic (24hrs), Av, Min:49, Max:68     I/Os: I/O last 3 completed shifts:  In: 710 [P.O.:710]  Out: 365 [Urine:365]     Exam:   General: chronically ill appearing  Mental Status: Alert  CV: Regular, rate   Resp: diminished at bases    Significant Results and Procedures   Lactic Acid:   Recent Labs   Lab Test 22  2313 22  1910 22  1513   LACT 3.1* 3.3* 2.9*     CBC:   Recent Labs   Lab Test 22  0809 09/10/22  0900 22  0728   WBC 27.4* 20.3* 16.0*   HGB 8.8* 8.3* 8.0*   HCT 27.8* 26.0* 25.2*   * 136* 127*

## 2022-09-12 NOTE — PROGRESS NOTES
Nephrology Progress Note  09/12/2022           Kody Reynaga is a 77 yom with complex medical hx including portal vein thrombosis requiring weekly para's, chronic pancreatitis, HTN, who is re-admitted (after being admitted last week) for AMS, fatigue and EUN.  Cr was 0.8 in 2/2022, up to 1.4 in 5/2022 and ~2.0 during admission in August before being 4.2 on admission to [facility] on 9/6.  Nephrology consulted for management of EUN on CKD.       Interval History :   Mr Reynaga's Cr had been improving but over the past 2 days is up sharply.  Unclear what precipitated this worsening, did have para for ~4L of fluid removal but was supplemented with albumin and has not had hypotension I would associate with hypoperfusion, no new meds to explain.  No urgent issue pushing to start RRT today but is at the point of starting for clearance. With hx of hematuria (predating this admission/valdovinos) I am sending serologies to see if this has any unifying ddx.        I had long discussion with pt's family along with ICU team and palliative care where we reviewed his longer term (last 6-12 month) trajectory and whether dialysis would fit with his wishes.  Family have elected to start HD and re-evaluate after ~1 week with the hope that Kody will have an improvement in AMS (which is a reasonable possibility but also may not make a difference) at which point he can help guide his further care/wishes.    Appreciate team placing the line today, will start with 1st iHD run tomorrow am (Short/low flow)and plan for HD x3 days in a row at which point we should have a reasonable idea of whether dialysis has helped his AMS.        Assessment and Recommendations  EUN on CKD-Difficult to determine baseline as Cr has been on the rise for the last 6 months likely with frequent EUN related to portal vein thrombosis.  Cr of 4.2 on admission, slightly better this am at 4.0 but gfr is certainly low and muscle mass suggests it may be worse than Cr would  suggest.  No NSAID's at home, no nephrotoxins in recent hx.  Likely hemodynamic related to ascites due to portal vein thrombus and suspected SBP based on elevated WBC, also may be congestion with need for weekly para's.  I had ~15 minute discussion on 9/7 with pt and his wife that his long term trajectory is concerning for needing dialysis and that this would be a difficult course given his other co-morbidities and functional status but that we can treat for infection and see if he can rebound a bit in the short term.                  -EUN, likely hemodynamic related to poor intake in conjunction with ascities.  Had shown improvement with para last week but significantly worsened over the past 48h for unclear reasons.  We did do para on 9/10 for 4L removal with albumin repletion, now with resp distress as well.                 -Had long ~45 min conversation with pt's family (Wife, Daughter and son-in-law) explaining his complex situation.  Ultimately they would like to do a time limited trial of dialysis for the next week to see if he feels better and is able to be more interactive.  Hopefully he will be able to weigh in on the continuation at that point.     -Appreciate team placing line, we will do first HD run tomorrow am.  -Dialysis consent signed and scanned in under media on 9/12.         Volume/BP's-Not hypotensive, has abdominal distension.  Will plan on doing HD for clearance and likely para's for volume removal.       Electrolytes-K 5.2, bicarb 23.  Na 132, no acute issues.  Started bicarb tabs to try to avoid needing HD for metabolic acidosis.        BMD-Ca 9.5, Mg and Phos WNL despite poor kidney fx suggests poor nutrition.       GI-Does not have overt liver disease (although imaging suggested cirrhosis?) but has tense abdomen related to portal vein thrombosis.  Getting diagnostic para with elevated WBC, on abx.       Anemia-Hgb down to 8.0 from 9.4 yesterday and ~10-12 generally.  WBC up, will check  "iron stores when ID issues subside.       Nutrition-Taking PO.      Time spent: 25 minutes on this date of encounter for chart review, physical exam, medical decision making and co-ordination of care.      Seen and discussed with Dr Villa     Recommendations were communicated to primary team via page       KATHRINE Jenikns CNS  Clinical Nurse Specialist  [phone]    Review of Systems:   I reviewed the following systems:  ROS not done due to AMS    Physical Exam:   I/O last 3 completed shifts:  In: 850 [P.O.:710; I.V.:140]  Out: 270 [Urine:270]   /55 (BP Location: Left arm)   Pulse 91   Temp 98.3  F (36.8  C) (Oral)   Resp 18   Ht 1.803 m (5' 10.98\")   Wt 74.2 kg (163 lb 9.3 oz)   SpO2 94%   BMI 22.83 kg/m       GENERAL APPEARANCE: Interactive but confused, wife answering most questions.  Needing bipap this am.    EYES: No scleral icterus  Pulmonary: lungs clear to auscultation with equal breath sounds bilaterally, no clubbing or cyanosis  CV: Regular rhythm, normal rate, no rub   - Edema +1-2 generalized. +++Abd distension.    GI: firm, mildly tender, very distended.    MS: no evidence of inflammation in joints, no muscle tenderness  : No Aparicio  SKIN: no rash, warm, dry  NEURO: Interactive but confused.  Mild asterixis when holding hands in front of him and closing eyes.         Labs:   All labs reviewed by me  Electrolytes/Renal - Recent Labs   Lab Test 09/12/22  0858 09/12/22  0604 09/12/22  0316 09/11/22  1555 09/11/22  0809 09/10/22  1655 09/10/22  0900 08/29/22  1600 08/29/22  1416   NA  --  132*  --   --  135*  --  135*   < > 127*   POTASSIUM  --  5.2  --   --  4.4  --  4.4   < > 6.7*   CHLORIDE  --  86*  --   --  91*  --  92*   < > 89*   CO2  --  23  --   --  24  --  25   < > 18*   BUN  --  148.0*  --   --  147.0*  --  138.0*   < > 83.4*   CR  --  5.28*  --   --  4.18*  --  3.76*   < > 4.28*   * 146* 172*   < > 152*   < > 135*   < > 137*   AMIRAH  --  9.5  --   --  9.3  --  9.7   < " > 9.8   MAG  --  3.1*  --   --  3.1*  --  3.0*   < > 2.4*   PHOS  --   --   --   --   --   --   --   --  6.3*    < > = values in this interval not displayed.       CBC -   Recent Labs   Lab Test 09/12/22  0604 09/11/22  0809 09/10/22  0900   WBC 18.9* 27.4* 20.3*   HGB 8.8* 8.8* 8.3*    139* 136*       LFTs -   Recent Labs   Lab Test 09/12/22 0604 09/11/22  0809 09/10/22  0900   ALKPHOS 140* 159* 158*   BILITOTAL 1.2 0.9 1.0   ALT 14 10 12   AST 37 25 26   PROTTOTAL 6.4 6.1* 6.5   ALBUMIN 4.0 4.0 4.4       Iron Panel - No lab results found.        Current Medications:    [Held by provider] amLODIPine  10 mg Oral Daily     apixaban ANTICOAGULANT  5 mg Oral BID     dicyclomine  20 mg Oral TID     escitalopram  20 mg Oral QAM     lactulose  10 g Oral Once     lactulose  20 g Oral TID     pantoprazole  40 mg Oral QAM     piperacillin-tazobactam  2.25 g Intravenous Q6H     pravastatin  40 mg Oral Daily     sodium bicarbonate  1,300 mg Oral TID     sodium chloride (PF)  3 mL Intracatheter Q8H     vancomycin  125 mg Oral 4x Daily       - MEDICATION INSTRUCTIONS -       - MEDICATION INSTRUCTIONS -       - MEDICATION INSTRUCTIONS -

## 2022-09-12 NOTE — PLAN OF CARE
Pt oxygen saturations remaining in high 80's despite increased oxygen- RTT called. A&Ox3- disoriented to situation. Pt had 1x emesis this shift- IV Zofran given. Is otherwise tolerating 2g Na diet with 1800 mL fluid restriction. Aparicio in place with low output- team aware. Passing gas and last BM was yesterday. Abdomen is distended & firm. Repositioned q2-3 hours. PIV 2x are saline locked. Dressing on head is clean, dry, & intact. Pt transferred to . Will continue plan of care.

## 2022-09-12 NOTE — PROGRESS NOTES
Respiratory Care Note    Approx 0215 RT called to assess patient due to worsening hypoxia. SpO2 86-88% on 40L/100% HFNC. Flow increased to 60L and oxymask placed on patient with no appreciable change. RN in room who called physician on call. ABG requested and obtained: 7.44/36, PO2 58. Placed on CPAP, initially +10, FiO2 100%, no improvement in SpO2. CPAP increased to +14. Physician updated, rapid response called to expedite transfer of patient.    Breath sounds markedly diminished, mild bibasilar crackles. RR 25 on CPAP, no respiratory complaints. Appears mildly dyspneic at worst.     -BJ Hayes, RRT, RRT-ACCS

## 2022-09-12 NOTE — CODE/RAPID RESPONSE
Rapid Response Team Note    Assessment   In assessment a rapid response was called on Kody Reynaga due to acute hypoxic respiratory failure. This presentation is likely due to aspiration.     Plan   -  CXR  -  HFNC  -  Transfer to intermediate care  -  Ceftriaxone  -  Recheck lactic acid  -  Orders for blood culture x 2 and urine culture    *Patient's wife Asia was updated by phone.    -  The Internal Medicine primary team was able to be reached and they are in agreement with the above plan.  -  Disposition: The patient will be transferred to [facility].  -  Reassessment and plan follow-up will be performed by the primary team    KATHRINE Lemus CNP  [facility] RRT [facility] Job Code Contact #[phone]  [facility] Paging/Directory    Hospital Course   Brief Summary of events leading to rapid response:   Staff entered room responding to O2 alarm. SpO2 80s on oxymask and turned up. Staff noted patient had vomited, suspect aspiration event.     Admission Diagnosis:   Hepatorenal syndrome (H) [K76.7]  Hyperammonemia (H) [E72.20]  Uremia [N19]  Acute renal failure superimposed on chronic kidney disease, unspecified CKD stage, unspecified acute renal failure type (H) [N17.9, N18.9]    Physical Exam   Temp: (!) 96.5  F (35.8  C) Temp  Min: 96.5  F (35.8  C)  Max: 98.5  F (36.9  C)  Resp: 24 Resp  Min: 16  Max: 24  SpO2: 90 % SpO2  Min: 86 %  Max: 94 %  Pulse: 100 Pulse  Min: 82  Max: 100    No data recorded  BP: 116/60 Systolic (24hrs), Av , Min:112 , Max:135   Diastolic (24hrs), Av, Min:49, Max:62     I/Os: I/O last 3 completed shifts:  In: 1000 [P.O.:1000]  Out: 465 [Urine:465]     Physical Exam  Constitutional:       Appearance: He is ill-appearing.   Cardiovascular:      Rate and Rhythm: Normal rate and regular rhythm.   Pulmonary:      Effort: Tachypnea and respiratory distress present.      Breath sounds: Examination of the right-lower field reveals decreased breath sounds. Examination of the left-lower field  reveals decreased breath sounds. Decreased breath sounds present.   Abdominal:      General: There is distension.      Tenderness: There is abdominal tenderness.   Skin:     Coloration: Skin is pale.   Neurological:      Mental Status: He is alert.       Significant Results and Procedures   Lactic Acid:   Recent Labs   Lab Test 09/11/22  1910 09/11/22  1513 09/07/22  0735   LACT 3.3* 2.9* 1.9     CBC:   Recent Labs   Lab Test 09/11/22  0809 09/10/22  0900 09/09/22  0728   WBC 27.4* 20.3* 16.0*   HGB 8.8* 8.3* 8.0*   HCT 27.8* 26.0* 25.2*   * 136* 127*        Sepsis Evaluation   The patient is known to have an infection.  Kody Reynaga meets SIRS criteria AND has a lactate >2 or other evidence of acute organ damage.  These vital signs, lab and physical exam findings constitute a diagnosis of SEVERE SEPSIS.    Sepsis Time-Zero (time severe sepsis diagnosis confirmed):   09/11/22 as this was the time when Lactate resulted, and the level was > 2.0     Anti-infectives (From now, onward)    Start     Dose/Rate Route Frequency Ordered Stop    09/11/22 2030  cefTRIAXone (ROCEPHIN) 1 g vial to attach to  mL bag for ADULTS or NS 50 mL bag for PEDS         1 g  over 15-30 Minutes Intravenous EVERY 24 HOURS 09/11/22 2018 09/08/22 0800  vancomycin (VANCOCIN) capsule 125 mg         125 mg Oral 4 TIMES DAILY 09/08/22 0721          Current antibiotic coverage is appropriate for source of infection.    3 Hour Severe Sepsis Bundle Completion:  1. Initial Lactic Acid result shown above. Repeat lactic acid is not indicated.   2. Blood Cultures before Antibiotics: No, antibiotics were started prior to BCx collection b/c waiting for BCx to be collected would have been detrimental to the patient  3. Broad Spectrum Antibiotics Administered: yes  4. Fluids: Fluid bolus not indicated due to: ESLD, ESRD

## 2022-09-12 NOTE — H&P
MEDICAL ICU H&P  09/12/2022    Date of Hospital Admission: 9/12/2  Date of ICU Admission: 9/12/22  Reason for Critical Care Admission: Acute Hypoxic Respiratory failure  Date of Service (when I saw the patient): 09/12/2022    ASSESSMENT: Kody Reynaga is a 77 year old male w/ chronic pancreatitis portal HTN, portal vein thrombosis cirrhosis with chronic ascites, EUN recent admission for EUN 8/29- 9/3 ,readmitted 9/7 for fatigue, worsening EUN and failure to thrive. Patient transferred to the IMCU in the setting of increased oxygen requirements c/f possible intubation and aspiration pneumonia.     PLAN:    Neuro:  #Acute Metabolic Encephalopathy most likely secondary to worsening uremia   #Hepatic Encephalopathy  Patient with history of encephalopathy previously evaluated for hepatic encephalopathy and treated with lactulose. Patient had CT head on 09/16 negative for intracranial abnormality. Most likely encephalopathy also predisposed to progressive uremia in the setting of worsening kidney function  -Lactulose, titrate for 3-4 loose stools daily  -Avoid opioids    Pulmonary:  #Acute Hypoxic Respiratory Failure- Multifactorial (Aspiration Pneumonia,Ascites, Rib fractures)     #Aspiration Pneumonia  #Rib fractures  Patient with increased oxygen requirements on HFNC FiO2 100% increased to 60L. ABG was obtained and switched to BiPAP. Patient had possible episode of emesis last night c/f possible aspiration pneumonia in the setting of increased oxygen requirements and zosyn was started empirically and transferred to the ICU in anticipation of possible intubation. On arrival patient on 14 CPAP 100% with TV -900 ml with normal WOB but shallow breathing. Most acute hypoxic respiratory failure multifactorial in the setting of possible aspiration pneumonia ascites in the setting of rib fractures.   -Abx: Zosyn  -ABG Stat  -Titrate CPAP, goal PaO2 > 60 on lowest FIO2, taget   -Serial VBG for PCO2 retention    -Procalcitonin   -Sputum cultures    Cardiovascular:  # HTN  -Hold for now PTA Amlodipine     GI/Nutrition:  #Ascites, worsening  #Portal Vein Thrombosis   #Portal Hypertension  Has had hx of biliary strictures (benign on most recent EGD 05/10/22), S/P multiple biliary stents [id number], biopsies negative for carcinoma of biliary etiology. Recent admission for ascites, has required ~weekly paracenteses since. Negative for SBP last admission (8/29 - 9/3). Patient had Abd US on 09/06/22 that showed  cirrhotic configuration of liver. Patient had last paracentesis on 09/10/22 with 5L removal.    -Continue with Eliquis   -Parecentesis this AM   -Continue with lactulose, titrate for 3-4 loose stools    #C.DIFF  -Continue with oral vancomycin for 10 course (started on 09/08/22)    Renal/Fluids/Electrolytes:  #EUN 2/2 pre renal vs HRS   #Uremia   # Lactic Acidosis  Recent admission as above for EUN, Cr baseline 0.9-1.0, thought to be multifactorial in the setting of fluid restriction (d/t ascites as above) and use of Lasix (d/c'd on discharge).  In ED, Cr 4.26 (up from 2.96 on 9/3/22).  Suspect etiology here is pre-renal as poor PO in the setting of ascites vs HRS as newfound evidence of cirrhosis on abd US as above. Recent renal US on 09/07/22 showed mild right hydronephrosis.Patient with lactic acid level 2-3 since the admission most likely in the setting of kidney dysfunction, will continue to follow blood cultures and continue with zosyn empirically in the setting of possible infection.   - Nephrology consulted for possible HD   - s/p Albumin challenge  - Fluid restriction to 1.8 L, <2g daily  - Blood cultures pending    Hyponatremia  Patient with sodium level of 131 most likely d/t hypovolemia.   -BMP daily     Endocrine:  #No active concerns    ID:  # Aspiration Pneumonia  Patient with increased oxygen requirements overnight c/f possible aspiration pneumonia with increased leukocytosis and started zosyn empirically.  Will continue with broad antibiotic, order procalcitonin, order sputum cultures, and follow up blood cultures.   -Procalcitonin ordered  -Sputum cultures ordered  -Abx: Zosyn  -Blood cultures pending    #C.Diif  -Continue with PO Vancomycin x 10 course     Hematology:    #Chronic Normocitic anemia  #Leukocytosis  Patient with progressive leuk  -CBC daily    Musculoskeletal:  # LLE EDEMA  -US LE     Skin:  # No active concerns    General Cares/Prophylaxis:    DVT Prophylaxis: DOAC  GI Prophylaxis: PPI  Restraints: None  Code Status: Full Code     Lines/tubes/drains:  - CPAP    Disposition:  - Medical ICU    Patient seen and findings/plan discussed with medical ICU staff, Dr. Gibson.    Earl Swanson MD   PGY-2 Internal Medicine     -----------------------------------------------------------------------    HISTORY PRESENTING ILLNESS:   Kody Reynaga is a 77 year old male admitted on 9/6/2022. He has a past medical history of portal vein thrombosis (on AC), recurrent ascites, chronic pancreatitis, prostate cancer (S/P prostatectomy), SDH (S/P craniotomy), HTN, dyslipidemia. He had a recent admission from 8/29-9/3/22 for EUN, worsening ascites.    Patient admitted to the medicine floor on 09/06/22 after presenting from home via ambulance after feeling unwell, fatigue, and with confusion. On med floor patient with increased creatinine level with evaluation of possible dialysis and recurrent paracentesis work-up.     Patient transferred to the MICU on 09/12/22 due to progressive hypoxia on HFNC FiO2 100%, several RR called for hypoxia after episode emesis and presumed aspiration. Patient received lasix 40 mg IV, zosyn empirically for aspiration pneumonia, and BiPAP was started. Patient transferred to the MICU in anticipation of possible intubation.     During my interview patient states improvement of shortness of breath after CPAP. States progressive abdominal pain. Denies chest pain, nausea or vomiting.      REVIEW OF SYSTEMS:     PAST MEDICAL HISTORY:   Past Medical History:   Diagnosis Date     Anxiety      Depression      Gastroesophageal reflux disease      Gout      Hypercholesterolemia      Hypertension      IBS (irritable bowel syndrome)      Pancreatic disease      Prostate cancer (H)      SURGICAL HISTORY:  Past Surgical History:   Procedure Laterality Date     CATARACT EXTRACTION W/ INTRAOCULAR LENS IMPLANT Right      COLONOSCOPY       cryoablation of prostate       ENDOSCOPIC RETROGRADE CHOLANGIOPANCREATOGRAM       ENDOSCOPIC RETROGRADE CHOLANGIOPANCREATOGRAM N/A 1/24/2022    Procedure: ENDOSCOPIC RETROGRADE CHOLANGIOPANCREATOGRAPHY, BILIARY STENT EXCHANGE, sPY WITH BIOPSIES AND BRUSHINGS;  Surgeon: Guru Ab Gonzalez MD;  Location: [facility]     ENDOSCOPIC RETROGRADE CHOLANGIOPANCREATOGRAM N/A 2/24/2022    Procedure: ENDOSCOPIC RETROGRADE CHOLANGIOPANCREATOGRAPHY, with stent placement and bile duct biopsy;  Surgeon: Anthony Abarca MD;  Location: [facility]     ENDOSCOPIC RETROGRADE CHOLANGIOPANCREATOGRAM N/A 5/10/2022    Procedure: ENDOSCOPIC RETROGRADE CHOLANGIOPANCREATOGRAPHY, pyloric dilation, bebris removal, biliary stent exchange;  Surgeon: Anthony Abarca MD;  Location: [facility]     ENDOSCOPIC RETROGRADE CHOLANGIOPANCREATOGRAM WITH SPYGLASS N/A 10/14/2021    Procedure: ENDOSCOPIC RETROGRADE CHOLANGIOPANCREATOGRAPHY, WITH DIRECT DUCT VISUALIZATION, USING PANCREATICOBILIARY FIBEROPTIC PROBE-spyglass, biliary sludge and clot removal, biliary biopsies, biliary stent exchange;  Surgeon: Anthony Abarca MD;  Location: [facility]     ENDOSCOPIC ULTRASOUND UPPER GASTROINTESTINAL TRACT (GI) N/A 9/28/2021    Procedure: ENDOSCOPIC ULTRASOUND, ESOPHAGOSCOPY / UPPER GASTROINTESTINAL TRACT (GI);  Surgeon: Babak Garvin MD;  Location: [facility]     ENDOSCOPIC ULTRASOUND UPPER GASTROINTESTINAL TRACT (GI) N/A 1/24/2022    Procedure: ENDOSCOPIC ULTRASOUND, ESOPHAGOSCOPY / UPPER  GASTROINTESTINAL TRACT (GI);  Surgeon: Guru Ab Gonzalez MD;  Location: [facility]     ESOPHAGOSCOPY, GASTROSCOPY, DUODENOSCOPY (EGD), COMBINED N/A 10/14/2021    Procedure: ESOPHAGOGASTRODUODENOSCOPY, WITH FINE NEEDLE ASPIRATION BIOPSY, WITH ENDOSCOPIC ULTRASOUND GUIDANCE, biliary stent removal;  Surgeon: Babak Garvin MD;  Location: [facility]     ESOPHAGOSCOPY, GASTROSCOPY, DUODENOSCOPY (EGD), COMBINED N/A 2022    Procedure: ESOPHAGOGASTRODUODENOSCOPY WITH DILATION AND STENT REMOVAL, ENDOSCOPIC ULTRASOUND WITH FINE NEEDLE BIOPSIES;  Surgeon: Babak Garvin MD;  Location: [facility]     MOUTH SURGERY       TONSILLECTOMY       SOCIAL HISTORY:  Social History     Socioeconomic History     Marital status:    Tobacco Use     Smoking status: Former Smoker     Types: Cigarettes     Quit date:      Years since quittin.7     Smokeless tobacco: Never Used   Vaping Use     Vaping Use: Never used   Substance and Sexual Activity     Alcohol use: Yes     Comment: cutting down, drinking NA beer     Drug use: Never     Sexual activity: Not Currently     FAMILY HISTORY:   Family History   Problem Relation Age of Onset     Heart Failure Mother      Cancer Father      Anesthesia Reaction No family hx of      Cardiovascular No family hx of      Deep Vein Thrombosis (DVT) No family hx of      ALLERGIES:   No Known Allergies  MEDICATIONS:  No current facility-administered medications on file prior to encounter.  acetaminophen (TYLENOL) 325 MG tablet, Take 325 mg by mouth every 6 hours as needed for mild pain  amLODIPine (NORVASC) 10 MG tablet, Take 1 tablet (10 mg) by mouth daily  Apixaban (ELIQUIS PO), Take 5 mg by mouth 2 times daily  dicyclomine (BENTYL) 20 MG tablet, Take 20 mg by mouth 3 times daily  escitalopram (LEXAPRO) 20 MG tablet, Take 1 tablet by mouth every morning   lovastatin (MEVACOR) 20 MG tablet, Take 2 tablets (40 mg) by mouth every morning  melatonin 5 MG CAPS, Take 1 tablet  by mouth nightly as needed   omeprazole (PRILOSEC) 20 MG DR capsule, Take 20 mg by mouth every morning   coenzyme Q-10 10 MG CAPS, Take 1 capsule by mouth every morning   [DISCONTINUED] furosemide (LASIX) 40 MG tablet, Take 1 tablet (40 mg) by mouth 2 times daily        PHYSICAL EXAMINATION:  Temp:  [96.5  F (35.8  C)-98.5  F (36.9  C)] 98.1  F (36.7  C)  Pulse:  [] 104  Resp:  [16-24] 22  BP: (108-135)/(49-69) 111/68  FiO2 (%):  [80 %-100 %] 100 %  SpO2:  [86 %-98 %] 92 %  General: Interactive, with shallow breath, non-toxic appearance  HEENT: PERRL, EOMI  Pulm/Resp: Shallow breathing, intermittent crackles on bilateral bases, no wheezing   CV: RRR, no murmurs  Abdomen: Distended, +BS, tender at palpation, no rigidity  Extremities: Left lower extremity edema without tenderness at palpation  Skin: No rashes  Neuro: Alert in person, following commands, strength 5/5 in 4 extremities    LABS: Reviewed.   Arterial Blood Gases   Recent Labs   Lab 09/12/22  0209 09/11/22  1611   PH 7.44 7.48*   PCO2 36 34*   PO2 58* 72*   HCO3 25 25     Complete Blood Count   Recent Labs   Lab 09/11/22  0809 09/10/22  0900 09/09/22  0728 09/08/22  0715   WBC 27.4* 20.3* 16.0* 17.4*   HGB 8.8* 8.3* 8.0* 8.4*   * 136* 127* 109*     Basic Metabolic Panel  Recent Labs   Lab 09/11/22  2153 09/11/22  1555 09/11/22  0809 09/10/22  2105 09/10/22  1655 09/10/22  0900 09/09/22  0800 09/09/22  0728 09/08/22  2213 09/08/22  0715   NA  --   --  135*  --   --  135*  --  133*  --  135*   POTASSIUM  --   --  4.4  --   --  4.4  --  4.2  --  4.1   CHLORIDE  --   --  91*  --   --  92*  --  91*  --  93*   CO2  --   --  24  --   --  25  --  25  --  24   BUN  --   --  147.0*  --   --  138.0*  --  127.0*  --  131.0*   CR  --   --  4.18*  --   --  3.76*  --  3.06*  --  3.30*   * 215* 152* 163*   < > 135*   < > 144*   < > 144*    < > = values in this interval not displayed.     Liver Function Tests  Recent Labs   Lab 09/11/22  0861  09/10/22  0900 09/09/22  0728 09/08/22  0715 09/07/22  0735 09/06/22  1421   AST 25 26 22 22   < > 27   ALT 10 12 12 10   < > 12   ALKPHOS 159* 158* 164* 167*   < > 125   BILITOTAL 0.9 1.0 0.9 0.8   < > 1.3*   ALBUMIN 4.0 4.4 4.6 4.4   < > 4.0   INR  --   --   --   --   --  2.50*    < > = values in this interval not displayed.     Coagulation Profile  Recent Labs   Lab 09/06/22  1421   INR 2.50*       IMAGING:  Recent Results (from the past 24 hour(s))   XR Chest Port 1 View    Narrative    Exam: XR CHEST PORT 1 VIEW, 9/11/2022 8:40 PM    Indication: increased O2 needs    Comparison: 9/10/2022    Findings:   The heart size is within normal limits. No appreciable pneumothorax or  pleural effusion. Improving right basilar streaky opacities. Increased  left retrocardiac opacity.      Impression    Impression:   1. Improving right basilar atelectasis.  2. Increasing retrocardiac opacity, suggestive of atelectasis or  infection.    I have personally reviewed the examination and initial interpretation  and I agree with the findings.    RACHEL COLEMAN,          SYSTEM ID:  [id number]

## 2022-09-12 NOTE — PROGRESS NOTES
Medicine [facility]    Called about progressing hypoxia on HFNC FiO2 100% increased to 60L. ABG obtained and switched to Bipap. Sat still high 80s to 90%.  mentating well. He is aware that intubation might be needed and is agreeable.  Lungs with good air movement in upper lung field bilaterally, but diminished in lower lung field with very distended abdomen.  Lasix 40mg IV given, antibiotic broadened.    Accepted to MICU.    I called his wife Asia and updated of his condition change and ICU transfer.

## 2022-09-12 NOTE — PROGRESS NOTES
Gastroenterology Follow up Note         Assessment and Plan:   Kody Reynaga is a 77 year old male with a history of a pancreas lesion (suspected chronic pancreatitis) and portal, splenic and SMV thrombosis with portal hypertension ( recurrent ascites requiring weekly paracentesis) who was recently admitted from 8/29-9/3/22 for management of EUN and worsening ascites, he was discharged home with plans to follow up with IR to evaluate for recanalization  and GI for further management and plans for potential biopsy. He unfortunately returned to the ED with concerns for worsening fatigue and confusion noted by his wife.      US in the ED with cirrhotic configuration to the liver with evidence for portal hypertension, given this findings, presentation with confusion and ascites, hepatology is being consulted for assistance with management.      #Portal Hypertension of unclear etiology with Moderate to Large Ascites  #Chronic occlusive thrombus of splenic vein, superior mesenteric vein and portal vein     Patient presented with worsening confusion, fatigue and ascites. Admission labs with evidence of elevated INR  2.50, low platelet 121, anemia hgb 8.2 and leukocytosis WBC 23.6k.   Of note patient has had ascites requiring weekly paracentesis which was thought to be due to non cirrhotic portal hypertension in the setting of PVT. He was recently admitted from 8/29-9/3, at the time was managed for EUN and ascites and was discharged with plans to follow with IR for evaluation of recanalization and also with GI for consideration of liver biopsy given concern for the possibility of cirrhosis in the setting of a reported history of alcohol use.      US during this admission revealed cirrhotic configuration to the liver with evidence for portal hypertension which together with presenting symptoms and ascites prompted hepatology consult for assistance with management    SAAG>1.1.  It is possible that the patient has cirrhosis  "although his portal hypertension may equally be non-cirrhotic related to PVT.  PMN noted at ~230 not diagnostic of SBP hence treatment not needed.      Hospital course unfortunately complicated by continued failure to thrive and increased oxygen requirement concerning for aspiration pneumonia for which patient was transferred to the MICU, started on zosyn and on BIPAP. Unfortunately given patient's age and comorbidities patient does not seem to have a good prognosis at this time, will need to have GOC discussion with the family.     MELD-Na score: 29 at 9/8/2022  7:15 AM  MELD score: 28 at 9/8/2022  7:15 AM  Calculated from:  Serum Creatinine: 3.30 mg/dL at 9/8/2022  7:15 AM  Serum Sodium: 135 mmol/L at 9/8/2022  7:15 AM  Total Bilirubin: 0.8 mg/dL (Using min of 1 mg/dL) at 9/8/2022  7:15 AM  INR(ratio): 2.50 at 9/6/2022  2:21 PM  Age: 77 years          #EUN  Patient with worsening creatinine and GFR, likely prerenal. Agree with nephrology consult     CDIFF     Patient with positive Cdiff test, no evidence of fulminant infection          Recommendations:   --Continue therapeutic paracentesis as needed, could consider outpatient TIPS in the setting of refractory ascites  --Vancomycin for Cdiff    --Agree with Zosyn and ongoing supportive care   --Continue Lactulose   --Agree with nephrology consult  --Continue to hold diuretics in the setting of EUN  --Monitor creatinine and GFR      It has been a pleasure to participate in the care of this patient.  Patient discussed with GI staff, Dr. De Jesus.  Please do not hesitate to contact the GI service with any questions or concerns.     Braxton Luciano MD  Gastroenterology Fellow  Pager [phone]         Subjective/Objective:   - No acute events overnight  -No GI related concerns at this time.         Physical Exam:   VS:  /71   Pulse 96   Temp 98  F (36.7  C) (Oral)   Resp 20   Ht 1.803 m (5' 10.98\")   Wt 74.2 kg (163 lb 9.3 oz)   SpO2 99%   BMI 22.83 kg/m      Wt: "   Wt Readings from Last 2 Encounters:   09/12/22 74.2 kg (163 lb 9.3 oz)   09/03/22 83.2 kg (183 lb 6.8 oz)      Constitutional: No acute distress  Eyes: Conjunctiva anicteric  HEENT: Mucus membranes moist  CV: LE edema   Respiratory: On BIPAP  Abd: Distended, nontender, no rebound or guarding   Skin: No jaundice  Neuro: AAO x 0         Laboratory:   BMP  Recent Labs   Lab 09/12/22  0858 09/12/22  0604 09/12/22  0316 09/11/22  2153 09/11/22  1555 09/11/22  0809 09/10/22  1655 09/10/22  0900 09/09/22  0800 09/09/22  0728   NA  --  132*  --   --   --  135*  --  135*  --  133*   POTASSIUM  --  5.2  --   --   --  4.4  --  4.4  --  4.2   CHLORIDE  --  86*  --   --   --  91*  --  92*  --  91*   AMIRAH  --  9.5  --   --   --  9.3  --  9.7  --  9.5   CO2  --  23  --   --   --  24  --  25  --  25   BUN  --  148.0*  --   --   --  147.0*  --  138.0*  --  127.0*   CR  --  5.28*  --   --   --  4.18*  --  3.76*  --  3.06*   * 146* 172* 182*   < > 152*   < > 135*   < > 144*    < > = values in this interval not displayed.     CBC  Recent Labs   Lab 09/12/22  0604 09/11/22  0809 09/10/22  0900 09/09/22  0728   WBC 18.9* 27.4* 20.3* 16.0*   RBC 3.20* 3.13* 2.92* 2.85*   HGB 8.8* 8.8* 8.3* 8.0*   HCT 28.8* 27.8* 26.0* 25.2*   MCV 90 89 89 88   MCH 27.5 28.1 28.4 28.1   MCHC 30.6* 31.7 31.9 31.7   RDW 16.7* 16.6* 16.6* 16.2*    139* 136* 127*     INR  Recent Labs   Lab 09/06/22  1421   INR 2.50*     LFTs  Recent Labs   Lab 09/12/22  0604 09/11/22  0809 09/10/22  0900 09/09/22  0728   ALKPHOS 140* 159* 158* 164*   AST 37 25 26 22   ALT 14 10 12 12   BILITOTAL 1.2 0.9 1.0 0.9   PROTTOTAL 6.4 6.1* 6.5 6.2*   ALBUMIN 4.0 4.0 4.4 4.6      PANC  Recent Labs   Lab 09/06/22  1421   LIPASE 548*

## 2022-09-12 NOTE — PROGRESS NOTES
Critical Care Attending Note    The patient was seen and examined by me with and independent of Dr Swanson  and housestaff team.     Pertinent vitals, lab results and imaging were reviewed by me as well I have verified the history and personally performed the physical exam and medical decision making.  All physician orders and treatments were placed at my direction for which I I personally managed. Key decisions are reflected in the housestaff note above with my additions  incorporated within their note and below.     Kody Reynaga remains in critical condition today due to acute hypoxemic respiratory failure, decompensated cirrhosis and acute on chronic renal disease/uremia, which I personally managed.      Pt is 76 yo M w/ chronic pancreatitis  portal HTN, portal vein thrombosis cirrhosis with chronic ascites, EUN recent admission for EUN 8/29- 9/3 ,readmitted 9/7 for fatigue, worsening EUN and failure to thrive.  He had been managed on the floor with recurrent paracentesis and medical mangaement of his liver and renal disease (ongoing discussion regarding HD) as well dx with C diff however with progression. Accompanying this has been escalating O2 requirement and increased dyspnea with several RR called for desat episode of emesis and presumed aspiration. He was transferred to IMCU on HFNC but continued desat and additional RRT called  Zosyn empirically added as well empirically dosed lasix and BIPAP applied however pt requiring 100% HFNC  ABG on HFBC P/F 58 FIO2 thus BIPAP started and pt transferred to ICU in anticipation of intubation    On arrival pt on 14 CPAP 100% with TV ~900 ml and normal WOB but shallow breathing secondary to habitus and enlarged abdomen, sats 100%. Patient weaned to 50% and 10 CPAP with -700 though sats 89-90%  Pt sleep but following commands and answering question appropriately, noted tremor. MAP ~64  LAB reviewed BUN >140 , Cr 4 both uptrending CXR stable ? Mild improvement  in RLL infiltrate.  No dramatic change in saturations between upright and supine positions.       IMPRESSION/PLAN  1 Acute hypoxemic respiratory failure - multifactorial - overload, extraparenchymal restriction (ascites), rib fracture and aspiration   - hypoxemia is a bit out of proportion to degree of infiltrate   2. Decompensated cirrhosis  3. End stage renal disease /Uremia  4. Encephalopathy/delirium -2/2  metabolic derangements  5. Aspiration/sepsis -   6. Coagulopathy -  7 . Severe malnutrition     - Titrate CPAP, goal PaO2 >60 on lowest FIO2, target   - serial vbg evaluate for PCO2 retention  - consider repeat LVP today  - discussion regarding HD given progression  - continue empiric zosyn, check procal  - check LE dopplers     Remainder per Resident note    ICU Daily Rounding Checklist         Checklist Response Notes   Can sedation be reduced?  NA    Can analgesia be reduced? NA    Is delirium being assessed, addressed and prevented? Yes    Spontaneous awakening trial and/or Spontaneous breathing trial candidate?  NA    Total fluid balance goal reviewed?  Yes Target Goals:        even [24h]   Is the patient at goals for lung protective ventilation? NA    Head of bed elevation (30 degrees)? Yes    Skin breakdown assessment (prevention) completed? Yes    Is enteral nutrition at goal? No    Is blood glucose at goal? Yes    Deep venous thrombosis prophylaxis? Yes  Apixiban     Gastric ulcer prophylaxis?  Yes If coagulopathy (INR-1.5 PTT2x normal. Ph<50k), mechanical ventilation 48hr, history of GI bleed/ulcer within past year. TBL, SCI, or burn, or if >= 2 minor risk factors (sepsis, ICU stay 1 week, occult GI bleed > 6 days. glucocorticoid therapy, NSAID use, antiplatelet use)   Can Antibiotics be narrowed or discontinued? No  expanded coverage given aspiration event   Early mobility candidate and physical therapy consulted? No    Is valdovinos catheter needed? Yes  strict I/O - eval need for HD   Is central  venous/arterial catheter needed? NA    Has the family been updated? pending    Are the patient's goals of care and code status current? Yes          Rusty Gibson MD   45 min CCT excluding procedures

## 2022-09-12 NOTE — PROGRESS NOTES
"SPIRITUAL HEALTH SERVICES Progress Note  [place]  4C    Saw pt [facility] per family request.      Illness Narrative - Pt's wife was at the bedside, described that pt's alertness has been ebbing and flowing throughout his stay.  She said that while they were \"prepared for some things, they haven't talked about everything,\" and that she was meeting with the palliative team later today to talk about goals of care. Pt was sleepy throughout visit and frequently closed his eyes as we talked.      Distress - Pt's wife was tearful as she talked about her 's illness, and said she is grateful that they moved to [place] from [place] last year to be closer to family.  They have a son and daughter, and pt's daughter and her family live here in [place].       Coping - Pt's wife said that she attended [employer] yesterday, \"which I really needed.\"  She also said she is praying and that having the support of her family has been very helpful.  Pt was involved in a [employer] in [place] and the  has also reached out.       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       Plan - Family will meet with palliative care team to discuss GOC, and  will continue to follow as needed/requested.        saw pt and wife again in the afternoon, and provided support and prayer.  Wife mentioned the team had met with them and they were going to try dialysis for a few days to see if pt's cognition improves.   will continue to be available for support.     Ana Whitfield  Staff   [phone]    "

## 2022-09-12 NOTE — PROVIDER NOTIFICATION
0200  Dr. Tovar with [facility] cover notified that pt is now on 100% HFNC at 60L with 15L+ oxymask overtop sating 86-89%. RT at bedside, suggests ABGs & CPAP/BiPAP. Orders placed.    0220  Dr Tovar with [employer] cover notified that pt is still sating 86-89% despite initiation of CPAP. MD assessed at bedside. Pt transferred to .

## 2022-09-12 NOTE — PLAN OF CARE
ICU End of Shift Summary. See flowsheets for vital signs and detailed assessment.    Changes this shift: A/O x3, disoriented to situation. VSS. On HFNC 40L 70%. Paracentesis today 3.5 L drained. R internal jugular HD line placed. Care conference with palliative, micu and nephrology. Passed bedside swallow for thin liquids, but not cleared by this RN for food, team aware.     Plan: Dialysis run, discuss goals of care as patient's mentation becomes more clear.      Goal Outcome Evaluation:    Plan of Care Reviewed With: patient     Overall Patient Progress: improving    Outcome Evaluation: HFNC, paracentesis and HD line placement

## 2022-09-12 NOTE — PROGRESS NOTES
CROSS COVER NOTE:    Contacted by bedside RN regarding increased O2 requirement from 2.5L to 5L    Plan:  No change to current plan. I did see this patient earlier in assessment for rapid response. It seems that volume management with dialysis is in discussion. If patient requires additional oxygen, please reach out to cross cover again.    Fabiana Burton, APRN, ACN-AG  BA, BSN-RN, CNRN, TCRN  Pgr [phone]

## 2022-09-12 NOTE — PROGRESS NOTES
Admitted/transferred from: 6B at 0300  Reason for admission/transfer: Respiratory distress requiring 100% FiO2 on CPAP/BiPAP.   Patient status upon admission/transfer:   Interventions: Continue with CPAP/BiPAP and recheck an ABG.  Plan: Remain on CPAP/BiPAP, titrate FiO2, trend ABGs, consider dialysis, possible paracentesis.  2 RN skin assessment: completed by Russell Hankins RN and Lizzie Eagle RN  Result of skin assessment and interventions/actions: Right abd/flank bruise, Left abd bruising and scabbing from para on 9/10. BUE bruising. Blanchable redness on sacrum/coccyx and BL heels. Nonblanchable redness around rectum. Scabbed over forehead/scalp wound with primapore dressing that remains clean, dry, and intact.  Height, weight, drug calc weight: Done  Patient belongings (see Flowsheet - Adult Profile for details): With patient

## 2022-09-12 NOTE — PROGRESS NOTES
Transfer  Transferred from: 7C  Via:bed  Reason for transfer: Pt appropriate for 6B- worsened patient condition  Family: Aware of transfer (per 7C RN)  Belongings: Received with pt  Chart: Received with pt  Medications: Meds received from old unit with pt  Code Status verified on armband: yes  2 RN Skin Assessment Completed By: writer Jorden Street rec completed: yes  Bed surface reassessed with algorithm and charted: yes  New bed surface ordered: yes  Suction/Ambu bag/Flowmeter at bedside: yes    Report received from: Diana PERES

## 2022-09-12 NOTE — PROGRESS NOTES
ICU End of Shift Summary. See flowsheets for vital signs and detailed assessment.    Changes this shift: No acute changes since patient transferred to MICU at 0300.    Neuro: A&Ox3, disoriented to situation; follows commands; no complaints of pain; PERRLA.    Cardiac: AFebrile; NSR; VSS.    Resp: CPAP 70% FiO2; LS clear/diminished; needs sputum culture.    GI: Last BM 9/11; NPO.    : Aparicio with poor urine output.      Plan: Titrate FiO2. Trend ABGs. Possible HD run. Continue to monitor and notify MICU of any acute changes.

## 2022-09-12 NOTE — PROCEDURES
[facility]    Paracentesis    Date/Time: 9/12/2022 4:58 PM  Performed by: Samina Arevalo MD  Authorized by: Aquiles Stone MD     PRE-PROCEDURE DETAILS  Procedure purpose: diagnostic and therapeutic        UNIVERSAL PROTOCOL   Site Marked: Yes  Prior Images Obtained and Reviewed:  NA  Required items: Required blood products, implants, devices and special equipment available    Patient identity confirmed:  Verbally with patient  NA - No sedation, light sedation, or local anesthesia  Confirmation Checklist:  Procedure was appropriate and matched the consent or emergent situation  Universal Protocol: the Joint Commission Universal Protocol was followed       ANESTHESIA    Local Anesthetic: Lidocaine 1% without epinephrine  Anesthetic Total (mL):  5      SEDATION    Patient Sedated: No    Vital signs: Vital signs monitored during sedation    POST PROCEDURE DETAILS  Ultrasound guidance: yes  Puncture site: left lower quadrant  Fluid removed: 4000(ml)  Fluid appearance: serous  Dressing: bandage.        PROCEDURE    Patient Tolerance:  Patient tolerated the procedure well with no immediate complications  Length of time physician/provider present for 1:1 monitoring during sedation: 0

## 2022-09-12 NOTE — CODE/RAPID RESPONSE
"   09/12/22 0200   Call Information   Date of Call 09/12/22   Time of Call 0230   Name of person requesting the team Erendira GRANT   Title of person requesting team RN   RRT Arrival time 0234   Reason for call   Type of RRT Adult   Primary reason for call Respiratory   Respiratory Labored breathing;Respiratory pattern change   Was patient transferred from the ED, ICU, or PACU within last 24 hours prior to RRT call? No   SBAR   Situation Increased oxygen need.   Background per provider H&P \"Kody Reynaga is a 77 year old male admitted on 9/6/2022. He has a past medical history of portal vein thrombosis (on AC), recurrent ascites, chronic pancreatitis, prostate cancer (S/P prostatectomy), SDH (S/P craniotomy), HTN, dyslipidemia. He had a recent admission from 8/29-9/3/22 for EUN, worsening ascites. Ultimately discharged home with recommendations for OP follow-up with Nephro, PCP, GI, but returns to ED today after presenting from home via ambulance after feeling unwell at the recommendation of PCP staff. He was admitted for further monitoring and management to Medicine service.\"   Notable History/Conditions Cancer;End-Stage disease;Hypertension;Neurological;Organ failure   Assessment Alert and interactive. Appropriately responsive to questions. Endorses feeling like he is tired out with his breathing.   Interventions Meds;Labs   Adjustments to Recommend Higher levl of care   Patient Outcome   Patient Outcome Transferred to  (4C 409)   RRT Team   Date Attending Physician notified 09/12/22   Physician(s) KATHRINE Pereira CNP   Lead RN Humberto GRANT   RT Aaron FRENCH   Post RRT Intervention Assessment   Post RRT Assessment Transferred to ICU     "

## 2022-09-12 NOTE — PROCEDURES
[facility]    Central line - Dialysis Catheter    Date/Time: 9/12/2022 5:37 PM  Performed by: Marci Chairez MD  Authorized by: Marci Chairez MD   Indications: vascular access      UNIVERSAL PROTOCOL   Site Marked: NA  Prior Images Obtained and Reviewed:  NA  Required items: Required blood products, implants, devices and special equipment available    Patient identity confirmed:  Verbally with patient and arm band  NA - No sedation, light sedation, or local anesthesia  Confirmation Checklist:  Patient's identity using two indicators, relevant allergies, procedure was appropriate and matched the consent or emergent situation and correct equipment/implants were available  Time out: Immediately prior to the procedure a time out was called    Universal Protocol: the Joint Commission Universal Protocol was followed    Preparation: Patient was prepped and draped in usual sterile fashion       ANESTHESIA    Anesthesia: Local infiltration  Local Anesthetic:  Lidocaine 1% without epinephrine      SEDATION    Patient Sedated: No      Preparation: skin prepped with 2% chlorhexidine  Skin prep agent dried: skin prep agent completely dried prior to procedure  Sterile barriers: all five maximum sterile barriers used - cap, mask, sterile gown, sterile gloves, and large sterile sheet  Hand hygiene: hand hygiene performed prior to central venous catheter insertion  Catheter type: double lumen  Pre-procedure: landmarks identified  Post-procedure: line sutured and dressing applied  Assessment: blood return through all ports and placement verified by x-ray      PROCEDURE    Patient Tolerance:  Patient tolerated the procedure well with no immediate complications  Length of time physician/provider present for 1:1 monitoring during sedation: 0      Dr. Samina Arevalo and Dr. Aquiles Stone were present for the procedure.    Marci Chairez MD  Internal Medicine, PGY-1

## 2022-09-12 NOTE — PROGRESS NOTES
MEDICAL ICU PROGRESS NOTE  09/12/2022      Date of Service (when I saw the patient): 09/12/2022    ASSESSMENT: Kody Reynaga is a 77 year old male admitted on 9/6/2022. He has a past medical history of portal vein thrombosis (on AC), recurrent ascites, chronic pancreatitis, prostate cancer (S/P prostatectomy), SDH (S/P craniotomy), HTN, dyslipidemia. He had a recent admission from 8/29-9/3/22 for EUN and worsening ascites. Readmitted on 9/7 for worsening EUN, fatigue and failure to thrive. He has been managed on the floor with recurrent paracentesis and medical management of his ongoing hepatorenal disease in addition to C diff but had escalating O2 requirements in the setting of possible aspiration pneumonia, transferred to the MICU on 9/12/22 with concern for potential intubation. Started on Zosyn at transfer. Initially arrived to MICU on HFNC but transitioned to CPAP overnight.     CHANGES and MAJOR THINGS TODAY:   - Therapeutic and diagnostic paracentesis today   - Will discuss dialysis with nephrology and the family today, if agreed upon will proceed with this and will require dialysis line placement   - Switching to HFNC now  - Continue Zosyn     PLAN:    Neuro:  #Acute Metabolic Encephalopathy most likely secondary to worsening uremia   #Hepatic Encephalopathy  Patient with history of encephalopathy previously evaluated for hepatic encephalopathy and treated with lactulose. Patient had CT head on 09/6 negative for intracranial abnormality. Most likely encephalopathy also predisposed to progressive uremia in the setting of worsening kidney function. Per the patients wife, he seems to be more confused today. There will be a discussion regarding dialysis this afternoon and if this is decided upon, I suspect his mental status will improve.   -Lactulose, titrate for 3-4 loose stools daily  -Avoid opioids    #Depression  -Continue PTA Lexapro     Pulmonary:  #Acute Hypoxic Respiratory Failure- Multifactorial  (Aspiration Pneumonia,Ascites, Rib fractures)     #Aspiration Pneumonia  #Rib fractures  Patient with increased oxygen requirements on HFNC FiO2 100% increased to 60L overnight. ABG was obtained and switched to BiPAP. Patient had possible episode of emesis last night c/f possible aspiration pneumonia in the setting of increased oxygen requirements and zosyn was started empirically and transferred to the ICU in anticipation of possible intubation. On arrival patient on 14 CPAP 100% with TV -900 ml with normal WOB but shallow breathing. Now on CPAP with pressure setting of 10 at 50% with -700 with sats ~ 90%. ABG this morning showed a PaO2 of 54 so FiO2 increased to 70%. Most likely acute hypoxic respiratory failure multifactorial in the setting of possible aspiration pneumonia ascites in the setting of rib fractures. Considered PE but the patient has no other findings consistent with this and has evidence of a new infiltrate on CXR. He did have question of left lower extremity swelling so bilateral doppler was obtained showing no evidence of DVT.   -Will transition back to NFNC   -VBG after transition off CPAP to NFNC   -Abx: Zosyn  -Sputum cultures ordered but patient not producing sputum      Cardiovascular:  # HTN  #Hyperlipidemia   Patient has had MAP's >65 throughout his hospitalization.   -Hold for now PTA Amlodipine   -Has been getting Pravastatin    GI/Nutrition:  #Ascites, worsening  #Portal Vein Thrombosis  #Portal Hypertension  Has had hx of biliary strictures (benign on most recent EGD 05/10/22), S/P multiple biliary stents [id number], biopsies negative for carcinoma of biliary etiology. Recent admission for ascites, has required ~weekly paracenteses since. Negative for SBP on 9/7. Patient had Abd US on 09/06/22 that showed  cirrhotic configuration of liver. Patient had last paracentesis on 09/10/22 with 5L removal. Paracentesis held yesterday as they were doing an albumin challenge.   -Continue with  Eliquis   -Parecentesis today, goal of 4-5 L   - Albumin 25 g during paracentesis   -Continue with lactulose, titrate for 3-4 loose stools     #C.DIFF  -Continue with oral vancomycin for 10 course (started on 09/08/22). Interestingly, this was ordered in the setting of increasing loose stools while the patient was taking  Lactulose.   -Enteric precautions   -Oral vancomycin for 10 days     #Hx of IBS   - Bentyl 20 mg TID with meals     Renal/Fluids/Electrolytes:  # Acute on chronic kidney disease, 2/2 prerenal + HRS   #Uremia   #ANGMA  # Lactic Acidosis  Recent admission as above for EUN, Cr baseline , thought to be multifactorial in the setting of fluid restriction (d/t ascites as above) and use of Lasix (d/c'd on discharge).  Cr 4.26 on admission (up from 2.96 on 9/3/22), had been improving after albumin challenge by now up trending to 5.28. Patient additionally is uremic which as been increasing over the last few days, 148. In addition, his lactate has been steadily increasing and is 3.9 this morning. Suspect this likely reflects ongoing declining kidney function related to his underlying progressive liver disease. Nephrology has discussed dialysis with the patient and his wife and they are agreeable if we feel that is needed. Will have a formal goals of care discussion today to further discuss this.  - Nephrology consulted for possible HD   - s/p Albumin challenge  - Fluid restriction to 1.8 L, <2g dNa aily  - Blood cultures pending     Hyponatremia  Patient with sodium level of 131 most likely d/t hypovolemia.   -BMP daily   -Sodium restriction      Endocrine:  #No active concerns     ID:  # Aspiration Pneumonia  Patient with increased oxygen requirements overnight c/f possible aspiration pneumonia with increased leukocytosis and started zosyn empirically. Patient does have significant abdominal distention related to his ascites. He did have diagnostic paracentesis on 9/7 which did not demonstrate any evidence  of SBP. Will resend these labs today. Will continue with broad antibiotic, order procalcitonin, order sputum cultures, and follow up blood cultures    -Procalcitonin 2.45  -Abx: Zosyn  -Sputum cultures ordered   -Blood cultures pending     #C.Dif  -Continue with PO Vancomycin     Hematology:    #Chronic Normocitic anemia  #Leukocytosis  Patient with progressive leukocytosis over the last 4 days peaking at 27 yesterday. WBC this morning 18.9. Hemoglobin stable at 8.8.  -CBC daily     Musculoskeletal:  # LLE EDEMA  -US LE      Skin:  No acute concerns     General Cares/Prophylaxis:    DVT Prophylaxis: DOAC  GI Prophylaxis: PPI  Restraints: None  Code Status: Full Code      Lines/tubes/drains:  - CPAP  - PIV x 2   - Aparicio Catheter     Disposition:  - Medical ICU     Patient seen and findings/plan discussed with medical ICU staff, Dr. Mckenna.    Syed Rodríguez, MS4    Resident/Fellow Attestation   I, Marci Chairez MD, was present with the medical/SANDOR student who participated in the service and in the documentation of the note.  I have verified the history and personally performed the physical exam and medical decision making.  I agree with the assessment and plan of care as documented in the note.      Briefly, Mr. Rendon was admitted to MICU for escalating oxygen requirements in the setting of aspiration PNA w/ concerns for possible intubation. Patient has been satting well on HFNC w/o increased WOB. Continuing pip-tazo. Therapeutic and diagnostic paracentesis performed. RIJ dialysis line placed after discussion w/ MICU team and nephrology for dialysis initiation.    Marci Chairez MD  Internal Medicine, PGY-1  Date of Service (when I saw the patient): 09/12/22    ====================================  INTERVAL HISTORY:   Patient was transferred to the ICU due to increasing oxygen requirements in the setting of suspected aspiration event. Was transitioned overnight from HFNC to CPAP. Per the patients wife, she feels  like his mental status is worse today than yesterday. She feels like he is not making much sense and is not answering all questions appropriately. Patient reports some abdominal pain but otherwise is feeling ok. Feels like his breathing is better not that he is on CPAP.     OBJECTIVE:   1. VITAL SIGNS:   Temp:  [96.5  F (35.8  C)-98.5  F (36.9  C)] 98  F (36.7  C)  Pulse:  [] 97  Resp:  [18-24] 20  BP: ()/(55-96) 113/60  FiO2 (%):  [50 %-100 %] 70 %  SpO2:  [86 %-100 %] 96 %  FiO2 (%): (S) 70 %  Resp: 20    2. INTAKE/ OUTPUT:   I/O last 3 completed shifts:  In: 710 [P.O.:710]  Out: 365 [Urine:365]    3. PHYSICAL EXAMINATION:  General: Laying in bed, some difficulty answering questions appropriately, on CPAP, no acute distress   HEENT: MMM. No scleral icterus.   Neuro: Alert and oriented to place and person, not to time. Able to follow commands, no asterixis.   Pulm/Resp: On CPAP with shallow respirations. Breath sounds are clear.   CV: RRR, no murmurs, rubs or gallops  Abdomen: Distended, minimal tenderness in the upper abdomen, no rebound or guarding   : valdovinos catheter in place, no urine in bag     4. LABS:   Arterial Blood Gases   Recent Labs   Lab 09/12/22  0435 09/12/22  0209 09/11/22  1611   PH 7.44 7.44 7.48*   PCO2 35 36 34*   PO2 54* 58* 72*   HCO3 24 25 25     Complete Blood Count   Recent Labs   Lab 09/11/22  0809 09/10/22  0900 09/09/22  0728 09/08/22  0715   WBC 27.4* 20.3* 16.0* 17.4*   HGB 8.8* 8.3* 8.0* 8.4*   * 136* 127* 109*     Basic Metabolic Panel  Recent Labs   Lab 09/12/22  0316 09/11/22  2153 09/11/22  1555 09/11/22  0809 09/10/22  1655 09/10/22  0900 09/09/22 0800 09/09/22 0728 09/08/22 2213 09/08/22 0715   NA  --   --   --  135*  --  135*  --  133*  --  135*   POTASSIUM  --   --   --  4.4  --  4.4  --  4.2  --  4.1   CHLORIDE  --   --   --  91*  --  92*  --  91*  --  93*   CO2  --   --   --  24  --  25  --  25  --  24   BUN  --   --   --  147.0*  --  138.0*  --   127.0*  --  131.0*   CR  --   --   --  4.18*  --  3.76*  --  3.06*  --  3.30*   * 182* 215* 152*   < > 135*   < > 144*   < > 144*    < > = values in this interval not displayed.     Liver Function Tests  Recent Labs   Lab 09/11/22  0809 09/10/22  0900 09/09/22  0728 09/08/22  0715 09/07/22  0735 09/06/22  1421   AST 25 26 22 22   < > 27   ALT 10 12 12 10   < > 12   ALKPHOS 159* 158* 164* 167*   < > 125   BILITOTAL 0.9 1.0 0.9 0.8   < > 1.3*   ALBUMIN 4.0 4.4 4.6 4.4   < > 4.0   INR  --   --   --   --   --  2.50*    < > = values in this interval not displayed.     Coagulation Profile  Recent Labs   Lab 09/06/22  1421   INR 2.50*       5. RADIOLOGY:   Recent Results (from the past 24 hour(s))   XR Chest Port 1 View    Narrative    Exam: XR CHEST PORT 1 VIEW, 9/11/2022 8:40 PM    Indication: increased O2 needs    Comparison: 9/10/2022    Findings:   The heart size is within normal limits. No appreciable pneumothorax or  pleural effusion. Improving right basilar streaky opacities. Increased  left retrocardiac opacity.      Impression    Impression:   1. Improving right basilar atelectasis.  2. Increasing retrocardiac opacity, suggestive of atelectasis or  infection.    I have personally reviewed the examination and initial interpretation  and I agree with the findings.    RACHEL COLEMAN,          SYSTEM ID:  [id number]

## 2022-09-12 NOTE — PROGRESS NOTES
"   09/11/22 2012   Call Information   Date of Call 09/11/22   Time of Call 2006   Name of person requesting the team Diana   Title of person requesting team RN   RRT Arrival time 2012   Time RRT ended 2115   Reason for call   Type of RRT Adult   Primary reason for call Respiratory   Respiratory O2sat less than 88% for greater than 5 minutes despite O2   SBAR   Situation Increased O2 requirement to 5 lpm from 2.5 lpm. SpO2 mid 80s   Background per provider H&P \"Kody Reynaga is a 77 year old male admitted on 9/6/2022. He has a past medical history of portal vein thrombosis (on AC), recurrent ascites, chronic pancreatitis, prostate cancer (S/P prostatectomy), SDH (S/P craniotomy), HTN, dyslipidemia. He had a recent admission from 8/29-9/3/22 for EUN, worsening ascites. Ultimately discharged home with recommendations for OP follow-up with Nephro, PCP, GI, but returns to ED today after presenting from home via ambulance after feeling unwell at the recommendation of PCP staff. He was admitted for further monitoring and management to Medicine service.\"   Notable History/Conditions Cancer;End-Stage disease;Hypertension;Neurological;Organ failure   Assessment Pt in bed in no apparent distress. Had vomitted recently, c/o nausea, otherwise denies SOB, difficulty breathing. Repeat lactate 3.3 from prior RRT. SpO2 86%, OVSS.   Interventions CXR;O2 per N/C or mask;Meds;Labs  (HFNC)   Patient Outcome   Patient Outcome Transferred to  (6B)   RRT Team   Attending/Primary/Covering Physician Kaleb 7   Date Attending Physician notified 09/11/22   Time Attending Physician notified 2006   Physician(s) Fabiana Simeon   Post RRT Intervention Assessment   Post RRT Assessment Transferred to [facility]   Date Follow Up Done 09/11/22   Time Follow Up Done 2315   Comments VSS, SpO2 mid 90s on % HFNC. Repeat lactate 3.1, down from 3.3. UA pending collection. Blood culture in process.     "

## 2022-09-13 NOTE — PROGRESS NOTES
"ICU End of Shift Summary. See flowsheets for vital signs and detailed assessment.    Changes this shift: Patient failed RN bedside swallow screening, coughs and drools after taking fluids. SLP consult placed and patient made NPO. Right internal jugular dressing changed twice d/t bleeding, stat seal under current dressing.    Neuro: Patient A&O only to self. At the beginning of the shift, patient was A&Ox3 (disoriented to situation). Follows commands and answers simple \"yes\" and \"no\" questions.    Cardiac: Afebrile; NSR 80's-90's; VSS    Resp: HFNC 80% and 40 LPM; LS clear/diminished    GI: NPO; SLP consult placed d/t failed RN bedside swallow screening. Last BM 8/11.    : Aparicio with 35 mL of UO in 12 hours.    Labs: Pending    Plan: Dialysis. SLP consult. Possible feeding tube placement. Wean FiO2 as tolerated. Continue to monitor and notify MDs of any acute changes.  "

## 2022-09-13 NOTE — PROGRESS NOTES
Nephrology Progress Note  09/13/2022         Kody Reynaga is a 77 yom with complex medical hx including portal vein thrombosis requiring weekly para's, chronic pancreatitis, HTN, who is re-admitted (after being admitted last week) for AMS, fatigue and EUN.  Cr was 0.8 in 2/2022, up to 1.4 in 5/2022 and ~2.0 during admission in August before being 4.2 on admission to [facility] on 9/6.  Nephrology consulted for management of EUN on CKD.       Interval History :   After long discussion yesterday we have elected to start RRT for clearance for Mr Reynaga.  Line placed yesterday afternoon, running 1st HD this am.  Starting with short, low flow run to avoid dysequilibrium issues.  Pulling 1L as resp status is tenuous and BP's are reasonable but will adjust as BP's allow.  Will plan to run the next 2 days with increasing length and BFR, we should see whatever benefit HD will provide from AMS standpoint in 3-4 runs.       Assessment and Recommendations  EUN on CKD-Difficult to determine baseline as Cr has been on the rise for the last 6 months likely with frequent EUN related to portal vein thrombosis.  Cr of 4.2 on admission, slightly better this am at 4.0 but gfr is certainly low and muscle mass suggests it may be worse than Cr would suggest.  No NSAID's at home, no nephrotoxins in recent hx.  Likely hemodynamic related to ascites due to portal vein thrombus and suspected SBP based on elevated WBC, also may be congestion with need for weekly para's.  I had ~15 minute discussion on 9/7 with pt and his wife that his long term trajectory is concerning for needing dialysis and that this would be a difficult course given his other co-morbidities and functional status but that we can treat for infection and see if he can rebound a bit in the short term.                  -EUN, likely hemodynamic related to poor intake in conjunction with ascities.  Had shown improvement with para last week but significantly worsened over the past  "48h for unclear reasons.  We did do para on 9/10 and 9/12 with albumin repletion, now with resp distress as well.                 -Had long ~45 min conversation with pt's family (Wife, Daughter and son-in-law) explaining his complex situation on 9/12.  Ultimately they would like to do a time limited trial of dialysis for the next week to see if he feels better and is able to be more interactive.  Hopefully he will be able to weigh in on the continuation at that point.                 -Appreciate team placing line, first HD this am.             -Dialysis consent signed and scanned in under media on 9/12.         Volume/BP's-Not hypotensive, has abdominal distension despite 2L para yesterday.  Pulling 1L of UF as able while trying to keep SBP up to promote overall improvement.       Electrolytes-K 5.2, bicarb 23.  Na 134, no acute issues.  Stopping bicarb tabs now that we are on dialysis.       BMD-Ca 9.7, Mg and Phos WNL despite poor kidney fx suggests poor nutrition.       GI-Does not have overt liver disease (although imaging suggested cirrhosis?) but has tense abdomen related to portal vein thrombosis.  Getting diagnostic para with elevated WBC, on abx.       Anemia-Hgb down to 8.0 from 9.4 yesterday and ~10-12 generally.  WBC up, will check iron stores when ID issues subside.       Nutrition-Taking PO.      Time spent: 25 minutes on this date of encounter for chart review, physical exam, medical decision making and co-ordination of care.      Seen and discussed with Dr Villa     Recommendations were communicated to primary team via page       KATHRINE Jenkins CNS  Clinical Nurse Specialist  [phone]    Review of Systems:   I reviewed the following systems:  ROS not done due to AMS    Physical Exam:   I/O last 3 completed shifts:  In: 1170 [P.O.:300; I.V.:620]  Out: 35 [Urine:35]   /63   Pulse 98   Temp 98.1  F (36.7  C) (Oral)   Resp 21   Ht 1.803 m (5' 10.98\")   Wt 70.9 kg (156 lb 4.9 oz)   " SpO2 93%   BMI 21.81 kg/m       GENERAL APPEARANCE: Interactive but confused, wife answering most questions.    EYES: No scleral icterus  Pulmonary: lungs clear to auscultation with equal breath sounds bilaterally, no clubbing or cyanosis  CV: Regular rhythm, normal rate, no rub   - Edema +1-2 generalized. +++Abd distension.    GI: firm, mildly tender, very distended.    MS: no evidence of inflammation in joints, no muscle tenderness  : No Aparicio  SKIN: no rash, warm, dry  NEURO: Interactive but confused.  Mild asterixis when holding hands in front of him and closing eyes.      Labs:   All labs reviewed by me  Electrolytes/Renal - Recent Labs   Lab Test 09/13/22  0809 09/13/22  0656 09/12/22 2015 09/12/22  0858 09/12/22  0604 09/11/22  1555 09/11/22  0809 09/10/22  1655 09/10/22  0900 08/29/22  1600 08/29/22  1416   NA  --  134*  --   --  132*  --  135*  --  135*   < > 127*   POTASSIUM  --  5.2  --   --  5.2  --  4.4  --  4.4   < > 6.7*   CHLORIDE  --  87*  --   --  86*  --  91*  --  92*   < > 89*   CO2  --  22  --   --  23  --  24  --  25   < > 18*   BUN  --  168.0*  --   --  148.0*  --  147.0*  --  138.0*   < > 83.4*   CR  --  6.65*  --   --  5.28*  --  4.18*  --  3.76*   < > 4.28*   * 154* 177*   < > 146*   < > 152*   < > 135*   < > 137*   AMIRAH  --  9.7  --   --  9.5  --  9.3  --  9.7   < > 9.8   MAG  --   --   --   --  3.1*  --  3.1*  --  3.0*   < > 2.4*   PHOS  --   --   --   --   --   --   --   --   --   --  6.3*    < > = values in this interval not displayed.       CBC -   Recent Labs   Lab Test 09/13/22  0656 09/12/22  0604 09/11/22  0809   WBC 23.4* 18.9* 27.4*   HGB 9.0* 8.8* 8.8*    203 139*       LFTs -   Recent Labs   Lab Test 09/13/22  0656 09/12/22  0604 09/11/22  0809   ALKPHOS 155* 140* 159*   BILITOTAL 1.1 1.2 0.9   ALT 12 14 10   AST 30 37 25   PROTTOTAL 6.1* 6.4 6.1*   ALBUMIN 3.8 4.0 4.0       Iron Panel - No lab results found.        Current Medications:    sodium chloride 0.9%   250 mL Intravenous Once in dialysis/CRRT     sodium chloride 0.9%  300 mL Hemodialysis Machine Once     [Held by provider] amLODIPine  10 mg Oral Daily     apixaban ANTICOAGULANT  5 mg Oral BID     dicyclomine  20 mg Oral TID     escitalopram  20 mg Oral QAM     lactulose  10 g Oral Once     lactulose  20 g Oral TID     - MEDICATION INSTRUCTIONS -   Does not apply Once     nystatin  500,000 Units Swish & Swallow 4x Daily     pantoprazole  40 mg Oral QAM     piperacillin-tazobactam  2.25 g Intravenous Q6H     pravastatin  40 mg Oral Daily     sodium bicarbonate  1,300 mg Oral TID     sodium chloride (PF)  3 mL Intracatheter Q8H     sodium chloride (PF)  9 mL Intracatheter During Dialysis/CRRT (from stock)     sodium chloride (PF)  9 mL Intracatheter During Dialysis/CRRT (from stock)     vancomycin  125 mg Oral 4x Daily       - MEDICATION INSTRUCTIONS -       - MEDICATION INSTRUCTIONS -       - MEDICATION INSTRUCTIONS -

## 2022-09-13 NOTE — PROGRESS NOTES
Physician Attestation   I, Katalina Mckenna MD, saw this patient with the resident and agree with the resident's findings and plan of care as documented in the note.      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 He was ultimately transferred to the MICU on 9/1/12 due to wrsening hypoxic respiratory failure with concern for aspiration pneumonia/pneumonitis, on zosyn. Now weaned to HFNC but with disorientation, and worsening renal failure. He was also found to have C. Dif and continues on PO vanco. GOC discussion with family, palliative care, and nephrology today regarding overall prognosis, and initiation of HD. They would like a trial of dialysis to improve current quality of life and allow family to visit. Will need ongoing discussion and clarification of long-term goals. Planning for paracentesis given need for recurrent drainage and ongoing RF.     The patient was seen and examined with the resident/fellow physician.  We have discussed the patient in detail and I agree with the findings, assessment, and plan as documented when this note was cosigned on this day. The plan was formulated in conjunction with pharmacy, ICU nurses, and respiratory therapist. I have evaluated all laboratory values and imaging studies for the past 24 hours. I have reviewed all the consults that have been ordered and are active for this patient.       Critical Care Time: 50 mins (excluding procedures)    Katalina Mckenna MD  Date of Service (when I saw the patient): 9/12/22

## 2022-09-13 NOTE — PLAN OF CARE
ICU End of Shift Summary. See flowsheets for vital signs and detailed assessment.    Changes this shift: Pt Mentation status varied throughout shift, always oriented to self but intermittently oriented to time. Switched from HFNC to BiPAP 10/5. One loose stool. Removed valdovinos. HD to run in the afternoon. NJ placed at bedside. Wife, Asia, aware of plan and at bedside.    Plan: Discuss goals of care on Friday once family arrives from out of town.

## 2022-09-13 NOTE — PLAN OF CARE
Goal Outcome Evaluation:        Problem: Oral Intake Inadequate (Acute Kidney Injury/Impairment)  Goal: Optimal Nutrition Intake  Outcome: Ongoing, Not Progressing

## 2022-09-13 NOTE — PLAN OF CARE
ICU End of Shift Summary. See flowsheets for vital signs and detailed assessment.    Changes this shift: resumed care at 1500. Alert and oriented to person, place and year. Follows commands. MONIQUE. SR-ST 90s-110s. BP a little soft during HD. Unable to pull fluid d/t soft BP. HFNC 100% 50L, sometimes requiring 15L oxymask as well. Loose BM x3. Rectal tube placed. NG tube placed, verified by xray. Tube feeds started @ 1830 15ml/hr, gioal 45 ml/hr. Patient pulling at lines and a little fidgety, mitts placed for safety.     Plan: Advance tube feeds per order. Maintain safety

## 2022-09-13 NOTE — PROCEDURES
Small Bowel Feeding Tube Placement Assessment  Reason for Feeding Tube Placement: nutrition and medication administration  Cortrak Start Time: 14:45   Cortrak End Time: 15:45  Medicine Delivered During Procedure: lidocaine gel, Reglan x 1 per RN  Placement Successful:  Presumed gastric per Cherie (AXR pending) - okay to still start TF per Dr. Mckenna.     Procedure Complications: suspected poor GI motility with FT continuing to coil in stomach, difficulty passing pylorus.   Final Placement Justin at exit of nare 115 cm  Face to Face time with patient: 75 minutes total     Bridle Placement:   Reason for bridle placement: securement of FT   Medicine delivered during procedure: lubricating jelly  Procedure: Successful  Location of top of clip on FT: @ 116 cm marker   Condition of nose/skin at time of bridle placement: mild epistaxis in R nare (since prior to start or procedure, per pt's wife due to pt picking at nose).    Face to Face time with patient: 5 minutes.    Liza Quach, MS, RD, LD, [facility]  MICU pager: [phone]  ASCOM: [id number]

## 2022-09-13 NOTE — CONSULTS
[facility]  Palliative Care Consultation/N Note    Patient: Kody Reynaga  Date of Admission:  9/6/2022    Requesting Clinician / Team:  MICU/ Nephrology  Reason for consult: Goals of care  Patient and family support    Recommendations:    PC will continue to follow for support.    Favor trial of HD and re convene family/patient meeting in late this week or early next for further goals of care review.      to continue to see for support.    CODE STATUS: full code at this time. Will continue to review in present critical illness.     These recommendations have been discussed with patient, family, and primary team.      Thank you for the opportunity to participate in the care of this patient and family. Our team: will continue to follow.     During regular M-F work hours -- if you are not sure who specifically to contact -- please contact us by sending a text page to our team consult pager at [phone].    After regular work hours and on weekends/holidays, you can call our answering service at [phone]. Also, who's on call for us is available in [facility] Smart Web.   Matteo CHISHOLM NP  Nurse Practitioner- Advanced Practice Provider  [facility] Palliative Medicine Consult Service   [phone]  TT spent:48 minutes of which 33 minutes were spent in direct face to face contact with patient/family. Patient teaching done regarding role and team support provided by PC. Greater than 50% of time spent counseling and/or coordinating care.   Assessments:  Kody Reynaga is a 77 year old male admitted on 9/6/2022 via EMS with a past medical history of portal vein thrombosis (on AC) with recurrent ascites, chronic pancreatitis, prostate cancer (S/P prostatectomy), SDH (S/P craniotomy), HTN, dyslipidemia and noting a recent admission   (8/29-9/3/22) for EUN and worsening ascites.   Primary concerns 9/6 were worsening EUN, fatigue and failure to thrive and after  some time on medicine service- receiving supportive interventions such as recurrent paracentesis and medical management of his ongoing hepatorenal disease and C-diff,  he continued to have escalating O2 requirements in the setting of possible aspiration pneumonia - treated with Zosyn, transferred to the MICU on 9/12/22 with concern for potential intubation  His kidney failure has progressed to the edge of dialysis and encephalopathy his worsened. Plan for ongoing medical interventions now being balanced against quality of life considerations--> palliative care consult..        Prognosis, Goals, & Planning:      Functional Status just prior to hospitalization: 3 (Capable of only limited self-care; needs help with ADLs; in bed/chair >50% of waking hours)      Prognosis, Goals, and/or Advance Care Planning were addressed today: Yes        Summary/Comments: see above      Patient's decision making preferences: shared with support from loved ones          Patient has decision-making capacity today for complex decisions: Partial (needs assistance with complex decisions)            I have concerns about the patient/family's health literacy today: No           Patient has a completed Health Care Directive: Yes, and on file.      Code status: Full Code    Coping, Meaning, & Spirituality:   Mood, coping, and/or meaning in the context of serious illness were addressed today: Yes  Summary/Comments: junito is strong part of meaning making- unit  is following.    Social:     Living situation: lives in [place] with spouse. owns a vacation home in [place]. increasingly weak and dependent at home.    Key family / caregivers: spouse, adult daughter and MIGNON.     Occupational history: retired    Current in-home services: family only- to date- recently started using walker.     History of Present Illness: Patient seen and examined with members of primary ICU team and nephrology at bedside.  Brief discussion care planning on  whether to initiate dialysis was held.  Patient has multisystem involvement with his acute on chronic problems.  Family reviewed neck steps in light of quality of life concerns and patient values as they understood them.  Decision was made for trial of hemodialysis for 3 sessions to reevaluate cognitive impact and reassess goals of care at that time. Plan was therefore:   -  Gentle dialysis after access placement for short trail to assess impact on encephalopathy.    -  Monitoring oxygen saturations. Patient has been intermittently hypoxic with increasing O2 requirement- teams with low threshold for BiPAP and will need to discuss intubation with the family given concern for his mental status.   - repeat volume paracentesis as able for ascites management.   - along those lines - consider placing NG for feeds and medication administration today      Key Palliative Symptom Data:  We are not helping to manage these symptoms currently in this patient.    ROS:  Comprehensive ROS is unable to be reliably obtained due to critical illness and uremia related AMS.     Past Medical History:  Past Medical History:   Diagnosis Date     Anxiety      Depression      Gastroesophageal reflux disease      Gout      Hypercholesterolemia      Hypertension      IBS (irritable bowel syndrome)      Pancreatic disease      Prostate cancer (H)         Past Surgical History:  Past Surgical History:   Procedure Laterality Date     CATARACT EXTRACTION W/ INTRAOCULAR LENS IMPLANT Right      COLONOSCOPY       cryoablation of prostate       ENDOSCOPIC RETROGRADE CHOLANGIOPANCREATOGRAM       ENDOSCOPIC RETROGRADE CHOLANGIOPANCREATOGRAM N/A 1/24/2022    Procedure: ENDOSCOPIC RETROGRADE CHOLANGIOPANCREATOGRAPHY, BILIARY STENT EXCHANGE, sPY WITH BIOPSIES AND BRUSHINGS;  Surgeon: Guru Ab Gonzalez MD;  Location: [facility] OR     ENDOSCOPIC RETROGRADE CHOLANGIOPANCREATOGRAM N/A 2/24/2022    Procedure: ENDOSCOPIC RETROGRADE  CHOLANGIOPANCREATOGRAPHY, with stent placement and bile duct biopsy;  Surgeon: Anthony Abarca MD;  Location: [facility] OR     ENDOSCOPIC RETROGRADE CHOLANGIOPANCREATOGRAM N/A 5/10/2022    Procedure: ENDOSCOPIC RETROGRADE CHOLANGIOPANCREATOGRAPHY, pyloric dilation, bebris removal, biliary stent exchange;  Surgeon: Anthony Abarca MD;  Location: [facility] OR     ENDOSCOPIC RETROGRADE CHOLANGIOPANCREATOGRAM WITH SPYGLASS N/A 10/14/2021    Procedure: ENDOSCOPIC RETROGRADE CHOLANGIOPANCREATOGRAPHY, WITH DIRECT DUCT VISUALIZATION, USING PANCREATICOBILIARY FIBEROPTIC PROBE-spyglass, biliary sludge and clot removal, biliary biopsies, biliary stent exchange;  Surgeon: Anthony Abarca MD;  Location: [facility] OR     ENDOSCOPIC ULTRASOUND UPPER GASTROINTESTINAL TRACT (GI) N/A 9/28/2021    Procedure: ENDOSCOPIC ULTRASOUND, ESOPHAGOSCOPY / UPPER GASTROINTESTINAL TRACT (GI);  Surgeon: Babak Garvin MD;  Location: [facility] GI     ENDOSCOPIC ULTRASOUND UPPER GASTROINTESTINAL TRACT (GI) N/A 1/24/2022    Procedure: ENDOSCOPIC ULTRASOUND, ESOPHAGOSCOPY / UPPER GASTROINTESTINAL TRACT (GI);  Surgeon: Guru Ab Gonzalez MD;  Location: [facility] OR     ESOPHAGOSCOPY, GASTROSCOPY, DUODENOSCOPY (EGD), COMBINED N/A 10/14/2021    Procedure: ESOPHAGOGASTRODUODENOSCOPY, WITH FINE NEEDLE ASPIRATION BIOPSY, WITH ENDOSCOPIC ULTRASOUND GUIDANCE, biliary stent removal;  Surgeon: Babak Garvin MD;  Location: [facility] OR     ESOPHAGOSCOPY, GASTROSCOPY, DUODENOSCOPY (EGD), COMBINED N/A 2/24/2022    Procedure: ESOPHAGOGASTRODUODENOSCOPY WITH DILATION AND STENT REMOVAL, ENDOSCOPIC ULTRASOUND WITH FINE NEEDLE BIOPSIES;  Surgeon: Babak Garvin MD;  Location: [facility] OR     MOUTH SURGERY       TONSILLECTOMY           Family History:  Family History   Problem Relation Age of Onset     Heart Failure Mother      Cancer Father      Anesthesia Reaction No family hx of      Cardiovascular No family hx of      Deep Vein Thrombosis (DVT) No  family hx of          Allergies:  No Known Allergies     Medications:  I have reviewed this patient's medication profile and medications from this hospitalization.     Physical Exam:  Vital Signs: Temp: 98.1  F (36.7  C) Temp src: Oral BP: 114/63 Pulse: 98   Resp: 21 SpO2: 93 % O2 Device: BiPAP/CPAP Oxygen Delivery: 40 LPM  Weight: 156 lbs 4.9 oz   GENERAL APPEARANCE: Interactive, easily drifts off to sleep. Appears mildly confused, wife/family answering most questions. HFNC in place  Pulmonary: lungs clear anteriorly to auscultation with equal breath sounds bilaterally  CV: Regular rhythm, normal rate - Edema +1-2 generalized w marked Abd distension.    GI: firm, mildly tender, very distended.    MS: no evidence of inflammation in joints, no muscle tenderness. Moving in bed at will  SKIN: no rash, warm, dry  NEURO: Interactive but confused. Likely delirium vs encephalopathic.     Data reviewed:  ROUTINE IP LABS (Last four results)  BMPRecent Labs   Lab 09/13/22  0809 09/13/22  0656 09/12/22 2015 09/12/22  1549 09/12/22  0858 09/12/22  0604 09/11/22  1555 09/11/22  0809 09/10/22  1655 09/10/22  0900   NA  --  134*  --   --   --  132*  --  135*  --  135*   POTASSIUM  --  5.2  --   --   --  5.2  --  4.4  --  4.4   CHLORIDE  --  87*  --   --   --  86*  --  91*  --  92*   AMIRAH  --  9.7  --   --   --  9.5  --  9.3  --  9.7   CO2  --  22  --   --   --  23  --  24  --  25   BUN  --  168.0*  --   --   --  148.0*  --  147.0*  --  138.0*   CR  --  6.65*  --   --   --  5.28*  --  4.18*  --  3.76*   * 154* 177* 167*   < > 146*   < > 152*   < > 135*    < > = values in this interval not displayed.     CBC  Recent Labs   Lab 09/13/22  0656 09/12/22  0604 09/11/22  0809 09/10/22  0900   WBC 23.4* 18.9* 27.4* 20.3*   RBC 3.22* 3.20* 3.13* 2.92*   HGB 9.0* 8.8* 8.8* 8.3*   HCT 28.8* 28.8* 27.8* 26.0*   MCV 89 90 89 89   MCH 28.0 27.5 28.1 28.4   MCHC 31.3* 30.6* 31.7 31.9   RDW 17.2* 16.7* 16.6* 16.6*    203 139* 136*      INRNo lab results found in last 7 days.

## 2022-09-13 NOTE — PLAN OF CARE
SLP: Orders received for clinical swallow eval. Attempted to see pt x2, however pt initially not appropriate per RN and requiring BiPAP upon second attempt. Will follow up as appropriate.

## 2022-09-13 NOTE — PROGRESS NOTES
"SPIRITUAL HEALTH SERVICES  SPIRITUAL ASSESSMENT Progress Note  [facility] 4C     REFERRAL SOURCE: Follow up    Pt's wife Asia at bedside.  She shared that pt is more alert today, although he has not had dialysis yet.  She talked about how she and her daughter are setting up a [employer] page for him so that she won't have to individually update everyone who is asking for information.  Pt was on Bipap and had trouble speaking but did say \"I'm ready for dialysis.\"   provided support to pt and wife and let them know that [facility] would continue to follow.     PLAN: [facility] will remain available     Ana Garnettin  Pager: [phone]    "

## 2022-09-13 NOTE — PROGRESS NOTES
Addendum: Consult received per provider for RD to order TF and post pyloric feeding tube placement. Able to speak with pt's wife during visit, reports minimal PO intake for at least 1-2 weeks. FT likely gastric per Cortrak (AXR pending) - okay to still start TF per discussion with Dr. Mckenna. Will attempt to reposition 9/14 if needed. Pt starting HD.    CLINICAL NUTRITION SERVICES - ASSESSMENT NOTE     Nutrition Prescription    RECOMMENDATIONS FOR MDs/PROVIDERS TO ORDER:  --If unable to advance diet in 24 hrs, recommend placement of feeding tube and starting enteral nutrition (if aligns with GOC).     Malnutrition Status:    Severe malnutrition in the context of acute illness    Recommendations already ordered by Registered Dietitian (RD):  TF:  --Pending AXR confirmation of feeding tube position (gastric okay)  --GOAL: Novasource Renal @ 45 ml/hr (1080 ml) +1 Prosource provides 2200 kcal (31 kcal/kg), 109 g pro (1.5 g/kg), 198 g CHO, 774 ml free water, and 0 g fiber daily.   --Start TF @ 15 ml/hr and advance by 10 ml q 12 hrs until goal rate.  --Do not start or advance TF rate unless K+ >3.0, Mg++ > 1.5,  and Phos > 1.9.  --Minimum 30 ml q 4 hrs water flushes for tube patency.  --If gastric enteral access: HOB > 30 degrees or Reverse Trendelenburg >10-25 degrees  --MVI/minerals supplement if not already ordered (Nephronex), Thiamine 100 mg    Future/Additional Recommendations:  --FT position, possible reposition on 9/14.  --Lytes, TF tolerance/advancement.      REASON FOR ASSESSMENT  Kody Reynaga is a/an 77 year old male assessed by the dietitian for Nutrition Risk Monitoring (NPO x 1 day in ICU) and LOS x 7 days    NUTRITION HISTORY  PMH chronic pancreatitis, portal hypertension, portal vein thrombosis on Eliquis, cirrhosis with chronic ascites who was admitted from 8/29 to 9/3 for EUN and worsening ascites, readmitted on 9/7 for failure to worsening EUN, fatigue and failure to thrive. He has been managed on the  "floor with recurrent paracentesis and medical management of his ongoing hepatorenal disease in addition to C diff+ who was had escalating O2 requirements in the setting of aspiration pneumonia, transferred to the MICU on 9/12/22 with concern for need for intubation. Pt is currently on BIPAP vs HFNC. Possible SLP consult per notes due to pt failing bedside swallow. Nephrology following, plan for iHD today. Discussed with MICU team, care conference planned and team to discuss feeding tube.     CURRENT NUTRITION ORDERS  Diet: NPO since 9/12, previously on dialysis diet eating 25-50% of very small meals (re: yogurt or pudding and crackers)    LABS  K+ 5.2 (high normal)   (H)  Cr 6.65 (H)  No recent Phos  Lactic acid 3.9 (H)  Ammonia 67 (H on 9/6)    MEDICATIONS  Bentyl  Lactulose TID  Nystatin QID  Zosyn  Sodium bicarb  Vancomycin    ANTHROPOMETRICS  Height: 180.3 cm (5' 10.984\")  Most Recent Weight: 70.9 kg (156 lb 4.9 oz) - driest weight on 9/13    IBW: 78.2 kg  BMI: 21.75 kg/m2 (underweight for age)  Weight History: ?accuracy of weight on 9/9 (significant weight differences); weights are variable 2/2 ascites, however were previously ~stable. Now with 22% weight loss x 1 month - severe  Wt Readings from Last 30 Encounters:   09/13/22 70.9 kg (156 lb 4.9 oz)   09/03/22 83.2 kg (183 lb 6.8 oz)   08/29/22 87.3 kg (192 lb 8 oz)   08/24/22 85 kg (187 lb 4.8 oz)   08/15/22 91.6 kg (202 lb)   08/12/22 89.9 kg (198 lb 3.2 oz)   05/16/22 89.2 kg (196 lb 9.6 oz)   05/10/22 90.6 kg (199 lb 11.8 oz)   05/03/22 92.1 kg (203 lb)   03/04/22 88.1 kg (194 lb 4.8 oz)   02/24/22 87.1 kg (192 lb 0.3 oz)   01/24/22 87.3 kg (192 lb 7.4 oz)   01/21/22 87.1 kg (192 lb 1.6 oz)   11/17/21 85 kg (187 lb 8 oz)   10/14/21 81.4 kg (179 lb 7.3 oz)   10/08/21 81.3 kg (179 lb 3.2 oz)   09/28/21 78.9 kg (173 lb 15.1 oz)     Dosing Weight: 71 kg driest weight on 9/13    ASSESSED NUTRITION NEEDS  Estimated Energy Needs: 2981-0213 kcals/day (30 - " 35 kcals/kg )  Justification: Increased needs and Repletion  Estimated Protein Needs: + grams protein/day (1.2 - 1.5+ grams of pro/kg)  Justification: Increased needs, Repletion, iHD (pending renal function)  Estimated Fluid Needs: (1 mL/kcal)   Justification: Maintenance and Per provider pending fluid status    PHYSICAL FINDINGS  See malnutrition section below.  Ascites - last paracentesis 9/12 with 4 L removed    MALNUTRITION  % Intake: </= 50% for >/= 5 days (severe)  % Weight Loss: > 5% in 1 month (severe)  Subcutaneous Fat Loss: Unable to assess  Muscle Loss: Unable to assess  Fluid Accumulation/Edema: Mild  Malnutrition Diagnosis: Severe malnutrition in the context of acute illness    NUTRITION DIAGNOSIS  Inadequate oral intake related to poor appetite, altered mentation, dysphagia, and increased respiratory requirements as evidenced by PO intake 25-50% of small meals x 6 days, now NPO x 1 day, with severe 22% weight loss x 1 month (some confounded by fluid/ascites).       INTERVENTIONS  Implementation  Collaboration with other providers  Enteral Nutrition - recs     Goals  Diet adv v nutrition support within 1-2 days.     Monitoring/Evaluation  Progress toward goals will be monitored and evaluated per protocol.    Liza Quach, MS, RD, LD, [facility] pager: [phone]  ASCOM: [id number]

## 2022-09-13 NOTE — PROGRESS NOTES
HEMODIALYSIS TREATMENT NOTE    Date: 9/13/2022  Time: 5:46 PM    Data:  Pre Wt:70.9kg    Desired Wt:  69.9kg    Post Wt: 71.4kg  Weight change:  +0.5 kg  Ultrafiltration - Post Run Net Total Removed (mL):0  Vascular Access Status: patent  Dialyzer Rinse:  Clear  Total Blood Volume Processed:  25Liters  Total Dialysis (Treatment) Time:  2 Hours    Lab:   No      Assessment:  Patient successful completed 2 hours of first HD on K2/Ca2.5 via Right non-tunneled CVC.  , .  Had episodes of Hypotension, SBP in 90's, MAP mostly >65. Total 200 ml N/saline boluses given to optimize BP. Pt unable to tolerate UF, 0.5kg net weight gain. CVC lumen patent, saline locked post run. CVC Dressing changed, site DCI, but mildly ecchymotic. Post /63, report given to PCN        Plan:    Per renal tean

## 2022-09-13 NOTE — PROGRESS NOTES
MEDICAL ICU PROGRESS NOTE  09/13/2022      Date of Service (when I saw the patient): 09/13/2022    ASSESSMENT: Kody Reynaga is a 77 year old male admitted on 9/6/2022. He has a past medical history of portal vein thrombosis (on AC) with recurrent ascites, chronic pancreatitis, prostate cancer (S/P prostatectomy), SDH (S/P craniotomy), HTN, dyslipidemia. He had a recent admission from 8/29-9/3/22 for EUN and worsening ascites. Readmitted on 9/7 for worsening EUN, fatigue and failure to thrive. He has been managed on the floor with recurrent paracentesis and medical management of his ongoing hepatorenal disease in addition to C diff but had escalating O2 requirements in the setting of possible aspiration pneumonia being treated with Zosyn, transferred to the MICU on 9/12/22 with concern for potential intubation.     CHANGES and MAJOR THINGS TODAY:   - Gentle dialysis today with plan to take approximately 1 liter of fluid off   -  Monitoring oxygen saturations. Patient has been intermittently hypoxic this morning with increasing O2 requirements. Low threshold for BiPAP but need to discuss intubation with the family given concern for his mental status.   - Glomerulonephritis labs sent  - NG placed for feeds and medication administration today   - Remove valdovinos catheter, Qshift bladder scan  - Spoke with wife (Asia) regarding goals of care. Patient remains full code. Spoke about intubation in detail, and Asia is aware that there is a risk of this happening if his oxygenation/ventilation worsens. States that he would not want to be on a ventilator if there is no chance of extubation. Reports that he would want a feeding tube, especially given that he is going forward with dialysis.    PLAN:    Neuro:  #Acute Metabolic Encephalopathy most likely secondary to worsening uremia   #Hepatic Encephalopathy  Patient with history of encephalopathy, previously evaluated for hepatic encephalopathy and treated with lactulose.  Patient had CT head on 09/6 negative for intracranial abnormality. Most likely encephalopathy secondary to progressive uremia in the setting of worsening kidney function. Overall it seems like his mentation has continued to decline since MICU admission. Suspect that after initiating dialysis this week, he will have some improvement in his mentation.    -Lactulose, titrate for 3-4 loose stools daily  -Avoid opioids and other sedating medications     #Depression  -Continue PTA Lexapro     Pulmonary:  #Acute Hypoxic Respiratory Failure- Multifactorial (Aspiration Pneumonia,Ascites, Rib fractures)     #Aspiration Pneumonia  #Rib fractures  Patient initially transferred to MICU on 9/12 for increasing oxygen requirements and increased WOB in the setting of possible aspiration pneumonia. Empirically started on zosyn. Was initially placed on BiPAP/CPAP and was transitioned to HFNC. Patient was stable from a respiratory standpoint throughout the day yesterday however, overnight had increasing O2 requirements, now on 40 L at 100% FiO2. Patient is intermittently hypoxic with saturations between 88-95% which seems to be provoked with activity. Suspect that his acute hypoxic respiratory failure is multifactorial in the setting of likely aspiration pneumonia with restriction from his ascites and potentially a component of hypervolemia given his anuria.  -Continue NFNC, will discuss intubation with family today. Low threshold to start NIPPV but there is some concern regarding his ability to take off his mask given his encephalopathy and ongoing nausea.    -Abx: Zosyn x 7 days (9/12-9/18)  -Daily ABG     Cardiovascular:  # HTN  #Hyperlipidemia   Patient has had MAP's >65 throughout his hospitalization.   -Hold for now PTA Amlodipine   -Has been getting Pravastatin    GI/Nutrition:  #Ascites, worsening  #Portal Vein Thrombosis  #Portal Hypertension  Has had hx of biliary strictures (benign on most recent EGD 05/10/22), S/P multiple  biliary stents [id number], biopsies negative for carcinoma of biliary etiology. Recent admission for ascites, has required ~weekly paracenteses since. 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 Patient had Abd US on 09/06/22 that showed cirrhotic configuration of liver. US during this admission (9/6) revealed cirrhotic appearing liver with evidence of portal hypertension with persistent PVT. At this time, it is unclear what the underlying cause of his recurrent ascites is. Patient has undergone multiple paracenteses here with removal between 3-5 L. Diagnostic and therapeutic paracentesis was completed yesterday with a SAAG of 1.6 and absolute PMN of 135 which speaks to his portal hypertension without any evidence of SBP. 4L ascitic fluid removed on 9/12.  -Continue with Eliquis  -Likely paracentesis in the next few days   -Continue with lactulose, titrate for 3-4 loose stools    #Malnutrition   Patient has been NPO since arriving in the MICU. Nursing attempted a bedside swallow which he failed. Will consider feeding tube placement after discussing this with the patients family.   -SLP consult   -Discuss tube feeds with family today     #C.DIFF  Patient had positive PCR toxin on 9/08.    -Continue with oral vancomycin for 10 course (started on 09/08/22).      #Hx of IBS   - Bentyl 20 mg TID with meals     Renal/Fluids/Electrolytes:  # Acute on chronic kidney disease, 2/2 prerenal + HRS   #Uremia   #ANGMA  # Lactic Acidosis  Recent admission as above for EUN, Cr was normal in February of 2022 but has precipitously declined since, thought to be multifactorial in the setting of fluid restriction (d/t ascites as above) and use of Lasix (d/c'd on discharge).  Cr 4.26 on admission (up from 2.96 on 9/3/22), had been improving after albumin challenge by now up trending to 5.28. Patient additionally is uremic which as been increasing over the last few days, today his BUN is 168. Suspect this decline in  his renal function is likely related to his underlying liver disease however other etiologies cannot be excluded at this time. Patients UA has showed progressive RBC's and proteinuria during this admission. Serologic evaluation for causes of glomerulonephritis were added. Repeat kidney US was performed yesterday showing stable mild right hydronephrosis. Patient will have three consecutive days of gentle dialysis and will continue to assess his kidney functioning.   - Nephrology following, appreciate the recs   - Gentle dialysis for three days, plan to take 1 L off today   - Fluid restriction to 1.8 L, <2g dNa aily    # Mild right-sided hydronephrosis  US renal (9/12) revealed atrophic right kidney w/ mild hydronephrosis. Left kidney is poorly visualized, although no severe hydronephrosis seen. Per renal, likely not responsible for EUN but may be contributing to renal disease.  - Will consider urology consult (non-urgent per renal)    # Mild hyponatremia, stable  Patient with sodium level of 134 most likely d/t hypovolemia.   -BMP daily   -Sodium restriction      Endocrine:  #No active concerns     ID:  # Aspiration Pneumonia  Patient transferred to the ICU for increasing oxygen requirements in the setting of a concern for aspiration. He started zosyn empirically. He did undergo diagnostic paracentesis yesterday which showed an absolute PMN count of 135. No concern for SBP at this time.   -Abx: Zosyn x 7 days (9/12-9/18)  -Ascitic gram stain and cultures pending   -Blood cultures without growth   -Urine cultures without growth   -Unable to obtain sputum cultures      #C.Dif  -Continue with PO Vancomycin     Hematology:    #Chronic Normocitic anemia, stable  #Leukocytosis 2/2 aspiration PNA  Baseline hemoglobin around 9. Patients hemoglobin today is 9.   -CBC daily     Musculoskeletal:  - PT/OT consulted      Skin:  No acute concerns     General Cares/Prophylaxis:    DVT Prophylaxis: DOAC  GI Prophylaxis:  PPI  Restraints: None  Code Status: Full Code      Lines/tubes/drains:  - HFNC  - PIV x 2   - Right internal jugular dialysis catheter   - Aparicio catheter to be removed today     Disposition:  - Medical ICU     Patient seen and findings/plan discussed with medical ICU staff, Dr. Mckenna.    Syed Rodríguez, MS4    Resident/Fellow Attestation   I, Marci Chairez MD, was present with the medical/SANDOR student who participated in the service and in the documentation of the note.  I have verified the history and personally performed the physical exam and medical decision making.  I agree with the assessment and plan of care as documented in the note.      Briefly, Mr. Rendon was admitted to MICU for escalating oxygen requirements in the setting of aspiration PNA and worsening ascites. Patient has been satting well on CPAP and HFNC w/o increased WOB. Continuing pip-tazo. Ascitic fluid w/o evidence of SBP. Plan for initiation of dialysis today. Syed and I discussed intubation and feeding tube placement with his wife (Asia), who wanted to proceed with both. She reported that if there is a chance of clinical improvement (especially w/ treating aspiration PNA and continuing to remove fluid), intubation is what Kody would want. If there would be a low likelihood of extubation, he would not want to be on the ventilator indefinitely. Since they are proceeding w/ a trial of dialysis, Asia states that we should proceed w/ feeding tube placement.    Marci Chairez MD  Internal Medicine, PGY-1  Date of Service (when I saw the patient): 09/13/22    ====================================  INTERVAL HISTORY:   Per nursing, patient has had worsening mentation overnight. Patient overnight additionally has had increasing oxygen requirements. He reports feeling like his breathing has not worsened. He did complain of some nausea but has not had any vomiting. No significant abdominal pain or chest pain.     OBJECTIVE:   1. VITAL SIGNS:    Temp:  [98  F (36.7  C)-98.7  F (37.1  C)] 98.7  F (37.1  C)  Pulse:  [86-99] 99  Resp:  [16-24] 16  BP: ()/(50-71) 114/56  FiO2 (%):  [70 %-80 %] 80 %  SpO2:  [86 %-100 %] 86 %  FiO2 (%): (S) 80 %  Resp: 16    2. INTAKE/ OUTPUT:   I/O last 3 completed shifts:  In: 1120 [P.O.:290; I.V.:580]  Out: 50 [Urine:50]    3. PHYSICAL EXAMINATION:  General: Laying in bed, appears in no acute distress, intermittently pulling high flow nasal cannula off   HEENT: MMM. No scleral icterus.   Neuro: Alert and oriented to place and person, and year. Able to follow commands.   Pulm/Resp: No increased work of breathing on HFNC with shallow respirations. Breath sounds are clear.   CV: RRR, no murmurs, rubs or gallops  Abdomen: Distended, nontender, no rebound or guarding   Skin: Left flank paracentesis site is without surrounding redness or warmth. Right internal jugular site is intact without any redness or warmth.   : valdovinos catheter in place, no urine in bag     4. LABS:   Arterial Blood Gases   Recent Labs   Lab 09/12/22  2018 09/12/22  0435 09/12/22  0209 09/11/22  1611   PH 7.45 7.44 7.44 7.48*   PCO2 37 35 36 34*   PO2 65* 54* 58* 72*   HCO3 26 24 25 25     Complete Blood Count   Recent Labs   Lab 09/12/22  0604 09/11/22  0809 09/10/22  0900 09/09/22  0728   WBC 18.9* 27.4* 20.3* 16.0*   HGB 8.8* 8.8* 8.3* 8.0*    139* 136* 127*     Basic Metabolic Panel  Recent Labs   Lab 09/12/22 2015 09/12/22  1549 09/12/22  0858 09/12/22  0604 09/11/22  1555 09/11/22  0809 09/10/22  1655 09/10/22  0900 09/09/22 0800 09/09/22 0728   NA  --   --   --  132*  --  135*  --  135*  --  133*   POTASSIUM  --   --   --  5.2  --  4.4  --  4.4  --  4.2   CHLORIDE  --   --   --  86*  --  91*  --  92*  --  91*   CO2  --   --   --  23  --  24  --  25  --  25   BUN  --   --   --  148.0*  --  147.0*  --  138.0*  --  127.0*   CR  --   --   --  5.28*  --  4.18*  --  3.76*  --  3.06*   * 167* 169* 146*   < > 152*   < > 135*   < > 144*     < > = values in this interval not displayed.     Liver Function Tests  Recent Labs   Lab 09/12/22  0604 09/11/22  0809 09/10/22  0900 09/09/22  0728 09/07/22  0735 09/06/22  1421   AST 37 25 26 22   < > 27   ALT 14 10 12 12   < > 12   ALKPHOS 140* 159* 158* 164*   < > 125   BILITOTAL 1.2 0.9 1.0 0.9   < > 1.3*   ALBUMIN 4.0 4.0 4.4 4.6   < > 4.0   INR  --   --   --   --   --  2.50*    < > = values in this interval not displayed.     Coagulation Profile  Recent Labs   Lab 09/06/22  1421   INR 2.50*       5. RADIOLOGY:   Recent Results (from the past 24 hour(s))   US Lower Extremity Venous Duplex Bilateral    Narrative    EXAMINATION: DOPPLER VENOUS ULTRASOUND OF BILATERAL LOWER EXTREMITIES,  9/12/2022 10:05 AM     COMPARISON: None.    HISTORY: Worsening lower extremity edema and hypoxemia    TECHNIQUE:  Gray-scale evaluation with compression, spectral flow and  color Doppler assessment of the deep venous system of both legs from  groin to knee, and then at the ankles.    FINDINGS:  In both lower extremities, the common femoral, femoral, popliteal and  posterior tibial veins demonstrate normal compressibility and blood  flow.      Impression    IMPRESSION:  No evidence of deep venous thrombosis in either lower  extremity.    RADHA MILLER MD         SYSTEM ID:  [id number]   XR Chest Port 1 View    Narrative    XR CHEST PORT 1 VIEW  9/12/2022 6:16 PM      HISTORY: s/p RIJ dialysis line, please confirm placement    COMPARISON: 9/11/2022    FINDINGS:   Single AP view of the chest. New right IJ CVC tip in the low SVC.  Stable mediastinum. No pneumothorax or significant pleural effusion.  Mildly increased perihilar and bibasilar hazy and streaky opacities.      Impression    IMPRESSION:     1. New right IJ CVC tip in the low SVC. No pneumothorax.  2. Increased perihilar and bibasilar opacities suggestive of  atelectasis versus infection versus aspiration     I have personally reviewed the examination and initial  interpretation  and I agree with the findings.    RUBEN COBIAN MD         SYSTEM ID:  [id number]   US Renal Complete    Narrative    EXAMINATION: US RENAL COMPLETE, 9/12/2022 9:16 PM     COMPARISON: Renal ultrasound 9/7/2022    HISTORY: EUN with unclear etiology, assess for recurrent  hydronephrosis    TECHNIQUE: The kidneys and bladder were scanned in the standard  fashion with specialized ultrasound transducer(s) using both gray  scale and limited color/spectral Doppler techniques.    FINDINGS:    Right kidney: Measures 9.0 cm in length. Parenchyma is atrophic. No  focal mass. No shadowing nephrolithiasis. Similar mild hydronephrosis.    Left kidney: The kidney is not well visualized. No significant  hydronephrosis.     Bladder: Obscured.    Redemonstrated ascites.      Impression    IMPRESSION:  1. Atrophic right kidney with similar mild hydronephrosis.  2. The left kidney is not well visualized, although no severe  hydronephrosis is seen.  3. Large amount of ascites.

## 2022-09-14 NOTE — PLAN OF CARE
ICU End of Shift Summary. See flowsheets for vital signs and detailed assessment.    Changes this shift: Pt switched to BiPAP overnight. Alert, Disoriented to situation and intermittently to self. Sitter in place for pulling at lines/mask. R hand PIV removed by Pt. TF advanced to 25/hr at 0630. No urine output bladder scanned for 164 mL. 800 mL out rectal tube, minimal leakage, dark brown.     Plan: Continue to monitor O2, continue to advance TF      Goal Outcome Evaluation:    Plan of Care Reviewed With: patient     Overall Patient Progress: no change    Outcome Evaluation: BiPAP 90% 10/8 overnight

## 2022-09-14 NOTE — PROGRESS NOTES
MEDICAL ICU PROGRESS NOTE  09/14/2022      Date of Service (when I saw the patient): 09/14/2022    ASSESSMENT: Kody Reynaga is a 77 year old male admitted on 9/6/2022. He has a past medical history of portal vein thrombosis (on AC) with recurrent ascites, chronic pancreatitis, prostate cancer (S/P prostatectomy), SDH (S/P craniotomy), HTN, dyslipidemia. He had a recent admission from 8/29-9/3/22 for EUN and worsening ascites. Readmitted on 9/7 for worsening EUN, fatigue and failure to thrive. He has been managed on the floor with recurrent paracentesis and medical management of his ongoing hepatorenal disease in addition to C diff but had escalating O2 requirements in the setting of possible aspiration pneumonia being treated with Zosyn, transferred to the MICU on 9/12/22 with concern for potential intubation.     CHANGES and MAJOR THINGS TODAY:   - Day two of gentle dialysis today, patient was unable to have fluid removed yesterday due to hypotension but hopefully will be able to have some removed today  -  Monitoring oxygen saturations. Patient has been intermittently hypoxic requiring BiPAP. Will switch back to high flow this morning.  - Procalcitonin today in setting of increasing leukocytosis. If this is decrease, can consider deescalating from Zosyn to Ceftriaxone  - Urology referral for right hydronephrosis     PLAN:    Neuro:  #Acute Metabolic Encephalopathy most likely secondary to worsening uremia   #Hepatic Encephalopathy  Patient with history of encephalopathy, previously evaluated for hepatic encephalopathy and treated with lactulose. Patient had CT head on 09/6 negative for intracranial abnormality. Most likely encephalopathy secondary to progressive uremia in the setting of worsening kidney function. Overall it seems like his mentation has continued to decline since MICU admission. Suspect that after initiating dialysis this week, he will have some improvement in his mentation.    -Lactulose,  titrate for 3-4 loose stools daily  -Avoid opioids and other sedating medications   -Optimize delirium precautions.     #Depression  -Continue PTA Lexapro     Pulmonary:  #Acute Hypoxic Respiratory Failure- Multifactorial (Aspiration Pneumonia,Ascites, rib fractures and likely hypervolemia)  Patient initially transferred to MICU on 9/12 for increasing oxygen requirements and increased WOB in the setting of possible aspiration pneumonia. Empirically started on zosyn. Was initially placed on BiPAP/CPAP and was transitioned to HFNC. Patient has been intermittently hypoxic on HFNC requiring intermittent BiPAP. Continue to believe this is multifactorial related to this previous aspiration pneumonia, restriction from his ascites, rib fractures as well as hypervolemia. CXR obtained overnight due to increasing oxygen needs and showed increased perihilar opacification. Suspect this is secondary to his volume status but could reflect evolving or continued aspiration. Unfortunately he did not tolerate having any fluid removed yesterday during dialysis so will reattempt this today.   -Continue HFNC with NIPPV as needed.  -Spoke yesterday with wife (Asia) regarding goals of care. Patient remains full code. Spoke about intubation in detail, and Asia is aware that there is a risk of this happening if his oxygenation/ventilation worsens. States that he would not want to be on a ventilator if there is no chance of extubation.   -Abx: Zosyn x 7 days (9/12-9/18)  - Palliative care following; recs appreciated     Cardiovascular:  # HTN  #Hyperlipidemia   Patient has had MAP's >65 throughout his hospitalization.   -Hold for now PTA Amlodipine   -Has been getting Pravastatin    GI/Nutrition:  #Cirrhosis     #Ascites, worsening  #Portal Vein Thrombosis  #Portal Hypertension  Has had hx of biliary strictures (benign on most recent EGD 05/10/22), S/P multiple biliary stents [id number], biopsies negative for carcinoma of biliary etiology.  Recent admission for ascites, has required ~weekly paracenteses since. 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 Patient had Abd US on 09/06/22 that showed cirrhotic configuration of liver. US during this admission (9/6) revealed cirrhotic appearing liver with evidence of portal hypertension with persistent PVT. At this time, it is unclear what the underlying cause of the newly noted cirrhosis is. Patient has undergone multiple paracenteses here with removal between 3-5 L. Diagnostic and therapeutic paracentesis was completed on 9/12 with a SAAG of 1.6 and absolute PMN of 135 which speaks to his portal hypertension without any evidence of SBP. 4L ascitic fluid removed on 9/12.  -Continue with Eliquis  -Continue to monitor clinically for determination of therapeutic paracentesis   -Continue with lactulose, titrate for 3-4 loose stools    #Malnutrition   Patient has been NPO since arriving in the MICU. Nursing attempted a bedside swallow which he failed. Patient had feeding tube place yesterday but this was noted on confirmatory abdominal XR to be coiled within the stomach.   -SLP consult   -Continue with TF toward goal  -Dietician will attempt to place post-pyloric today     #C.DIFF  Patient had positive PCR toxin on 9/08.    -Continue with oral vancomycin for 10 course (started on 09/08/22).      #Hx of IBS   - Bentyl 20 mg TID with meals     Renal/Fluids/Electrolytes:  # Acute on chronic kidney disease, 2/2 HRS   #Uremia   #ANGMA  # Lactic Acidosis  Recent admission as above for EUN, Cr was normal in February of 2022 but has precipitously declined since, thought to be multifactorial in the setting of fluid restriction (d/t ascites as above) and use of Lasix (d/c'd on discharge).  Cr 4.26 on admission (up from 2.96 on 9/3/22), had been improving after albumin challenge by peaked at 6.65 prior to initiating dialysis. Patient additionally has had increasing uremia since admission, peaking at  168. Suspect this decline in his renal function is likely related to his underlying liver disease however other etiologies cannot be excluded at this time. Patients UA has showed progressive RBC's and proteinuria during this admission. Serologic evaluation for causes of glomerulonephritis were added. Interesting he does have a degree of hypocomplementemia with a low C3. This could be seen in the setting of chronic liver dysfunction. Labs otherwise have been unremarkable with anti-GBM, LUKE and ANCA still pending. Patient had first run of dialysis yesterday but this was cut slightly short due to hypotension. Unfortunately he did not have any fluid pulled off but will revisit this today during his run.    - Nephrology following, appreciate the recs   - Gentle dialysis, day 2 of 3 today, plan to remove fluid if tolerated  - Midodrine will be added for BP support prior to dialysis to facilitate fluid removal  - Fluid restriction to 1.8 L, <2g dNa aily    # Mild right-sided hydronephrosis  US renal (9/12) revealed atrophic right kidney w/ mild hydronephrosis. Left kidney is poorly visualized, although no severe hydronephrosis seen. Of note, patient had CTAP on 9/6 which did not identify hydronephrosis per radiology report. Per renal, likely not responsible for EUN but may be contributing to renal disease.  - Urology consulted; recs appreciated       Endocrine:  #No active concerns     ID:  # Aspiration Pneumonia  #Leukocytosis, increasing  Patient transferred to the ICU for increasing oxygen requirements in the setting of a concern for aspiration. He started zosyn empirically. He did undergo diagnostic paracentesis yesterday which showed an absolute PMN count of 135. No concern for SBP at this time. Patient's white count has been uptrending, and is at 28 today. He has been afebrile without any obvious signs of infection. Cultures to date have been without growth. Will add a procalcitonin on today. If this is improving  from the last procalcitonin (2.45) obtained on 9/12, will consider deescalating antibiotics to Ceftriaxone.   -Procalcitonin today (although may be elevated 2/2 renal disease)  -Abx: Zosyn x 7 days (9/12-9/18)  -Ascitic cultures without growth   -Blood cultures without growth   -Urine cultures without growth   -Unable to obtain sputum cultures     #C.Dif  -Continue with PO Vancomycin     Hematology:    #Chronic Normocitic anemia, stable  #Leukocytosis 2/2 aspiration PNA  Baseline hemoglobin around 9. Patients hemoglobin today is 8.7.   -CBC daily     Musculoskeletal:  - PT/OT consulted      Skin:  No acute concerns     General Cares/Prophylaxis:    DVT Prophylaxis: DOAC  GI Prophylaxis: PPI  Restraints: None  Code Status: Full Code      Lines/tubes/drains:  - HFNC  - PIV x 1  - Right internal jugular dialysis catheter     Disposition:  - Medical ICU     Patient seen and findings/plan discussed with medical ICU staff, Dr. Mckenna.    Syed Rodríguez, MS4    Resident/Fellow Attestation   I, Marci Chairez MD, was present with the medical/SANDOR student who participated in the service and in the documentation of the note.  I have verified the history and personally performed the physical exam and medical decision making.  I agree with the assessment and plan of care as documented in the note.      Mr. Rendon was admitted to MICU for escalating oxygen requirements in the setting of aspiration PNA and worsening ascites. Patient has been satting well on CPAP and HFNC w/o increased WOB. Continuing pip-tazo. Ascitic fluid w/o evidence of SBP. Plan for initiation of dialysis today. Another round of HD today. Switched from BiPAP to HFNC this morning. Urology consult placed for hydronephrosis.    Marci Chairez MD  Internal Medicine, PGY-1  Date of Service (when I saw the patient): 09/14/22    ====================================  INTERVAL HISTORY:   Patient underwent dialysis last evening but was cut slightly short due to  hypotension. Continues to have intermittent agitation. Patient was hypoxic overnight which improved after BiPAP.      OBJECTIVE:   1. VITAL SIGNS:   Temp:  [97.9  F (36.6  C)-98.5  F (36.9  C)] 97.9  F (36.6  C)  Pulse:  [] 104  Resp:  [14-28] 19  BP: ()/(42-67) 108/55  FiO2 (%):  [80 %-100 %] 90 %  SpO2:  [79 %-99 %] 93 %  FiO2 (%): 90 %  Resp: 19    2. INTAKE/ OUTPUT:   I/O last 3 completed shifts:  In: 1370 [P.O.:10; I.V.:620; Other:500; NG/GT:165]  Out: 15 [Urine:15]    3. PHYSICAL EXAMINATION:  General: Lying in bed, no acute distress, intermittently closing eyes and dozing off.   Pulm/Resp: On BiPAP with normal work of breathing, there are crackles on the right on anterior ascultation.   CV: RRR, no murmurs, rubs or gallops  Abdomen: Distended, nontender, no rebound or guarding   Neuro: Difficult to assess given that the patient is on BiPAP. Answers some questions appropriately, A&O x 0. Moving all extremities.    4. LABS:   Arterial Blood Gases   Recent Labs   Lab 09/13/22  2209 09/12/22  2018 09/12/22  0435 09/12/22  0209   PH 7.43 7.45 7.44 7.44   PCO2 34* 37 35 36   PO2 75* 65* 54* 58*   HCO3 22 26 24 25     Complete Blood Count   Recent Labs   Lab 09/14/22  0554 09/13/22  0656 09/12/22  0604 09/11/22  0809   WBC 28.0* 23.4* 18.9* 27.4*   HGB 8.7* 9.0* 8.8* 8.8*    170 203 139*     Basic Metabolic Panel  Recent Labs   Lab 09/14/22  0402 09/13/22  2345 09/13/22  1944 09/13/22  1626 09/13/22  0809 09/13/22  0656 09/12/22  0858 09/12/22  0604 09/11/22  1555 09/11/22  0809 09/10/22  1655 09/10/22  0900   NA  --   --   --   --   --  134*  --  132*  --  135*  --  135*   POTASSIUM  --   --   --   --   --  5.2  --  5.2  --  4.4  --  4.4   CHLORIDE  --   --   --   --   --  87*  --  86*  --  91*  --  92*   CO2  --   --   --   --   --  22  --  23  --  24  --  25   BUN  --   --   --   --   --  168.0*  --  148.0*  --  147.0*  --  138.0*   CR  --   --   --   --   --  6.65*  --  5.28*  --  4.18*  --   3.76*   * 211* 158* 189*   < > 154*   < > 146*   < > 152*   < > 135*    < > = values in this interval not displayed.     Liver Function Tests  Recent Labs   Lab 09/13/22  0656 09/12/22  0604 09/11/22  0809 09/10/22  0900   AST 30 37 25 26   ALT 12 14 10 12   ALKPHOS 155* 140* 159* 158*   BILITOTAL 1.1 1.2 0.9 1.0   ALBUMIN 3.8 4.0 4.0 4.4     Coagulation Profile  No lab results found in last 7 days.    5. RADIOLOGY:   Recent Results (from the past 24 hour(s))   XR Abdomen Port 1 View    Narrative    XR ABDOMEN PORT 1 VIEW  9/13/2022 5:19 PM      HISTORY: Verify small bowel feeding tube bedside placement    COMPARISON: 9/12/2022    FINDINGS:   Single AP view of the upper abdomen. Feeding tube coiled in the  stomach with tip near the antrum. Right IJ CVC tip at the low SVC.  Mildly improved perihilar and bibasilar streaky opacities. Gastric  distention.      Impression    IMPRESSION:   1. Feeding tube coiled in the stomach with tip near the antrum.  Recommend repositioning if postpyloric is desired.  2. Gastric distention.    I have personally reviewed the examination and initial interpretation  and I agree with the findings.    JOSTIN DAVIDSON MD         SYSTEM ID:  [id number]   XR Chest Port 1 View    Narrative    XR CHEST PORT 1 VIEW  9/13/2022 8:34 PM      HISTORY: increased respiratory distress    COMPARISON: 9/12/2022    FINDINGS:   Single AP view of the chest. Right IJ CVC tip at the low SVC. Feeding  tube courses beyond the field-of-view, looped in the distended  stomach. Stable mediastinum. No pneumothorax or significant pleural  effusion. Increased perihilar and mildly decreased bibasilar  interstitial and airspace opacities.      Impression    IMPRESSION:   1. Feeding tube courses beyond the field-of-view, remaining looped in  the distended stomach.  2. Increased perihilar and mildly decreased bibasilar opacities  consistent with infection versus edema and atelectasis.    I have personally reviewed the  examination and initial interpretation  and I agree with the findings.    JOSTIN DAVIDSON MD         SYSTEM ID:  [id number]

## 2022-09-14 NOTE — CONSULTS
Urology Consult    Name: Kody Reynaga    MRN: [id number]   YOB: 1945               Chief Complaint:   Persistent mild right hydro     History is obtained from the patient and chart review          History of Present Illness:   Kody Reynaga is a 77 year old male with complex pmhx of liver cirrhosis, portal vein thrombosis, chronic ascites requiring paracenteses, chronic pancreatitis, HTN who is currently admitted for AMS, fatigue and EUN. His course has been complicated with worsening hypoxic respiratory failure concerning for aspiration pneumonia/pneumonitis and worsening renal function.     On admission the patient had a creatinine of 4.2. Nephrology was consulted who believe that it likely related to ascites from his hepatorenal disease. His creatinine continues to increase and was most recently 5.92 with a BUN of 168 so he was very recently started on dialysis. A renal US was obtained demonstrating mild hydronephrosis that is unchanged since May 2022 when his creatinine was 1.45. While in the hospital a UA was obtained that demonstrated casts and RBCs consistent with chronic renal disease.     At the bedside the patient is altered and does not respond appropriately to questions. His wife is at the bedside and states that he never had an issue with urination when he started making less urine and his creatinine was increasing. He has always been able to urinate without issues.     Of note, his urologic history is significant for prostate cancer s/p cryoablation. He last saw Dr. Peralta in January where he had no urinary issues.            Past Medical History:     Past Medical History:   Diagnosis Date     Anxiety      Depression      Gastroesophageal reflux disease      Gout      Hypercholesterolemia      Hypertension      IBS (irritable bowel syndrome)      Pancreatic disease      Prostate cancer (H)             Past Surgical History:     Past Surgical History:   Procedure Laterality Date      CATARACT EXTRACTION W/ INTRAOCULAR LENS IMPLANT Right      COLONOSCOPY       cryoablation of prostate       ENDOSCOPIC RETROGRADE CHOLANGIOPANCREATOGRAM       ENDOSCOPIC RETROGRADE CHOLANGIOPANCREATOGRAM N/A 1/24/2022    Procedure: ENDOSCOPIC RETROGRADE CHOLANGIOPANCREATOGRAPHY, BILIARY STENT EXCHANGE, sPY WITH BIOPSIES AND BRUSHINGS;  Surgeon: Guru Ab Gonzalez MD;  Location: [facility] OR     ENDOSCOPIC RETROGRADE CHOLANGIOPANCREATOGRAM N/A 2/24/2022    Procedure: ENDOSCOPIC RETROGRADE CHOLANGIOPANCREATOGRAPHY, with stent placement and bile duct biopsy;  Surgeon: Anthony Abarca MD;  Location: [facility]     ENDOSCOPIC RETROGRADE CHOLANGIOPANCREATOGRAM N/A 5/10/2022    Procedure: ENDOSCOPIC RETROGRADE CHOLANGIOPANCREATOGRAPHY, pyloric dilation, bebris removal, biliary stent exchange;  Surgeon: Anthony Abarca MD;  Location: [facility]     ENDOSCOPIC RETROGRADE CHOLANGIOPANCREATOGRAM WITH SPYGLASS N/A 10/14/2021    Procedure: ENDOSCOPIC RETROGRADE CHOLANGIOPANCREATOGRAPHY, WITH DIRECT DUCT VISUALIZATION, USING PANCREATICOBILIARY FIBEROPTIC PROBE-spyglass, biliary sludge and clot removal, biliary biopsies, biliary stent exchange;  Surgeon: Anthony Abarca MD;  Location: [facility]     ENDOSCOPIC ULTRASOUND UPPER GASTROINTESTINAL TRACT (GI) N/A 9/28/2021    Procedure: ENDOSCOPIC ULTRASOUND, ESOPHAGOSCOPY / UPPER GASTROINTESTINAL TRACT (GI);  Surgeon: Babak Garvin MD;  Location: [facility]     ENDOSCOPIC ULTRASOUND UPPER GASTROINTESTINAL TRACT (GI) N/A 1/24/2022    Procedure: ENDOSCOPIC ULTRASOUND, ESOPHAGOSCOPY / UPPER GASTROINTESTINAL TRACT (GI);  Surgeon: Guru Ab Gonzalez MD;  Location: [facility]     ESOPHAGOSCOPY, GASTROSCOPY, DUODENOSCOPY (EGD), COMBINED N/A 10/14/2021    Procedure: ESOPHAGOGASTRODUODENOSCOPY, WITH FINE NEEDLE ASPIRATION BIOPSY, WITH ENDOSCOPIC ULTRASOUND GUIDANCE, biliary stent removal;  Surgeon: Babak Garvin MD;  Location: [facility]     ESOPHAGOSCOPY,  GASTROSCOPY, DUODENOSCOPY (EGD), COMBINED N/A 2022    Procedure: ESOPHAGOGASTRODUODENOSCOPY WITH DILATION AND STENT REMOVAL, ENDOSCOPIC ULTRASOUND WITH FINE NEEDLE BIOPSIES;  Surgeon: Babak Garvin MD;  Location: [facility] OR     MOUTH SURGERY       TONSILLECTOMY              Social History:     Social History     Tobacco Use     Smoking status: Former Smoker     Types: Cigarettes     Quit date: 1970     Years since quittin.7     Smokeless tobacco: Never Used   Substance Use Topics     Alcohol use: Yes     Comment: cutting down, drinking NA beer            Family History:     Family History   Problem Relation Age of Onset     Heart Failure Mother      Cancer Father      Anesthesia Reaction No family hx of      Cardiovascular No family hx of      Deep Vein Thrombosis (DVT) No family hx of             Allergies:   No Known Allergies         Medications:     Current Facility-Administered Medications   Medication     acetaminophen (TYLENOL) tablet 650 mg    Or     acetaminophen (TYLENOL) Suppository 650 mg     [Held by provider] amLODIPine (NORVASC) tablet 10 mg     apixaban ANTICOAGULANT (ELIQUIS) tablet 5 mg     B and C vitamin Complex with folic acid (NEPHRONEX) liquid 5 mL     carboxymethylcellulose PF (REFRESH PLUS) 0.5 % ophthalmic solution 1 drop     dextrose 10% infusion     glucose gel 15-30 g    Or     dextrose 50 % injection 25-50 mL    Or     glucagon injection 1 mg     dicyclomine (BENTYL) tablet 20 mg     escitalopram (LEXAPRO) tablet 20 mg     insulin aspart (NovoLOG) injection (RAPID ACTING)     lactulose (CHRONULAC) solution 10 g     lactulose (CHRONULAC) solution 20 g     lidocaine (LMX4) cream     lidocaine 1 % 0.1-1 mL     magnesium hydroxide (MILK OF MAGNESIA) suspension 30 mL     melatonin tablet 1 mg     metoclopramide (REGLAN) injection 5 mg     midodrine (PROAMATINE) tablet 5 mg     No lozenges or gum should be given while patient on BIPAP/AVAPS/AVAPS AE     nystatin (MYCOSTATIN)  suspension 500,000 Units     ondansetron (ZOFRAN ODT) ODT tab 4 mg    Or     ondansetron (ZOFRAN) injection 4 mg     pantoprazole (PROTONIX) 2 mg/mL suspension 40 mg     Patient is already receiving anticoagulation with heparin, enoxaparin (LOVENOX), warfarin (COUMADIN)  or other anticoagulant medication     Patient may continue current oral medications     piperacillin-tazobactam (ZOSYN) 2.25 g vial to attach to  ml bag     pravastatin (PRAVACHOL) tablet 40 mg     protein modular (PROSOURCE TF) 1 packet     senna-docusate (SENOKOT-S/PERICOLACE) 8.6-50 MG per tablet 1 tablet    Or     senna-docusate (SENOKOT-S/PERICOLACE) 8.6-50 MG per tablet 2 tablet     sodium chloride (PF) 0.9% PF flush 3 mL     sodium chloride (PF) 0.9% PF flush 3 mL     thiamine (B-1) tablet 100 mg     vancomycin (FIRVANQ) oral solution 125 mg             Review of Systems:    ROS: 10 point ROS neg other than the symptoms noted above in the HPI           Physical Exam:   VS:  T: 98.2    HR: 111    BP: 97/54    RR: 30   GEN:  NAD. Responds to questions, but does not answer appropriately.    CV:  Tachycardic  LUNGS: Breathing with high flow nasal canula, NG in place   BACK:  No midline or CVA tenderness.  ABD:  Soft. But distended. Ascites wave present.   :  circumcised.  Normal penile shaft.  Testicles descended bilaterally,.  SKIN:  Warm.  Dry.  No rashes.  NEURO:  CN grossly intact.            Data:   All laboratory data reviewed:    Recent Labs   Lab 09/14/22  0554 09/13/22  0656 09/12/22  0604 09/11/22  0809   WBC 28.0* 23.4* 18.9* 27.4*   HGB 8.7* 9.0* 8.8* 8.8*    170 203 139*     Recent Labs   Lab 09/14/22  1133 09/14/22  0752 09/14/22  0554 09/14/22  0402 09/13/22  0809 09/13/22  0656 09/12/22  0858 09/12/22  0604 09/11/22  1555 09/11/22  0809 09/10/22  1655 09/10/22  0900   NA  --   --  138  --   --  134*  --  132*  --  135*  --  135*   POTASSIUM  --   --  4.6  --   --  5.2  --  5.2  --  4.4  --  4.4   CHLORIDE  --    --  94*  --   --  87*  --  86*  --  91*  --  92*   CO2  --   --  22  --   --  22  --  23  --  24  --  25   BUN  --   --  129.0*  --   --  168.0*  --  148.0*  --  147.0*  --  138.0*   CR  --   --  5.92*  --   --  6.65*  --  5.28*  --  4.18*  --  3.76*   * 172* 168* 163*   < > 154*   < > 146*   < > 152*   < > 135*   AMIRAH  --   --  8.9  --   --  9.7  --  9.5  --  9.3  --  9.7   MAG  --   --  2.8*  --   --   --   --  3.1*  --  3.1*  --  3.0*   PHOS  --   --  7.6*  --   --   --   --   --   --   --   --   --     < > = values in this interval not displayed.     Recent Labs   Lab 09/11/22  2337   COLOR Yellow   APPEARANCE Slightly Cloudy*   URINEGLC Negative   URINEBILI Negative   URINEKETONE Negative   SG 1.020   URINEPH 5.5   PROTEIN 100 *   NITRITE Negative   LEUKEST Small*   RBCU 85*   WBCU 8*       All pertinent imaging reviewed:    CT scan of the abdomen:   CTAP from 9/6/22 reviewed: Right kidney with unchanged hydro since 5/22 scan      Renal ultrasound:   IMPRESSION:     1. Atrophic right kidney with similar mild hydronephrosis.  2. The left kidney is not well visualized, although no severe  hydronephrosis is seen.  3. Large amount of ascites.            Impression and Plan:   Impression:   Kody Reynaga is a 77 year old male with complex pmhx of liver cirrhosis, portal vein thrombosis, chronic ascites requiring paracenteses, chronic pancreatitis, HTN who is currently admitted for AMS, fatigue and EUN. His course has been complicated with worsening hypoxic respiratory failure concerning for aspiration pneumonia/pneumonitis and worsening renal function. The patient has had persistent mild right hydronephrosis since May when his creatinine was 1.4 and a normal appearing contralateral kidney which would be unlikely to contribute to this significant decrease in his renal function given his other current co-morbidities. Etiology more likely to be hepatorenal syndrome. Given this, no indication for surgical  intervention. Urology will continue to follow peripherally.       Plan:     - no indication for surgical intervention    - urology to follow peripherally; Please contact resident/PA on call with any questions or concerns.                 This patient's exam findings, labs, and imaging discussed with urology staff surgeon Dr. Encinas, who developed the treatment plan.    Connor Elmore MD  Urology Resident

## 2022-09-14 NOTE — PROGRESS NOTES
Physician Attestation      I, Katalina Mckenna MD, saw this patient with the resident and agree with the resident's findings and plan of care as documented in the note.         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 He was ultimately transferred to the MICU on 9/1/12 due to wrsening hypoxic respiratory failure with concern for aspiration pneumonia/pneumonitis, currently on zosyn. He was also found to have C. Dif and continues on PO vanco. Now with worsening respiratory failure in the setting of hypervolemia and he continues on HFNC but has had worsening confusion; will initiate Bpap as needed. Discussed GOC with Asia, wife, at bedside and she would want a trial of intubation if needed and he remains full code at this time. Palliative care is also following, appreciated. He was initiated on HD on 9/13 for a short-term trial of HD with goal to remove volume, improve mental status; unfortunately have been unable to remove fluid due to hypotension. He did undergo uncomplicated paracentesis on 9/12 with removal of 3 L; no sign of SBP.         The patient was seen and examined with the resident/fellow physician. We have discussed the patient in detail and I agree with the findings, assessment, and plan as documented when this note was cosigned on this day. The plan was formulated in conjunction with pharmacy, ICU nurses, and respiratory therapist. I have evaluated all laboratory values and imaging studies for the past 24 hours. I have reviewed all the consults that have been ordered and are active for this patient.         Critical Care Time: 40 mins (excluding procedures)       Katalina  Graciela Mckenna MD   Date of Service (when I saw the patient): 9/14/22

## 2022-09-14 NOTE — PROCEDURES
Small Bowel Feeding Tube Reposition  Reason for Feeding Tube Reposition: FT gastric, attempt to reposition post pyloric due to intermittent BIPAP needs  Cortrak Start Time: 14:45  Cortrak End Time: 15:30  Medicine Delivered During Procedure: Reglan x 1 per RN  Reposition Successful: suspected just post pyloric in the proximal duodenum vs still gastric. If FT remains gastric and post pyloric is needed, please release to Radiology (XR Feeding Tube Placement).   Procedure Complications: suspected poor GI motility with FT coiling in the stomach vs kinking past the pylorus  Final Placement Justin at exit of nare: 118 cm, bridpaulina @ 119 at the clip  Face to Face time with patient: 60 minutes total    Liza Quach, MS, RD, LD, [facility]  MICU pager: [phone]  ASCOM: [id number]

## 2022-09-14 NOTE — PLAN OF CARE
HEMODIALYSIS TREATMENT NOTE    Date: 9/14/2022  Time: 11:10 AM    Data:  Pre Wt:  75.3  Desired Wt: 74.3  kg   Post Wt:  74.7 kg  Weight change: 600  kg  Ultrafiltration - Post Run Net Total Removed (mL): 0.6 mL  Vascular Access Status: CVC  patent  Dialyzer Rinse: Clear  Total Blood Volume Processed: 48.3    Total Dialysis (Treatment) Time: 2.5    Dialysate Bath: K 2, Ca 2.5  Heparin: None    Lab:   No    Interventions:  Midodrine given  Albumin given  Fluid goal titrated to support bp  +100 ml bolus given for low bp     Assessment:  Pt confused but clearer than previous days. Bp soft through out. Wife at bedside. Pt redirectable with prompting. Frequent fluid titration, pt would benefit from midodrine given 30-45 mins before start of dialysis treatment.     Plan:    Per renal team.

## 2022-09-14 NOTE — PROGRESS NOTES
[facility]  Palliative Care Daily Progress Note       Recommendations/discussion       PC continues to follow for support. [id number] OK.     Family favored trial of HD and asking re-convene family/patient meeting early next week ( Monday) for further goals of care review. AMS persisting into second HD run. Limited abilty to pull fluid 2/2 BP concerns.      to continue to see for support.    CODE STATUS: full code at this time. Will continue to review in face of present critical illness. Asia indicated she and Kody have talked about limits to what he would want if recovery seemed out of reach.     Discussed with attending MICU team, Nephrology and ICU RN.         Assessments          Kody Reynaga is a 77 year old male admitted on 9/6/2022 via EMS with a past medical history of portal vein thrombosis (on AC) with recurrent ascites, chronic pancreatitis, prostate cancer (S/P prostatectomy), SDH (S/P craniotomy), HTN, dyslipidemia and noting a recent admission (8/29-9/3/22) for EUN and worsening ascites.   Primary concerns 9/6 were worsening EUN, fatigue and failure to thrive and after some time on medicine service- receiving supportive interventions such as recurrent paracentesis and medical management of his ongoing hepatorenal disease and C-diff,  he continued to have escalating O2 requirements in the setting of possible aspiration pneumonia - treated with Zosyn, transferred to the MICU on 9/12/22 with concern for potential intubation  His kidney failure progressed to needing to start dialysis as encephalopathy had worsened. Plan for ongoing medical interventions being balanced against quality of life considerations--> palliative care consult..  Hoping for trial of HD to assess cognitive improvement. GI is not planning any interventions or further work up.      Today, 9/14 the patient was seen for PC follow up related to above MSOF.      Prognosis, Goals, or Advance Care Planning was addressed today with: Yes.  Mood, coping, and/or meaning in the context of serious illness were addressed today: Yes.  Summary/Comments: see notes.    Matteo CHISHOLM NP  Nurse Practitioner- Advanced Practice Provider  [facility]iative [facility] Consult Service   [phone]  TT spent: 45 minutes of which 35 minutes were spent in direct face to face contact with patient/family.  Greater than 50% of time spent counseling and/or coordinating care.        Interval History:     Chart review/discussion with patient and family,  unit or clinical team members:   Similar affect this afternoon as prior- though chart notes and ICU RN indicate more clarity of thought this am. Needed BiPap support this afternoon with sats on HFNC decreasing to mid 80's. No pain or N&V by his report.    AMS persisting into second HD run. Limited abilty to pull fluid 2/2 BP concerns.       Key Palliative Symptoms:  We are not helping to manage these symptoms currently in this patient.    Patient is on opioids: bowels not assessed today.           Review of Systems:     A system ROS was unreliable due to AMS likely multifactorial in nature.           Medications:     I have reviewed this patient's medication profile and medications during this hospitalization.           Physical Exam:   Vitals were reviewed  Temp: 99.1  F (37.3  C) Temp src: Axillary BP: (!) 83/64 Pulse: 115   Resp: 25 SpO2: 91 % O2 Device: (S) High Flow Nasal Cannula (HFNC) Oxygen Delivery: (S) 50 LPM  GA: Lethargy, pale. Mumbled speech- HFNC with 80% and still desats to point of bipap. NG tube. Soft mitt restraints.  EENT: some tongue coating-thrush. MM mildly dry.    Heart: Normal S1-S2 no murmur   Lung: Crackles   Abdomen: Distended, + ascites.    MSK: no LE edema, feet cool.               Data Reviewed:     ROUTINE LABS (Last four results)  BMPRecent Labs   Lab 09/14/22  1133 09/14/22  0752 09/14/22  0561  09/14/22  0402 09/13/22  0809 09/13/22  0656 09/12/22  0858 09/12/22  0604 09/11/22  1555 09/11/22  0809   NA  --   --  138  --   --  134*  --  132*  --  135*   POTASSIUM  --   --  4.6  --   --  5.2  --  5.2  --  4.4   CHLORIDE  --   --  94*  --   --  87*  --  86*  --  91*   AMIRAH  --   --  8.9  --   --  9.7  --  9.5  --  9.3   CO2  --   --  22  --   --  22  --  23  --  24   BUN  --   --  129.0*  --   --  168.0*  --  148.0*  --  147.0*   CR  --   --  5.92*  --   --  6.65*  --  5.28*  --  4.18*   * 172* 168* 163*   < > 154*   < > 146*   < > 152*    < > = values in this interval not displayed.     Liver Function Studies    Lab Test 09/14/22  0554   PROTTOTAL 5.8*   ALBUMIN 3.4*   BILITOTAL 1.0   ALKPHOS 157*   AST 36   ALT 14     CBC  Recent Labs   Lab 09/14/22  0554 09/13/22  0656 09/12/22  0604 09/11/22  0809   WBC 28.0* 23.4* 18.9* 27.4*   RBC 3.08* 3.22* 3.20* 3.13*   HGB 8.7* 9.0* 8.8* 8.8*   HCT 28.6* 28.8* 28.8* 27.8*   MCV 93 89 90 89   MCH 28.2 28.0 27.5 28.1   MCHC 30.4* 31.3* 30.6* 31.7   RDW 17.6* 17.2* 16.7* 16.6*    170 203 139*     INRNo lab results found in last 7 days.

## 2022-09-14 NOTE — PROGRESS NOTES
CLINICAL NUTRITION SERVICES - BRIEF NOTE  *Please see full assessment note from 9/13/2022    New Findings:  Attempted reposition of FT to post pyloric (see procedure note). If remains gastric, recommend Radiology attempt to reposition FT on 9/15. Would recommend FT be post pyloric due to altered mentation and intermittent BIPAP needs (pt currently on HFNC).     Interventions  Collaboration with other providers - RN, rounds  Enteral Nutrition - as ordered    RD to follow per protocol.    Liza Quach, MS, RD, LD, [facility]  MICU pager: [phone]  ASCOM: [id number]

## 2022-09-14 NOTE — PROGRESS NOTES
Nephrology Progress Note  09/14/2022         Kody Reynaga is a 77 yom with complex medical hx including portal vein thrombosis requiring weekly para's, chronic pancreatitis, HTN, who is re-admitted (after being admitted last week) for AMS, fatigue and EUN.  Cr was 0.8 in 2/2022, up to 1.4 in 5/2022 and ~2.0 during admission in August before being 4.2 on admission to [facility] on 9/6.  Nephrology consulted for management of EUN on CKD.       Interval History :   Mr Reynaga started HD yesterday with 2h low flow run, running 2.5h with moderate flows today and will plan for run again tomorrow.  We should have better idea of the benefit of dialysis after his 3rd or 4th run, ongoing discussions of GOC with family as in the long and short term he has continued to accrue complications.  Trying to pull 1L on run while keeping SBP >90.       Assessment and Recommendations  EUN on CKD-Difficult to determine baseline as Cr has been on the rise for the last 6 months likely with frequent EUN related to portal vein thrombosis.  Cr of 4.2 on admission, slightly better this am at 4.0 but gfr is certainly low and muscle mass suggests it may be worse than Cr would suggest.  No NSAID's at home, no nephrotoxins in recent hx.  Likely hemodynamic related to ascites due to portal vein thrombus and suspected SBP based on elevated WBC, also may be congestion with need for weekly para's.  I had ~15 minute discussion on 9/7 with pt and his wife that his long term trajectory is concerning for needing dialysis and that this would be a difficult course given his other co-morbidities and functional status but that we can treat for infection and see if he can rebound a bit in the short term.                  -EUN, likely hemodynamic related to poor intake in conjunction with ascities.  Had shown improvement with para last week but significantly worsened over the past 48h for unclear reasons.  We did do para on 9/10 and 9/12 with albumin repletion, now  "with resp distress as well.                 -Had long ~45 min conversation with pt's family (Wife, Daughter and son-in-law) explaining his complex situation on 9/12.  Ultimately they would like to do a time limited trial of dialysis for the next week to see if he feels better and is able to be more interactive.  Hopefully he will be able to weigh in on the continuation at that point.                 -Appreciate team placing line, first HD this am.                         -Dialysis consent signed and scanned in under media on 9/12.         Volume/BP's-Borderline BP's, pulling 1L on run today as BP's allow in 2.5h to try to help resp status although SBP's are in 90's.       Electrolytes-K 4.6, bicarb 22.  Na 138, no acute issues.  Stopping bicarb tabs now that we are on dialysis.       BMD-Ca 8.9, Mg and Phos 7.6 but should get better with iHD.       GI-Does not have overt liver disease (although imaging suggested cirrhosis?) but has tense abdomen related to portal vein thrombosis.  Getting diagnostic para with elevated WBC, on abx.       Anemia-Hgb down to 8.7 from 9.4 yesterday and ~10-12 generally.  WBC up, will check iron stores when ID issues subside.       Nutrition-Taking PO.      Time spent: 25 minutes on this date of encounter for chart review, physical exam, medical decision making and co-ordination of care.      Seen and discussed with Dr Villa     Recommendations were communicated to primary team via KATHRINE Davies CNS  Clinical Nurse Specialist  [phone]    Review of Systems:   I reviewed the following systems:  ROS not done due to AMS/BiPAP    Physical Exam:   I/O last 3 completed shifts:  In: 1465 [I.V.:580; Other:500; NG/GT:165]  Out: 800 [Stool:800]   BP 93/53   Pulse 108   Temp 98.2  F (36.8  C) (Axillary)   Resp 30   Ht 1.803 m (5' 10.98\")   Wt 74.8 kg (164 lb 14.5 oz)   SpO2 96%   BMI 23.01 kg/m       GENERAL APPEARANCE: Interactive but confused, wife answering most " questions.    EYES: No scleral icterus  Pulmonary: lungs clear to auscultation with equal breath sounds bilaterally, no clubbing or cyanosis  CV: Regular rhythm, normal rate, no rub   - Edema +1-2 generalized. +++Abd distension.    GI: firm, mildly tender, very distended.    MS: no evidence of inflammation in joints, no muscle tenderness  : No Aparicio  SKIN: no rash, warm, dry  NEURO: Interactive but confused.  Mild asterixis when holding hands in front of him and closing eyes.      Labs:   All labs reviewed by me  Electrolytes/Renal - Recent Labs   Lab Test 09/14/22  1133 09/14/22  0752 09/14/22  0554 09/13/22  0809 09/13/22  0656 09/12/22  0858 09/12/22  0604 09/11/22  1555 09/11/22  0809 08/29/22  1600 08/29/22  1416   NA  --   --  138  --  134*  --  132*  --  135*   < > 127*   POTASSIUM  --   --  4.6  --  5.2  --  5.2  --  4.4   < > 6.7*   CHLORIDE  --   --  94*  --  87*  --  86*  --  91*   < > 89*   CO2  --   --  22  --  22  --  23  --  24   < > 18*   BUN  --   --  129.0*  --  168.0*  --  148.0*  --  147.0*   < > 83.4*   CR  --   --  5.92*  --  6.65*  --  5.28*  --  4.18*   < > 4.28*   * 172* 168*   < > 154*   < > 146*   < > 152*   < > 137*   AMIRAH  --   --  8.9  --  9.7  --  9.5  --  9.3   < > 9.8   MAG  --   --  2.8*  --   --   --  3.1*  --  3.1*   < > 2.4*   PHOS  --   --  7.6*  --   --   --   --   --   --   --  6.3*    < > = values in this interval not displayed.       CBC -   Recent Labs   Lab Test 09/14/22  0554 09/13/22  0656 09/12/22  0604   WBC 28.0* 23.4* 18.9*   HGB 8.7* 9.0* 8.8*    170 203       LFTs -   Recent Labs   Lab Test 09/14/22  0554 09/13/22  0656 09/12/22  0604   ALKPHOS 157* 155* 140*   BILITOTAL 1.0 1.1 1.2   ALT 14 12 14   AST 36 30 37   PROTTOTAL 5.8* 6.1* 6.4   ALBUMIN 3.4* 3.8 4.0       Iron Panel - No lab results found.        Current Medications:    [Held by provider] amLODIPine  10 mg Oral Daily     apixaban ANTICOAGULANT  5 mg Oral BID     B and C vitamin Complex  with folic acid  5 mL Per Feeding Tube Daily     dicyclomine  20 mg Oral TID     escitalopram  20 mg Oral QAM     insulin aspart  1-4 Units Subcutaneous Q4H     lactulose  10 g Oral Once     lactulose  20 g Oral TID     nystatin  500,000 Units Swish & Swallow 4x Daily     pantoprazole  40 mg Per Feeding Tube QAM AC     piperacillin-tazobactam  2.25 g Intravenous Q6H     pravastatin  40 mg Oral Daily     protein modular  1 packet Per Feeding Tube Daily     sodium chloride (PF)  3 mL Intracatheter Q8H     thiamine  100 mg Per Feeding Tube Daily     vancomycin  125 mg Oral or Feeding Tube 4x Daily       dextrose       - MEDICATION INSTRUCTIONS -       - MEDICATION INSTRUCTIONS -       - MEDICATION INSTRUCTIONS -

## 2022-09-15 NOTE — PROGRESS NOTES
ABG was drawn. Team and RN reviewed the the abg result. Currently pt on HFNC 40L/60%, O2 sats 95%. Pt is rustless and family at bedside. Will continue to follow as needed.

## 2022-09-15 NOTE — PROGRESS NOTES
Right wrist and Left wrist restraints discontinued at 8:00 AM on 9/15/2022.    Restraint discontinue criteria met, patient is calm, cooperative and safe. Restraints removed.     Patient's Response: No evidence of learning  Family Notification: Other (Not at this time)  Attending Physician Notified: Yes, Attending Physician's Name: Dr. Earl Hearn RN

## 2022-09-15 NOTE — PROGRESS NOTES
MEDICAL ICU PROGRESS NOTE  09/15/2022      Date of Service (when I saw the patient): 09/15/2022    ASSESSMENT: Kody Reynaga is a 77 year old male admitted on 9/6/2022. He has a past medical history of portal vein thrombosis (on AC) with recurrent ascites, chronic pancreatitis, prostate cancer (S/P prostatectomy), SDH (S/P craniotomy), HTN, dyslipidemia. He had a recent admission from 8/29-9/3/22 for EUN and worsening ascites. Readmitted on 9/7 for worsening EUN, fatigue and failure to thrive. He has been managed on the floor with recurrent paracentesis and medical management of his ongoing hepatorenal disease in addition to C diff but had escalating O2 requirements in the setting of possible aspiration pneumonia being treated with Zosyn, transferred to the MICU on 9/12/22 with concern for potential intubation.     CHANGES and MAJOR THINGS TODAY:   - Day 3/3 of gentle dialysis today, patient was able to have approximately 450 ml of fluid removed yesterday, will try to remove again today.  - Reached out to GI regarding increasing alkaline phosphatase in setting of worsening leukocytosis and elevated procal (patient has biliary stents)  - BiPAP at night (atelectasis)  - Increased midodrine to 10mg  - Discussed w/ renal - plan for NE during dialysis  - Increase to medium dose sliding scale   - Repeat urine cultures (if able to obtain urine w/ straight cath)  - Consider diagnostic paracentesis in near future if concern for infection persists  - Lidocaine patch for rib fractures  - Care conference on 9/19    PLAN:    Neuro:  #Pain and Sedation  Patient is not requiring any sedating medications here. Does have known rib fractures seen on CT scan. Will assess the patient for any pain related to this as this could be contributing to his respiratory issues.     #Acute Metabolic Encephalopathy most likely secondary to worsening uremia   #Hepatic Encephalopathy  Patient with history of encephalopathy, previously  evaluated for hepatic encephalopathy and treated with lactulose. Patient had CT head on 09/6 negative for intracranial abnormality. Most likely encephalopathy secondary to progressive uremia and potentially a component of hepatic encephalopathy in the setting of worsening kidney function and chronic liver dysfuction. Patient does seem to have some improvement in his mental status today.   -Dialysis day 3/3  -Lactulose, titrate to 1000 mL of stool output   -Avoid opioids and Benzodiazapine   -Optimize delirium precautions.     #Depression  -Continue PTA Lexapro     Pulmonary:  #Acute Hypoxic Respiratory Failure- Multifactorial (Aspiration Pneumonia,Ascites, rib fractures and likely hypervolemia)  Patient initially transferred to MICU on 9/12 for increasing oxygen requirements and increased WOB in the setting of possible aspiration pneumonia. Empirically started on zosyn. Was initially placed on BiPAP/CPAP and was transitioned to HFNC. Patient has been intermittently hypoxic on HFNC requiring intermittent BiPAP. Continue to believe this is multifactorial related to this previous aspiration pneumonia, restriction from his ascites, rib fractures as well as hypervolemia. CXR on 9/14 showed increasing perihilar opacification, unclear if this is due to fluid overload or evolving/recurring infection. Patient did have a small amount of fluid taken off during his dialysis run yesterday. Patient has had stable oxygen requirements over the last 24 hours.   -Continue HFNC as needed  - BiPAP at night (d/t atelectasis)  -Abx: Zosyn x 7 days (9/12-9/18)  - Palliative care following; recs appreciated     Cardiovascular:  # HTN  #Hyperlipidemia   Patient has had MAP's >65 throughout his hospitalization.   -Hold for now PTA Amlodipine   -Has been getting Pravastatin    GI/Nutrition:  #Cirrhosis     #Ascites, worsening  #Portal Vein Thrombosis  #Portal Hypertension  Has had hx of biliary strictures (benign on most recent EGD 05/10/22),  S/P multiple biliary stents [id number], biopsies negative for carcinoma of biliary etiology. Recent admission for ascites, has required ~weekly paracenteses since. 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 Patient had Abd US on 09/06/22 that showed cirrhotic configuration of liver. US during this admission (9/6) revealed cirrhotic appearing liver with evidence of portal hypertension with persistent PVT. At this time, it is unclear what the underlying cause of the newly noted cirrhosis is. Patient has undergone multiple paracenteses here with removal between 3-5 L. Diagnostic and therapeutic paracentesis was completed on 9/12 with a SAAG of 1.6 and absolute PMN of 135 which speaks to his portal hypertension without any evidence of SBP. 4L ascitic fluid removed on 9/12. Patient does not appear overly distended here and has not had any clinical worsening which would necessitate therapeutic paracentesis. Will however consider performing a diagnostic paracentesis to send for culture in the next few days if his leukocytosis and procalcitonin continue to rise.   -Continue with Eliquis  -Continue to monitor clinically for determination of therapeutic paracentesis   -Continue with lactulose, titrate to 1000 mL of stool output     #Elevated Alkaline Phosphatase, worsening  Patient's alk phos has been mildly elevated over the last week with an acute rise this morning to 235.   - discuss further evaluation with GI, especially given history of biliary stents w/ worsening leukocytosis    #Malnutrition   Patient has been NPO since arriving in the MICU. Nursing attempted a bedside swallow which he failed. Patient had feeding tube place 9/13 which was advanced yesterday and is now confirmed to be post pyloric   -SLP consult   -Continue with TF at goal of 45 mL/hr     #C.DIFF  Patient had positive PCR toxin on 9/08.    -Continue with oral vancomycin for 10 course (09/08/22-9/18/22).      #Hx of IBS   - Bentyl  20 mg TID with meals     Renal/Fluids/Electrolytes:  # Acute on chronic kidney disease, 2/2 HRS   #Uremia   #ANGMA  # Lactic Acidosis  Recent admission as above for EUN, Cr was normal in February of 2022 but has precipitously declined since, thought to be multifactorial in the setting of fluid restriction (d/t ascites as above) and use of Lasix (d/c'd on discharge).  Cr 4.26 on admission (up from 2.96 on 9/3/22), had been improving after albumin challenge by peaked at 6.65 prior to initiating dialysis. Patient additionally has had increasing uremia since admission, peaking at 168. Suspect this decline in his renal function is likely related to his underlying liver disease however other etiologies cannot be excluded at this time. Patients UA has showed progressive RBC's and proteinuria during this admission. Serologic evaluation for causes of glomerulonephritis were added. Interesting he does have a degree of hypocomplementemia with a low C3. This could be seen in the setting of chronic liver dysfunction. Labs otherwise have been unremarkable with anti-GBM, LUKE and ANCA still pending. Was able to tolerate removal of approximately 450 mL of fluid yesterday during dialysis. Labs are correcting appropriately. Will undergo another run today and attempt to remove fluid.   - Nephrology following, appreciate the recs   - Gentle dialysis, day 3 of 3 today, plan to remove fluid if tolerated  - Midodrine PRN hypotension during dialysis   - Fluid restriction to 1.8 L, <2g dNa aily    # Mild right-sided hydronephrosis  US renal (9/12) revealed atrophic right kidney w/ mild hydronephrosis. Left kidney is poorly visualized, although no severe hydronephrosis seen. Of note, patient had CTAP on 9/6 which did not identify hydronephrosis per radiology report. Per renal, likely not responsible for EUN but may be contributing to renal disease. Urology consulted 9/14 who agreed that there was not a urologic component of his ongoing kidney  injury and that not intervention is indicated at this time.     Endocrine:  #Hyperglycemia   Patients blood sugar's yesterday in the 200's. We did initiate TF at that time.  - Medium dose sliding scale   - Blood glucose goal >120 and <180     ID:  # Aspiration Pneumonia  #Leukocytosis, increasing  Patient transferred to the ICU for increasing oxygen requirements in the setting of a concern for aspiration. He started zosyn empirically. He did undergo diagnostic paracentesis 9/12 which showed an absolute PMN count of 135. No concern for SBP at this time. Patient's white count has been uptrending, and is at 35 today. Procalcitonin was added yesterday and had increased to 9.61. Blood cultures were obtained yesterday and are without growth at this point. Patient has been afebrile, normotensive and is on broad spectrum antibiotics. His abdomen is nontender and his neurologic status has been stable. Patients has not had any increased oxygen requirements. Low concern for additional underlying infections. Will continue to trend WBC and procalcitonin.   - Repeat urine cultures   - Consider diagnostic paracentesis in near future if concern persists  - daily procalcitonin   -Abx: Zosyn x 7 days (9/12-9/18)   - Will consider switching to meropenem if clinically worsening  -Ascitic cultures without growth   -Blood cultures without growth   -Urine cultures without growth   -Unable to obtain sputum cultures     #C.Dif  -Continue with PO Vancomycin     Hematology:    #Chronic Normocitic anemia, stable  #Leukocytosis 2/2 aspiration PNA  Baseline hemoglobin around 9. Patients hemoglobin today is 8.7.   -CBC daily     Musculoskeletal:  - PT/OT consulted      Skin:  No acute concerns     General Cares/Prophylaxis:    DVT Prophylaxis: DOAC  GI Prophylaxis: PPI  Restraints: None  Family Communication: Wife Asia involved. Care conference planned for Monday (9/19)  Code Status: Full Code      Lines/tubes/drains:  - HFNC  - PIV x 1  - Right  internal jugular dialysis catheter     Disposition:  - Medical ICU     Patient seen and findings/plan discussed with medical ICU staff, Dr. Mckenna.    Syed Rodríguez, MS4    Resident/Fellow Attestation   I, Marci Chairez MD, was present with the medical/SANDOR student who participated in the service and in the documentation of the note.  I have verified the history and personally performed the physical exam and medical decision making.  I agree with the assessment and plan of care as documented in the note.      Mr. Reynaga is 78 y/o M w/ cirrhosis, portal vein thrombosis (on Eliquis) w/ recurrent ascites, chronic pancreatitis, biliary strictures s/p stenting who was admitted to MICU d/t hypoxic respiratory failure 2/2 aspiration PNA  HFNC during day, BiPAP at night. Worsening leukocytosis however no clear source aside from PNA (on Zosyn). Reached out to GI regarding further evaluation of abdomen in setting of elevated alk phos and hx biliary stents. Continuing on dialysis.    Marci Chairez MD   Internal Medicine, PGY-1  Date of Service (when I saw the patient): 09/15/22    ====================================  INTERVAL HISTORY:   Patient underwent second dialysis run yesterday and was able to have 450 mL of fluid removed. Mental status has been fairly stable. Patient did require soft restraints overnight due to agitation. O2 requirements are improving.     OBJECTIVE:   1. VITAL SIGNS:   Temp:  [97.9  F (36.6  C)-99.1  F (37.3  C)] 99.1  F (37.3  C)  Pulse:  [] 101  Resp:  [15-30] 20  BP: ()/(48-64) 118/62  FiO2 (%):  [30 %-100 %] 60 %  SpO2:  [89 %-100 %] 95 %  FiO2 (%): 60 %  Resp: 20    2. INTAKE/ OUTPUT:   I/O last 3 completed shifts:  In: 2067 [I.V.:397.5; Other:450; NG/GT:464.5]  Out: 1000 [Stool:1000]    3. PHYSICAL EXAMINATION:  General: Lying in bed on BiPAP, in soft restraints,  no acute distress  Pulm/Resp: On BiPAP with normal work of breathing, there is coarse rhonchi in the right lung  fields on anterior ascultation   CV: RRR, no murmurs, rubs or gallops  Abdomen: Distended, nontender, no rebound or guarding   Neuro: Interactive but appears confused. Answers a few questions appropriately, otherwise mumbles nonsensically. Alert to person, not to place or time. Moving all extremities.    4. LABS:   Arterial Blood Gases   Recent Labs   Lab 09/13/22 2209 09/12/22  2018 09/12/22  0435 09/12/22  0209   PH 7.43 7.45 7.44 7.44   PCO2 34* 37 35 36   PO2 75* 65* 54* 58*   HCO3 22 26 24 25     Complete Blood Count   Recent Labs   Lab 09/15/22  0514 09/14/22  0554 09/13/22  0656 09/12/22  0604   WBC 35.0* 28.0* 23.4* 18.9*   HGB 8.3* 8.7* 9.0* 8.8*   * 156 170 203     Basic Metabolic Panel  Recent Labs   Lab 09/15/22  0514 09/15/22  0419 09/14/22  2335 09/14/22  1854 09/14/22 0752 09/14/22  0554 09/13/22  0809 09/13/22  0656 09/12/22  0858 09/12/22  0604     --   --   --   --  138  --  134*  --  132*   POTASSIUM 3.5  --   --   --   --  4.6  --  5.2  --  5.2   CHLORIDE 97*  --   --   --   --  94*  --  87*  --  86*   CO2 21*  --   --   --   --  22  --  22  --  23   .0*  --   --   --   --  129.0*  --  168.0*  --  148.0*   CR 5.16*  --   --   --   --  5.92*  --  6.65*  --  5.28*   * 205* 197* 216*   < > 168*   < > 154*   < > 146*    < > = values in this interval not displayed.     Liver Function Tests  Recent Labs   Lab 09/15/22  0514 09/14/22  0554 09/13/22  0656 09/12/22  0604   AST 49 36 30 37   ALT 20 14 12 14   ALKPHOS 235* 157* 155* 140*   BILITOTAL 1.0 1.0 1.1 1.2   ALBUMIN 3.4* 3.4* 3.8 4.0     Coagulation Profile  No lab results found in last 7 days.    5. RADIOLOGY:   Recent Results (from the past 24 hour(s))   XR Abdomen Port 1 View    Narrative    XR ABDOMEN PORT 1 VIEW  9/14/2022 4:02 PM      HISTORY: Verify small bowel feeding tube bedside placement    COMPARISON: 9/13/2022    FINDINGS:   Single AP view of the abdomen. Feeding tube tip overlying the distal  2nd/3rd  segment of the duodenum, coiled in the stomach. Continued  gastric distention. Gaseous distention of the small and large bowel.  No definite pneumatosis or portal venous gas. Basal atelectasis.      Impression    IMPRESSION:   1. Feeding tube tip overlying the distal 2nd/3rd segment of the  duodenum with coil more proximally in the stomach.  2. Gaseous distention of the small and large bowel, most consistent  with ileus.    I have personally reviewed the examination and initial interpretation  and I agree with the findings.    AUBREE KIRKPATRICK MD         SYSTEM ID:  [id number]

## 2022-09-15 NOTE — PLAN OF CARE
ICU End of Shift Summary. See flowsheets for vital signs and detailed assessment.    Changes this shift: 2/3 HD run; 450 ml removed midodrine 10 given during  nd albumin 25% 25 g given post HD run 2/2 MAP<60. 1.5L of stool out thus far via rectal tube, SBFT advanced to post pyloric tolerating feeds we. Neuro status mostly garble incomprehensive speech w/occ clear language but follow commands consistently .    Wife Asia at BS mot of the shift and updated throughout the day    Plan:  Increase TF's per order, provide frequent re orientation and continue w/goals of care.  Goal Outcome Evaluation:  Ongoing, not progressing.      Problem: Fluid Volume Excess  Goal: Fluid Balance  Outcome: Ongoing, Not Progressing     Problem: Risk for Delirium  Goal: Optimal Coping  Outcome: Ongoing, Not Progressing  Intervention: Optimize Psychosocial Adjustment to Delirium  Recent Flowsheet Documentation  Taken 9/14/2022 1600 by Abbie Hearn, RN  Supportive Measures:   active listening utilized   positive reinforcement provided  Taken 9/14/2022 1200 by Abbie Hearn, RN  Supportive Measures:   active listening utilized   positive reinforcement provided  Taken 9/14/2022 0800 by Abbie Hearn, RN  Supportive Measures:   active listening utilized   positive reinforcement provided

## 2022-09-15 NOTE — PROGRESS NOTES
-Patient has worsening oxygenation. His ABG had shown 84% oxygen saturation.    - Poor wave forms are noted. Discussed with wife and daughter regarding goals of care. At this point patient would have needed endotracheal intubation and be placed on ventilator.     Decision was taken against it by wife and daughter. As per wish he was made DNR.     Total critical care time was 40 mins excluding time spent by Dr. Mckenna in the morning.     Aquiles Stone MD

## 2022-09-15 NOTE — PLAN OF CARE
ICU End of Shift Summary. See flowsheets for vital signs and detailed assessment.    Changes this shift: Oriented to self-only. Speech grossly incomprehensible, but able to nod yes/no to Qs. Follows commands but very forgetful. Restless +impulsive; frequently attempts to pull off O2 and mitts; increasingly more agitated throughout NOC. MD notified for soft-wrist restraints. Ongoing 1:1 sitter. Initially, pt on 100% HFNC @ 50lpm; bridged to BiPAP @ hs 10/5 and FiO2 weaned down to 30% during NOC, maintaining sats >96%. Infrequent dry cough. Initiated TF via post-pyloric and now running 45mL/hr. Limited HD trial; no void. WBC 35 and uptrending. Alk phos 235 and uptrending.    Plan: Increase sliding scale regimen. Wean O2, as able. Monitor neuro/behaviors/trial off restraints. Repeat iHD, pending labs? Titrate TF to goal (45mL/hr).    Goal Outcome Evaluation:    Plan of Care Reviewed With: patient     Overall Patient Progress: no change    Outcome Evaluation: BiPAP O2 requirement much improved now 10/5 @ 50%; however increasingly restless and impulsive requiring 1:1 and now upper soft restraints.    Problem: Gas Exchange Impaired  Goal: Optimal Gas Exchange  Outcome: Ongoing, Progressing  Intervention: Optimize Oxygenation and Ventilation  Recent Flowsheet Documentation  Taken 9/15/2022 0000 by Juan Zuleta, RN  Head of Bed (HOB) Positioning: HOB at 30 degrees  Taken 9/14/2022 2200 by Juan Zuleta, RN  Head of Bed (HOB) Positioning: HOB at 30 degrees  Taken 9/14/2022 2000 by Juan Zuleta RN  Head of Bed (HOB) Positioning: HOB at 30 degrees

## 2022-09-15 NOTE — PLAN OF CARE
ICU End of Shift Summary. See flowsheets for vital signs and detailed assessment.    Changes this shift: Third consecutive day of iHD tolerated poorly, despite receiving midodrine 10 mg prior to iHD with an additional dose of 10 mg during iHD unable to remove fluid 2/2 hypotension MAPs <65.    This afternoon, code status changed to DNR/DNI 2/2 to worsening hypoxia; ABG revealed PO2 of 53 w/oxyhemoglobin 84 (HFNC 60% 40L.) Extremities are cool and difficult to  an accurate SPO2. Therefore had been monitoring SPO2 via forehead probe which had been reading a higher SPO2. Now on BiPAP 14/8 FIO2 100 via a finger probe.     Family at BS most of the day and updated throughout the shift.          Plan: iHD tomorrow, CC to discuss GOC in the near future, NPO at 0400 for abdominal U/S tomorrow morning. Continue w/GOC.     Goal Outcome Evaluation: Ongoing, not  progressing      Problem: Fluid Volume Excess  Goal: Fluid Balance  Outcome: Ongoing, Not Progressing     Problem: Gas Exchange Impaired  Goal: Optimal Gas Exchange  Outcome: Ongoing, Not Progressing  Intervention: Optimize Oxygenation and Ventilation  Recent Flowsheet Documentation  Taken 9/15/2022 1200 by Abbie Hearn, RN  Airway/Ventilation Management: calming measures promoted  Head of Bed (HOB) Positioning: HOB at 30 degrees  Taken 9/15/2022 1038 by Abbie Hearn, RN  Head of Bed (HOB) Positioning: HOB at 30 degrees  Taken 9/15/2022 0800 by Abbie Hearn, RN  Airway/Ventilation Management: calming measures promoted  Head of Bed (HOB) Positioning: HOB at 30 degrees

## 2022-09-15 NOTE — PROGRESS NOTES
Physician Attestation      I, Katalina Mckenna MD, saw this patient with the resident and agree with the resident's findings and plan of care as documented in the note.         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 He was ultimately transferred to the MICU on 9/1/12 due to wrsening hypoxic respiratory failure with concern for aspiration pneumonia/pneumonitis, currently on zosyn. Hypoxic respiratory failure is improving likely due to improved recruitment, mental status and he is now on HFNC 60%, 40 LPM.  Will continue Bpap at nighttime as he tolerates to help with bibasilar atelectasis. He was also found to have C. Dif and continues on PO vanco. Previously discussed GOC with wife, Asia, and she would want a trial of intubation if needed and he remains full code at this time with plan for ongoing 1 week trial of dialysis. Palliative care is also following, appreciated. Worsening leukocytosis today but appears to be improving clinically; pan-cultures sent but will continue on current antibiotics. Low threshold to expand antimicrobials if he were to decompensate. Alk phos increased today and he has known biliary stents; will discuss with GI if further imaging/assessment is necessary but continue to trend LFTs.  Wife and daughter updated at bedside.       The patient was seen and examined with the resident/fellow physician. We have discussed the patient in detail and I agree with the findings, assessment, and plan as documented when this note was cosigned on this day. The plan was formulated in conjunction with pharmacy, ICU nurses, and respiratory therapist. I have evaluated all  laboratory values and imaging studies for the past 24 hours. I have reviewed all the consults that have been ordered and are active for this patient.         Critical Care Time: 40 mins (excluding procedures)       Katalina Mckenna MD   Date of Service (when I saw the patient): 9/15/22

## 2022-09-15 NOTE — PHARMACY-VANCOMYCIN DOSING SERVICE
"Pharmacy Vancomycin Initial Note  Date of Service September 15, 2022  Patient's  1945  77 year old, male    Indication: Intra-abdominal infection    Current estimated CrCl = Estimated Creatinine Clearance: 12.2 mL/min (A) (based on SCr of 5.16 mg/dL (H)).    Creatinine for last 3 days  2022:  6:56 AM Creatinine 6.65 mg/dL  2022:  5:54 AM Creatinine 5.92 mg/dL  9/15/2022:  5:14 AM Creatinine 5.16 mg/dL    Recent Vancomycin Level(s) for last 3 days  No results found for requested labs within last 72 hours.      Vancomycin IV Administrations (past 72 hours)      No vancomycin orders with administrations in past 72 hours.                Nephrotoxins and other renal medications (From now, onward)    Start     Dose/Rate Route Frequency Ordered Stop    09/15/22 1800  vancomycin 1500 mg in 0.9% NaCl 250 ml intermittent infusion 1,500 mg         1,500 mg  over 90 Minutes Intravenous ONCE 09/15/22 1746      09/15/22 174  vancomycin place reich - receiving intermittent dosing         1 each Intravenous SEE ADMIN INSTRUCTIONS 09/15/22 1746      22 2030  vancomycin (FIRVANQ) oral solution 125 mg         125 mg Oral or Feeding Tube 4 TIMES DAILY 22 2019 22 0759    22 0300  piperacillin-tazobactam (ZOSYN) 2.25 g vial to attach to  ml bag        Note to Pharmacy: For [facility], [facility] and [facility]: For Zosyn-naive patients, use the \"Zosyn initial dose + extended infusion\" order panel.    2.25 g  over 30 Minutes Intravenous EVERY 6 HOURS 22 0250 22 0059          Contrast Orders - past 72 hours (72h ago, onward)    None          [employer] Prediction of Planned Initial Vancomycin Regimen  N/A        Plan:  1. Start vancomycin  1500 mg IV once, then intermittent dosing pending levels/hemodialysis.   2. Vancomycin monitoring method: Trough (Method 2 = manual dose calculation)  3. Vancomycin therapeutic monitoring goal: 15-20 mg/L  4. Pharmacy will check vancomycin levels as appropriate in " 1-3 Days.    5. Serum creatinine levels will be ordered daily for the first week of therapy and at least twice weekly for subsequent weeks.      Diana Pena RPH     denies

## 2022-09-15 NOTE — PROGRESS NOTES
Nephrology Progress Note  09/15/2022           Kody Reynaga is a 77 yom with complex medical hx including portal vein thrombosis requiring weekly para's, chronic pancreatitis, HTN, who is re-admitted (after being admitted last week) for AMS, fatigue and EUN.  Cr was 0.8 in 2/2022, up to 1.4 in 5/2022 and ~2.0 during admission in August before being 4.2 on admission to [facility] on 9/6.  Nephrology consulted for management of EUN on CKD.       Interval History :   Mr Reynaga had 2nd run yesterday, labs correcting appropriately.  We were unable to pull much UF yesterday (0.6L) due to borderline BP's.  Having 3rd run today, full BFR and 3h then can likely have tomorrow off from clearance standpoint (although will consider run depending on I/O balance today).  Trying again to pull fluid and using albumin prime to facilitate this, would favor starting a bit of norepi during runs.  O2 needs have improved despite our difficulty pulling fluid the past 2 days.       Assessment and Recommendations  EUN on CKD-Difficult to determine baseline as Cr has been on the rise for the last 6 months likely with frequent EUN related to portal vein thrombosis.  Cr of 4.2 on admission, slightly better this am at 4.0 but gfr is certainly low and muscle mass suggests it may be worse than Cr would suggest.  No NSAID's at home, no nephrotoxins in recent hx.  Likely hemodynamic related to ascites due to portal vein thrombus and suspected SBP based on elevated WBC, also may be congestion with need for weekly para's.  I had ~15 minute discussion on 9/7 with pt and his wife that his long term trajectory is concerning for needing dialysis and that this would be a difficult course given his other co-morbidities and functional status but that we can treat for infection and see if he can rebound a bit in the short term.                  -EUN, likely hemodynamic related to poor intake in conjunction with ascities.  Had shown improvement with para last  week but significantly worsened over the past 48h for unclear reasons.  We did do para on 9/10 and 9/12 with albumin repletion, now with resp distress as well.                 -Had long ~45 min conversation with pt's family (Wife, Daughter and son-in-law) explaining his complex situation on 9/12.  Ultimately they would like to do a time limited trial of dialysis for the next week to see if he feels better and is able to be more interactive.  Hopefully he will be able to weigh in on the continuation at that point.                 -Appreciate team placing line, first HD this am.                         -Dialysis consent signed and scanned in under media on 9/12.         Volume/BP's-Pulled 0.6L yesterday, difficulty with hypotension despite midodrine and albumin prime.  Again trying to pull a bit today as able while keeping SBP >90.  O2 needs have improved despite our inability to pull fluid on run.       Electrolytes-K 3.5, bicarb 21.  Na 138, no acute issues.  3rd HD run today.     BMD-Ca 8.8, Mg and Phos 5.3 and improving with iHD.       GI-Does not have overt liver disease (although imaging suggested cirrhosis?) but has tense abdomen related to portal vein thrombosis.  Getting diagnostic para with elevated WBC, on abx.       Anemia-Hgb down to 8.7 from 9.4 yesterday and ~10-12 generally.  WBC up, will check iron stores when ID issues subside.       Nutrition-Taking PO.      Time spent: 25 minutes on this date of encounter for chart review, physical exam, medical decision making and co-ordination of care.      Seen and discussed with Dr Villa     Recommendations were communicated to primary team via KATHRINE Davies CNS  Clinical Nurse Specialist  [phone]      Review of Systems:   I reviewed the following systems:  ROS not done due to AMS/Bipap    Physical Exam:   I/O last 3 completed shifts:  In: 2067 [I.V.:397.5; Other:450; NG/GT:464.5]  Out: 1000 [Stool:1000]   /62   Pulse 101   Temp  "99.1  F (37.3  C) (Axillary)   Resp 20   Ht 1.803 m (5' 10.98\")   Wt 72.1 kg (158 lb 15.2 oz)   SpO2 95%   BMI 22.18 kg/m       GENERAL APPEARANCE: Interactive but confused, wife answering most questions.    EYES: No scleral icterus  Pulmonary: lungs clear to auscultation with equal breath sounds bilaterally, no clubbing or cyanosis  CV: Regular rhythm, normal rate, no rub   - Edema +1-2 generalized. +++Abd distension.    GI: firm, mildly tender, very distended.    MS: no evidence of inflammation in joints, no muscle tenderness  : No Aparicio  SKIN: no rash, warm, dry  NEURO: Interactive but confused.  Mild asterixis when holding hands in front of him and closing eyes.      Labs:   All labs reviewed by me  Electrolytes/Renal - Recent Labs   Lab Test 09/15/22  0514 09/15/22  0419 09/14/22  2335 09/14/22  0752 09/14/22  0554 09/13/22  0809 09/13/22  0656 09/12/22  0858 09/12/22  0604 08/29/22  1600 08/29/22  1416     --   --   --  138  --  134*  --  132*   < > 127*   POTASSIUM 3.5  --   --   --  4.6  --  5.2  --  5.2   < > 6.7*   CHLORIDE 97*  --   --   --  94*  --  87*  --  86*   < > 89*   CO2 21*  --   --   --  22  --  22  --  23   < > 18*   .0*  --   --   --  129.0*  --  168.0*  --  148.0*   < > 83.4*   CR 5.16*  --   --   --  5.92*  --  6.65*  --  5.28*   < > 4.28*   * 205* 197*   < > 168*   < > 154*   < > 146*   < > 137*   AMIRAH 8.8  --   --   --  8.9  --  9.7  --  9.5   < > 9.8   MAG 2.5*  --   --   --  2.8*  --   --   --  3.1*   < > 2.4*   PHOS 5.3*  --   --   --  7.6*  --   --   --   --   --  6.3*    < > = values in this interval not displayed.       CBC -   Recent Labs   Lab Test 09/15/22  0514 09/14/22  0554 09/13/22  0656   WBC 35.0* 28.0* 23.4*   HGB 8.3* 8.7* 9.0*   * 156 170       LFTs -   Recent Labs   Lab Test 09/15/22  0514 09/14/22  0554 09/13/22  0656   ALKPHOS 235* 157* 155*   BILITOTAL 1.0 1.0 1.1   ALT 20 14 12   AST 49 36 30   PROTTOTAL 5.9* 5.8* 6.1*   ALBUMIN 3.4* " 3.4* 3.8       Iron Panel - No lab results found.        Current Medications:    sodium chloride 0.9%  300 mL Hemodialysis Machine Once     albumin human  250 mL Intravenous Once in dialysis/CRRT     [Held by provider] amLODIPine  10 mg Oral Daily     apixaban ANTICOAGULANT  5 mg Oral BID     B and C vitamin Complex with folic acid  5 mL Per Feeding Tube Daily     dicyclomine  20 mg Oral TID     escitalopram  20 mg Oral QAM     insulin aspart  1-4 Units Subcutaneous Q4H     lactulose  10 g Oral Once     lactulose  20 g Oral TID     - MEDICATION INSTRUCTIONS -   Does not apply Once     nystatin  500,000 Units Swish & Swallow 4x Daily     pantoprazole  40 mg Per Feeding Tube QAM AC     piperacillin-tazobactam  2.25 g Intravenous Q6H     pravastatin  40 mg Oral Daily     protein modular  1 packet Per Feeding Tube Daily     sodium chloride (PF)  3 mL Intracatheter Q8H     sodium chloride (PF)  9 mL Intracatheter During Dialysis/CRRT (from stock)     sodium chloride (PF)  9 mL Intracatheter During Dialysis/CRRT (from stock)     thiamine  100 mg Per Feeding Tube Daily     vancomycin  125 mg Oral or Feeding Tube 4x Daily       dextrose       - MEDICATION INSTRUCTIONS -       - MEDICATION INSTRUCTIONS -       - MEDICATION INSTRUCTIONS -

## 2022-09-15 NOTE — PROGRESS NOTES
HEMODIALYSIS TREATMENT NOTE    Date: 9/15/2022  Time: 1:30 PM    Data:  Pre Wt:   72.1kg  Desired Wt:  71.1 kg   Post Wt: Unable/ Bed weight   Ultrafiltration - Post Run Net Total Removed (mL): 48 mL  Vascular Access Status: CVC  patent  Dialyzer Rinse: Clear  Total Blood Volume Processed: 67.0 L   Total Dialysis (Treatment) Time: 3.0 hours   Dialysate Bath: K 3, Ca 2.25  Heparin: None    Lab:   Yes : Hep B Antigen & Antibody    Interventions:  10 mg Midodrine given by primary team prior dialysis.   5% Albumin 250 ml Machine prime per order.  Mid treatment SBP @ 80's , 100 mils bolus given, goal adjusted, another dose of tab Midodrine given with little effect.  Primary Team informed.  Seen by Nephrology with order to keep BFR at 300. Same done.  Pt completed his treatment time,blood rinsed back ,CVC saline locked & handoff report given.       Assessment: Pt alert but confused. 3.0 hours HD via right internal jugular .  with good flow.       Plan:    Per Renal Team.

## 2022-09-15 NOTE — PROGRESS NOTES
[facility] Nurse Inpatient Assessment     Consulted for: forehead wound     Patient History (according to provider note(s):      77 year old male admitted on 9/6/2022. He has a past medical history of portal vein thrombosis (on AC), recurrent ascites, chronic pancreatitis, prostate cancer (S/P prostatectomy), SDH (S/P craniotomy), HTN, dyslipidemia. He had a recent admission from 8/29-9/3/22 for EUN, worsening ascites. Presented to ED via ambulance after feeling unwell at the recommendation of PCP staff. He was admitted for further monitoring and management to Medicine service.    Areas Assessed:    Wound location: left forehead        Last photo: 9/15/22  Wound due to: Surgical Wound  Wound history/plan of care: s/p craniotomy.  Has been followed closely by wound care at his previous rehab facility.  Most recent dressing prior to admission:  hydrofera blue packing.  Per spouse, wound has become much smaller in all dimensions with this dressing.  Pt has a wound clinic appt in about a month.      Wound base: 40 % granulation tissue, 60% intact grey tinged epithelium, hardware exposed      Palpation of the wound bed: normal      Drainage: scant     Description of drainage: serosanguinous     Measurements (length x width x depth, in cm): 1.6  x 0.9  x  0.2 cm      Tunneling: N/A     Undermining: N/A  Periwound skin: Intact and well healed incision scar      Color: normal and consistent with surrounding tissue      Temperature: normal   Odor: none  Pain: no grimacing or signs of discomfort,   Pain interventions prior to dressing change: N/A  Treatment goal: Heal   STATUS: deteriorating,   Supplies ordered: ordered Iodosorb gel     Treatment Plan:     Forehead wound: Every other day   Cleanse with wound cleanser and gauze.  Pat dry.   Apply Iodosorb gel nickel thick to wound bed   Cover with mepilex border dressing.     Orders: Updated    RECOMMEND PRIMARY TEAM ORDER:  Bacitracin discontinuation   Education provided: plan of care and wound progress  Discussed plan of care with: Family and Nurse  WOC nurse follow-up plan: weekly  Notify WOC if wound(s) deteriorate.  Nursing to notify the Provider(s) and re-consult the WOC Nurse if new skin concern.    DATA:     Current support surface: Standard  Atmos Air mattress  BMI: Body mass index is 22.18 kg/m .   Active diet order: Orders Placed This Encounter      NPO for Medical/Clinical Reasons Except for: Meds     Output: I/O last 3 completed shifts:  In: 2067 [I.V.:397.5; Other:450; NG/GT:464.5]  Out: 1000 [Stool:1000]     Labs:   Recent Labs   Lab 09/15/22  0514   ALBUMIN 3.4*   HGB 8.3*   WBC 35.0*     Pressure injury risk assessment:   Sensory Perception: 3-->slightly limited  Moisture: 3-->occasionally moist  Activity: 1-->bedfast  Mobility: 3-->slightly limited  Nutrition: 3-->adequate  Friction and Shear: 1-->problem  Fracisco Score: 14

## 2022-09-16 NOTE — PROGRESS NOTES
Brief Nephrology Note        Mr Reynaga unfortunately has not shown any major improvement in AMS with initiation of dialysis and pulm status remains tenuous bordering on needing intubation.  Family has decided to transition to comfort care, will sign off from Nephrology standpoint.  If I can be of any help please do not hesitate to page me at the number below.    Ruddy Armstrong, APRN CNS  Clinical Nurse Specialist  [phone]

## 2022-09-16 NOTE — PROGRESS NOTES
CLINICAL NUTRITION SERVICES    Per discussion in MICU rounds, informed decision made to change pt s status to comfort care. Nutrition interventions to be discontinued and no further interventions planned at this time. RD can be consulted if needed.    RD signing off on 9/16/2022.    Liza Quach, MS, RD, LD, [facility]  MICU pager: [phone]  ASCOM: [id number]

## 2022-09-16 NOTE — PLAN OF CARE
SLP: Per chart review, pt/family have elected to transition to comfort cares. Will complete ST orders.

## 2022-09-16 NOTE — DEATH PRONOUNCEMENT
MD DEATH PRONOUNCEMENT    Called to pronounce Kody Reynaga dead.    Physical Exam: Unresponsive to noxious stimuli, Spontaneous respirations absent, Breath sounds absent, Carotid pulse absent, Heart sounds absent, Pupillary light reflex absent and Corneal blink reflex absent    Patient was pronounced dead at 4:40 PM, 2022.    Preliminary Cause of Death: Acute hypoxic respiratory failure 2/2 aspiration pneumonia    Active Problems:    Prerenal acute renal failure (H)    Severe sepsis with lactic acidosis (H)    Hyperammonemia (H)    Uremia    Acute renal failure superimposed on chronic kidney disease, unspecified CKD stage, unspecified acute renal failure type (H)    Lab test negative for COVID-19 virus    Acute kidney failure, unspecified (H)     Infectious disease present?: NO    Communicable disease present? (examples: HIV, chicken pox, TB, Ebola, CJD) :  NO    Multi-drug resistant organism present? (example: MRSA): NO    Please consider an autopsy if any of the following exist:  NO Unexpected or unexplained death during or following any dental, medical, or surgical diagnostic treatment procedures.   NO Death of mother at or up to seven days after delivery.     NO All  and pediatric deaths.     NO Death where the cause is sufficiently obscure to delay completion of the death certificate.   NO Deaths in which autopsy would confirm a suspected illness/condition that would affect surviving family members or recipients of transplanted organs.     The following deaths must be reported to the 's Office:  NO A death that may be due entirely or in part to any factors other than natural disease (recent surgery, recent trauma, suspected abuse/neglect).   NO A death that may be an accident, suicide, or homicide.     NO Any sudden, unexpected death in which there is no prior history of significant heart disease or any other condition associated with sudden death.   NO A death under suspicious,  unusual, or unexpected circumstances.    NO Any death which is apparently due to natural causes but in which the  does not have a personal physician familiar with the patient s medical history, social, or environmental situation or the circumstances of the terminal event.   NO Any death apparently due to Sudden Infant Death Syndrome.     NO Deaths that occur during, in association with, or as consequences of a diagnostic, therapeutic, or anesthetic procedure.   NO Any death in which a fracture of a major bone has occurred within the past (6) six months.   NO A death of persons note seen by their physician within 120 days of demise.     NO Any death in which the  was an inmate of a public institution or was in the custody of Law Enforcement personnel.   NO  All unexpected deaths of children   NO Solid organ donors   NO Unidentified bodies   NO Deaths of persons whose bodies are to be cremated or otherwise disposed of so that the bodies will later be unavailable for examination;   NO Deaths unattended by a physician outside of a licensed healthcare facility or licensed residential hospice program   NO Deaths occurring within 24 hours of arrival to a health care facility if death is unexpected.    NO Deaths associated with the decedent s employment.   NO Deaths attributed to acts of terrorism.   NO Any death in which there is uncertainty as to whether it is a medical examiner s care should be discussed with the medical investigator.        Body disposition: Autopsy was discussed with family member:  Spouse in person.  Permission for autopsy was declined.    Marci Chairez MD  Internal Medicine, PGY-1

## 2022-09-16 NOTE — CONSULTS
Progress Note - [facility]     ______________________________________________________________________     [facility] 24/7 Contact Number: [phone]    - Providers: Please contact [facility] with changes in orders or clinical plan of care   - Nursing: Please contact [facility] with significant changes in patient condition  ______________________________________________________________________    [facility] would like to thank you for the [facility] - IP referral    Referral received.and initial insurance information sent to  Hospice intake.    Reviewing eligibility with intake team and medical director at this time.         Tatianna Alan RN on 9/16/2022 at 12:44 PM   DON Saini, RN  Clinical Nurse Liaison I [facility]  Cell: [phone]  [facility]  Office: [phone]  Email [email or web address]    [email or web address]

## 2022-09-16 NOTE — PROGRESS NOTES
Antimicrobial Stewardship Team Note    Antimicrobial Stewardship Program - A joint venture between [facility] and  Physicians to optimize antibiotic management.  NOT a formal consult - Restricted Antimicrobial Review     Patient: Kody Reynaga  MRN: [id number]  Allergies: Patient has no known allergies.    Brief Summary: Kody Reynaga is a 77 year-old male with PMH of portal vein thrombosis (on apixaban), recurrent ascites, chronic pancreatitis, biliary strictures (benign on EGD 5/10/22) with multiple stents in [id number], prostate cancer (s/p prostatectomy), SDH (s/p craniotomy), hypertension, and dyslipidemia. He was admitted on 9/6/22 for worsening EUN, fatigue, and failure to thrive.    History of Present Illness: Upon admission, physical exam notable for distended abdomen with diffuse tenderness without rebound or guarding, was alert and disoriented as not able to answer questions. CT of abdomen and pelvis on 9/6/22 showed increasing intrahepatic biliary dilation from 8/11/2022, increasing moderate to large volume abdominal ascites, and new partially imaged right pleural effusion. Abdominal ultrasound was also obtained on 9/6/22 and showed cirrhotic configuration of liver with evidence of portal hypertension, no suspicious foal hepatic lesion. On 9/7/22, patient underwent paracentesis and 2L of fluid were removed and revealed < 250 PMNs, no organisms on gram stain or cultures. Empiric ceftriaxone was started then discontinued on 9/9/22 per AMT recommendations. C. Difficile toxin B PCR resulted positive on 9/8/22 and was started on PO vancomycin with current end date of 10 days (EOT 9/18/22).     Since then, patient has undergone multiple paracentesis with 5L fluid removal on 9/10/22 and 4L removal on 9/12 with no growth to date on ascitic fluid. On 9/11/22, rapid response was called for acute hypoxic respiratory failure likely due to aspiration, blood cultures obtained which are no growth  to date, and was transferred to [facility]. On 9/12, another rapid response called for increased oxygenation requirements, Zosyn started and transferred to MICU. Patient was started on a short trial of HD on 9/13/22 to assist with volume removal and hopes for improvement in mental status, however patient has poorly tolerated HD due to hypotension despite being on midodrine. With increasing oxygen requirements (requiring up to 50L oxygen via BiPAP) and uptrending WBCs (from 18.9 on 9/12 to 35.0 on 9/15), Zosyn broadened to vancomycin and meropenem on 9/15. Decision was made to transition to comfort cares on 9/16 AM.         Active Anti-infective Medications   (From admission, onward)                 Start     Stop    09/15/22 1930  meropenem  500 mg,   Intravenous,   EVERY 24 HOURS        Intra-Abdominal Infection        --    09/15/22 1746  Vancomycin [facility] - Receiving Intermittent Dosing  1 each,   Intravenous,   SEE ADMIN INSTRUCTIONS        Intra-Abdominal Infection        --    09/13/22 2030  vancomycin  125 mg,   Oral or Feeding Tube,   4 TIMES DAILY        Clostridioides difficile        09/18/22 9421                  Assessment: Worsening hypoxic respiratory failure and leukocytosis; concern for aspiration pneumonia; CDI  Given transition to comfort cares, overall antibiotic plan will depend on what is in line with overall comfort care plans. If opting to continue antibiotics, it is reasonable to discontinue vancomycin and meropenem and resume Zosyn to complete a 7-day course for possible aspiration pneumonia; however, this may contribute to undesirable GI symptoms in the setting of active C diff infection. Continuing oral vancomycin is reasonable as it may be beneficial in setting of C. Difficile infection. If opting to not continue antibiotic therapy, it is reasonable to discontinue systemic antibiotics altogether.    Recommendations:  If continuing systemic antibiotics: discontinue vancomycin and  meropenem, resume Zosyn to complete total 7-day course  If not continuing systemic antibiotics: discontinue altogether  Continue oral vancomycin to complete 10-day course    Pharmacy took the following actions: Electronic note created.    Discussed with ID Staff: Sajan Williamson MD and Capri Ugarte, PharmD, BCIDP  Matteo Matias, PharmD, MPH  PGY2 ID Pharmacy Resident

## 2022-09-16 NOTE — PROGRESS NOTES
"SPIRITUAL HEALTH SERVICES  SPIRITUAL ASSESSMENT Progress Note  [facility] 4C     REFERRAL SOURCE: Follow up     Pt is comfort care and wife is at bedside with more family expected later today.  Pt's wife said that pt told her \"I'm ready to go.\"  Wife reports not sleeping well but is grateful that her family is here with her and that her daughter spent the day at the hospital with her yesterday. She also said that pt has an outpouring of prayers from family and friends through his [employer] page.  provided support and will continue to visit throughout the day as family arrives.     PLAN: SHS will remain available     Ana Tillman  Pager: [phone]    "

## 2022-09-16 NOTE — DISCHARGE SUMMARY
[facility]    Death Summary - MICU    Date of Admission:  9/6/2022  Date of Death: 9/16/2022   Attending Provider: Dr. Mckenna    Discharge Diagnoses   Acute Hypoxic Respiratory Failure  Acute Toxic Metabolic Encephalopathy   Hepatic Encephalopathy  Cirrhosis  Portal Hypertension  Portal Vein Thrombosis  Acute on Chronic Kidney Disease  Hepatorenal Syndrome  C. Difficile Infection  Aspiration Pneumonia    Cause of death: Respiratory failure    Hospital Course   Kody Reynaga was admitted on 9/6/2022 for fatigue, EUN and failure to thrive.  The following problems were addressed during his hospitalization:     #Acute Hypoxic Respiratory Failure- Multifactorial (Aspiration Pneumonia, Ascites, rib fractures and likely hypervolemia)  #Aspiration pneumonia  #Acute on chronic kidney disease, 2/2 Hepatorenal Syndrome  #Acute Metabolic Encephalopathy most likely secondary to worsening uremia   #Decompensated cirrhosis with ascites, HE, portal hypertension  He has been managed on the floor with recurrent paracentesis and medical management of his ongoing hepatorenal disease in addition to C diff but had escalating O2 requirements in the setting of possible aspiration pneumonia being treated with Zosyn, transferred to the MICU on 9/12/22 with concern for potential intubation.  Palliative care was consulted on admission to the ICU. During the patient's ICU stay he alternated between needing high flow nasal cannula and BiPAP for respiratory support, but never was intubated.  He was treated with Zosyn for aspiration pneumonia.  There is no evidence of other infectious sources, with a diagnostic paracentesis which did not show SBP.  4 L of ascitic fluid was removed during this paracentesis.  Urine culture was not able to be obtained due to patient's anuria.  Blood cultures showed no growth.  Patient developed worsening renal failure with anuria, likely related in part to hepatorenal  syndrome.  Nephrology was consulted and family elected to ultimately trial a short course of dialysis for renal clearance.  Patient continued to have waxing and waning encephalopathy likely due to uremia, as well as hepatic encephalopathy and ICU delirium.  Given lack of improvement in mental status, continued signficant support needs despite aggressive interventions patient's family decided that it would align with patient's goals of care to go comfort cares, which was done on 2022.  Patient was taken off of BiPAP in the afternoon of  and  a short time later.        Felipe Padilla MD  [facility]    ______________________________________________________________________      Significant Results and Procedures       Consultations This Hospital Stay   GI HEPATOLOGY ADULT IP CONSULT  NEPHROLOGY GENERAL ADULT IP CONSULT  INTERNAL MEDICINE PROCEDURE TEAM ADULT IP CONSULT [facility]  PHYSICAL THERAPY ADULT IP CONSULT  OCCUPATIONAL THERAPY ADULT IP CONSULT  NEPHROLOGY GENERAL ADULT IP CONSULT  UROLOGY IP CONSULT  WOUND OSTOMY CONTINENCE NURSE  IP CONSULT  CARE MANAGEMENT / SOCIAL WORK IP CONSULT  NURSING TO CONSULT FOR VASCULAR ACCESS CARE IP CONSULT  NURSING TO CONSULT FOR VASCULAR ACCESS CARE IP CONSULT  INTERNAL MEDICINE PROCEDURE TEAM ADULT IP CONSULT [facility]  PALLIATIVE CARE ADULT IP CONSULT  PHYSICAL THERAPY ADULT IP CONSULT  OCCUPATIONAL THERAPY ADULT IP CONSULT  SPEECH LANGUAGE PATH ADULT IP CONSULT  NUTRITION SERVICES ADULT IP CONSULT  NUTRITION SERVICES ADULT IP CONSULT  PHARMACY IP CONSULT  UROLOGY IP CONSULT  PHARMACY TO DOSE VANCO  [facility] INPATIENT HOSPICE ADULT CONSULT    Primary Care Physician   Jerel Curran

## 2022-09-16 NOTE — PLAN OF CARE
Goal Outcome Evaluation:    Plan of Care Reviewed With: patient     Overall Patient Progress: no change    Major Shift Events: Received 250mL LR bolus. Lactic now down to 3.7, team aware. Agitated overnight. Pulling off Bipap mask and SpO2 probe. Pt educated. Follows commands but disoriented to time, place, and situation. Patient frequently reoriented. SR and BP stable. BiPap 100% 14/8. SpO2 90s. No ABG ordered this shift. Tube feeds stopped from 1930 to 2300 for abdominal ultrasound. Tube feeds now running at goal rate 45mL/hr. Rectal tube output >1L yesterday. Put out around 300mL overnight.     Plan: Wean Bi-pap as tolerated. Discuss plan of care with family.   For vital signs and complete assessments, please see documentation flowsheets.

## 2022-09-17 LAB
BACTERIA BLD CULT: NO GROWTH
BACTERIA BLD CULT: NO GROWTH
BACTERIA FLD CULT: NO GROWTH

## 2022-09-17 NOTE — PLAN OF CARE
Pt  at 1640 w/family at BS. Hydromorphone 0.3mg x2 and ativan 1mg given x2.   [place] services obtained .

## 2022-09-17 NOTE — PROGRESS NOTES
[facility]  Palliative Care Daily Progress Note       Recommendations/discussion     I met with Kody and his family, including his wife and adult children at Kody's bedside.  Prior to my visit he had been transitioned to a comfort focused pathway and restorative treatments had been discontinued.  Kody his wife had a number of questions about his clinical appearance, how to assess for restlessness or pain, what to do after that Kody's death in terms of any necessary contact with the medical examiner, addressing any issues about death certificate, and transition between the hospital and  home.  I answered her questions; her son has arranged for a  home which will provide appropriate services.  I explained the roles of benzodiazepines, anticholinergics and opioids and management of end-of-life symptoms.  Kody was experiencing some mild death rattle symptoms and this was explained to the family.  Kody had been comfortable for most of the afternoon but was beginning to experience a bit of restlessness and a dose of lorazepam was administered by RN, which appeared to help significantly.        Assessments          Kody Reynaga is a 77 year old male admitted on 2022 via EMS with a past medical history of portal vein thrombosis (on AC) with recurrent ascites, chronic pancreatitis, prostate cancer (S/P prostatectomy), SDH (S/P craniotomy), HTN, dyslipidemia and noting a recent admission (-9/3/22) for EUN and worsening ascites.   Primary concerns  were worsening EUN, fatigue and failure to thrive and after some time on medicine service- receiving supportive interventions such as recurrent paracentesis and medical management of his ongoing hepatorenal disease and C-diff,  he continued to have escalating O2 requirements in the setting of possible aspiration pneumonia - treated with Zosyn, transferred to the MICU on 22 with concern for  potential intubation  His kidney failure progressed to needing to start dialysis as encephalopathy had worsened. Plan for ongoing medical interventions being balanced against quality of life considerations--> palliative care consult..  Hoping for trial of HD to assess cognitive improvement. GI is not planning any interventions or further work up.      Today, 9/16 the patient was seen for PC follow up related to above MSOF.     Prognosis, Goals, or Advance Care Planning was addressed today with: Yes.  Mood, coping, and/or meaning in the context of serious illness were addressed today: Yes.  Summary/Comments: see notes.    Matteo CHISHOLM NP  Nurse Practitioner- Advanced Practice Provider  [facility] Palliative Medicine Consult Service   [phone]  TT spent: 29 minutes of which 35 minutes were spent in direct face to face contact with patient/family.  Greater than 50% of time spent counseling and/or coordinating care. [id number]       Interval History:     Kody transition to a comfort focused pathway of care earlier today, anticipating primary focus on comfort as well as end-of-life within hours/days of discontinuation of restorative cares.  Per family report he has been resting comfortably for most of the afternoon with minimal agitation or other signs of distress.           Review of Systems:     A system ROS was unreliable due to AMS likely multifactorial in nature.           Medications:     I have reviewed this patient's medication profile and medications during this hospitalization.  Currently receiving as needed hydromorphone and lorazepam.           Physical Exam:   General: Appears generally comfortable although during my time in the room he became a bit more restless.  See comments above  CV regular radial pulse 100  ENT: Mild/moderate saliva pooling (death rattle) in posterior oropharynx; not distressing to patient or family  Extremities no mottling; warm.             Data Reviewed:     MAR reviewed

## 2022-09-19 LAB — BACTERIA BLD CULT: NO GROWTH

## 2022-09-21 LAB
BACTERIA BLD CULT: ABNORMAL
BACTERIA BLD CULT: ABNORMAL

## 2022-09-22 ASSESSMENT — ENCOUNTER SYMPTOMS
VOMITING: 1
NAUSEA: 1

## 2023-01-26 NOTE — PHARMACY-CONSULT NOTE
Pharmacy Tube Feeding Consult    Medication reviewed for administration by feeding tube and for potential food/drug interactions.    Recommendation: No changes are needed at this time.     Pharmacy will continue to follow as new medications are ordered.     84

## 2024-11-07 NOTE — ANESTHESIA CARE TRANSFER NOTE
Patient: Kody Reynaga    Procedure: Procedure(s):  ENDOSCOPIC RETROGRADE CHOLANGIOPANCREATOGRAPHY, pyloric dilation, bebris removal, biliary stent exchange       Diagnosis: Common bile duct stricture [K83.1]  Diagnosis Additional Information: No value filed.    Anesthesia Type:   No value filed.     Note:    Oropharynx: oropharynx clear of all foreign objects and spontaneously breathing  Level of Consciousness: awake  Oxygen Supplementation: nasal cannula    Independent Airway: airway patency satisfactory and stable  Dentition: dentition unchanged  Vital Signs Stable: post-procedure vital signs reviewed and stable  Report to RN Given: handoff report given  Patient transferred to: PACU  Comments: Patient transferred to PACU instable condition, breathing spontaneously on NC, VSS.  Report given to RN.  Handoff Report: Identifed the Patient, Identified the Reponsible Provider, Reviewed the pertinent medical history, Discussed the surgical course, Reviewed Intra-OP anesthesia mangement and issues during anesthesia, Set expectations for post-procedure period and Allowed opportunity for questions and acknowledgement of understanding      Vitals:  Vitals Value Taken Time   BP     Temp     Pulse 80 05/10/22 1215   Resp 21 05/10/22 1215   SpO2 100 % 05/10/22 1215   Vitals shown include unvalidated device data.    Electronically Signed By: KATHRINE Ghotra CRNA  May 10, 2022  12:16 PM   Tympanic membrane and external ear normal.      Left Ear: Tympanic membrane and external ear normal.      Ears:      Comments: Middle ear well aerated.     Nose: Nose normal.      Mouth/Throat:      Mouth: Mucous membranes are moist.      Dentition: Normal dentition. No gingival swelling or dental caries.      Pharynx: Oropharynx is clear. Uvula midline.      Comments: Gums appear healthy.   No thrush no mucositis  Eyes:      General: Vision grossly intact.      Extraocular Movements: Extraocular movements intact.      Conjunctiva/sclera: Conjunctivae normal.      Pupils: Pupils are equal, round, and reactive to light.   Neck:      Vascular: No carotid bruit.      Comments: Thyroid is normal in size.  Carotids 2+ and equal bilaterally  Cardiovascular:      Rate and Rhythm: Normal rate and regular rhythm.      Pulses: Normal pulses.      Heart sounds: Normal heart sounds, S1 normal and S2 normal. No murmur heard.     No friction rub. No gallop.      Comments: Pedal pulses 2+ and equal bilaterally  Pulmonary:      Effort: Pulmonary effort is normal.      Breath sounds: Normal breath sounds.   Abdominal:      General: Bowel sounds are normal. There is no distension.      Palpations: Abdomen is soft. There is no mass.      Tenderness: There is no abdominal tenderness. There is no guarding or rebound.      Hernia: No hernia is present.      Comments: No rigidity   Musculoskeletal:         General: Normal range of motion.      Right shoulder: Normal.      Left shoulder: Normal.      Right upper arm: Normal.      Left upper arm: Normal.      Cervical back: Normal range of motion.      Right hip: Normal.      Left hip: Normal.      Right upper leg: Normal.      Left upper leg: Normal.      Right lower leg: Normal.      Left lower leg: Normal.      Right foot: Normal.      Left foot: Normal.   Lymphadenopathy:      Comments: No palpable or visible regional lymphadenopathy   Skin:     General: Skin is warm and dry.

## 2025-05-19 NOTE — PLAN OF CARE
Goal Outcome Evaluation:    Plan of Care Reviewed With: patient     Overall Patient Progress: no change    Time: 9154-9368  Neuros: A&O x2, patient disoriented to time and situation. Very pleasant. Slow to respond at times. Bed alarm on for safety.   Cardiac: WDL  Respiratory: WDL except dyspnea when ambulating, desatting to 89%, placed on 1L O2 via nasal cannula. Satting >90% on room air when in bed.  GI/: Aparicio in place for strict I&O, adequate urine output. LBM 9/9, pt is incontinent of bowel with smears of loose stools throughout the shift. Pt reports abdominal discomfort due distention from worsening ascites.   Diet/Nausea: Tolerating 2g sodium diet & 1800mL fluid restriction. About 500mL intake between 3753-3114. Denies nausea.  Skin: Pt has small wound on forehead, wound cares completed today. Pt also has large bruise on right side of abdomen leading to the back & a bruise on the left upper arm.   LDA: Aparicio in place. Right PIV x2 saline locked.   Labs: Reviewed  Pain: Pt complains of abdominal pain, refuses interventions. Pt complained of neck pain early in the shift, heat pack applied.   Activity: Assist x1-2. Pt walked with physical therapy with assist x1 with gait belt and walker. Up to chair for 1hr.   New changes this shift: Wife present throughout the day, she received updates from the team.   Plan: Continue with oral antibiotics and continue to monitor. Eventual TCU placement.       PAST MEDICAL HISTORY:  Bipolar disorder     H/O iron deficiency     H/O osteoporosis     H/O sinus tachycardia     Hyperlipidemia     Hypothyroidism     Intellectual disability     Right bundle branch block     Seizure disorder

## (undated) DEVICE — ENDO SNARE POLYPECTOMY OVAL 15MM LOOP SD-240U-15

## (undated) DEVICE — KIT CONNECTOR FOR OLYMPUS ENDOSCOPES DEFENDO 100310

## (undated) DEVICE — MMIS - BLANKET WRM ADULT 85.8X50IN FORCE AIR LF WHT

## (undated) DEVICE — ENDO DEVICE LOCKING AND BIOPSY CAP M00545261

## (undated) DEVICE — MMIS - SYRINGE 1ML SLIP TIP STRL TB LF DISP

## (undated) DEVICE — MMIS - DEVICE BIOPSY 25GA ACQUIRE ENDO US FINE NDL STRL

## (undated) DEVICE — WIRE GUIDE 0.025"X270CM ANG VISIGLIDE G-240-2527A

## (undated) DEVICE — SUCTION MANIFOLD NEPTUNE 2 SYS 4 PORT 0702-020-000

## (undated) DEVICE — CATH RETRIEVAL BALLOON EXTRACTOR PRO RX-S INJ ABOVE 15-18MM

## (undated) DEVICE — MMIS - SPHNTM 200CM 4MM 5FR HUIBREGTSE 1 LUM NDL KNF

## (undated) DEVICE — ENDO FORCEP ENDOJAW BIOPSY 2.0MMX155CM FB-221K

## (undated) DEVICE — SOL WATER IRRIG 1000ML BOTTLE 2F7114

## (undated) DEVICE — Device

## (undated) DEVICE — INFLATION DEVICE BIG 60 ENDO-AN6012

## (undated) DEVICE — MMIS - SOLUTION IRRIGATION SODIUM CHLORIDE 0.9% 250ML

## (undated) DEVICE — MMIS - CATHETER SPEC RTRVL ML3V 5.5-7FR 15MM 190CM 3 LUM

## (undated) DEVICE — ENDO FUSION OMNI-TOME G31903

## (undated) DEVICE — MMIS - PACK SURG CTRCT

## (undated) DEVICE — KIT ENDO FIRST STEP DISINFECTANT 200ML W/POUCH EP-4

## (undated) DEVICE — MMIS - SPHNTM 170CM 20MM 4.5FR CLEVERCUT3V BIL PNCR 3 LUM

## (undated) DEVICE — MMIS - DEVICE GUIDEWIRE LOCKING W/ BIOPSY CAP

## (undated) DEVICE — BIOPSY VALVE BIOSHIELD 00711135

## (undated) DEVICE — MMIS - COVER BACK REINFORCE LF DISP 90X44IN TBL CNVRT

## (undated) DEVICE — ENDO PROBE COVER ULTRASOUND BALLOON LATEX  MAJ-249

## (undated) DEVICE — PACK ENDOSCOPY GI CUSTOM UMMC

## (undated) DEVICE — MMIS - EXPANDER OPHTHALMIC 6.25MM MALYUGIN RING PUPIL

## (undated) DEVICE — CATH RETRIEVAL BALLOON EXTRACTOR PRO RX-S INJ ABOVE 9-12MM

## (undated) DEVICE — INTR ENDOSCOPIC STENT FUSION OASIS 09.0FRX200CM

## (undated) DEVICE — MMIS - TUBING PACK SIGNATURE PRO

## (undated) DEVICE — MMIS - BRUSH CYTO 200CMX2.1MM 8FR RX ACCEPTS .035IN GW

## (undated) DEVICE — MMIS - GLOVE SURG 6 PROTEXIS LF CRM PF SMTH BEAD CUFF

## (undated) DEVICE — MMIS - SHIELD EYE UNV VNT DEEP SHELL PLYCRB CLR

## (undated) DEVICE — ENDO BITE BLOCK ADULT OMNI-BLOC

## (undated) DEVICE — MMIS - CLASP SURG GRAY 2.3X1.13X.5IN SUPERCLIP SECUREMENT

## (undated) DEVICE — MMIS - COVER EQUIPMENT CVR KAPS 20IN DISP KAP

## (undated) DEVICE — CATH SPYSCOPE DIGITAL ACCESS DS DISP M00546600

## (undated) DEVICE — ENDO CAP AND TUBING STERILE FOR ENDOGATOR  100130

## (undated) DEVICE — MMIS - KIT PACK ENDO

## (undated) DEVICE — BALLOON ELATION 180CML 11-12-13.5-15-16MM 5.5CM EPX12

## (undated) DEVICE — MMIS - SOLUTION IRR 250ML H2O PLASTIC POUR BTL N-PYRG

## (undated) DEVICE — MMIS - GUIDEWIRE JGWR DRM TIP STRGT .025IN 450CM ENDO

## (undated) DEVICE — ENDO TUBING CO2 SMARTCAP STERILE DISP 100145CO2EXT

## (undated) DEVICE — BALLOON EXTRACTION 15X1950MM 3.2MM TL B-V243Q-A

## (undated) DEVICE — MMIS - ELECTRODE PT RTN C30- LB 9FT CORD NONIRRITATE

## (undated) DEVICE — FORCEP BIOPSY SPYBITE MAX 286CMX1.2MM M00546470

## (undated) DEVICE — BALLOON ELATION 180CML 14-15-16.5-18-19MM 5.5CM EPX15

## (undated) DEVICE — MMIS - BLOCK BITE 60FR STURDY STRAP SDPRT DNTL RTNT RIM

## (undated) DEVICE — SPECIMEN CONTAINER 3OZ W/FORMALIN 59901

## (undated) DEVICE — MMIS - TAPE SURG TRNSPR 10YDX.5IN PLASTIC HPOAL WTR RES

## (undated) DEVICE — MMIS - CANNULA OPHTHALMIC 27GA 7/8IN 45D 10MM FLX TP SLV

## (undated) DEVICE — TUBING SUCTION 10'X3/16" N510

## (undated) DEVICE — MMIS - SPONGE GAUZE 4X4IN CTN 12 PLY HI ABS WOVEN STRL LF

## (undated) DEVICE — BALLOON EXTRACTION 20X1950MM 3.2MM TL B-V443Q-A

## (undated) DEVICE — ENDO SNARE POLYPECTOMY OVAL 25MM LOOP SD-240U-25

## (undated) DEVICE — MMIS - AGENT VSCELAS AMVS 1.2% SOD HALUR .8ML

## (undated) DEVICE — WIRE GUIDE 0.025"X450CM STR VISIGLIDE G-240-2545S

## (undated) DEVICE — MMIS - NEEDLE BIOPSY 22GA FINE ACQUIRE ENDO US

## (undated) DEVICE — CATH SPYGLASS DS 2 DELIVERY & ACCESS M00546610

## (undated) DEVICE — ENDO NDL ASPIRATION ULTRASOUND 22GA ACQUIRE M00555540

## (undated) DEVICE — PAD CHUX UNDERPAD 23X24" 7136

## (undated) RX ORDER — INDOMETHACIN 50 MG/1
SUPPOSITORY RECTAL
Status: DISPENSED
Start: 2022-01-01

## (undated) RX ORDER — SIMETHICONE 40MG/0.6ML
SUSPENSION, DROPS(FINAL DOSAGE FORM)(ML) ORAL
Status: DISPENSED
Start: 2022-01-01

## (undated) RX ORDER — ROCURONIUM BROMIDE 50 MG/5 ML
SYRINGE (ML) INTRAVENOUS
Status: DISPENSED
Start: 2022-01-01

## (undated) RX ORDER — PROPOFOL 10 MG/ML
INJECTION, EMULSION INTRAVENOUS
Status: DISPENSED
Start: 2022-01-01

## (undated) RX ORDER — EPHEDRINE SULFATE 50 MG/ML
INJECTION, SOLUTION INTRAMUSCULAR; INTRAVENOUS; SUBCUTANEOUS
Status: DISPENSED
Start: 2021-01-01

## (undated) RX ORDER — FENTANYL CITRATE 50 UG/ML
INJECTION, SOLUTION INTRAMUSCULAR; INTRAVENOUS
Status: DISPENSED
Start: 2022-01-01

## (undated) RX ORDER — LIDOCAINE HYDROCHLORIDE 20 MG/ML
INJECTION, SOLUTION EPIDURAL; INFILTRATION; INTRACAUDAL; PERINEURAL
Status: DISPENSED
Start: 2021-01-01

## (undated) RX ORDER — FENTANYL CITRATE-0.9 % NACL/PF 10 MCG/ML
PLASTIC BAG, INJECTION (ML) INTRAVENOUS
Status: DISPENSED
Start: 2021-01-01

## (undated) RX ORDER — PROPOFOL 10 MG/ML
INJECTION, EMULSION INTRAVENOUS
Status: DISPENSED
Start: 2021-01-01

## (undated) RX ORDER — DEXAMETHASONE SODIUM PHOSPHATE 4 MG/ML
INJECTION, SOLUTION INTRA-ARTICULAR; INTRALESIONAL; INTRAMUSCULAR; INTRAVENOUS; SOFT TISSUE
Status: DISPENSED
Start: 2022-01-01

## (undated) RX ORDER — ONDANSETRON 2 MG/ML
INJECTION INTRAMUSCULAR; INTRAVENOUS
Status: DISPENSED
Start: 2021-01-01

## (undated) RX ORDER — IOPAMIDOL 510 MG/ML
INJECTION, SOLUTION INTRAVASCULAR
Status: DISPENSED
Start: 2022-01-01

## (undated) RX ORDER — LIDOCAINE HYDROCHLORIDE 10 MG/ML
INJECTION, SOLUTION EPIDURAL; INFILTRATION; INTRACAUDAL; PERINEURAL
Status: DISPENSED
Start: 2022-01-01

## (undated) RX ORDER — FENTANYL CITRATE 50 UG/ML
INJECTION, SOLUTION INTRAMUSCULAR; INTRAVENOUS
Status: DISPENSED
Start: 2021-01-01

## (undated) RX ORDER — ONDANSETRON 2 MG/ML
INJECTION INTRAMUSCULAR; INTRAVENOUS
Status: DISPENSED
Start: 2022-01-01

## (undated) RX ORDER — ESMOLOL HYDROCHLORIDE 10 MG/ML
INJECTION INTRAVENOUS
Status: DISPENSED
Start: 2022-01-01

## (undated) RX ORDER — SODIUM CHLORIDE, SODIUM LACTATE, POTASSIUM CHLORIDE, CALCIUM CHLORIDE 600; 310; 30; 20 MG/100ML; MG/100ML; MG/100ML; MG/100ML
INJECTION, SOLUTION INTRAVENOUS
Status: DISPENSED
Start: 2021-01-01

## (undated) RX ORDER — GLYCOPYRROLATE 0.2 MG/ML
INJECTION, SOLUTION INTRAMUSCULAR; INTRAVENOUS
Status: DISPENSED
Start: 2022-01-01

## (undated) RX ORDER — FENTANYL CITRATE-0.9 % NACL/PF 10 MCG/ML
PLASTIC BAG, INJECTION (ML) INTRAVENOUS
Status: DISPENSED
Start: 2022-01-01

## (undated) RX ORDER — DEXAMETHASONE SODIUM PHOSPHATE 4 MG/ML
INJECTION, SOLUTION INTRA-ARTICULAR; INTRALESIONAL; INTRAMUSCULAR; INTRAVENOUS; SOFT TISSUE
Status: DISPENSED
Start: 2021-01-01